# Patient Record
Sex: FEMALE | Race: ASIAN | NOT HISPANIC OR LATINO | Employment: FULL TIME | URBAN - METROPOLITAN AREA
[De-identification: names, ages, dates, MRNs, and addresses within clinical notes are randomized per-mention and may not be internally consistent; named-entity substitution may affect disease eponyms.]

---

## 2021-02-01 ENCOUNTER — AMBULATORY - HEALTHEAST (OUTPATIENT)
Dept: NURSING | Facility: CLINIC | Age: 46
End: 2021-02-01

## 2021-02-22 ENCOUNTER — AMBULATORY - HEALTHEAST (OUTPATIENT)
Dept: NURSING | Facility: CLINIC | Age: 46
End: 2021-02-22

## 2023-10-10 ENCOUNTER — OFFICE VISIT (OUTPATIENT)
Dept: FAMILY MEDICINE | Facility: CLINIC | Age: 48
End: 2023-10-10
Payer: COMMERCIAL

## 2023-10-10 ENCOUNTER — NURSE TRIAGE (OUTPATIENT)
Dept: NURSING | Facility: CLINIC | Age: 48
End: 2023-10-10
Payer: COMMERCIAL

## 2023-10-10 VITALS
RESPIRATION RATE: 22 BRPM | DIASTOLIC BLOOD PRESSURE: 85 MMHG | TEMPERATURE: 99.1 F | SYSTOLIC BLOOD PRESSURE: 154 MMHG | HEART RATE: 90 BPM | OXYGEN SATURATION: 93 %

## 2023-10-10 DIAGNOSIS — R60.0 BILATERAL LOWER EXTREMITY EDEMA: ICD-10-CM

## 2023-10-10 DIAGNOSIS — R06.09 DYSPNEA ON EXERTION: Primary | ICD-10-CM

## 2023-10-10 LAB
ERYTHROCYTE [DISTWIDTH] IN BLOOD BY AUTOMATED COUNT: 15 % (ref 10–15)
HCT VFR BLD AUTO: 42.7 % (ref 35–47)
HGB BLD-MCNC: 14 G/DL (ref 11.7–15.7)
MCH RBC QN AUTO: 29.8 PG (ref 26.5–33)
MCHC RBC AUTO-ENTMCNC: 32.8 G/DL (ref 31.5–36.5)
MCV RBC AUTO: 91 FL (ref 78–100)
PLATELET # BLD AUTO: 124 10E3/UL (ref 150–450)
RBC # BLD AUTO: 4.7 10E6/UL (ref 3.8–5.2)
WBC # BLD AUTO: 16.3 10E3/UL (ref 4–11)

## 2023-10-10 PROCEDURE — 80053 COMPREHEN METABOLIC PANEL: CPT | Performed by: STUDENT IN AN ORGANIZED HEALTH CARE EDUCATION/TRAINING PROGRAM

## 2023-10-10 PROCEDURE — 36415 COLL VENOUS BLD VENIPUNCTURE: CPT | Performed by: STUDENT IN AN ORGANIZED HEALTH CARE EDUCATION/TRAINING PROGRAM

## 2023-10-10 PROCEDURE — 83880 ASSAY OF NATRIURETIC PEPTIDE: CPT | Performed by: STUDENT IN AN ORGANIZED HEALTH CARE EDUCATION/TRAINING PROGRAM

## 2023-10-10 PROCEDURE — 85027 COMPLETE CBC AUTOMATED: CPT | Performed by: STUDENT IN AN ORGANIZED HEALTH CARE EDUCATION/TRAINING PROGRAM

## 2023-10-10 PROCEDURE — 84443 ASSAY THYROID STIM HORMONE: CPT | Performed by: STUDENT IN AN ORGANIZED HEALTH CARE EDUCATION/TRAINING PROGRAM

## 2023-10-10 PROCEDURE — 99204 OFFICE O/P NEW MOD 45 MIN: CPT | Performed by: STUDENT IN AN ORGANIZED HEALTH CARE EDUCATION/TRAINING PROGRAM

## 2023-10-10 NOTE — TELEPHONE ENCOUNTER
Pt calling with concerns about;    Has been experiencing following symptoms intermittently since sudden onset one day last winter of 2023.    States she was walking into a school to  her nephew on an extremely cold/windy day.  When she tried to walk back to her car in parking lot- it took her almost 30 minutes d/t being so out of breath.    Since that time she has had increasingly frequent episodes of unusual fatigue, difficulty doing some physical activities like stairs and taking longer periods of rest to gain any energy.    Sometimes has some wheezing  Pt reports she has not been seen by a provider for long length of time but does have upcoming appt for physical.    Pt is concerned today because of extreme fatigue, some wheezing.    Denies;  Chest pain/pressure  Fever  Severe cough  Signs/sx of cold/flu/recent illness    According to the protocol, patient should go to ED/UC (no PCP for 2LT).  Care advice given. Patient verbalizes understanding and agrees with plan of care. Transferred to scheduling.     Lorraine Rincon RN, Nurse Advisor 3:09 PM 10/10/2023  Reason for Disposition   Patient sounds very sick or weak to the triager    Additional Information   Negative: SEVERE difficulty breathing (e.g., struggling for each breath, speaks in single words, pulse > 120)   Negative: Breathing stopped and hasn't returned   Negative: Choking on something   Negative: Bluish (or gray) lips or face   Negative: Difficult to awaken or acting confused (e.g., disoriented, slurred speech)   Negative: Passed out (i.e., fainted, collapsed and was not responding)   Negative: Wheezing started suddenly after medicine, an allergic food, or bee sting   Negative: Stridor (harsh sound while breathing in)   Negative: Slow, shallow and weak breathing   Negative: Sounds like a life-threatening emergency to the triager   Negative: Chest pain   Negative: Wheezing (high pitched whistling sound) and previous asthma attacks or use of asthma  "medicines   Negative: Difficulty breathing and within 14 days of COVID-19 Exposure   Negative: Difficulty breathing and only present when coughing   Negative: Difficulty breathing and only from stuffy nose   Negative: Difficulty breathing and only from stuffy nose or runny nose from common cold   Negative: MODERATE difficulty breathing (e.g., speaks in phrases, SOB even at rest, pulse 100-120) of new-onset or worse than normal   Negative: Oxygen level (e.g., pulse oximetry) 90% or lower   Negative: Wheezing can be heard across the room   Negative: Drooling or spitting out saliva (because can't swallow)   Negative: Any history of prior \"blood clot\" in leg or lungs   Negative: Illness requiring prolonged bedrest in past month (e.g., immobilization, long hospital stay)   Negative: Hip or leg fracture (broken bone) in past month (or had cast on leg or ankle in past month)   Negative: Major surgery in the past month   Negative: Long-distance travel in past month (e.g., car, bus, train, plane; with trip lasting 6 or more hours)   Negative: Cancer treatment in past six months (or has cancer now)   Negative: Extra heartbeats, irregular heart beating, or heart is beating very fast (i.e., 'palpitations')   Negative: Fever > 103 F (39.4 C)   Negative: Fever > 101 F (38.3 C) and over 60 years of age   Negative: Fever > 100.0 F (37.8 C) and bedridden (e.g., nursing home patient, stroke, chronic illness, recovering from surgery)   Negative: Fever > 100.0 F (37.8 C) and diabetes mellitus or weak immune system (e.g., HIV positive, cancer chemo, splenectomy, organ transplant, chronic steroids)   Negative: Periods where breathing stops and then resumes normally and bedridden (e.g., nursing home patient, CVA)   Negative: Pregnant or postpartum (from 0 to 6 weeks after delivery)    Protocols used: Breathing Difficulty-A-[place]    "

## 2023-10-11 ENCOUNTER — HOSPITAL ENCOUNTER (INPATIENT)
Facility: HOSPITAL | Age: 48
LOS: 6 days | Discharge: HOME | End: 2023-10-17
Attending: EMERGENCY MEDICINE | Admitting: EMERGENCY MEDICINE
Payer: COMMERCIAL

## 2023-10-11 ENCOUNTER — APPOINTMENT (OUTPATIENT)
Dept: CT IMAGING | Facility: HOSPITAL | Age: 48
End: 2023-10-11
Attending: EMERGENCY MEDICINE
Payer: COMMERCIAL

## 2023-10-11 ENCOUNTER — APPOINTMENT (OUTPATIENT)
Dept: ULTRASOUND IMAGING | Facility: HOSPITAL | Age: 48
End: 2023-10-11
Attending: EMERGENCY MEDICINE
Payer: COMMERCIAL

## 2023-10-11 ENCOUNTER — APPOINTMENT (OUTPATIENT)
Dept: RADIOLOGY | Facility: HOSPITAL | Age: 48
End: 2023-10-11
Attending: EMERGENCY MEDICINE
Payer: COMMERCIAL

## 2023-10-11 DIAGNOSIS — R73.9 BLOOD GLUCOSE ELEVATED: ICD-10-CM

## 2023-10-11 DIAGNOSIS — N28.9 RENAL INSUFFICIENCY: ICD-10-CM

## 2023-10-11 DIAGNOSIS — J69.0 ASPIRATION PNEUMONIA OF LOWER LOBE, UNSPECIFIED ASPIRATION PNEUMONIA TYPE, UNSPECIFIED LATERALITY (H): ICD-10-CM

## 2023-10-11 DIAGNOSIS — I50.21 ACUTE SYSTOLIC CONGESTIVE HEART FAILURE (H): ICD-10-CM

## 2023-10-11 DIAGNOSIS — N28.89 RENAL MASS: ICD-10-CM

## 2023-10-11 DIAGNOSIS — J90 PLEURAL EFFUSION: ICD-10-CM

## 2023-10-11 DIAGNOSIS — R60.0 PERIPHERAL EDEMA: ICD-10-CM

## 2023-10-11 DIAGNOSIS — R78.81 BACTEREMIA: ICD-10-CM

## 2023-10-11 DIAGNOSIS — N10 PYELONEPHRITIS, ACUTE: Primary | ICD-10-CM

## 2023-10-11 DIAGNOSIS — I10 HYPERTENSION, UNSPECIFIED TYPE: ICD-10-CM

## 2023-10-11 LAB
ALBUMIN SERPL BCG-MCNC: 3.7 G/DL (ref 3.5–5.2)
ALBUMIN SERPL BCG-MCNC: 3.9 G/DL (ref 3.5–5.2)
ALBUMIN UR-MCNC: 100 MG/DL
ALP SERPL-CCNC: 60 U/L (ref 35–104)
ALP SERPL-CCNC: 77 U/L (ref 35–104)
ALT SERPL W P-5'-P-CCNC: 21 U/L (ref 0–50)
ALT SERPL W P-5'-P-CCNC: 23 U/L (ref 0–50)
ANION GAP SERPL CALCULATED.3IONS-SCNC: 10 MMOL/L (ref 7–15)
ANION GAP SERPL CALCULATED.3IONS-SCNC: 13 MMOL/L (ref 7–15)
APPEARANCE UR: ABNORMAL
AST SERPL W P-5'-P-CCNC: 23 U/L (ref 0–45)
AST SERPL W P-5'-P-CCNC: 25 U/L (ref 0–45)
BACTERIA #/AREA URNS HPF: ABNORMAL /HPF
BASO+EOS+MONOS # BLD AUTO: ABNORMAL 10*3/UL
BASO+EOS+MONOS NFR BLD AUTO: ABNORMAL %
BASOPHILS # BLD AUTO: 0 10E3/UL (ref 0–0.2)
BASOPHILS NFR BLD AUTO: 0 %
BILIRUB SERPL-MCNC: 1.8 MG/DL
BILIRUB SERPL-MCNC: 1.9 MG/DL
BILIRUB UR QL STRIP: NEGATIVE
BUN SERPL-MCNC: 16.8 MG/DL (ref 6–20)
BUN SERPL-MCNC: 20.6 MG/DL (ref 6–20)
CALCIUM SERPL-MCNC: 8.8 MG/DL (ref 8.6–10)
CALCIUM SERPL-MCNC: 9 MG/DL (ref 8.6–10)
CHLORIDE SERPL-SCNC: 104 MMOL/L (ref 98–107)
CHLORIDE SERPL-SCNC: 105 MMOL/L (ref 98–107)
CLUE CELLS: ABNORMAL
COLOR UR AUTO: YELLOW
CREAT SERPL-MCNC: 0.91 MG/DL (ref 0.51–0.95)
CREAT SERPL-MCNC: 0.98 MG/DL (ref 0.51–0.95)
DEPRECATED HCO3 PLAS-SCNC: 24 MMOL/L (ref 22–29)
DEPRECATED HCO3 PLAS-SCNC: 25 MMOL/L (ref 22–29)
EGFRCR SERPLBLD CKD-EPI 2021: 70 ML/MIN/1.73M2
EGFRCR SERPLBLD CKD-EPI 2021: 77 ML/MIN/1.73M2
EOSINOPHIL # BLD AUTO: 0 10E3/UL (ref 0–0.7)
EOSINOPHIL NFR BLD AUTO: 0 %
ERYTHROCYTE [DISTWIDTH] IN BLOOD BY AUTOMATED COUNT: 15.3 % (ref 10–15)
GLUCOSE SERPL-MCNC: 137 MG/DL (ref 70–99)
GLUCOSE SERPL-MCNC: 144 MG/DL (ref 70–99)
GLUCOSE UR STRIP-MCNC: NEGATIVE MG/DL
HBA1C MFR BLD: 5.8 %
HCT VFR BLD AUTO: 47.9 % (ref 35–47)
HGB BLD-MCNC: 15.3 G/DL (ref 11.7–15.7)
HGB UR QL STRIP: ABNORMAL
HOLD SPECIMEN: NORMAL
HYALINE CASTS: 4 /LPF
IMM GRANULOCYTES # BLD: 0.1 10E3/UL
IMM GRANULOCYTES NFR BLD: 1 %
KETONES UR STRIP-MCNC: ABNORMAL MG/DL
LACTATE SERPL-SCNC: 1.5 MMOL/L (ref 0.7–2)
LEUKOCYTE ESTERASE UR QL STRIP: ABNORMAL
LYMPHOCYTES # BLD AUTO: 0.6 10E3/UL (ref 0.8–5.3)
LYMPHOCYTES NFR BLD AUTO: 4 %
MCH RBC QN AUTO: 29.1 PG (ref 26.5–33)
MCHC RBC AUTO-ENTMCNC: 31.9 G/DL (ref 31.5–36.5)
MCV RBC AUTO: 91 FL (ref 78–100)
MONOCYTES # BLD AUTO: 0.9 10E3/UL (ref 0–1.3)
MONOCYTES NFR BLD AUTO: 6 %
MUCOUS THREADS #/AREA URNS LPF: PRESENT /LPF
NEUTROPHILS # BLD AUTO: 14.7 10E3/UL (ref 1.6–8.3)
NEUTROPHILS NFR BLD AUTO: 89 %
NITRATE UR QL: POSITIVE
NRBC # BLD AUTO: 0 10E3/UL
NRBC BLD AUTO-RTO: 0 /100
NT-PROBNP SERPL-MCNC: 5007 PG/ML (ref 0–450)
NT-PROBNP SERPL-MCNC: 5019 PG/ML (ref 0–450)
PH UR STRIP: 6 [PH] (ref 5–7)
PLATELET # BLD AUTO: 120 10E3/UL (ref 150–450)
POTASSIUM SERPL-SCNC: 3.4 MMOL/L (ref 3.4–5.3)
POTASSIUM SERPL-SCNC: 3.9 MMOL/L (ref 3.4–5.3)
PROT SERPL-MCNC: 7.5 G/DL (ref 6.4–8.3)
PROT SERPL-MCNC: 8.3 G/DL (ref 6.4–8.3)
RBC # BLD AUTO: 5.25 10E6/UL (ref 3.8–5.2)
RBC URINE: 14 /HPF
SODIUM SERPL-SCNC: 140 MMOL/L (ref 135–145)
SODIUM SERPL-SCNC: 141 MMOL/L (ref 135–145)
SP GR UR STRIP: 1.03 (ref 1–1.03)
SQUAMOUS EPITHELIAL: 2 /HPF
TRICHOMONAS, WET PREP: ABNORMAL
TROPONIN T SERPL HS-MCNC: 17 NG/L
TSH SERPL DL<=0.005 MIU/L-ACNC: 1.3 UIU/ML (ref 0.3–4.2)
UROBILINOGEN UR STRIP-MCNC: 4 MG/DL
WBC # BLD AUTO: 16.4 10E3/UL (ref 4–11)
WBC CLUMPS #/AREA URNS HPF: PRESENT /HPF
WBC URINE: >182 /HPF
WBC'S/HIGH POWER FIELD, WET PREP: ABNORMAL
YEAST, WET PREP: ABNORMAL

## 2023-10-11 PROCEDURE — 96366 THER/PROPH/DIAG IV INF ADDON: CPT

## 2023-10-11 PROCEDURE — 84484 ASSAY OF TROPONIN QUANT: CPT | Performed by: EMERGENCY MEDICINE

## 2023-10-11 PROCEDURE — 250N000011 HC RX IP 250 OP 636: Mod: JZ | Performed by: EMERGENCY MEDICINE

## 2023-10-11 PROCEDURE — 83880 ASSAY OF NATRIURETIC PEPTIDE: CPT | Performed by: EMERGENCY MEDICINE

## 2023-10-11 PROCEDURE — 74177 CT ABD & PELVIS W/CONTRAST: CPT

## 2023-10-11 PROCEDURE — 250N000013 HC RX MED GY IP 250 OP 250 PS 637: Performed by: EMERGENCY MEDICINE

## 2023-10-11 PROCEDURE — 36415 COLL VENOUS BLD VENIPUNCTURE: CPT | Performed by: EMERGENCY MEDICINE

## 2023-10-11 PROCEDURE — 83036 HEMOGLOBIN GLYCOSYLATED A1C: CPT | Performed by: EMERGENCY MEDICINE

## 2023-10-11 PROCEDURE — 93005 ELECTROCARDIOGRAM TRACING: CPT | Performed by: EMERGENCY MEDICINE

## 2023-10-11 PROCEDURE — 96375 TX/PRO/DX INJ NEW DRUG ADDON: CPT

## 2023-10-11 PROCEDURE — 120N000001 HC R&B MED SURG/OB

## 2023-10-11 PROCEDURE — 258N000003 HC RX IP 258 OP 636: Performed by: EMERGENCY MEDICINE

## 2023-10-11 PROCEDURE — 87210 SMEAR WET MOUNT SALINE/INK: CPT | Performed by: EMERGENCY MEDICINE

## 2023-10-11 PROCEDURE — 99285 EMERGENCY DEPT VISIT HI MDM: CPT | Mod: 25

## 2023-10-11 PROCEDURE — 93970 EXTREMITY STUDY: CPT

## 2023-10-11 PROCEDURE — 85025 COMPLETE CBC W/AUTO DIFF WBC: CPT | Performed by: EMERGENCY MEDICINE

## 2023-10-11 PROCEDURE — 96365 THER/PROPH/DIAG IV INF INIT: CPT | Mod: 59

## 2023-10-11 PROCEDURE — 81001 URINALYSIS AUTO W/SCOPE: CPT | Performed by: STUDENT IN AN ORGANIZED HEALTH CARE EDUCATION/TRAINING PROGRAM

## 2023-10-11 PROCEDURE — 87086 URINE CULTURE/COLONY COUNT: CPT | Performed by: STUDENT IN AN ORGANIZED HEALTH CARE EDUCATION/TRAINING PROGRAM

## 2023-10-11 PROCEDURE — 84145 PROCALCITONIN (PCT): CPT | Performed by: FAMILY MEDICINE

## 2023-10-11 PROCEDURE — 99223 1ST HOSP IP/OBS HIGH 75: CPT | Performed by: EMERGENCY MEDICINE

## 2023-10-11 PROCEDURE — 87077 CULTURE AEROBIC IDENTIFY: CPT | Performed by: EMERGENCY MEDICINE

## 2023-10-11 PROCEDURE — 71046 X-RAY EXAM CHEST 2 VIEWS: CPT

## 2023-10-11 PROCEDURE — 71275 CT ANGIOGRAPHY CHEST: CPT

## 2023-10-11 PROCEDURE — 87491 CHLMYD TRACH DNA AMP PROBE: CPT | Performed by: EMERGENCY MEDICINE

## 2023-10-11 PROCEDURE — 96367 TX/PROPH/DG ADDL SEQ IV INF: CPT

## 2023-10-11 PROCEDURE — 83605 ASSAY OF LACTIC ACID: CPT | Performed by: EMERGENCY MEDICINE

## 2023-10-11 PROCEDURE — 87149 DNA/RNA DIRECT PROBE: CPT | Performed by: EMERGENCY MEDICINE

## 2023-10-11 PROCEDURE — 80053 COMPREHEN METABOLIC PANEL: CPT | Performed by: EMERGENCY MEDICINE

## 2023-10-11 RX ORDER — CEFTRIAXONE 2 G/1
2 INJECTION, POWDER, FOR SOLUTION INTRAMUSCULAR; INTRAVENOUS EVERY 24 HOURS
Status: DISCONTINUED | OUTPATIENT
Start: 2023-10-12 | End: 2023-10-17 | Stop reason: HOSPADM

## 2023-10-11 RX ORDER — HYDRALAZINE HYDROCHLORIDE 20 MG/ML
5 INJECTION INTRAMUSCULAR; INTRAVENOUS EVERY 6 HOURS PRN
Status: DISCONTINUED | OUTPATIENT
Start: 2023-10-11 | End: 2023-10-13

## 2023-10-11 RX ORDER — AMOXICILLIN 250 MG
2 CAPSULE ORAL 2 TIMES DAILY PRN
Status: DISCONTINUED | OUTPATIENT
Start: 2023-10-11 | End: 2023-10-17 | Stop reason: HOSPADM

## 2023-10-11 RX ORDER — CEFTRIAXONE 2 G/1
2 INJECTION, POWDER, FOR SOLUTION INTRAMUSCULAR; INTRAVENOUS EVERY 24 HOURS
Status: DISCONTINUED | OUTPATIENT
Start: 2023-10-12 | End: 2023-10-11

## 2023-10-11 RX ORDER — ENOXAPARIN SODIUM 100 MG/ML
40 INJECTION SUBCUTANEOUS EVERY 12 HOURS
Status: DISCONTINUED | OUTPATIENT
Start: 2023-10-11 | End: 2023-10-17 | Stop reason: HOSPADM

## 2023-10-11 RX ORDER — CEFTRIAXONE 2 G/1
2 INJECTION, POWDER, FOR SOLUTION INTRAMUSCULAR; INTRAVENOUS ONCE
Status: COMPLETED | OUTPATIENT
Start: 2023-10-11 | End: 2023-10-11

## 2023-10-11 RX ORDER — DEXTROSE MONOHYDRATE 25 G/50ML
25-50 INJECTION, SOLUTION INTRAVENOUS
Status: DISCONTINUED | OUTPATIENT
Start: 2023-10-11 | End: 2023-10-17 | Stop reason: HOSPADM

## 2023-10-11 RX ORDER — LANOLIN ALCOHOL/MO/W.PET/CERES
1000 CREAM (GRAM) TOPICAL DAILY
COMMUNITY

## 2023-10-11 RX ORDER — ASPIRIN 81 MG/1
81 TABLET, CHEWABLE ORAL ONCE
Status: COMPLETED | OUTPATIENT
Start: 2023-10-11 | End: 2023-10-11

## 2023-10-11 RX ORDER — FUROSEMIDE 10 MG/ML
20 INJECTION INTRAMUSCULAR; INTRAVENOUS ONCE
Status: COMPLETED | OUTPATIENT
Start: 2023-10-11 | End: 2023-10-11

## 2023-10-11 RX ORDER — IOPAMIDOL 755 MG/ML
90 INJECTION, SOLUTION INTRAVASCULAR ONCE
Status: COMPLETED | OUTPATIENT
Start: 2023-10-11 | End: 2023-10-11

## 2023-10-11 RX ORDER — MULTIVITAMIN,THERAPEUTIC
1 TABLET ORAL DAILY
COMMUNITY

## 2023-10-11 RX ORDER — FERROUS SULFATE 324(65)MG
1 TABLET, DELAYED RELEASE (ENTERIC COATED) ORAL DAILY
COMMUNITY

## 2023-10-11 RX ORDER — LANOLIN ALCOHOL/MO/W.PET/CERES
1000 CREAM (GRAM) TOPICAL DAILY
Status: DISCONTINUED | OUTPATIENT
Start: 2023-10-12 | End: 2023-10-17 | Stop reason: HOSPADM

## 2023-10-11 RX ORDER — ONDANSETRON 2 MG/ML
4 INJECTION INTRAMUSCULAR; INTRAVENOUS EVERY 6 HOURS PRN
Status: DISCONTINUED | OUTPATIENT
Start: 2023-10-11 | End: 2023-10-17 | Stop reason: HOSPADM

## 2023-10-11 RX ORDER — ACETAMINOPHEN 325 MG/1
975 TABLET ORAL ONCE
Status: COMPLETED | OUTPATIENT
Start: 2023-10-11 | End: 2023-10-11

## 2023-10-11 RX ORDER — ONDANSETRON 2 MG/ML
4 INJECTION INTRAMUSCULAR; INTRAVENOUS ONCE
Status: COMPLETED | OUTPATIENT
Start: 2023-10-11 | End: 2023-10-11

## 2023-10-11 RX ORDER — MULTIVITAMIN,THERAPEUTIC
1 TABLET ORAL DAILY
Status: DISCONTINUED | OUTPATIENT
Start: 2023-10-12 | End: 2023-10-17 | Stop reason: HOSPADM

## 2023-10-11 RX ORDER — AMOXICILLIN 250 MG
1 CAPSULE ORAL 2 TIMES DAILY PRN
Status: DISCONTINUED | OUTPATIENT
Start: 2023-10-11 | End: 2023-10-17 | Stop reason: HOSPADM

## 2023-10-11 RX ORDER — ACETAMINOPHEN 650 MG/1
650 SUPPOSITORY RECTAL EVERY 6 HOURS PRN
Status: DISCONTINUED | OUTPATIENT
Start: 2023-10-11 | End: 2023-10-17 | Stop reason: HOSPADM

## 2023-10-11 RX ORDER — ACETAMINOPHEN 325 MG/1
650 TABLET ORAL EVERY 6 HOURS PRN
Status: DISCONTINUED | OUTPATIENT
Start: 2023-10-11 | End: 2023-10-17 | Stop reason: HOSPADM

## 2023-10-11 RX ORDER — FERROUS SULFATE 325(65) MG
324 TABLET ORAL DAILY
Status: DISCONTINUED | OUTPATIENT
Start: 2023-10-12 | End: 2023-10-12

## 2023-10-11 RX ORDER — NICOTINE POLACRILEX 4 MG
15-30 LOZENGE BUCCAL
Status: DISCONTINUED | OUTPATIENT
Start: 2023-10-11 | End: 2023-10-17 | Stop reason: HOSPADM

## 2023-10-11 RX ORDER — ONDANSETRON 4 MG/1
4 TABLET, ORALLY DISINTEGRATING ORAL EVERY 6 HOURS PRN
Status: DISCONTINUED | OUTPATIENT
Start: 2023-10-11 | End: 2023-10-17 | Stop reason: HOSPADM

## 2023-10-11 RX ADMIN — ASPIRIN 81 MG CHEWABLE TABLET 81 MG: 81 TABLET CHEWABLE at 15:41

## 2023-10-11 RX ADMIN — FUROSEMIDE 20 MG: 10 INJECTION, SOLUTION INTRAMUSCULAR; INTRAVENOUS at 16:12

## 2023-10-11 RX ADMIN — IOPAMIDOL 90 ML: 755 INJECTION, SOLUTION INTRAVENOUS at 13:12

## 2023-10-11 RX ADMIN — CEFTRIAXONE SODIUM 2 G: 2 INJECTION, POWDER, FOR SOLUTION INTRAMUSCULAR; INTRAVENOUS at 13:45

## 2023-10-11 RX ADMIN — ACETAMINOPHEN 975 MG: 325 TABLET ORAL at 16:22

## 2023-10-11 RX ADMIN — NITROGLYCERIN 15 MG: 20 OINTMENT TOPICAL at 15:40

## 2023-10-11 RX ADMIN — ENOXAPARIN SODIUM 40 MG: 40 INJECTION SUBCUTANEOUS at 21:43

## 2023-10-11 RX ADMIN — AZITHROMYCIN MONOHYDRATE 500 MG: 500 INJECTION, POWDER, LYOPHILIZED, FOR SOLUTION INTRAVENOUS at 16:08

## 2023-10-11 ASSESSMENT — ACTIVITIES OF DAILY LIVING (ADL)
ADLS_ACUITY_SCORE: 35

## 2023-10-11 ASSESSMENT — ENCOUNTER SYMPTOMS: SHORTNESS OF BREATH: 1

## 2023-10-11 NOTE — ED NOTES
EMERGENCY DEPARTMENT SIGN OUT NOTE        ED COURSE AND MEDICAL DECISION MAKING  Patient was signed out to me by Dr Isabel Dawson at 2:31 PM.    In brief, Betty Pan is a 48 year old female who initially presented Increasing LE edema with elevated BNP, hypertensive.  US and CTs performed, neg for DVT/PE.  CT abd imaging showing pyelo and CT chest showing effusions and/or pneumonia.  Treating with IV antibiotics.       At time of sign out, disposition was pending attempt to transfer if there is another bed available.     5:00 PM No beds in system. Will admit here.    5:27 PM I spoke with hospitalist, Dr. Zepeda, who accepts for inpatient tele.       FINAL IMPRESSION    1. Pyelonephritis, acute    2. Hypertension, unspecified type    3. Peripheral edema    4. Aspiration pneumonia of lower lobe, unspecified aspiration pneumonia type, unspecified laterality (H)    5. Pleural effusion    6. Renal insufficiency    7. Blood glucose elevated    8. Renal mass        ED MEDS  Medications   nitroGLYcerin (NITRO-BID) 2 % ointment 15 mg (15 mg Transdermal $Patch/Med Applied 10/11/23 1540)   cefTRIAXone (ROCEPHIN) 2 g vial to attach to  ml bag for ADULTS or NS 50 ml bag for PEDS (0 g Intravenous Stopped 10/11/23 1611)   iopamidol (ISOVUE-370) solution 90 mL (90 mLs Intravenous $Given 10/11/23 1312)   azithromycin (ZITHROMAX) 500 mg in sodium chloride 0.9 % 250 mL intermittent infusion (0 mg Intravenous Stopped 10/11/23 1742)   furosemide (LASIX) injection 20 mg (20 mg Intravenous $Given 10/11/23 1612)   aspirin (ASA) chewable tablet 81 mg (81 mg Oral $Given 10/11/23 1541)   acetaminophen (TYLENOL) tablet 975 mg (975 mg Oral $Given 10/11/23 1622)   ondansetron (ZOFRAN) injection 4 mg (4 mg Intravenous Not Given 10/11/23 1841)       LAB  Labs Ordered and Resulted from Time of ED Arrival to Time of ED Departure   COMPREHENSIVE METABOLIC PANEL - Abnormal       Result Value    Sodium 141      Potassium 3.4      Carbon  Dioxide (CO2) 24      Anion Gap 13      Urea Nitrogen 20.6 (*)     Creatinine 0.98 (*)     GFR Estimate 70      Calcium 9.0      Chloride 104      Glucose 144 (*)     Alkaline Phosphatase 77      AST 23      ALT 23      Protein Total 8.3      Albumin 3.9      Bilirubin Total 1.9 (*)    TROPONIN T, HIGH SENSITIVITY - Abnormal    Troponin T, High Sensitivity 17 (*)    NT PROBNP INPATIENT - Abnormal    N terminal Pro BNP Inpatient 5,007 (*)    CBC WITH PLATELETS AND DIFFERENTIAL - Abnormal    WBC Count 16.4 (*)     RBC Count 5.25 (*)     Hemoglobin 15.3      Hematocrit 47.9 (*)     MCV 91      MCH 29.1      MCHC 31.9      RDW 15.3 (*)     Platelet Count 120 (*)     % Neutrophils 89      % Lymphocytes 4      % Monocytes 6      Mids % (Monos, Eos, Basos)        % Eosinophils 0      % Basophils 0      % Immature Granulocytes 1      NRBCs per 100 WBC 0      Absolute Neutrophils 14.7 (*)     Absolute Lymphocytes 0.6 (*)     Absolute Monocytes 0.9      Mids Abs (Monos, Eos, Basos)        Absolute Eosinophils 0.0      Absolute Basophils 0.0      Absolute Immature Granulocytes 0.1      Absolute NRBCs 0.0     ROUTINE UA WITH MICROSCOPIC REFLEX TO CULTURE - Abnormal    Color Urine Yellow      Appearance Urine Cloudy (*)     Glucose Urine Negative      Bilirubin Urine Negative      Ketones Urine Trace (*)     Specific Gravity Urine 1.029      Blood Urine 0.5 mg/dL (*)     pH Urine 6.0      Protein Albumin Urine 100 (*)     Urobilinogen Urine 4.0 (*)     Nitrite Urine Positive (*)     Leukocyte Esterase Urine 500 Christian/uL (*)     Bacteria Urine Few (*)     WBC Clumps Urine Present (*)     Mucus Urine Present (*)     RBC Urine 14 (*)     WBC Urine >182 (*)     Squamous Epithelials Urine 2 (*)     Hyaline Casts Urine 4 (*)    HEMOGLOBIN A1C - Abnormal    Hemoglobin A1C 5.8 (*)    WET PREPARATION - Abnormal    Trichomonas Absent      Yeast Absent      Clue Cells Absent      WBCs/high power field 1+ (*)    LACTIC ACID WHOLE BLOOD -  Normal    Lactic Acid 1.5     URINE CULTURE   BLOOD CULTURE   BLOOD CULTURE   CHLAMYDIA TRACHOMATIS/NEISSERIA GONORRHOEAE BY PCR       RADIOLOGY    CT Abdomen Pelvis w Contrast   Final Result   IMPRESSION:    1.  No findings to explain lower extremity edema.      2.  Geographic hypoenhancing areas in the upper poles of both kidneys most suggestive of pyelonephritis. Consider follow-up renal CT or MRI in 4-6 weeks to ensure complete resolution.      CT Chest Pulmonary Embolism w Contrast   Final Result   IMPRESSION:   1.  No acute pulmonary emboli.      2.  Small right pleural effusion with associated middle lobe and right lower lobe consolidation, which could represent compressive atelectasis or pneumonia.      US Lower Extremity Venous Duplex Bilateral   Final Result   IMPRESSION:   1.  No deep venous thrombosis in the bilateral lower extremities.   2.  Bilateral popliteal fossa Baker's cysts, larger on the right.      XR Chest 2 Views   Final Result   IMPRESSION: There is cardiomegaly, pulmonary vascular congestion, perihilar edema and bilateral small effusions, right greater than left. Findings consistent with failure.      No signs of pneumonia.          DISCHARGE MEDS  New Prescriptions    No medications on file         I, Bria Agarwal, am serving as a scribe to document services personally performed by Dr. Beto Parry, based on my observations and the provider's statements to me. I, Beto Parry, DO attest that Bria Agarwal is acting in a scribe capacity, has observed my performance of the services and has documented them in accordance with my direction.    Beto Parry DO  [facility] EMERGENCY DEPARTMENT  [address] [place] [id number]  [phone]       Beto Parry MD  10/11/23 9504

## 2023-10-11 NOTE — ED TRIAGE NOTES
Patient states she was seen at urgent care yesterday and the MD called her back this AM to be seen in the ED. Patient reports 1 year hx of shortness of breath with exertion and fatigue.      Triage Assessment (Adult)       Row Name 10/11/23 1044          Triage Assessment    Airway WDL WDL        Respiratory WDL    Respiratory WDL X;rhythm/pattern     Rhythm/Pattern, Respiratory shortness of breath;other (see comments)  dyspnea on exertion        Skin Circulation/Temperature WDL    Skin Circulation/Temperature WDL WDL        Cardiac WDL    Cardiac WDL X;rhythm     Pulse Rate & Regularity tachycardic

## 2023-10-11 NOTE — ED NOTES
Expected Patient Referral to ED  9:34 AM    Referring Clinic/Provider:  Dr. Flower at [facility] walk in clinic (yesterday), but coming from home    Reason for referral/Clinical facts:  1 year worsening fatigue and SOB, BNP 5000, sleeping upright in chair. Sending in for eval and diuresis.    Recommendations provided:  Send to ED for further evaluation    Caller was informed that this institution does possess the capabilities and/or resources to provide for patient and should be transferred to our facility.    Discussed that if direct admit is sought and any hurdles are encountered, this ED would be happy to see the patient and evaluate.    Informed caller that recommendations provided are recommendations based only on the facts provided and that they responsible to accept or reject the advice, or to seek a formal in person consultation as needed and that this ED will see/treat patient should they arrive.      Richard Sharma MD  [facility] EMERGENCY DEPARTMENT  [address] [place] [id number]  [phone]       Richrad Sharma MD  10/11/23 0916

## 2023-10-11 NOTE — Clinical Note
dry, intact, no bleeding and no hematoma. Right femoral arterial sheath sutured in place and site covered with a tegaderm dressing.

## 2023-10-11 NOTE — Clinical Note
Unable to advance guidewire via right femoral artery. Decision made to get femoral venous access and right radial arterial access.

## 2023-10-11 NOTE — H&P
ADMISSION HISTORY & PHYSICAL      No Ref-Primary, Physician, None  ASSESSMENT AND PLAN:  48 year old female presenting with pyelonephritis, possible heart failure versus pneumonia.    Pyelonephritis: Abdomen/pelvic CT shows hypoenhancing areas in the upper poles of both kidneys most suggestive of pyelonephritis.  Recommend follow-up renal CT or MRI in 4 to 6 weeks to ensure complete resolution.  UA is consistent with infection with white count of 16.4 but lactate is normal and she is not hypotensive.  Temperature elevated on multiple occasions up to 100.3.  Started on IV Zosyn.  Right sided pneumonia with small right pleural effusion with associated middle lobe and right lower lobe consolidation.  Already on antibiotics as above.  BNP is elevated which could occur with pneumonia but she does have evidence of CHF clinically with chronically progressive dyspnea and lower extremity edema.  We will check echocardiogram.  Given 1 dose of IV Lasix in the ED, will see how she responds and we will hold off on further diuresis until further evaluation completed, especially given her fever and evidence of infection which would raise a concern for sepsis.  Acute hypoxic respiratory failure: On 2 L for O2 sats of 84% and now satting in the mid 90s.  Hyperglycemia with new diagnosis of prediabetes: Hemoglobin A1c 5.8, diabetic order set started.  Hypertension: We will monitor, would want avoid hypotension in the context of potential infection.  As needed hydralazine ordered for extremely high pressures.  History of cervical cancer: Would need follow-up imaging of her kidneys as described above to rule out mass.    Barriers to discharge: Pyelonephritis, IV antibiotics, dyspnea      CHIEF COMPLAINT:  Dyspnea    HISTORY OF PRESENTING ILLNESS:  Betty Pan is a 48 year old female who presented to urgent care yesterday for evaluation of progressive dyspnea on exertion and at rest over the past year.  She finally got insurance and  presented at her family's insistence.  She was found to have abnormal labs with BNP greater than 5000.  Interestingly, she denies any UTI symptoms despite her UA and CT findings.  Her symptoms sound more consistent with heart failure although pneumonia could certainly be an issue especially given her fever.  She denies any chest pain, no UTI symptoms including urinary frequency or urgency or dysuria or flank pain.  Does have a fever in the ED, denies severe coughing.  Does report lower extremity edema gradually progressive over the last year.    PMH/PSH:  Patient Active Problem List   Diagnosis    Pyelonephritis, acute    Peripheral edema    Pleural effusion    Renal mass    Blood glucose elevated    Renal insufficiency    Aspiration pneumonia of lower lobe, unspecified aspiration pneumonia type, unspecified laterality (H)    Hypertension, unspecified type       ALLERGIES:  No Known Allergies    MEDICATIONS:  Reviewed.  Current Facility-Administered Medications   Medication    nitroGLYcerin (NITRO-BID) 2 % ointment 15 mg    ondansetron (ZOFRAN) injection 4 mg     Current Outpatient Medications   Medication    cyanocobalamin (VITAMIN B-12) 1000 MCG tablet    Ferrous Sulfate 324 (65 Fe) MG TBEC    multivitamin, therapeutic (THERA-VIT) TABS tablet       SOCIAL HISTORY:  Social History     Socioeconomic History    Marital status: Single     Spouse name: Not on file    Number of children: Not on file    Years of education: Not on file    Highest education level: Not on file   Occupational History    Not on file   Tobacco Use    Smoking status: Never    Smokeless tobacco: Not on file   Substance and Sexual Activity    Alcohol use: Not on file    Drug use: Not on file    Sexual activity: Not on file   Other Topics Concern    Not on file   Social History Narrative    Not on file     Social Determinants of Health     Financial Resource Strain: Not on file   Food Insecurity: Not on file   Transportation Needs: Not on file  "  Physical Activity: Not on file   Stress: Not on file   Social Connections: Not on file   Interpersonal Safety: Not on file   Housing Stability: Not on file       FAMILY HISTORY:  History reviewed. No pertinent family history.    ROS:  12 point ROS was performed and found to be negative except for the pertinent positives mentioned in the HPI.      PHYSICAL EXAM:  BP (!) 141/77   Pulse 90   Temp (!) 102.2  F (39  C) (Oral)   Resp 23   Ht 1.499 m (4' 11\")   SpO2 93%   BMI 32.32 kg/m    No intake/output data recorded.  I/O this shift:  In: 250 [IV Piggyback:250]  Out: -   CONSTITUTIONAL: No apparent distress  HEENT:       Sclera- anicteric      Mucous membrane- moist and pink  LUNGS: Decreased breath sounds bilaterally  CARDIOVASCULAR: S1S2 regular. No murmurs, rubs or gallops, 1+ bilateral equal pedal edema  GI: Soft. Non-tender, non-distended. No organomegaly. No guarding or rigidity. Bowel sounds- active  NEURO: Cranial nerves II-XII grossly intact. No focal neurological deficit. No involuntary movements  INTGM: No jaundice, no cyanosis or clubbing  LYMPH:   MSK: no joint swelling or tenderness  PSYCH: alert and oriented , no agitation      DIAGNOSTIC DATA:  Recent Results (from the past 24 hour(s))   Comprehensive metabolic panel (BMP + Alb, Alk Phos, ALT, AST, Total. Bili, TP)    Collection Time: 10/10/23  8:12 PM   Result Value Ref Range    Sodium 140 135 - 145 mmol/L    Potassium 3.9 3.4 - 5.3 mmol/L    Carbon Dioxide (CO2) 25 22 - 29 mmol/L    Anion Gap 10 7 - 15 mmol/L    Urea Nitrogen 16.8 6.0 - 20.0 mg/dL    Creatinine 0.91 0.51 - 0.95 mg/dL    GFR Estimate 77 >60 mL/min/1.73m2    Calcium 8.8 8.6 - 10.0 mg/dL    Chloride 105 98 - 107 mmol/L    Glucose 137 (H) 70 - 99 mg/dL    Alkaline Phosphatase 60 35 - 104 U/L    AST 25 0 - 45 U/L    ALT 21 0 - 50 U/L    Protein Total 7.5 6.4 - 8.3 g/dL    Albumin 3.7 3.5 - 5.2 g/dL    Bilirubin Total 1.8 (H) <=1.2 mg/dL   CBC with platelets    Collection Time: " 10/10/23  8:12 PM   Result Value Ref Range    WBC Count 16.3 (H) 4.0 - 11.0 10e3/uL    RBC Count 4.70 3.80 - 5.20 10e6/uL    Hemoglobin 14.0 11.7 - 15.7 g/dL    Hematocrit 42.7 35.0 - 47.0 %    MCV 91 78 - 100 fL    MCH 29.8 26.5 - 33.0 pg    MCHC 32.8 31.5 - 36.5 g/dL    RDW 15.0 10.0 - 15.0 %    Platelet Count 124 (L) 150 - 450 10e3/uL   TSH with free T4 reflex    Collection Time: 10/10/23  8:12 PM   Result Value Ref Range    TSH 1.30 0.30 - 4.20 uIU/mL   BNP-N terminal pro    Collection Time: 10/10/23  8:12 PM   Result Value Ref Range    N Terminal Pro BNP Outpatient 5,019 (H) 0 - 450 pg/mL   Extra Blue Top Tube    Collection Time: 10/11/23 10:57 AM   Result Value Ref Range    Hold Specimen JIC    Extra Red Top Tube    Collection Time: 10/11/23 10:57 AM   Result Value Ref Range    Hold Specimen JIC    Extra Green Top (Lithium Heparin) Tube    Collection Time: 10/11/23 10:57 AM   Result Value Ref Range    Hold Specimen JIC    Extra Purple Top Tube    Collection Time: 10/11/23 10:57 AM   Result Value Ref Range    Hold Specimen JIC    Comprehensive metabolic panel    Collection Time: 10/11/23 10:57 AM   Result Value Ref Range    Sodium 141 135 - 145 mmol/L    Potassium 3.4 3.4 - 5.3 mmol/L    Carbon Dioxide (CO2) 24 22 - 29 mmol/L    Anion Gap 13 7 - 15 mmol/L    Urea Nitrogen 20.6 (H) 6.0 - 20.0 mg/dL    Creatinine 0.98 (H) 0.51 - 0.95 mg/dL    GFR Estimate 70 >60 mL/min/1.73m2    Calcium 9.0 8.6 - 10.0 mg/dL    Chloride 104 98 - 107 mmol/L    Glucose 144 (H) 70 - 99 mg/dL    Alkaline Phosphatase 77 35 - 104 U/L    AST 23 0 - 45 U/L    ALT 23 0 - 50 U/L    Protein Total 8.3 6.4 - 8.3 g/dL    Albumin 3.9 3.5 - 5.2 g/dL    Bilirubin Total 1.9 (H) <=1.2 mg/dL   Troponin T, High Sensitivity    Collection Time: 10/11/23 10:57 AM   Result Value Ref Range    Troponin T, High Sensitivity 17 (H) <=14 ng/L   Nt probnp inpatient (BNP)    Collection Time: 10/11/23 10:57 AM   Result Value Ref Range    N terminal Pro BNP  Inpatient 5,007 (H) 0 - 450 pg/mL   CBC with platelets and differential    Collection Time: 10/11/23 10:57 AM   Result Value Ref Range    WBC Count 16.4 (H) 4.0 - 11.0 10e3/uL    RBC Count 5.25 (H) 3.80 - 5.20 10e6/uL    Hemoglobin 15.3 11.7 - 15.7 g/dL    Hematocrit 47.9 (H) 35.0 - 47.0 %    MCV 91 78 - 100 fL    MCH 29.1 26.5 - 33.0 pg    MCHC 31.9 31.5 - 36.5 g/dL    RDW 15.3 (H) 10.0 - 15.0 %    Platelet Count 120 (L) 150 - 450 10e3/uL    % Neutrophils 89 %    % Lymphocytes 4 %    % Monocytes 6 %    Mids % (Monos, Eos, Basos)      % Eosinophils 0 %    % Basophils 0 %    % Immature Granulocytes 1 %    NRBCs per 100 WBC 0 <1 /100    Absolute Neutrophils 14.7 (H) 1.6 - 8.3 10e3/uL    Absolute Lymphocytes 0.6 (L) 0.8 - 5.3 10e3/uL    Absolute Monocytes 0.9 0.0 - 1.3 10e3/uL    Mids Abs (Monos, Eos, Basos)      Absolute Eosinophils 0.0 0.0 - 0.7 10e3/uL    Absolute Basophils 0.0 0.0 - 0.2 10e3/uL    Absolute Immature Granulocytes 0.1 <=0.4 10e3/uL    Absolute NRBCs 0.0 10e3/uL   Hemoglobin A1c    Collection Time: 10/11/23 10:57 AM   Result Value Ref Range    Hemoglobin A1C 5.8 (H) <5.7 %   UA with Microscopic reflex to Culture    Collection Time: 10/11/23 11:53 AM    Specimen: Urine, Midstream   Result Value Ref Range    Color Urine Yellow Colorless, Straw, Light Yellow, Yellow    Appearance Urine Cloudy (A) Clear    Glucose Urine Negative Negative mg/dL    Bilirubin Urine Negative Negative    Ketones Urine Trace (A) Negative mg/dL    Specific Gravity Urine 1.029 1.001 - 1.030    Blood Urine 0.5 mg/dL (A) Negative    pH Urine 6.0 5.0 - 7.0    Protein Albumin Urine 100 (A) Negative mg/dL    Urobilinogen Urine 4.0 (A) <2.0 mg/dL    Nitrite Urine Positive (A) Negative    Leukocyte Esterase Urine 500 Christian/uL (A) Negative    Bacteria Urine Few (A) None Seen /HPF    WBC Clumps Urine Present (A) None Seen /HPF    Mucus Urine Present (A) None Seen /LPF    RBC Urine 14 (H) <=2 /HPF    WBC Urine >182 (H) <=5 /HPF    Squamous  Epithelials Urine 2 (H) <=1 /HPF    Hyaline Casts Urine 4 (H) <=2 /LPF   Lactic acid whole blood    Collection Time: 10/11/23  1:37 PM   Result Value Ref Range    Lactic Acid 1.5 0.7 - 2.0 mmol/L   Wet prep    Collection Time: 10/11/23  4:06 PM    Specimen: Vagina; Swab   Result Value Ref Range    Trichomonas Absent Absent    Yeast Absent Absent    Clue Cells Absent Absent    WBCs/high power field 1+ (A) None     All lab studies reviewed personally    CT Abdomen Pelvis w Contrast    Result Date: 10/11/2023  EXAM: CT ABDOMEN PELVIS W CONTRAST LOCATION: [facility] DATE: 10/11/2023 INDICATION: hx o cervical cancer s p tahbso but now with asymmetric lower extremity edema and progressive dyspnea COMPARISON: 5/13/2017 TECHNIQUE: CT scan of the abdomen and pelvis was performed following injection of IV contrast. Multiplanar reformats were obtained. Dose reduction techniques were used. CONTRAST: IsoVue 370 90mL FINDINGS: LOWER CHEST: See separate chest CT report for full details. HEPATOBILIARY: Layering sludge and stones in the gallbladder, but no acute inflammation or biliary dilation. No hepatic lesions. PANCREAS: Normal. SPLEEN: Normal. ADRENAL GLANDS: Normal. KIDNEYS/BLADDER: 3.2 x 2.9 x 2.6 cm geographic hypoenhancing area in the upper pole right kidney (3/83, 4/68) with preserved overlying cortex. Another smaller hypoenhancing area also noted in the upper pole left kidney (3/83). Tiny simple cyst in the right lower pole. No collecting system dilation or obstructing urinary tract calculi. No urothelial thickening or periureteral fat stranding. The bladder is unremarkable. BOWEL: No obstruction or inflammatory change. Postsurgical changes in the stomach. LYMPH NODES: No lymphadenopathy. VASCULATURE: IVC and pelvic veins are patent. No abdominal aortic aneurysm. PELVIC ORGANS: Surgically absent. No suspicious soft tissue in the pelvis. Trace free fluid. MUSCULOSKELETAL: No aggressive or  destructive osseous lesions. Degenerative changes in the spine.     IMPRESSION: 1.  No findings to explain lower extremity edema. 2.  Geographic hypoenhancing areas in the upper poles of both kidneys most suggestive of pyelonephritis. Consider follow-up renal CT or MRI in 4-6 weeks to ensure complete resolution.    CT Chest Pulmonary Embolism w Contrast    Result Date: 10/11/2023  EXAM: CT CHEST PULMONARY EMBOLISM W CONTRAST LOCATION: [facility] DATE: 10/11/2023 INDICATION: dyspnea progressive.  extremity edema COMPARISON: None. TECHNIQUE: CT chest pulmonary angiogram during arterial phase injection of IV contrast. Multiplanar reformats and MIP reconstructions were performed. Dose reduction techniques were used. CONTRAST: IsoVue 370 90mL FINDINGS: ANGIOGRAM CHEST: Pulmonary arteries are normal caliber and negative for pulmonary emboli. Thoracic aorta is not well opacified and is  indeterminate for dissection. No CT evidence of right heart strain. LUNGS AND PLEURA: Small right pleural effusion with associated right lower lobe and middle lobe consolidation. MEDIASTINUM/AXILLAE: Cardiomegaly. No enlarged thoracic lymph nodes. CORONARY ARTERY CALCIFICATION: Mild. UPPER ABDOMEN: Post surgical changes in the stomach. MUSCULOSKELETAL: Degenerative changes in the spine.     IMPRESSION: 1.  No acute pulmonary emboli. 2.  Small right pleural effusion with associated middle lobe and right lower lobe consolidation, which could represent compressive atelectasis or pneumonia.    US Lower Extremity Venous Duplex Bilateral    Result Date: 10/11/2023  EXAM: US LOWER EXTREMITY VENOUS DUPLEX BILATERAL LOCATION: [facility] DATE: 10/11/2023 INDICATION: Bilateral lower extremity pain and swelling. Swelling long term worse on left than r.  no previous evaluation. COMPARISON: None. TECHNIQUE: Venous Duplex ultrasound of bilateral lower extremities with and without compression, augmentation  and duplex. Color flow and spectral Doppler with waveform analysis performed. FINDINGS: Exam includes the common femoral, femoral, popliteal veins as well as segmentally visualized deep calf veins and greater saphenous vein. RIGHT: No deep vein thrombosis. No superficial thrombophlebitis. Within the right popliteal fossa is an anechoic avascular cyst measuring 4.8 x 3.1 x 1.5 cm. LEFT: No deep vein thrombosis. No superficial thrombophlebitis. Within the left popliteal fossa is an anechoic avascular cyst measuring 2.2 x 1.4 x 0.4 cm.     IMPRESSION: 1.  No deep venous thrombosis in the bilateral lower extremities. 2.  Bilateral popliteal fossa Baker's cysts, larger on the right.    XR Chest 2 Views    Result Date: 10/11/2023  EXAM: XR CHEST 2 VIEWS LOCATION: [facility] DATE: 10/11/2023 INDICATION: dyspnea  COMPARISON: 03/17/2014     IMPRESSION: There is cardiomegaly, pulmonary vascular congestion, perihilar edema and bilateral small effusions, right greater than left. Findings consistent with failure. No signs of pneumonia.    Radiology report reviewed.    Medical Decision Making       80 MINUTES SPENT BY ME on the date of service doing chart review, history, exam, documentation & further activities per the note.      Jarrod Zepeda MD  [facility] Medicine Service

## 2023-10-11 NOTE — Clinical Note
Potential access sites were evaluated for patency using ultrasound.   The right femoral artery and right femoral vein was selected. Access was obtained under with Sonosite and Fluoroscopic guidance using a micropuncture 21 gauge needle with direct visualization of needle entry.

## 2023-10-11 NOTE — ED NOTES
Patient provided swabs to self swab per order. Would like to rest following transfer to room prior to collection. Patient informed to call with any questions or assistance. Family at bedside.

## 2023-10-11 NOTE — Clinical Note
dry, intact, no bleeding and no hematoma. Right femoral site. Venous sheath removed and manual pressure held until hemostasis achieved.

## 2023-10-11 NOTE — PHARMACY-ADMISSION MEDICATION HISTORY
Pharmacist Admission Medication History    Admission medication history is complete. The information provided in this note is only as accurate as the sources available at the time of the update.    Information Source(s): Patient via in-person    Changes made to PTA medication list:  Added: all  Deleted: None  Changed: None    Medication Affordability:  Not including over the counter (OTC) medications, was there a time in the past 3 months when you did not take your medications as prescribed because of cost?: No    Allergies reviewed with patient and updates made in EHR: yes    Medication History Completed By: Shi Bryant [facility] 10/11/2023 5:50 PM    PTA Med List   Medication Sig Last Dose    cyanocobalamin (VITAMIN B-12) 1000 MCG tablet Take 1,000 mcg by mouth daily 10/11/2023 at am    Ferrous Sulfate 324 (65 Fe) MG TBEC Take 1 tablet by mouth daily 10/11/2023 at am    multivitamin, therapeutic (THERA-VIT) TABS tablet Take 1 tablet by mouth daily 10/11/2023 at am

## 2023-10-11 NOTE — PATIENT INSTRUCTIONS
Your fatigue which has been gradually worsening over the last year and your shortness of breath when you exert yourself are worth further working up with some additional labs.  We got comprehensive metabolic panel to check your liver and kidney.  We will also get a complete blood count to look for a low blood level.  We are checking your thyroid.  We will also obtain an echocardiogram to look at your heart.    Because your symptoms have been gradually worsening over the last year, we do have some time to look this up.  Is very important that you follow-up with your primary care doctor later this month to make sure that we can come up with a good plan for anything that we detect on this work-up.

## 2023-10-11 NOTE — PROGRESS NOTES
Addendum:     Patient's BNP returned at greater than 5000.  In conjunction with her dyspnea on exertion and dyspnea with lying down horizontally and 2+ lower extremity edema this is concerning for heart failure exacerbation.      Spoke with the patient this morning via phone and discussed options with her.  While her symptoms have been gradually progressing over the last year, given the current severity of symptoms, exam findings and laboratory measurements, it would be reasonable for her to present to the emergency department for an echo and IV diuresis. She is amenable to this plan.     I signed out her care to one of the [facility] emergency apartment physicians.  Betty plans to present to the ER in the next hour or two.    Clinical Decision Making: The patient presents with 1 year of gradually worsening fatigue and dyspnea on exertion.  Given her bilateral lower extremity edema and her inability to lie down horizontally in bed, concern for possible heart failure.  We will obtain brain natruretic peptide and transthoracic echo to evaluate this.  As her symptoms have been gradually worsening over time without any acute changes, she does not need to be urgently admitted to the hospital.  However, discussed with the patient and her brother that if symptoms acutely change, this work-up could be expedited in the hospital.    Renal or hepatic cause of her increased lower extremity edema and fatigue is possible which we will evaluate with a comprehensive metabolic panel.  Anemia could also cause her fatigue which we will evaluate with a complete blood count.  Hypothyroidism could also explain her fatigue which we will evaluate with labs.  Low suspicion for an acute process such as an infection given the gradual worsening of her symptoms and the absence of a fever.    Discussed with patient that this work-up was fairly extensive and it would be of prime importance that she follows up with her primary care physician  later this month to address results from this work-up.  She states that she has a visit to establish care with a primary care physician later in October.  Discussed with her that at that time these results should be back and would inform a plan for management e.g. diuresis if there is heart failure versus Synthroid if she has hypothyroidism versus iron if she has iron deficiency anemia etc.        ICD-10-CM    1. Dyspnea on exertion  R06.09 Echocardiogram Complete     BNP-N terminal pro      2. Bilateral lower extremity edema  R60.0 Comprehensive metabolic panel (BMP + Alb, Alk Phos, ALT, AST, Total. Bili, TP)     CBC with platelets     TSH with free T4 reflex          Patient Instructions   Your fatigue which has been gradually worsening over the last year and your shortness of breath when you exert yourself are worth further working up with some additional labs.  We got comprehensive metabolic panel to check your liver and kidney.  We will also get a complete blood count to look for a low blood level.  We are checking your thyroid.  We will also obtain an echocardiogram to look at your heart.    Because your symptoms have been gradually worsening over the last year, we do have some time to look this up.  Is very important that you follow-up with your primary care doctor later this month to make sure that we can come up with a good plan for anything that we detect on this work-up.    HPI:  Betty Pan is a 48 year old female who presents today complaining of 1 year of gradually worsening chronic fatigue.  She notes an episode last winter when she was helping her nephew and had acute dyspnea and fatigue.  Since then symptoms have been fluctuating.  She notes that she can walk less than 1 block at the present and cannot walk up or down stairs.    She has to sit in a chair at night when sleeping because she feels dyspneic with lying down flat.  This has been going on for several months.  She is not sure if her  weight has increased or decreased in the last few months.  Review of symptoms otherwise unremarkable.    She does not take any regular medications but does take supplemental iron and vitamin B12.  Surgical history notable for D&C, hysterectomy, back surgery and gastric bypass.  No known allergies.  Family history notable for diabetes in her father and brother and sister.  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  It has been difficult for her to move around in the café given her dyspnea and fatigue.        History obtained from the patient and her brother.    Problem List:  There are no relevant problems documented for this patient.      No past medical history on file.    Social History     Tobacco Use    Smoking status: Never    Smokeless tobacco: Not on file   Substance Use Topics    Alcohol use: Not on file       Vitals:    10/10/23 1923   BP: (!) 154/85   Pulse: 90   Resp: 22   Temp: 99.1  F (37.3  C)   TempSrc: Oral   SpO2: 93%       Physical Exam  Constitutional:       Appearance: She is obese.      Comments: fatigued   HENT:      Head: Normocephalic.      Right Ear: External ear normal.      Left Ear: External ear normal.      Mouth/Throat:      Mouth: Mucous membranes are moist.   Eyes:      Extraocular Movements: Extraocular movements intact.   Cardiovascular:      Rate and Rhythm: Normal rate and regular rhythm.   Pulmonary:      Breath sounds: Normal breath sounds.   Musculoskeletal:         General: Normal range of motion.      Right lower leg: Edema present.      Left lower leg: Edema present.   Neurological:      General: No focal deficit present.      Mental Status: She is alert and oriented to person, place, and time. Mental status is at baseline.         Results:  Results for orders placed or performed in visit on 10/10/23   CBC with platelets     Status: Abnormal   Result Value Ref Range    WBC Count 16.3 (H) 4.0 - 11.0 10e3/uL    RBC Count 4.70 3.80 - 5.20 10e6/uL    Hemoglobin 14.0 11.7 -  15.7 g/dL    Hematocrit 42.7 35.0 - 47.0 %    MCV 91 78 - 100 fL    MCH 29.8 26.5 - 33.0 pg    MCHC 32.8 31.5 - 36.5 g/dL    RDW 15.0 10.0 - 15.0 %    Platelet Count 124 (L) 150 - 450 10e3/uL         At the end of the encounter, I discussed results, diagnosis, medications. Discussed red flags for immediate return to clinic/ER, as well as indications for follow up if no improvement. Patient understood and agreed to plan. Patient was stable for discharge.

## 2023-10-11 NOTE — Clinical Note
Unless otherwise noted, the J wire was used to insert, remove, exchange, and reposition all catheters.

## 2023-10-11 NOTE — ED NOTES
Patient ambulated to bathroom on room air with pulse ox. Patient O2 dropped to 84% and HR increased to 110 BPM. While resting on toilet, patient recovered somewhat to 90% on room air but dropped again when returning to room. Patient placed back on 2LPM via nasal canula and recovered to 95%. Provider notified.

## 2023-10-11 NOTE — ED PROVIDER NOTES
Emergency Department Encounter     Evaluation Date & Time:   No admission date for patient encounter.    CHIEF COMPLAINT:  Shortness of Breath and Fatigue      Triage Note:Patient states she was seen at urgent care yesterday and the MD called her back this AM to be seen in the ED. Patient reports 1 year hx of shortness of breath with exertion and fatigue.      Triage Assessment (Adult)       Row Name 10/11/23 1044          Triage Assessment    Airway WDL WDL        Respiratory WDL    Respiratory WDL X;rhythm/pattern     Rhythm/Pattern, Respiratory shortness of breath;other (see comments)  dyspnea on exertion        Skin Circulation/Temperature WDL    Skin Circulation/Temperature WDL WDL        Cardiac WDL    Cardiac WDL X;rhythm     Pulse Rate & Regularity tachycardic                         FINAL IMPRESSION:    ICD-10-CM    1. Hypertension, unspecified type  I10       2. Peripheral edema  R60.9       3. Pyelonephritis, acute  N10       4. Aspiration pneumonia of lower lobe, unspecified aspiration pneumonia type, unspecified laterality (H)  J69.0       5. Pleural effusion  J90       6. Renal insufficiency  N28.9       7. Blood glucose elevated  R73.9       8. Renal mass  N28.89           Impression and Plan     ED COURSE & MEDICAL DECISION MAKIN:31 AM I met with the patient to gather history and to perform my initial exam. I discussed the plan for care while in the Emergency Department. Appropriate PPE was worn during patient encounters.    ED Course as of 10/11/23 1429   Wed Oct 11, 2023   1146 She really comes in sent in by her clinic for evaluation of progressive dyspnea on exertion and at rest over the course of the past year at least.  She finally got insurance and presented for evaluation at her family's urging for continued progression of shortness of breath with minimal exertion and at rest.  She is obese and does have findings of peripheral edema.  Her blood pressure is severely elevated but she  has no current chest pain and there is no acute decompensation noted.  However, she is quite short of breath even just with talking to me while sitting and so is rather decompensated on an every day basis.  She would certainly benefit from more urgent evaluation and stabilization of this.  I did review her blood work from the clinic and her BNP is severely elevated but thankfully her GFR is not severely decreased.  She has asymmetric symmetry which may be simply due to position, or changes in venous drainage but also has a history of cervical cancer so asymmetric swelling in her extremities may be secondary to pelvic mass.  We will do CT scan to rule out PE and ultrasound of her lower extremities to rule out DVT with this asymmetry, but I think also needs pelvic/abdominal CT due to this history.  Otherwise, will likely need admission for her breathlessness to start stabilization and treatment and do cardiac evaluation depending on findings.  Unlikely to be infectious given the very slow progression of disease.   1151 I did review her chart from yesterday including her blood work.  Her EKG does suggest some biatrial enlargement though she does not quite meet criteria for right atrial enlargement on her EKG the P wave does occasionally have a double bump suggesting it.  No signs of right heart strain.  There is poor R wave progression suggesting anterior infarct pattern cited on or before February 2014.   1220 Given the presence of nitrite this is likely an E. coli UTI despite the fact that there are few squamous cells present.  As she likely has associated heart failure with this whether from PE or cardiac cause, will opt to treat with antibiotics.  She is not running a fever and so although tachycardic does not technically meet criteria for sepsis but will get blood cultures as endocarditis would still be in the differential given the indolent nature of her progression of shortness of breath.   1224 Troponin is at a  nonspecific level.  This is long-term without acute changes so no need to repeat troponin at this time.  May also be due to underlying renal disease.  Glycosylated hemoglobin pending for elevated bs.   1424 Patient's imaging is just back and shows pyelonephritis, right-sided pneumonia versus atelectasis but with associated effusion.  It certainly would be concerning for underlying mass given her history of cervical cancer should have follow-up CT to rule show resolution but with progressive shortness of breath does need admission for stabilization.  I am adding on azithromycin to her ceftriaxone to treat the possible pneumonia on top of the pyelonephritis.  She is signed out to Dr. Parry as we have been requested to attempt to transfer if there is another bed available.   1427 Now that we have confirmed there is no PE and I am starting her on nitroglycerin paste, a small dose of diuretic given her underlying renal insufficiency and aspirin.       At the conclusion of the encounter I discussed the results of all the tests and the disposition. The questions were answered. The patient or family acknowledged understanding and was agreeable with the care plan.          0 minutes of critical care time        MEDICATIONS GIVEN IN THE EMERGENCY DEPARTMENT:  Medications   azithromycin (ZITHROMAX) 500 mg in sodium chloride 0.9 % 250 mL intermittent infusion (has no administration in time range)   nitroGLYcerin (NITRO-BID) 2 % ointment 15 mg (has no administration in time range)   furosemide (LASIX) injection 20 mg (has no administration in time range)   aspirin (ASA) chewable tablet 81 mg (has no administration in time range)   cefTRIAXone (ROCEPHIN) 2 g vial to attach to  ml bag for ADULTS or NS 50 ml bag for PEDS (2 g Intravenous $New Bag 10/11/23 1343)   iopamidol (ISOVUE-370) solution 90 mL (90 mLs Intravenous $Given 10/11/23 1312)       NEW PRESCRIPTIONS STARTED AT TODAY'S ED VISIT:  New Prescriptions    No  "medications on file       HPI     HPI     Betty Pan is a 48 year old female with a pertinent history of gastric bypass and hysterectomy who presents to this ED via private vehicle with sister for evaluation of shortness of breath.    Per chart review, patient was seen on 10/10 (~1 day ago) at [facility] for shortness of breath. N terminal pro BNP 5,019. ECHO completed. Patient discharged in stable condition. Patient contacted the next day with lab results and advised to seek ED evaluation.    Patient states that she has not been seen by a doctor in a long time and has been having progressively worsening shortness of breath for ~1 year. She was seen ~1 day ago for the shortness of breath and contacted prior to arrival for a concerning abdominal pain value. Shortness of breath is worse with activity. She has to elevate her head while sleeping. Endorses bilateral leg swelling, left leg more than the right.    REVIEW OF SYSTEMS:  Review of Systems   Respiratory:  Positive for shortness of breath.    Cardiovascular:  Positive for leg swelling (left greater than the right).     remainder of systems are all otherwise negative.        Medical History     History reviewed. No pertinent past medical history.    History reviewed. No pertinent surgical history.    History reviewed. No pertinent family history.    Social History     Tobacco Use    Smoking status: Never       No current outpatient medications on file.      Physical Exam     First Vitals:  Patient Vitals for the past 24 hrs:   BP Temp Temp src Pulse Resp SpO2 Height   10/11/23 1418 (!) 196/100 -- -- 102 -- 91 % --   10/11/23 1415 (!) 193/106 -- -- 101 -- 91 % --   10/11/23 1043 (!) 214/117 97.6  F (36.4  C) Temporal 105 20 95 % 1.499 m (4' 11\")       PHYSICAL EXAM:   Constitutional:   Sitting in wheelchair, easily conversive   HENT:  Normocephalic, posterior pharynx wnl, wearing mask   Eyes:  PERRL, EOMI, Conjunctiva normal, No discharge, no scleral " icterus.  Respiratory:  Breathless with talking, able to speak in full sentences, clear to auscultation  Cardiovascular:  Regular rate and rhythm, nl s1s2 0 murmurs, rubs, or gallops.  Peripheral pulses dp, pt, and radial are wnl. 1-2+ pitting edema in lower extremities, left calf few cm larger than the right  GI:  Bowel sounds normal, Soft, No tenderness, No flank tenderness, nondistended.  :No CVA tenderness.   Musculoskeletal:  Moves all extremities.  No erythematous or swollen major joints,   Integument:  Normal color  Lymphatic:  No cervical lymphadenopathy  Neurologic:  Alert & oriented x 3, Normal motor function, Normal sensory function, No focal deficits noted. Normal speech.  Psychiatric:  Affect normal, Judgment normal, Mood normal.     Results     LAB AND RADIOLOGY:  All pertinent labs reviewed and interpreted  Results for orders placed or performed during the hospital encounter of 10/11/23   XR Chest 2 Views     Status: None    Narrative    EXAM: XR CHEST 2 VIEWS  LOCATION: [facility]  DATE: 10/11/2023    INDICATION: dyspnea    COMPARISON: 03/17/2014      Impression    IMPRESSION: There is cardiomegaly, pulmonary vascular congestion, perihilar edema and bilateral small effusions, right greater than left. Findings consistent with failure.    No signs of pneumonia.   US Lower Extremity Venous Duplex Bilateral     Status: None    Narrative    EXAM: US LOWER EXTREMITY VENOUS DUPLEX BILATERAL  LOCATION: [facility]  DATE: 10/11/2023    INDICATION: Bilateral lower extremity pain and swelling. Swelling long term worse on left than r.  no previous evaluation.  COMPARISON: None.  TECHNIQUE: Venous Duplex ultrasound of bilateral lower extremities with and without compression, augmentation and duplex. Color flow and spectral Doppler with waveform analysis performed.    FINDINGS: Exam includes the common femoral, femoral, popliteal veins as well as segmentally  visualized deep calf veins and greater saphenous vein.     RIGHT: No deep vein thrombosis. No superficial thrombophlebitis. Within the right popliteal fossa is an anechoic avascular cyst measuring 4.8 x 3.1 x 1.5 cm.    LEFT: No deep vein thrombosis. No superficial thrombophlebitis. Within the left popliteal fossa is an anechoic avascular cyst measuring 2.2 x 1.4 x 0.4 cm.      Impression    IMPRESSION:  1.  No deep venous thrombosis in the bilateral lower extremities.  2.  Bilateral popliteal fossa Baker's cysts, larger on the right.   CT Chest Pulmonary Embolism w Contrast     Status: None    Narrative    EXAM: CT CHEST PULMONARY EMBOLISM W CONTRAST  LOCATION: [facility]  DATE: 10/11/2023    INDICATION: dyspnea progressive.  extremity edema  COMPARISON: None.  TECHNIQUE: CT chest pulmonary angiogram during arterial phase injection of IV contrast. Multiplanar reformats and MIP reconstructions were performed. Dose reduction techniques were used.   CONTRAST: IsoVue 370 90mL    FINDINGS:  ANGIOGRAM CHEST: Pulmonary arteries are normal caliber and negative for pulmonary emboli. Thoracic aorta is not well opacified and is  indeterminate for dissection. No CT evidence of right heart strain.    LUNGS AND PLEURA: Small right pleural effusion with associated right lower lobe and middle lobe consolidation.    MEDIASTINUM/AXILLAE: Cardiomegaly. No enlarged thoracic lymph nodes.    CORONARY ARTERY CALCIFICATION: Mild.    UPPER ABDOMEN: Post surgical changes in the stomach.    MUSCULOSKELETAL: Degenerative changes in the spine.      Impression    IMPRESSION:  1.  No acute pulmonary emboli.    2.  Small right pleural effusion with associated middle lobe and right lower lobe consolidation, which could represent compressive atelectasis or pneumonia.   CT Abdomen Pelvis w Contrast     Status: None    Narrative    EXAM: CT ABDOMEN PELVIS W CONTRAST  LOCATION: [facility]  DATE:  10/11/2023    INDICATION: hx o cervical cancer s p tahbso but now with asymmetric lower extremity edema and progressive dyspnea  COMPARISON: 5/13/2017  TECHNIQUE: CT scan of the abdomen and pelvis was performed following injection of IV contrast. Multiplanar reformats were obtained. Dose reduction techniques were used.  CONTRAST: IsoVue 370 90mL    FINDINGS:   LOWER CHEST: See separate chest CT report for full details.    HEPATOBILIARY: Layering sludge and stones in the gallbladder, but no acute inflammation or biliary dilation. No hepatic lesions.    PANCREAS: Normal.    SPLEEN: Normal.    ADRENAL GLANDS: Normal.    KIDNEYS/BLADDER: 3.2 x 2.9 x 2.6 cm geographic hypoenhancing area in the upper pole right kidney (3/83, 4/68) with preserved overlying cortex. Another smaller hypoenhancing area also noted in the upper pole left kidney (3/83). Tiny simple cyst in the   right lower pole. No collecting system dilation or obstructing urinary tract calculi. No urothelial thickening or periureteral fat stranding. The bladder is unremarkable.    BOWEL: No obstruction or inflammatory change. Postsurgical changes in the stomach.    LYMPH NODES: No lymphadenopathy.    VASCULATURE: IVC and pelvic veins are patent. No abdominal aortic aneurysm.    PELVIC ORGANS: Surgically absent. No suspicious soft tissue in the pelvis. Trace free fluid.    MUSCULOSKELETAL: No aggressive or destructive osseous lesions. Degenerative changes in the spine.      Impression    IMPRESSION:   1.  No findings to explain lower extremity edema.    2.  Geographic hypoenhancing areas in the upper poles of both kidneys most suggestive of pyelonephritis. Consider follow-up renal CT or MRI in 4-6 weeks to ensure complete resolution.   [place] Draw     Status: None    Narrative    The following orders were created for panel order [place] Draw.  Procedure                               Abnormality         Status                     ---------                                -----------         ------                     Extra Blue Top Tube[[id number]]                              Final result               Extra Red Top Tube[[id number]]                               Final result               Extra Green Top (Lithium...[[id number]]                      Final result               Extra Purple Top Tube[[id number]]                            Final result                 Please view results for these tests on the individual orders.   Extra Blue Top Tube     Status: None   Result Value Ref Range    Hold Specimen JIC    Extra Red Top Tube     Status: None   Result Value Ref Range    Hold Specimen JIC    Extra Green Top (Lithium Heparin) Tube     Status: None   Result Value Ref Range    Hold Specimen JIC    Extra Purple Top Tube     Status: None   Result Value Ref Range    Hold Specimen JIC    Comprehensive metabolic panel     Status: Abnormal   Result Value Ref Range    Sodium 141 135 - 145 mmol/L    Potassium 3.4 3.4 - 5.3 mmol/L    Carbon Dioxide (CO2) 24 22 - 29 mmol/L    Anion Gap 13 7 - 15 mmol/L    Urea Nitrogen 20.6 (H) 6.0 - 20.0 mg/dL    Creatinine 0.98 (H) 0.51 - 0.95 mg/dL    GFR Estimate 70 >60 mL/min/1.73m2    Calcium 9.0 8.6 - 10.0 mg/dL    Chloride 104 98 - 107 mmol/L    Glucose 144 (H) 70 - 99 mg/dL    Alkaline Phosphatase 77 35 - 104 U/L    AST 23 0 - 45 U/L    ALT 23 0 - 50 U/L    Protein Total 8.3 6.4 - 8.3 g/dL    Albumin 3.9 3.5 - 5.2 g/dL    Bilirubin Total 1.9 (H) <=1.2 mg/dL   Troponin T, High Sensitivity     Status: Abnormal   Result Value Ref Range    Troponin T, High Sensitivity 17 (H) <=14 ng/L   Nt probnp inpatient (BNP)     Status: Abnormal   Result Value Ref Range    N terminal Pro BNP Inpatient 5,007 (H) 0 - 450 pg/mL   CBC with platelets and differential     Status: Abnormal   Result Value Ref Range    WBC Count 16.4 (H) 4.0 - 11.0 10e3/uL    RBC Count 5.25 (H) 3.80 - 5.20 10e6/uL    Hemoglobin 15.3 11.7 - 15.7 g/dL    Hematocrit 47.9 (H) 35.0 - 47.0 %     MCV 91 78 - 100 fL    MCH 29.1 26.5 - 33.0 pg    MCHC 31.9 31.5 - 36.5 g/dL    RDW 15.3 (H) 10.0 - 15.0 %    Platelet Count 120 (L) 150 - 450 10e3/uL    % Neutrophils 89 %    % Lymphocytes 4 %    % Monocytes 6 %    Mids % (Monos, Eos, Basos)      % Eosinophils 0 %    % Basophils 0 %    % Immature Granulocytes 1 %    NRBCs per 100 WBC 0 <1 /100    Absolute Neutrophils 14.7 (H) 1.6 - 8.3 10e3/uL    Absolute Lymphocytes 0.6 (L) 0.8 - 5.3 10e3/uL    Absolute Monocytes 0.9 0.0 - 1.3 10e3/uL    Mids Abs (Monos, Eos, Basos)      Absolute Eosinophils 0.0 0.0 - 0.7 10e3/uL    Absolute Basophils 0.0 0.0 - 0.2 10e3/uL    Absolute Immature Granulocytes 0.1 <=0.4 10e3/uL    Absolute NRBCs 0.0 10e3/uL   UA with Microscopic reflex to Culture     Status: Abnormal    Specimen: Urine, Midstream   Result Value Ref Range    Color Urine Yellow Colorless, Straw, Light Yellow, Yellow    Appearance Urine Cloudy (A) Clear    Glucose Urine Negative Negative mg/dL    Bilirubin Urine Negative Negative    Ketones Urine Trace (A) Negative mg/dL    Specific Gravity Urine 1.029 1.001 - 1.030    Blood Urine 0.5 mg/dL (A) Negative    pH Urine 6.0 5.0 - 7.0    Protein Albumin Urine 100 (A) Negative mg/dL    Urobilinogen Urine 4.0 (A) <2.0 mg/dL    Nitrite Urine Positive (A) Negative    Leukocyte Esterase Urine 500 Christian/uL (A) Negative    Bacteria Urine Few (A) None Seen /HPF    WBC Clumps Urine Present (A) None Seen /HPF    Mucus Urine Present (A) None Seen /LPF    RBC Urine 14 (H) <=2 /HPF    WBC Urine >182 (H) <=5 /HPF    Squamous Epithelials Urine 2 (H) <=1 /HPF    Hyaline Casts Urine 4 (H) <=2 /LPF    Narrative    Urine Culture ordered based on laboratory criteria   Lactic acid whole blood     Status: Normal   Result Value Ref Range    Lactic Acid 1.5 0.7 - 2.0 mmol/L   Hemoglobin A1c     Status: Abnormal   Result Value Ref Range    Hemoglobin A1C 5.8 (H) <5.7 %   CBC with platelets differential     Status: Abnormal    Narrative    The following  orders were created for panel order CBC with platelets differential.  Procedure                               Abnormality         Status                     ---------                               -----------         ------                     CBC with platelets and d...[[id number]]  Abnormal            Final result                 Please view results for these tests on the individual orders.         ECG:    Performed at: 1048    Impression: nst with borderline first degree av block, lae, possible yousif, anterior infarct pattern cited onor before 2/27/14 tachycardiac rate 108, pr 204ms, qrs 86ms, qtc 450ms, prt axes 50 73 42. Compared to 2/27/2014 ventricular rate has increased by 44bpm , lae and possible yousif are now present, pr interval has lengthened, and qtc has lengthtned. nonspecific T wave abnormality also now evident in lateral leads.    I have independently reviewed and interpreted the EKS(s) documented above    PROCEDURES:  Procedures:      [facility] Documentation     Medical Decision Making    History:  Supplemental history from: Documented in chart, if applicable  External Record(s) reviewed: Outpatient Record: 10/10/2023 (See HPI)    Work Up:  Chart documentation includes differential considered and any EKGs or imaging independently interpreted by provider, where specified.  In additional to work up documented, I considered the following work up: Documented in chart, if applicable.    External consultation:  Discussion of management with another provider: Documented in chart, if applicable    Complicating factors:  Care impacted by chronic illness: N/A  Care affected by social determinants of health: Other: previous issues with insurance availability    Disposition considerations: Admit.signed out to Dr. Parry to attempt transfer             The creation of this record is based on the scribe s observations of the work being performed by Brionna Malhotra and the provider s statements to them. This  document has been checked and approved by MD Isabel Sprague MD  Emergency Medicine  [facility] EMERGENCY DEPARTMENT         Isabel Dawson MD  10/11/23 1119

## 2023-10-12 ENCOUNTER — APPOINTMENT (OUTPATIENT)
Dept: OCCUPATIONAL THERAPY | Facility: HOSPITAL | Age: 48
End: 2023-10-12
Attending: FAMILY MEDICINE
Payer: COMMERCIAL

## 2023-10-12 ENCOUNTER — APPOINTMENT (OUTPATIENT)
Dept: CARDIOLOGY | Facility: HOSPITAL | Age: 48
End: 2023-10-12
Attending: EMERGENCY MEDICINE
Payer: COMMERCIAL

## 2023-10-12 LAB
ACINETOBACTER SPECIES: NOT DETECTED
BACTERIA UR CULT: ABNORMAL
C TRACH DNA SPEC QL PROBE+SIG AMP: NEGATIVE
CITROBACTER SPECIES: NOT DETECTED
CTX-M: NOT DETECTED
ENTEROBACTER SPECIES: NOT DETECTED
ESCHERICHIA COLI: DETECTED
GLUCOSE BLDC GLUCOMTR-MCNC: 105 MG/DL (ref 70–99)
GLUCOSE BLDC GLUCOMTR-MCNC: 121 MG/DL (ref 70–99)
GLUCOSE BLDC GLUCOMTR-MCNC: 128 MG/DL (ref 70–99)
GLUCOSE BLDC GLUCOMTR-MCNC: 150 MG/DL (ref 70–99)
IMP: NOT DETECTED
KLEBSIELLA OXYTOCA: NOT DETECTED
KLEBSIELLA PNEUMONIAE: NOT DETECTED
KPC: NOT DETECTED
N GONORRHOEA DNA SPEC QL NAA+PROBE: NEGATIVE
NDM: NOT DETECTED
OXA (DETECTED/NOT DETECTED): NOT DETECTED
POTASSIUM SERPL-SCNC: 3.9 MMOL/L (ref 3.4–5.3)
PROCALCITONIN SERPL IA-MCNC: 0.55 NG/ML
PROTEUS SPECIES: NOT DETECTED
PSEUDOMONAS AERUGINOSA: NOT DETECTED
VIM: NOT DETECTED

## 2023-10-12 PROCEDURE — 255N000002 HC RX 255 OP 636: Performed by: INTERNAL MEDICINE

## 2023-10-12 PROCEDURE — 250N000011 HC RX IP 250 OP 636: Mod: JZ | Performed by: FAMILY MEDICINE

## 2023-10-12 PROCEDURE — 250N000013 HC RX MED GY IP 250 OP 250 PS 637: Performed by: EMERGENCY MEDICINE

## 2023-10-12 PROCEDURE — 250N000013 HC RX MED GY IP 250 OP 250 PS 637: Performed by: INTERNAL MEDICINE

## 2023-10-12 PROCEDURE — 250N000011 HC RX IP 250 OP 636: Mod: JZ | Performed by: EMERGENCY MEDICINE

## 2023-10-12 PROCEDURE — 97165 OT EVAL LOW COMPLEX 30 MIN: CPT | Mod: GO

## 2023-10-12 PROCEDURE — 250N000011 HC RX IP 250 OP 636: Mod: JZ | Performed by: INTERNAL MEDICINE

## 2023-10-12 PROCEDURE — 84132 ASSAY OF SERUM POTASSIUM: CPT | Performed by: FAMILY MEDICINE

## 2023-10-12 PROCEDURE — 120N000001 HC R&B MED SURG/OB

## 2023-10-12 PROCEDURE — 99233 SBSQ HOSP IP/OBS HIGH 50: CPT | Performed by: FAMILY MEDICINE

## 2023-10-12 PROCEDURE — 36415 COLL VENOUS BLD VENIPUNCTURE: CPT | Performed by: FAMILY MEDICINE

## 2023-10-12 PROCEDURE — 258N000003 HC RX IP 258 OP 636: Performed by: EMERGENCY MEDICINE

## 2023-10-12 PROCEDURE — 99255 IP/OBS CONSLTJ NEW/EST HI 80: CPT | Performed by: INTERNAL MEDICINE

## 2023-10-12 PROCEDURE — 97535 SELF CARE MNGMENT TRAINING: CPT | Mod: GO

## 2023-10-12 PROCEDURE — 999N000248 HC STATISTIC IV INSERT WITH US BY RN

## 2023-10-12 PROCEDURE — 93306 TTE W/DOPPLER COMPLETE: CPT | Mod: 26 | Performed by: INTERNAL MEDICINE

## 2023-10-12 RX ORDER — FERROUS SULFATE 325(65) MG
325 TABLET ORAL DAILY
Status: DISCONTINUED | OUTPATIENT
Start: 2023-10-12 | End: 2023-10-17 | Stop reason: HOSPADM

## 2023-10-12 RX ORDER — FUROSEMIDE 10 MG/ML
40 INJECTION INTRAMUSCULAR; INTRAVENOUS EVERY 12 HOURS
Status: DISCONTINUED | OUTPATIENT
Start: 2023-10-12 | End: 2023-10-12

## 2023-10-12 RX ORDER — DIAZEPAM 5 MG
10 TABLET ORAL
Status: DISCONTINUED | OUTPATIENT
Start: 2023-10-12 | End: 2023-10-13

## 2023-10-12 RX ORDER — FUROSEMIDE 10 MG/ML
40 INJECTION INTRAMUSCULAR; INTRAVENOUS ONCE
Status: COMPLETED | OUTPATIENT
Start: 2023-10-12 | End: 2023-10-12

## 2023-10-12 RX ORDER — FUROSEMIDE 10 MG/ML
40 INJECTION INTRAMUSCULAR; INTRAVENOUS EVERY 8 HOURS
Status: DISCONTINUED | OUTPATIENT
Start: 2023-10-12 | End: 2023-10-13

## 2023-10-12 RX ADMIN — FUROSEMIDE 40 MG: 10 INJECTION, SOLUTION INTRAMUSCULAR; INTRAVENOUS at 21:08

## 2023-10-12 RX ADMIN — FUROSEMIDE 40 MG: 10 INJECTION, SOLUTION INTRAMUSCULAR; INTRAVENOUS at 13:09

## 2023-10-12 RX ADMIN — AZITHROMYCIN MONOHYDRATE 250 MG: 500 INJECTION, POWDER, LYOPHILIZED, FOR SOLUTION INTRAVENOUS at 17:11

## 2023-10-12 RX ADMIN — FERROUS SULFATE TAB 325 MG (65 MG ELEMENTAL FE) 325 MG: 325 (65 FE) TAB at 08:44

## 2023-10-12 RX ADMIN — ACETAMINOPHEN 650 MG: 325 TABLET ORAL at 14:32

## 2023-10-12 RX ADMIN — PERFLUTREN 2 ML: 6.52 INJECTION, SUSPENSION INTRAVENOUS at 10:22

## 2023-10-12 RX ADMIN — ENOXAPARIN SODIUM 40 MG: 40 INJECTION SUBCUTANEOUS at 08:42

## 2023-10-12 RX ADMIN — CEFTRIAXONE SODIUM 2 G: 2 INJECTION, POWDER, FOR SOLUTION INTRAMUSCULAR; INTRAVENOUS at 13:24

## 2023-10-12 RX ADMIN — THERA TABS 1 TABLET: TAB at 08:42

## 2023-10-12 RX ADMIN — SACUBITRIL AND VALSARTAN 1 TABLET: 24; 26 TABLET, FILM COATED ORAL at 21:07

## 2023-10-12 RX ADMIN — ACETAMINOPHEN 650 MG: 325 TABLET ORAL at 03:08

## 2023-10-12 RX ADMIN — CYANOCOBALAMIN TAB 1000 MCG 1000 MCG: 1000 TAB at 08:42

## 2023-10-12 RX ADMIN — ENOXAPARIN SODIUM 40 MG: 40 INJECTION SUBCUTANEOUS at 21:09

## 2023-10-12 ASSESSMENT — ACTIVITIES OF DAILY LIVING (ADL)
ADLS_ACUITY_SCORE: 35
DEPENDENT_IADLS:: INDEPENDENT
ADLS_ACUITY_SCORE: 35
ADLS_ACUITY_SCORE: 35
PREVIOUS_RESPONSIBILITIES: WORK
ADLS_ACUITY_SCORE: 35

## 2023-10-12 NOTE — CONSULTS
Thank you, Dr. Luke ref. provider found, for asking the [facility] team to see Betty Pan to evaluate heart failure.      Assessment/Recommendations   Assessment:    Acute heart failure with fluid overload  Cardiomyopathy with moderately depressed LV systolic function and apical akinesia, unclear etiology-echo suggestive of either coronary artery disease or stress cardiomyopathy as a culprit  Coronary calcification consistent with coronary atherosclerosis  Pyelonephritis, resolved  Sleep apnea, untreated  Morbid obesity  Gastric bypass surgery  Cervical cancer status post surgery with no chemo      Plan:  IV furosemide until euvolemic  Start Entresto  Add carvedilol when euvolemic    Check lipids    Coronary angiogram tomorrow if able to lay flat  Risks and benefits were discussed with the patient and she agrees to proceed           History of Present Illness/Subjective    Ms. Betty Pan is a 48 year old female who came to emergency room with complaints of progressive shortness of breath and overwhelming fatigue.  She did not have any chest pains or heart palpitations.  She was treated for pyelonephritis.  Echocardiogram was performed and showed decreased LV systolic function.  Cardiology consultation was requested.  She has no history of known heart disease.  She has never had any cardiac testing.  She reports that starting in winter of last year she started to experience worsening shortness of breath.  The last several weeks and months she is not able to do any physical activities without getting profoundly dyspneic.  She is not sure if she gained any weight.  She does have some swelling of lower extremities.  She been orthopneic.  She denies any recent emotionally traumatic events in her life.  She does have a history of sleep apnea but she does not use CPAP mask  She also has a history of gastric bypass surgery but she regained all the weight that she lost after initial surgery.        ECG:  "Personally reviewed.  Normal sinus rhythm.  Poor R wave progression questionable Q waves in anterolateral leads    ECHO (personnaly Reviewed):   Technically very challenging study. Definity contrast utilized.  Left ventricle measures mildly enlarged. Mild left ventricular hypertrophy  suggested. Left ventricular systolic function is moderate to severely globally  reduced with an ejection fraction of 30 to 35% with akinesis of the left  ventricular apex.Diastolic Doppler findings (E/E' ratio and/or other  parameters) suggest left ventricular filling pressures are increased.  The right ventricle is suboptimally visualized. On limited imaging right  ventricular systolic function appears potentially reduced.  Mild enlargement of the left atrium.  Valve structures are suboptimally visualized on this technically challenging  study. No obvious hemodynamically significant valve abnormality noted.  CT chest:  1.  No acute pulmonary emboli.     2.  Small right pleural effusion with associated middle lobe and right lower lobe consolidation, which could represent compressive atelectasis or pneumonia.  3.  Mild coronary calcification     Physical Examination Review of Systems   /80   Pulse 70   Temp 98.1  F (36.7  C) (Oral)   Resp 18   Ht 1.499 m (4' 11\")   Wt 111.6 kg (246 lb)   SpO2 100%   BMI 49.69 kg/m    Body mass index is 49.69 kg/m .  Wt Readings from Last 3 Encounters:   10/11/23 111.6 kg (246 lb)       Intake/Output Summary (Last 24 hours) at 10/12/2023 1355  Last data filed at 10/11/2023 1742  Gross per 24 hour   Intake 250 ml   Output --   Net 250 ml     General Appearance:   Alert, cooperative, mildly dyspneic   Head/ENT: Normocephalic, without obvious abnormality. Membranes moist.      EYES:  No scleral icterus   Neck: Supple, symmetrical, trachea midline, no adenopathy, thyroid: not enlarged, symmetric, no carotid bruit JVD is not visible   Chest/Lungs:   Distant breath sound   Cardiovascular:   " "Regular rhythm, distant S1, S2 normal, no murmur, rub or gallop.   Abdomen:  Soft, non-tender, bowel sounds active all four quadrants,  no masses, no organomegaly   Extremities: no cyanosis or clubbing.  2+ edema   Skin: Skin color, texture, turgor normal, no rashes or lesions.    Neurologic: Alert and oriented x 3, moving all four extremities.    Psychiatric: Normal affect.      10 system review of systems completed see history of present illness and inpatient H&P (reviewed) for further details.          Lab Results    Chemistry/lipid CBC Cardiac Enzymes/BNP/TSH/INR   No results for input(s): \"CHOL\", \"HDL\", \"LDL\", \"TRIG\", \"CHOLHDLRATIO\" in the last 60477 hours.  No results for input(s): \"LDL\" in the last 95338 hours.  Recent Labs   Lab Test 10/12/23  1141 10/12/23  0832 10/11/23  1057   NA  --   --  141   POTASSIUM  --   --  3.4   CHLORIDE  --   --  104   CO2  --   --  24   *   < > 144*   BUN  --   --  20.6*   CR  --   --  0.98*   GFRESTIMATED  --   --  70   KHADIJAH  --   --  9.0    < > = values in this interval not displayed.     Recent Labs   Lab Test 10/11/23  1057 10/10/23  2012   CR 0.98* 0.91     Recent Labs   Lab Test 10/11/23  1057   A1C 5.8*          Recent Labs   Lab Test 10/11/23  1057   WBC 16.4*   HGB 15.3   HCT 47.9*   MCV 91   *     Recent Labs   Lab Test 10/11/23  1057 10/10/23  2012   HGB 15.3 14.0    No results for input(s): \"TROPONINI\" in the last 45417 hours.  Recent Labs   Lab Test 10/11/23  1057 10/10/23  2012   NTBNPI 5,007*  --    NTBNP  --  5,019*     Recent Labs   Lab Test 10/10/23  2012   TSH 1.30     No results for input(s): \"INR\" in the last 06073 hours.     Medical History  Surgical History Family History Social History   Sleep apnea Gastric bypass  Cervical cancer surgery.  Father had some form of heart surgery in his 60s   Social History     Socioeconomic History    Marital status: Single     Spouse name: Not on file    Number of children: Not on file    Years of education: " Not on file    Highest education level: Not on file   Occupational History    Not on file   Tobacco Use    Smoking status: Never    Smokeless tobacco: Not on file   Substance and Sexual Activity    Alcohol use: Not on file    Drug use: Not on file    Sexual activity: Not on file   Other Topics Concern    Not on file   Social History Narrative    Not on file     Social Determinants of Health     Financial Resource Strain: Not on file   Food Insecurity: Not on file   Transportation Needs: Not on file   Physical Activity: Not on file   Stress: Not on file   Social Connections: Not on file   Interpersonal Safety: Not on file   Housing Stability: Not on file         Medications  Allergies   Current Facility-Administered Medications Ordered in Epic   Medication Dose Route Frequency Provider Last Rate Last Admin    acetaminophen (TYLENOL) tablet 650 mg  650 mg Oral Q6H PRN Lazarus Zepeda MD   650 mg at 10/12/23 0308    Or    acetaminophen (TYLENOL) Suppository 650 mg  650 mg Rectal Q6H PRN Lazarus Zepeda MD        azithromycin (ZITHROMAX) 250 mg in sodium chloride 0.9 % 250 mL intermittent infusion  250 mg Intravenous Q24H Lazarus Zepeda MD        cefTRIAXone (ROCEPHIN) 2 g vial to attach to  ml bag for ADULTS or NS 50 ml bag for PEDS  2 g Intravenous Q24H Lazarus Zepeda  mL/hr at 10/12/23 1324 2 g at 10/12/23 1324    cyanocobalamin (VITAMIN B-12) tablet 1,000 mcg  1,000 mcg Oral Daily Lazarus Zepeda MD   1,000 mcg at 10/12/23 0842    glucose gel 15-30 g  15-30 g Oral Q15 Min PRN Lazarus Zepeda MD        Or    dextrose 50 % injection 25-50 mL  25-50 mL Intravenous Q15 Min PRN Lazarus Zepeda MD        Or    glucagon injection 1 mg  1 mg Subcutaneous Q15 Min PRLazarus Mcconnell MD        enoxaparin ANTICOAGULANT (LOVENOX) injection 40 mg  40 mg Subcutaneous Q12H Lazarus Zepeda MD   40 mg at 10/12/23 0842    ferrous sulfate (FEROSUL) tablet 325  mg  325 mg Oral Daily Lazarus Zepeda MD   325 mg at 10/12/23 0844    furosemide (LASIX) injection 40 mg  40 mg Intravenous Q8H Gus Nur MD        hydrALAZINE (APRESOLINE) injection 5 mg  5 mg Intravenous Q6H PRN Lazarus Zepeda MD        melatonin tablet 1 mg  1 mg Oral At Bedtime PRN Lazarus Zepeda MD        multivitamin, therapeutic (THERA-VIT) tablet 1 tablet  1 tablet Oral Daily Lazarus Zepeda MD   1 tablet at 10/12/23 0842    ondansetron (ZOFRAN ODT) ODT tab 4 mg  4 mg Oral Q6H PRN Lazarus Zepeda MD        Or    ondansetron (ZOFRAN) injection 4 mg  4 mg Intravenous Q6H PRN Lazarus Zepeda MD        sacubitril-valsartan (ENTRESTO) 24-26 MG per tablet 1 tablet  1 tablet Oral BID Gus Nur MD        senna-docusate (SENOKOT-S/PERICOLACE) 8.6-50 MG per tablet 1 tablet  1 tablet Oral BID PRN Lazarus Zepeda MD        Or    senna-docusate (SENOKOT-S/PERICOLACE) 8.6-50 MG per tablet 2 tablet  2 tablet Oral BID PRN Lazarus Zepeda MD         No current Epic-ordered outpatient medications on file.     No Known Allergies

## 2023-10-12 NOTE — PLAN OF CARE
Goal Outcome Evaluation:      Problem: Adult Inpatient Plan of Care  Goal: Absence of Hospital-Acquired Illness or Injury  Outcome: Progressing  Intervention: Identify and Manage Fall Risk  Recent Flowsheet Documentation  Taken 10/12/2023 1340 by Jaylyn Chapin RN  Safety Promotion/Fall Prevention:   activity supervised   assistive device/personal items within reach   clutter free environment maintained   nonskid shoes/slippers when out of bed   patient and family education   safety round/check completed  Taken 10/12/2023 0845 by Jaylyn Chapin RN  Safety Promotion/Fall Prevention:   activity supervised   assistive device/personal items within reach   clutter free environment maintained   nonskid shoes/slippers when out of bed   patient and family education   safety round/check completed     Problem: Pain Acute  Goal: Optimal Pain Control and Function  Outcome: Progressing  Intervention: Develop Pain Management Plan  Recent Flowsheet Documentation  Taken 10/12/2023 1432 by Jaylyn Chapin, RN  Pain Management Interventions: medication (see MAR)  Intervention: Prevent or Manage Pain  Recent Flowsheet Documentation  Taken 10/12/2023 1340 by Jaylyn Chpain, RN  Medication Review/Management: medications reviewed  Taken 10/12/2023 0845 by Jaylyn Chapin, RN  Medication Review/Management: medications reviewed  PRN Tylenol for generalized pain.    Pt with SOB and dyspnea on exertion.  O2 sat 93-94% on RA; and % on 2 L n/c.      IV Lasix given. Pt voiding frequently.    VSS.  Telemetry: NSR.  Ate 50% of breakfast.  Still eating lunch.

## 2023-10-12 NOTE — PROGRESS NOTES
"Occupational Therapy     10/12/23 9860   Appointment Info   Signing Clinician's Name / Credentials (OT) LORENA Doherty   Student Supervision Direct supervision provided   Living Environment   People in Home alone   Current Living Arrangements house  (duplex-lives on top floor)   Home Accessibility stairs to enter home;stairs within home   Number of Stairs, Main Entrance 3   Number of Stairs, Within Home, Primary greater than 10 stairs   Living Environment Comments Tub/shower w/ GB, has SC but to small per pt report   Self-Care   Usual Activity Tolerance moderate   Current Activity Tolerance poor   Equipment Currently Used at Home none   Activity/Exercise/Self-Care Comment Baseline indp in ADLs   Instrumental Activities of Daily Living (IADL)   Previous Responsibilities work   IADL Comments Baseline indp in IADLs   General Information   Onset of Illness/Injury or Date of Surgery 10/11/23   Referring Physician Candelaria Whatley MD   Additional Occupational Profile Info/Pertinent History of Current Problem \" 48 year old female who presented to urgent care yesterday for evaluation of progressive dyspnea on exertion and at rest over the past year.  She finally got insurance and presented at her family's insistence.  She was found to have abnormal labs with BNP greater than 5000.  Interestingly, she denies any UTI symptoms despite her UA and CT findings.  Her symptoms sound more consistent with heart failure although pneumonia could certainly be an issue especially given her fever.  She denies any chest pain, no UTI symptoms including urinary frequency or urgency or dysuria or flank pain.  Does have a fever in the ED, denies severe coughing.  Does report lower extremity edema gradually progressive over the last year.\"   Existing Precautions/Restrictions oxygen therapy device and L/min   Cognitive Status Examination   Orientation Status orientation to person, place and time   Follows Commands WNL   Posture   Posture " not impaired   Range of Motion Comprehensive   General Range of Motion bilateral upper extremity ROM WFL   Strength Comprehensive (MMT)   General Manual Muscle Testing (MMT) Assessment no strength deficits identified   Bed Mobility   Bed Mobility supine-sit   Supine-Sit [place] (Bed Mobility) supervision   Assistive Device (Bed Mobility) bed rails   Comment (Bed Mobility) increased effort/extended time   Transfers   Transfers sit-stand transfer;toilet transfer   Sit-Stand Transfer   Sit-Stand [place] (Transfers) supervision   Sit/Stand Transfer Comments STS from EOB no AD   Toilet Transfer   Type (Toilet Transfer) sit-stand   [place] Level (Toilet Transfer) supervision   Activities of Daily Living   Comment, BADL Assessment/Training Not assessed during eval but pt experiences decreased endurance/activity tolerance requiring increased effort impacting ADL performance   Additional Documentation Comment, BADL Assessment/Training (Row)   Clinical Impression   Criteria for Skilled Therapeutic Interventions Met (OT) Yes, treatment indicated   OT Diagnosis Decreased ADL/activity tolerance   OT Problem List-Impairments impacting ADL problems related to;mobility   Assessment of Occupational Performance 1-3 Performance Deficits   Identified Performance Deficits ADLs, activity tolerance/endurance   Planned Therapy Interventions (OT) ADL retraining;bed mobility training;progressive activity/exercise;transfer training   Clinical Decision Making Complexity (OT) problem focused assessment/low complexity   Risk & Benefits of therapy have been explained evaluation/treatment results reviewed;care plan/treatment goals reviewed;current/potential barriers reviewed;participants voiced agreement with care plan;patient   OT Total Evaluation Time   OT Eval, Low Complexity Minutes (60440) 10   OT Goals   Therapy Frequency (OT) Daily   OT Predicted Duration/Target Date for Goal Attainment 10/19/23   OT Goals Cardiac Phase 1    OT: Understanding of cardiac education to maximize quality of life, condition management, and health outcomes Patient;Verbalize;Demonstrate   OT: Perform aerobic activity with stable cardiovascular response intermittent;10 minutes;ambulation   OT: Functional/aerobic ambulation tolerance with stable cardiovascular response in order to return to home and community environment Modified independent;200 feet   OT: Navigation of stairs simulating home set up with stable cardiovascular response in order to return to home and community environment Supervision/SBA;10 stairs   Self-Care/Home Management   Self-Care/Home Mgmt/ADL, Compensatory, Meal Prep Minutes (95510) 15   Symptoms Noted During/After Treatment (Meal Preparation/Planning Training) fatigue;shortness of breath   Treatment Detail/Skilled Intervention Following sup<EOB pt /92 HR 85, pt took seated rest break at EOB till stable. Pt amb to/from BR w/ SBA/supervision no AD, in BR pt able to complete toilet routine w/ supervision. Seated at EOB post activity BP 130s/60s SpO2 90s. Pt educ/reviewed CHF (monitor weight, low sodium diet, diagnosis), pt verbalized understanding. Pt EOB<Sup w/ supervision, pt able to scoot to HOB but required increased effort/SOB. Throughout session pt reported increased fatigue/SOB and required increased effort for bed mobility/trfrs/amb.   OT Discharge Planning   OT Plan CHF review (packet in room), functional mobility, stairs, monitor BP, LB dressing?   OT Discharge Recommendation (DC Rec) home   OT Rationale for DC Rec At this time pt demo increased fatigue/SOB impacting ADL performance, pt lives alone at home w/ greater than 10 stairs and works fulltime. Pt safe to return home when medically stable and follow CHF rec.   OT Brief overview of current status CHF-monitor BP, Supervision amb/trfrs no AD, increased effort   OT Equipment Needed at Discharge   (Pt interested in SC options)   Total Session Time   Timed Code Treatment  Minutes 15   Total Session Time (sum of timed and untimed services) 25

## 2023-10-12 NOTE — ED NOTES
"[facility] ED Handoff Report    ED Chief Complaint: SOB, fatigue    ED Diagnosis:  (N10) Pyelonephritis, acute  (primary encounter diagnosis)  Comment: Urinary symptoms of frequency, urgency, and malodorous.  Plan: Abx started    (I10) Hypertension, unspecified type  Comment: Improved following nitroglycerin paste administration  Plan: admit    (R60.9) Peripheral edema  Plan: admit    (J69.0) Aspiration pneumonia of lower lobe, unspecified aspiration pneumonia type, unspecified laterality (H)  Comment: denies cough today. Infrequent cough yesterday.  Plan: admit    (J90) Pleural effusion  Comment: SOB with exertion  Plan: admit, O2    (N28.9) Renal insufficiency    (R73.9) Blood glucose elevated    (N28.89) Renal mass      PMH:  History reviewed. No pertinent past medical history.     Code Status:  No Order     Falls Risk: Yes Band: Applied    Current Living Situation/Residence: lives with a significant other     Elimination Status: Continent: Yes     Activity Level: SBA    Patients Preferred Language:  English     Needed: No    Vital Signs:  BP (!) 145/92   Pulse 74   Temp 99.6  F (37.6  C) (Oral)   Resp (!) 36   Ht 1.499 m (4' 11\")   SpO2 97%   BMI 32.32 kg/m       Cardiac Rhythm: SR, ST with activity    Pain Score: 0/10    Is the Patient Confused:  No    Last Food or Drink: 10/11/23 at dinner    Focused Assessment:  Lung sounds clear, diminished. Denies cough. SOB with exertion. Dropped to 84% on room air when ambulated to bathroom along with increased HR to 110 BPM. On 2 LPM via nasal canula, 95%.  Urinary frequency, urgency, malodorous.     Tests Performed: Done: Labs and Imaging    Treatments Provided:  see MAR    Family Dynamics/Concerns: No    Family Updated On Visitor Policy: Yes    Plan of Care Communicated to Family: Yes    Who Was Updated about Plan of Care:  at bedside    Belongings Checklist Done and Signed by Patient: No    Medications sent with patient: " none    Additional Information: call with questions    RN: Vanesa Zepeda RN   10/11/2023 7:26 PM

## 2023-10-12 NOTE — PLAN OF CARE
Problem: Adult Inpatient Plan of Care  Goal: Optimal Comfort and Wellbeing  Outcome: Progressing     Problem: Pain Acute  Goal: Optimal Pain Control and Function  Outcome: Progressing  Intervention: Prevent or Manage Pain  Recent Flowsheet Documentation  Taken 10/12/2023 0100 by Immanuel Randall RN  Medication Review/Management: medications reviewed   Goal Outcome Evaluation:       Pt is A&O x4. Vital signs stable. Pt reported generalized headache with pain at a 7. PRN PO tylenol given. Nitroglycerin patch removed. Pt reported improvement with shortness of breath. Blood cultures came back from 10/11 positive for gram negative bacilli. Rapid ID came back positive for E-coli. Provider notified of critical results. Pt able to make needs known.   Immanuel Randall RN

## 2023-10-12 NOTE — SIGNIFICANT EVENT
Significant Event Note    Time of event: 3:23 AM October 12, 2023    Description of event:  RN reported positive blood culture 1 of 2 bottles from 10/11/2023 at 1:42 PM growing gram-negative bacilli    Currently on IV ceftriaxone for pyelonephritis    Plan:  Follow-up Verigene    Discussed with: bedside nurse    Abdulaziz Hernandez MD

## 2023-10-12 NOTE — CONSULTS
Care Management Initial Consult    General Information  Assessment completed with: Patient,    Type of CM/SW Visit: Initial Assessment    Primary Care Provider verified and updated as needed: No   Readmission within the last 30 days:        Reason for Consult: discharge planning  Advance Care Planning: Advance Care Planning Reviewed: no concerns identified          Communication Assessment  Patient's communication style: spoken language (English or Bilingual)             Cognitive  Cognitive/Neuro/Behavioral: WDL                      Living Environment:   People in home: alone     Current living Arrangements: apartment      Able to return to prior arrangements: yes       Family/Social Support:  Care provided by: self  Provides care for: no one  Marital Status: Single  Sibling(s)          Description of Support System:           Current Resources:   Patient receiving home care services: No     Community Resources: None  Equipment currently used at home:    Supplies currently used at home: None    Employment/Financial:  Employment Status: self-employed        Financial Concerns: none           Does the patient's insurance plan have a 3 day qualifying hospital stay waiver?  No    Lifestyle & Psychosocial Needs:  Social Determinants of Health     Food Insecurity: Not on file   Depression: Not on file   Housing Stability: Not on file   Tobacco Use: Unknown (10/11/2023)    Patient History     Smoking Tobacco Use: Never     Smokeless Tobacco Use: Unknown     Passive Exposure: Not on file   Financial Resource Strain: Not on file   Alcohol Use: Not on file   Transportation Needs: Not on file   Physical Activity: Not on file   Interpersonal Safety: Not on file   Stress: Not on file   Social Connections: Not on file       Functional Status:  Prior to admission patient needed assistance:   Dependent ADLs:: Independent  Dependent IADLs:: Independent       Mental Health Status:          Chemical Dependency Status:                 Values/Beliefs:  Spiritual, Cultural Beliefs, L[employer]-day Practices, Values that affect care:                 Additional Information:  Assessed. Pt lives in an apartment alone. She is independent with ADLs and IADLs and self employed. No CM needs anticipated.    RNCM to follow for medical progression, recommendations, and final discharge plan.      Rupali Cantor RN

## 2023-10-12 NOTE — PROGRESS NOTES
[facility]    Medicine Progress Note - Hospitalist Service    Date of Admission:  10/11/2023    Assessment & Plan      48 year old female presenting with pyelonephritis, possible heart failure versus pneumonia.     Acute respiratory failure  Dyspnea on exertion  Acute HFoEF  ---new dx  --- Subacute symptoms; mild hypoxia noted in the emergency room department  --- With increasing edema, weight gain over the past year and PND, orthopnea suspect underlying CHF  --- Echo today shows EF 30 to 35% with akinesis of the left ventricular apex  --- Add I's and O's and daily weights  --- Add CHF order set; add IV Lasix; he was given 1 dose in the emergency room department  --- Cardiology consult  --- On Rocephin and Zithromax for possible pneumonia, see below.  Suspect mainly cardiac etiology along with pyelonephritis as main infectious etiology  --- Add telemetry  --- Wean oxygen as able    Sepsis due to Pyelonephritis,   resolved: Diagnosis based on Temp > 38 C and WBC > 12 due to infection.    ---BC positive with BNB  ---Abdomen/pelvic CT shows hypoenhancing areas in the upper poles of both kidneys most suggestive of pyelonephritis.  Recommend follow-up renal CT or MRI in 4 to 6 weeks to ensure complete resolution.    ---UA is consistent with infection with white count of 16.4   --- Symptomatic at home with fever dysuria, strong smelling urine  ---lactate is normal and she is not hypotensive.   --- Temperature elevated on multiple occasions up to 100.3.  ---  Started on IV ceftriaxone, will continue  --- Follow-up blood culture and urine culture    Right sided pneumonia  --- CT with small right pleural effusion with associated middle lobe and right lower lobe consolidation.    --- I wonder if this is all more related to effusion from CHF  --- Check procalcitonin; if low could consider discontinuing azithromycin and using ceftriaxone alone for pyelonephritis   --- Would recommend outpatient  "follow-up chest CT in 4 to 6 weeks to ensure resolution   --- Treatment CHF per above.    Hyperglycemia   --- new diagnosis of prediabetes:   ---Hemoglobin A1c 5.8  --- Accu-Cheks have been low.  No clear indication for insulin so we will hold off on insulin sliding scale    Hypertension:   --- Improved with 1 dose diuretic  ----As needed hydralazine ordered for extremely high pressures.  ---was on galdino patch - removed  --- Monitor with further work-up per above    History of cervical cancer:   ---consider follow-up imaging of her kidneys as described above to rule out mass.    Obstructive sleep apnea--does not use CPAP per her report  --- Unclear if contributing to above  --- Await cardiology recommendations    Previous gastric bypass surgery        Diet: Combination Diet Regular Diet Adult; Moderate Consistent Carb (60 g CHO per Meal) Diet; Low Saturated Fat Na <2400mg Diet    DVT Prophylaxis: Enoxaparin (Lovenox) SQ  Bond Catheter: Not present  Lines: None     Cardiac Monitoring: None  Code Status: Full Code      Clinically Significant Risk Factors Present on Admission                # Thrombocytopenia: Lowest platelets = 120 in last 2 days, will monitor for bleeding   # Hypertension: Noted on problem list   # Acute Respiratory Failure: Documented O2 saturation < 91%.  Continue supplemental oxygen as needed     # Severe Obesity: Estimated body mass index is 49.69 kg/m  as calculated from the following:    Height as of this encounter: 1.499 m (4' 11\").    Weight as of this encounter: 111.6 kg (246 lb).              Disposition Plan      Expected Discharge Date: 10/13/2023                    Candelaria Whatley MD  Hospitalist Service  [facility]  Securely message with MMIS (more info)  Text page via hopscout Paging/Directory   ______________________________________________________________________    Interval History   --- Patient seen and chart reviewed.  --- Seen in the presence of her " family.  --- Breathing slightly better with diuretics overnight.  Urinating a lot.  Leg swelling possibly some better  --- She confirms that main issue of coming in was dyspnea on exertion, PND and orthopnea.  --- Also admits to subacute increasing fatigue.  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  Admits to poor self-care due to being busy with this  --- Most recently she thinks she had a couple days of fevers along with malodorous urine and increased urinary frequency at home which was a separate issue and not what brought her into urgent care.    Physical Exam   Vital Signs: Temp: 98.1  F (36.7  C) Temp src: Oral BP: 125/80 Pulse: 70   Resp: 18 SpO2: 94 % O2 Device: None (Room air) Oxygen Delivery: 2 LPM  Weight: 246 lbs 0 oz    General Appearance: Pleasant female  Respiratory: Decreased breath sounds throughout.  Mild crackles  Cardiovascular: Regular rate and rhythm  GI: Obese soft and nontender  Skin: 1+ lower extremity edema, left somewhat greater than right  Other: Neurologically grossly intact without focal deficits appreciated    Medical Decision Making             Data     I have personally reviewed the following data over the past 24 hrs:    TSH: N/A T4: N/A A1C: N/A     Procal: N/A CRP: N/A Lactic Acid: 1.5         Imaging results reviewed over the past 24 hrs:   Recent Results (from the past 24 hour(s))   XR Chest 2 Views    Narrative    EXAM: XR CHEST 2 VIEWS  LOCATION: [facility]  DATE: 10/11/2023    INDICATION: dyspnea    COMPARISON: 03/17/2014      Impression    IMPRESSION: There is cardiomegaly, pulmonary vascular congestion, perihilar edema and bilateral small effusions, right greater than left. Findings consistent with failure.    No signs of pneumonia.   US Lower Extremity Venous Duplex Bilateral    Narrative    EXAM: US LOWER EXTREMITY VENOUS DUPLEX BILATERAL  LOCATION:   [facility]  DATE: 10/11/2023    INDICATION: Bilateral lower extremity pain and swelling. Swelling long term worse on left than r.  no previous evaluation.  COMPARISON: None.  TECHNIQUE: Venous Duplex ultrasound of bilateral lower extremities with and without compression, augmentation and duplex. Color flow and spectral Doppler with waveform analysis performed.    FINDINGS: Exam includes the common femoral, femoral, popliteal veins as well as segmentally visualized deep calf veins and greater saphenous vein.     RIGHT: No deep vein thrombosis. No superficial thrombophlebitis. Within the right popliteal fossa is an anechoic avascular cyst measuring 4.8 x 3.1 x 1.5 cm.    LEFT: No deep vein thrombosis. No superficial thrombophlebitis. Within the left popliteal fossa is an anechoic avascular cyst measuring 2.2 x 1.4 x 0.4 cm.      Impression    IMPRESSION:  1.  No deep venous thrombosis in the bilateral lower extremities.  2.  Bilateral popliteal fossa Baker's cysts, larger on the right.   CT Chest Pulmonary Embolism w Contrast    Narrative    EXAM: CT CHEST PULMONARY EMBOLISM W CONTRAST  LOCATION: [facility]  DATE: 10/11/2023    INDICATION: dyspnea progressive.  extremity edema  COMPARISON: None.  TECHNIQUE: CT chest pulmonary angiogram during arterial phase injection of IV contrast. Multiplanar reformats and MIP reconstructions were performed. Dose reduction techniques were used.   CONTRAST: IsoVue 370 90mL    FINDINGS:  ANGIOGRAM CHEST: Pulmonary arteries are normal caliber and negative for pulmonary emboli. Thoracic aorta is not well opacified and is  indeterminate for dissection. No CT evidence of right heart strain.    LUNGS AND PLEURA: Small right pleural effusion with associated right lower lobe and middle lobe consolidation.    MEDIASTINUM/AXILLAE: Cardiomegaly. No enlarged thoracic lymph nodes.    CORONARY ARTERY CALCIFICATION: Mild.    UPPER ABDOMEN: Post  surgical changes in the stomach.    MUSCULOSKELETAL: Degenerative changes in the spine.      Impression    IMPRESSION:  1.  No acute pulmonary emboli.    2.  Small right pleural effusion with associated middle lobe and right lower lobe consolidation, which could represent compressive atelectasis or pneumonia.   CT Abdomen Pelvis w Contrast    Narrative    EXAM: CT ABDOMEN PELVIS W CONTRAST  LOCATION: [facility]  DATE: 10/11/2023    INDICATION: hx o cervical cancer s p tahbso but now with asymmetric lower extremity edema and progressive dyspnea  COMPARISON: 5/13/2017  TECHNIQUE: CT scan of the abdomen and pelvis was performed following injection of IV contrast. Multiplanar reformats were obtained. Dose reduction techniques were used.  CONTRAST: IsoVue 370 90mL    FINDINGS:   LOWER CHEST: See separate chest CT report for full details.    HEPATOBILIARY: Layering sludge and stones in the gallbladder, but no acute inflammation or biliary dilation. No hepatic lesions.    PANCREAS: Normal.    SPLEEN: Normal.    ADRENAL GLANDS: Normal.    KIDNEYS/BLADDER: 3.2 x 2.9 x 2.6 cm geographic hypoenhancing area in the upper pole right kidney (3/83, 4/68) with preserved overlying cortex. Another smaller hypoenhancing area also noted in the upper pole left kidney (3/83). Tiny simple cyst in the   right lower pole. No collecting system dilation or obstructing urinary tract calculi. No urothelial thickening or periureteral fat stranding. The bladder is unremarkable.    BOWEL: No obstruction or inflammatory change. Postsurgical changes in the stomach.    LYMPH NODES: No lymphadenopathy.    VASCULATURE: IVC and pelvic veins are patent. No abdominal aortic aneurysm.    PELVIC ORGANS: Surgically absent. No suspicious soft tissue in the pelvis. Trace free fluid.    MUSCULOSKELETAL: No aggressive or destructive osseous lesions. Degenerative changes in the spine.      Impression    IMPRESSION:   1.  No findings to  explain lower extremity edema.    2.  Geographic hypoenhancing areas in the upper poles of both kidneys most suggestive of pyelonephritis. Consider follow-up renal CT or MRI in 4-6 weeks to ensure complete resolution.   Echocardiogram Complete    Narrative    [id number]  [id number]  [id number]  [id number]^TADEO^ROSE^[place] [address]     Name: JOHANNA JIMÉNEZ  MRN: [id number]  : 1975  Study Date: 10/12/2023 09:52 AM  Age: 48 yrs  Gender: Female  Patient [facility]  Reason For Study: CHF  Ordering Physician: ROSE PIEDRA  Performed By:      BSA: 2.0 m2  Height: 59 in  Weight: 246 lb  HR: 85  ______________________________________________________________________________  Procedure  Complete Portable Echo Adult. Definity (NDC #12485-351) given intravenously.  ______________________________________________________________________________  Interpretation Summary     Technically very challenging study. Definity contrast utilized.  Left ventricle measures mildly enlarged. Mild left ventricular hypertrophy  suggested. Left ventricular systolic function is moderate to severely globally  reduced with an ejection fraction of 30 to 35% with akinesis of the left  ventricular apex.Diastolic Doppler findings (E/E' ratio and/or other  parameters) suggest left ventricular filling pressures are increased.  The right ventricle is suboptimally visualized. On limited imaging right  ventricular systolic function appears potentially reduced.  Mild enlargement of the left atrium.  Valve structures are suboptimally visualized on this technically challenging  study. No obvious hemodynamically significant valve abnormality noted.  Consider cardiac MRI to further define cardiac structures.  ______________________________________________________________________________  Left Ventricle  The left ventricle is mildly dilated. There is mild concentric left  ventricular  hypertrophy. Diastolic Doppler findings (E/E' ratio and/or other  parameters) suggest left ventricular filling pressures are increased. Left-  ventricular systolic function is moderate to severely globally reduced with an  ejection fraction estimate of 30 to 35% with akinesis of the left ventricular  apex. There is apical akinesis. There is mod-severe global hypokinesia of the  left ventricle.     Right Ventricle  The right ventricle is not well visualized. The right ventricle is  suboptimally visualized. On limited imaging right ventricular systolic  function appears reduced.     Atria  The left atrium is not well visualized. The left atrium is mildly dilated.  Right atrium not well visualized.     Mitral Valve  The mitral valve leaflets appear thickened, but open well. There is trace  mitral regurgitation.     Tricuspid Valve  The tricuspid valve is not well visualized, but is grossly normal. There is  trace tricuspid regurgitation. Right ventricular systolic pressure could not  be approximated due to inadequate tricuspid regurgitation.     Aortic Valve  The aortic valve is not well visualized. The aortic valve is trileaflet with  aortic valve sclerosis. No hemodynamically significant valvular aortic  stenosis.     Pulmonic Valve  The pulmonic valve is not well seen, but is grossly normal. There is trace  pulmonic valvular regurgitation.     Vessels  The aorta root is normal. Normal size ascending aorta. IVC diameter >2.1 cm  collapsing <50% with sniff suggests a high RA pressure estimated at 15 mmHg or  greater.     Pericardium  Pericardial fat suggested.     ______________________________________________________________________________  MMode/2D Measurements & Calculations  IVSd: 1.2 cm  LVIDd: 5.6 cm  LVIDs: 4.4 cm  LVPWd: 1.7 cm     FS: 20.7 %  LV mass(C)d: 367.2 grams  LV mass(C)dI: 182.4 grams/m2  Ao root diam: 2.7 cm  LA dimension: 3.9 cm  asc Aorta Diam: 3.3 cm  LA/Ao: 1.4  LVOT diam: 1.9 cm  LVOT area:  2.8 cm2  Ao root diam index Ht(cm/m): 1.8  Ao root diam index BSA (cm/m2): 1.3  Asc Ao diam index BSA (cm/m2): 1.6  Asc Ao diam index Ht(cm/m): 2.2  LA Volume (BP): 58.3 ml     LA Volume Index (BP): 29.0 ml/m2  LA Volume Indexed (AL/bp): 30.9 ml/m2  RWT: 0.60     Doppler Measurements & Calculations  MV E max herminio: 130.0 cm/sec  MV A max herminio: 88.6 cm/sec  MV E/A: 1.5  MV max P.4 mmHg  MV mean P.0 mmHg  MV V2 VTI: 28.9 cm  MVA(VTI): 1.8 cm2  MV P1/2t max herminio: 157.0 cm/sec  MV P1/2t: 52.3 msec  MVA(P1/2t): 4.2 cm2  MV dec slope: 880.0 cm/sec2  MV dec time: 0.15 sec  Ao V2 max: 171.5 cm/sec  Ao max P.0 mmHg  Ao V2 mean: 131.0 cm/sec  Ao mean P.0 mmHg  Ao V2 VTI: 32.6 cm  ENA(I,D): 1.6 cm2  ENA(V,D): 1.9 cm2     LV V1 max P.5 mmHg  LV V1 max: 114.9 cm/sec  LV V1 VTI: 18.5 cm  SV(LVOT): 52.5 ml  SI(LVOT): 26.0 ml/m2  PA V2 max: 83.2 cm/sec  PA max P.8 mmHg  PA acc time: 0.08 sec  PI end-d herminio: 112.0 cm/sec  AV Herminio Ratio (DI): 0.67  ENA Index (cm2/m2): 0.80  E/E' av.7  Lateral E/e': 15.9  Medial E/e': 31.5  RV S Herminio: 6.1 cm/sec     ______________________________________________________________________________  Report approved by: Joyce Butler 10/12/2023 11:30 AM

## 2023-10-13 ENCOUNTER — TELEPHONE (OUTPATIENT)
Dept: CARDIOLOGY | Facility: CLINIC | Age: 48
End: 2023-10-13
Payer: COMMERCIAL

## 2023-10-13 DIAGNOSIS — I50.9 ACUTE DECOMPENSATED HEART FAILURE (H): Primary | ICD-10-CM

## 2023-10-13 PROBLEM — I50.21 ACUTE SYSTOLIC CONGESTIVE HEART FAILURE (H): Status: ACTIVE | Noted: 2023-10-13

## 2023-10-13 LAB
ANION GAP SERPL CALCULATED.3IONS-SCNC: 11 MMOL/L (ref 7–15)
ATRIAL RATE - MUSE: 108 BPM
BASE EXCESS BLDA CALC-SCNC: 1.9 MMOL/L
BASE EXCESS BLDV CALC-SCNC: 3.5 MMOL/L
BUN SERPL-MCNC: 21.2 MG/DL (ref 6–20)
CALCIUM SERPL-MCNC: 9 MG/DL (ref 8.6–10)
CHLORIDE SERPL-SCNC: 102 MMOL/L (ref 98–107)
COHGB MFR BLD: 95.5 % (ref 96–97)
CREAT SERPL-MCNC: 0.86 MG/DL (ref 0.51–0.95)
DEPRECATED HCO3 PLAS-SCNC: 25 MMOL/L (ref 22–29)
DIASTOLIC BLOOD PRESSURE - MUSE: NORMAL MMHG
EGFRCR SERPLBLD CKD-EPI 2021: 82 ML/MIN/1.73M2
ERYTHROCYTE [DISTWIDTH] IN BLOOD BY AUTOMATED COUNT: 14.6 % (ref 10–15)
GLUCOSE BLDC GLUCOMTR-MCNC: 108 MG/DL (ref 70–99)
GLUCOSE BLDC GLUCOMTR-MCNC: 134 MG/DL (ref 70–99)
GLUCOSE SERPL-MCNC: 105 MG/DL (ref 70–99)
HCO3 BLDA-SCNC: 26 MMOL/L (ref 23–29)
HCO3 BLDV-SCNC: 26 MMOL/L (ref 24–30)
HCT VFR BLD AUTO: 40.4 % (ref 35–47)
HGB BLD-MCNC: 12.5 G/DL (ref 11.7–15.7)
INTERPRETATION ECG - MUSE: NORMAL
LACTATE SERPL-SCNC: 1.2 MMOL/L (ref 0.7–2)
MCH RBC QN AUTO: 28.8 PG (ref 26.5–33)
MCHC RBC AUTO-ENTMCNC: 30.9 G/DL (ref 31.5–36.5)
MCV RBC AUTO: 93 FL (ref 78–100)
P AXIS - MUSE: 50 DEGREES
PCO2 BLDA: 41 MM HG (ref 35–45)
PCO2 BLDV: 48 MM HG (ref 35–50)
PH BLDA: 7.42 [PH] (ref 7.37–7.44)
PH BLDV: 7.38 [PH] (ref 7.35–7.45)
PLATELET # BLD AUTO: 138 10E3/UL (ref 150–450)
PO2 BLDA: 82 MM HG (ref 80–90)
PO2 BLDV: 34 MM HG (ref 25–47)
POTASSIUM SERPL-SCNC: 3.5 MMOL/L (ref 3.4–5.3)
PR INTERVAL - MUSE: 204 MS
QRS DURATION - MUSE: 86 MS
QT - MUSE: 336 MS
QTC - MUSE: 450 MS
R AXIS - MUSE: 73 DEGREES
RBC # BLD AUTO: 4.34 10E6/UL (ref 3.8–5.2)
SATV LHE POCT: 66 % (ref 70–75)
SODIUM SERPL-SCNC: 138 MMOL/L (ref 135–145)
SYSTOLIC BLOOD PRESSURE - MUSE: NORMAL MMHG
T AXIS - MUSE: 42 DEGREES
VENTRICULAR RATE- MUSE: 108 BPM
WBC # BLD AUTO: 11.5 10E3/UL (ref 4–11)

## 2023-10-13 PROCEDURE — 83605 ASSAY OF LACTIC ACID: CPT | Performed by: INTERNAL MEDICINE

## 2023-10-13 PROCEDURE — 4A023N6 MEASUREMENT OF CARDIAC SAMPLING AND PRESSURE, RIGHT HEART, PERCUTANEOUS APPROACH: ICD-10-PCS | Performed by: INTERNAL MEDICINE

## 2023-10-13 PROCEDURE — C1887 CATHETER, GUIDING: HCPCS | Performed by: INTERNAL MEDICINE

## 2023-10-13 PROCEDURE — 93456 R HRT CORONARY ARTERY ANGIO: CPT | Mod: 26 | Performed by: INTERNAL MEDICINE

## 2023-10-13 PROCEDURE — 255N000002 HC RX 255 OP 636: Performed by: INTERNAL MEDICINE

## 2023-10-13 PROCEDURE — 99153 MOD SED SAME PHYS/QHP EA: CPT | Performed by: INTERNAL MEDICINE

## 2023-10-13 PROCEDURE — 258N000003 HC RX IP 258 OP 636: Performed by: NURSE PRACTITIONER

## 2023-10-13 PROCEDURE — 250N000013 HC RX MED GY IP 250 OP 250 PS 637: Performed by: INTERNAL MEDICINE

## 2023-10-13 PROCEDURE — B211YZZ FLUOROSCOPY OF MULTIPLE CORONARY ARTERIES USING OTHER CONTRAST: ICD-10-PCS | Performed by: INTERNAL MEDICINE

## 2023-10-13 PROCEDURE — 250N000013 HC RX MED GY IP 250 OP 250 PS 637: Performed by: NURSE PRACTITIONER

## 2023-10-13 PROCEDURE — 36415 COLL VENOUS BLD VENIPUNCTURE: CPT | Performed by: INTERNAL MEDICINE

## 2023-10-13 PROCEDURE — 36415 COLL VENOUS BLD VENIPUNCTURE: CPT | Performed by: FAMILY MEDICINE

## 2023-10-13 PROCEDURE — 85014 HEMATOCRIT: CPT | Performed by: FAMILY MEDICINE

## 2023-10-13 PROCEDURE — 250N000013 HC RX MED GY IP 250 OP 250 PS 637: Performed by: EMERGENCY MEDICINE

## 2023-10-13 PROCEDURE — 99152 MOD SED SAME PHYS/QHP 5/>YRS: CPT | Performed by: INTERNAL MEDICINE

## 2023-10-13 PROCEDURE — C1751 CATH, INF, PER/CENT/MIDLINE: HCPCS | Performed by: INTERNAL MEDICINE

## 2023-10-13 PROCEDURE — 250N000011 HC RX IP 250 OP 636: Mod: JZ | Performed by: INTERNAL MEDICINE

## 2023-10-13 PROCEDURE — 80048 BASIC METABOLIC PNL TOTAL CA: CPT | Performed by: FAMILY MEDICINE

## 2023-10-13 PROCEDURE — 210N000001 HC R&B IMCU HEART CARE

## 2023-10-13 PROCEDURE — 250N000009 HC RX 250: Performed by: INTERNAL MEDICINE

## 2023-10-13 PROCEDURE — 93456 R HRT CORONARY ARTERY ANGIO: CPT | Performed by: INTERNAL MEDICINE

## 2023-10-13 PROCEDURE — P9047 ALBUMIN (HUMAN), 25%, 50ML: HCPCS | Performed by: NURSE PRACTITIONER

## 2023-10-13 PROCEDURE — C1894 INTRO/SHEATH, NON-LASER: HCPCS | Performed by: INTERNAL MEDICINE

## 2023-10-13 PROCEDURE — 272N000001 HC OR GENERAL SUPPLY STERILE: Performed by: INTERNAL MEDICINE

## 2023-10-13 PROCEDURE — 250N000011 HC RX IP 250 OP 636: Performed by: NURSE PRACTITIONER

## 2023-10-13 PROCEDURE — 250N000011 HC RX IP 250 OP 636: Performed by: INTERNAL MEDICINE

## 2023-10-13 PROCEDURE — 99223 1ST HOSP IP/OBS HIGH 75: CPT | Mod: GC | Performed by: THORACIC SURGERY (CARDIOTHORACIC VASCULAR SURGERY)

## 2023-10-13 PROCEDURE — 99233 SBSQ HOSP IP/OBS HIGH 50: CPT | Performed by: INTERNAL MEDICINE

## 2023-10-13 PROCEDURE — 82805 BLOOD GASES W/O2 SATURATION: CPT

## 2023-10-13 RX ORDER — IODIXANOL 320 MG/ML
INJECTION, SOLUTION INTRAVASCULAR
Status: DISCONTINUED | OUTPATIENT
Start: 2023-10-13 | End: 2023-10-13 | Stop reason: HOSPADM

## 2023-10-13 RX ORDER — SPIRONOLACTONE 25 MG/1
25 TABLET ORAL DAILY
Status: DISCONTINUED | OUTPATIENT
Start: 2023-10-13 | End: 2023-10-13

## 2023-10-13 RX ORDER — NALOXONE HYDROCHLORIDE 0.4 MG/ML
0.4 INJECTION, SOLUTION INTRAMUSCULAR; INTRAVENOUS; SUBCUTANEOUS
Status: ACTIVE | OUTPATIENT
Start: 2023-10-13 | End: 2023-10-13

## 2023-10-13 RX ORDER — NALOXONE HYDROCHLORIDE 0.4 MG/ML
0.2 INJECTION, SOLUTION INTRAMUSCULAR; INTRAVENOUS; SUBCUTANEOUS
Status: ACTIVE | OUTPATIENT
Start: 2023-10-13 | End: 2023-10-13

## 2023-10-13 RX ORDER — HYDRALAZINE HYDROCHLORIDE 20 MG/ML
INJECTION INTRAMUSCULAR; INTRAVENOUS
Status: DISCONTINUED | OUTPATIENT
Start: 2023-10-13 | End: 2023-10-13 | Stop reason: HOSPADM

## 2023-10-13 RX ORDER — OXYCODONE HYDROCHLORIDE 5 MG/1
10 TABLET ORAL EVERY 4 HOURS PRN
Status: DISCONTINUED | OUTPATIENT
Start: 2023-10-13 | End: 2023-10-17 | Stop reason: HOSPADM

## 2023-10-13 RX ORDER — FUROSEMIDE 10 MG/ML
60 INJECTION INTRAMUSCULAR; INTRAVENOUS EVERY 8 HOURS
Status: DISCONTINUED | OUTPATIENT
Start: 2023-10-13 | End: 2023-10-15

## 2023-10-13 RX ORDER — OXYCODONE HYDROCHLORIDE 5 MG/1
5 TABLET ORAL EVERY 4 HOURS PRN
Status: DISCONTINUED | OUTPATIENT
Start: 2023-10-13 | End: 2023-10-17 | Stop reason: HOSPADM

## 2023-10-13 RX ORDER — NITROGLYCERIN 0.4 MG/1
0.4 TABLET SUBLINGUAL EVERY 5 MIN PRN
Status: DISCONTINUED | OUTPATIENT
Start: 2023-10-13 | End: 2023-10-17 | Stop reason: HOSPADM

## 2023-10-13 RX ORDER — HYDRALAZINE HYDROCHLORIDE 20 MG/ML
10 INJECTION INTRAMUSCULAR; INTRAVENOUS
Status: DISCONTINUED | OUTPATIENT
Start: 2023-10-13 | End: 2023-10-13

## 2023-10-13 RX ORDER — ALBUMIN (HUMAN) 12.5 G/50ML
50 SOLUTION INTRAVENOUS ONCE
Status: COMPLETED | OUTPATIENT
Start: 2023-10-13 | End: 2023-10-13

## 2023-10-13 RX ORDER — FENTANYL CITRATE 50 UG/ML
25 INJECTION, SOLUTION INTRAMUSCULAR; INTRAVENOUS
Status: DISCONTINUED | OUTPATIENT
Start: 2023-10-13 | End: 2023-10-17 | Stop reason: HOSPADM

## 2023-10-13 RX ORDER — ASPIRIN 81 MG/1
243 TABLET, CHEWABLE ORAL ONCE
Status: DISCONTINUED | OUTPATIENT
Start: 2023-10-13 | End: 2023-10-13

## 2023-10-13 RX ORDER — FENTANYL CITRATE 50 UG/ML
25 INJECTION, SOLUTION INTRAMUSCULAR; INTRAVENOUS
Status: DISCONTINUED | OUTPATIENT
Start: 2023-10-13 | End: 2023-10-13

## 2023-10-13 RX ORDER — ASPIRIN 325 MG
325 TABLET ORAL ONCE
Status: DISCONTINUED | OUTPATIENT
Start: 2023-10-13 | End: 2023-10-13

## 2023-10-13 RX ORDER — ATROPINE SULFATE 0.1 MG/ML
0.5 INJECTION INTRAVENOUS
Status: ACTIVE | OUTPATIENT
Start: 2023-10-13 | End: 2023-10-13

## 2023-10-13 RX ORDER — LIDOCAINE 40 MG/G
CREAM TOPICAL
Status: DISCONTINUED | OUTPATIENT
Start: 2023-10-13 | End: 2023-10-13

## 2023-10-13 RX ORDER — HYDRALAZINE HYDROCHLORIDE 20 MG/ML
10 INJECTION INTRAMUSCULAR; INTRAVENOUS EVERY 4 HOURS PRN
Status: DISCONTINUED | OUTPATIENT
Start: 2023-10-13 | End: 2023-10-17 | Stop reason: HOSPADM

## 2023-10-13 RX ORDER — FLUMAZENIL 0.1 MG/ML
0.2 INJECTION, SOLUTION INTRAVENOUS
Status: ACTIVE | OUTPATIENT
Start: 2023-10-13 | End: 2023-10-13

## 2023-10-13 RX ORDER — FENTANYL CITRATE 50 UG/ML
INJECTION, SOLUTION INTRAMUSCULAR; INTRAVENOUS
Status: DISCONTINUED | OUTPATIENT
Start: 2023-10-13 | End: 2023-10-13 | Stop reason: HOSPADM

## 2023-10-13 RX ORDER — ACETAMINOPHEN 325 MG/1
650 TABLET ORAL EVERY 4 HOURS PRN
Status: DISCONTINUED | OUTPATIENT
Start: 2023-10-13 | End: 2023-10-17 | Stop reason: HOSPADM

## 2023-10-13 RX ADMIN — AZITHROMYCIN MONOHYDRATE 250 MG: 500 INJECTION, POWDER, LYOPHILIZED, FOR SOLUTION INTRAVENOUS at 19:53

## 2023-10-13 RX ADMIN — THERA TABS 1 TABLET: TAB at 08:40

## 2023-10-13 RX ADMIN — FUROSEMIDE 60 MG: 10 INJECTION, SOLUTION INTRAMUSCULAR; INTRAVENOUS at 16:30

## 2023-10-13 RX ADMIN — SACUBITRIL AND VALSARTAN 1 TABLET: 24; 26 TABLET, FILM COATED ORAL at 21:10

## 2023-10-13 RX ADMIN — CEFTRIAXONE SODIUM 2 G: 2 INJECTION, POWDER, FOR SOLUTION INTRAMUSCULAR; INTRAVENOUS at 18:15

## 2023-10-13 RX ADMIN — FUROSEMIDE 60 MG: 10 INJECTION, SOLUTION INTRAMUSCULAR; INTRAVENOUS at 21:35

## 2023-10-13 RX ADMIN — HYDRALAZINE HYDROCHLORIDE 10 MG: 20 INJECTION, SOLUTION INTRAMUSCULAR; INTRAVENOUS at 15:30

## 2023-10-13 RX ADMIN — CYANOCOBALAMIN TAB 1000 MCG 1000 MCG: 1000 TAB at 08:40

## 2023-10-13 RX ADMIN — SACUBITRIL AND VALSARTAN 1 TABLET: 24; 26 TABLET, FILM COATED ORAL at 08:40

## 2023-10-13 RX ADMIN — ENOXAPARIN SODIUM 40 MG: 40 INJECTION SUBCUTANEOUS at 21:11

## 2023-10-13 RX ADMIN — ACETAMINOPHEN 650 MG: 325 TABLET ORAL at 16:39

## 2023-10-13 RX ADMIN — FUROSEMIDE 40 MG: 10 INJECTION, SOLUTION INTRAMUSCULAR; INTRAVENOUS at 07:14

## 2023-10-13 RX ADMIN — FERROUS SULFATE TAB 325 MG (65 MG ELEMENTAL FE) 325 MG: 325 (65 FE) TAB at 08:40

## 2023-10-13 RX ADMIN — ACETAMINOPHEN 650 MG: 325 TABLET ORAL at 01:23

## 2023-10-13 RX ADMIN — ALBUMIN HUMAN 50 G: 0.25 SOLUTION INTRAVENOUS at 11:57

## 2023-10-13 ASSESSMENT — ACTIVITIES OF DAILY LIVING (ADL)
ADLS_ACUITY_SCORE: 35
ADLS_ACUITY_SCORE: 35
ADLS_ACUITY_SCORE: 20
ADLS_ACUITY_SCORE: 20
ADLS_ACUITY_SCORE: 35
ADLS_ACUITY_SCORE: 20
ADLS_ACUITY_SCORE: 35

## 2023-10-13 NOTE — PRE-PROCEDURE
GENERAL PRE-PROCEDURE:   Procedure:  Coronary angiogram with possible PCI, right heart cathterization  Date/Time:  10/13/2023 10:38 AM    Written consent obtained?: Yes    Risks and benefits: Risks, benefits and alternatives were discussed    Consent given by:  Patient  Patient states understanding of procedure being performed: Yes    Patient's understanding of procedure matches consent: Yes    Procedure consent matches procedure scheduled: Yes    Expected level of sedation:  Moderate  Appropriately NPO:  Yes  ASA Class:  4 (HFrEF, untreated MANINDER, Class III obesity; BMI 49.69kg/m2)  Mallampati  :  Grade 4- soft palate obscured by base of tongue  Lungs:  Lungs clear with good breath sounds bilaterally  Heart:  Normal heart sounds and rate  History & Physical reviewed:  History and physical reviewed and updates made (see comment)  H&P Comments:  Clinically Significant Risk Factors Present on Admission    Cardiovascular: Systolic acute    Fluid & Electrolyte Disorders : Volume overload, unspecified; undergoing diuresis    Gastroenterology : Not present on admission    Hematology/Oncology : Not present on admission    Nephrology : Not present on admission    Neurology : Not present on admission    Pulmonology : Not present on admission    Systemic: Chronic Fatigue and Other Debilities: Other reduced mobility; Class III obesity BMI 49.65kg/m2    Statement of review:  I have reviewed the lab findings, diagnostic data, medications, and the plan for sedation  I agree  Roxana Melgoza MD on 10/13/2023 at 1:08 PM

## 2023-10-13 NOTE — CONSULTS
Cardiac surgery consult  Note     Betty Pan  Code Status: Full Code  Primary Care Physician: No Ref-Primary, Physician   : 1975   Age: 48 year old     Date of Service: 10/13/2023     Subjective     Chief Complaint: Shortness of breath    History of present illness:     48-year-old female with a history of morbid obesity and cervical cancer s/p hysterectomy.  She has been having increased shortness of breath and fatigue.  She presented to the hospital for evaluation and was noted to have leukocytosis with hypoenhancing lesions in the kidney.  Suspected pyelonephritis and admitted for IV antibiotics.  Echo noted significantly reduced EF of 30 to 35%.  Coronary angiogram today demonstrates severe multivessel CAD.  She is currently resting in bed and denies chest pain.  She does not have pain with mobility.  She does have significant shortness of breath with ambulation and has to stop to catch her breath frequently when going on stairs or walking more than a short distance.  She denies history of hypertension or diabetes.  She does not take any blood thinners.  No prior history of surgery to the heart or lungs  She has a history of gastric bypass     Subjective   Review of Systems:   12 point ROS negative except for noted above     Patient Active Problem List   Diagnosis    Pyelonephritis, acute    Peripheral edema    Pleural effusion    Renal mass    Blood glucose elevated    Renal insufficiency    Aspiration pneumonia of lower lobe, unspecified aspiration pneumonia type, unspecified laterality (H)    Hypertension, unspecified type     History reviewed. No pertinent past medical history.  History reviewed. No pertinent surgical history.  Social History     Socioeconomic History    Marital status: Single     Spouse name: Not on file    Number of children: Not on file    Years of education: Not on file    Highest education level: Not on file   Occupational History    Not on file   Tobacco Use    Smoking  "status: Never    Smokeless tobacco: Not on file   Substance and Sexual Activity    Alcohol use: Not on file    Drug use: Not on file    Sexual activity: Not on file   Other Topics Concern    Not on file   Social History Narrative    Not on file     Social Determinants of Health     Financial Resource Strain: Not on file   Food Insecurity: Not on file   Transportation Needs: Not on file   Physical Activity: Not on file   Stress: Not on file   Social Connections: Not on file   Interpersonal Safety: Not on file   Housing Stability: Not on file     History reviewed. No pertinent family history.  Medications Prior to Admission   Medication Sig Dispense Refill Last Dose    cyanocobalamin (VITAMIN B-12) 1000 MCG tablet Take 1,000 mcg by mouth daily   10/11/2023 at am    Ferrous Sulfate 324 (65 Fe) MG TBEC Take 1 tablet by mouth daily   10/11/2023 at am    multivitamin, therapeutic (THERA-VIT) TABS tablet Take 1 tablet by mouth daily   10/11/2023 at am     No Known Allergies       Objective   Objective   /72 (BP Location: Left arm)   Pulse 68   Temp 98.4  F (36.9  C) (Oral)   Resp 20   Ht 1.499 m (4' 11\")   Wt 111.6 kg (246 lb)   SpO2 98%   BMI 49.69 kg/m      Temp  Min: 98.4  F (36.9  C)  Max: 100.4  F (38  C)  BP  Min: 120/72  Max: 141/87     Intake/Output Summary (Last 24 hours) at 10/13/2023 1507  Last data filed at 10/13/2023 1230  Gross per 24 hour   Intake --   Output 3350 ml   Net -3350 ml     I/O last 3 completed shifts:  In: -   Out: 3350 [Urine:3350]     Physical Exam:   Gen- alert and oriented x3, NAD  HEENT- NC/AT, EOMI  Cardiac- RRR  Pulm- normal respiratory effort  Abd- soft, NT/ND, no rebound or guarding  - deferred  Extremities- no peripheral edema  Neuro- gross motor intact  Skin- no obvious rashes, bruises, or cuts     Pertinent Labs: Reviewed.     Arterial Blood Gases   Recent Labs   Lab 10/13/23  1331   PH 7.42   PCO2 41   PO2 82   HCO3 26       Complete Blood Count   Recent Labs   Lab " 10/13/23  0607 10/11/23  1057 10/10/23  2012   WBC 11.5* 16.4* 16.3*   HGB 12.5 15.3 14.0   * 120* 124*       Basic Metabolic Panel  Recent Labs   Lab 10/13/23  0607 10/12/23  1527 10/11/23  1057 10/10/23  2012     --  141 140   POTASSIUM 3.5 3.9 3.4 3.9   CHLORIDE 102  --  104 105   CO2 25  --  24 25   BUN 21.2*  --  20.6* 16.8   CR 0.86  --  0.98* 0.91       Liver Function Tests  Recent Labs   Lab 10/11/23  1057 10/10/23  2012   AST 23 25   ALT 23 21   ALKPHOS 77 60   BILITOTAL 1.9* 1.8*   ALBUMIN 3.9 3.7       Coagulation Profile  No lab results found in last 7 days.       Pertinent Imaging (Last 24 hours):  Recent Results (from the past 24 hour(s))   Cardiac Catheterization    Narrative      1st Mrg lesion is 70% stenosed.    Prox LAD to Mid LAD lesion is 90% stenosed.    1st Diag lesion is 90% stenosed.    2nd Diag lesion is 70% stenosed.    Mid LAD lesion is 100% stenosed.    RPDA lesion is 90% stenosed.    Prox RCA lesion is 30% stenosed.    3rd RPL lesion is 80% stenosed.    Right sided filling pressures are normal.    Left sided filling pressures are mildly elevated.    Mild elevated pulmonary hypertension.     Severe multivessel CAD without significant left main stenosis.  The proximal to midLAD has severe calcific stensosis involving the ostia   of D1 and D2 and the distal LAD is occluded and fills by collaterals.  The LCx OM1 branch has severe ostial stenosis.  The RPDA has subtotal stensis and there is severe stenosis of a large RPLA    branch.  Right side filling pressures are high-normal and left side filling   pressures are mildly elevated.  RA mean 8mmHg  PA 43/25, mean 31mmHg  PCWP mean 19mmHg.   Borderline reduced cardiac output/index: CO 4.2L/min, CI 2.1L/min/m2.         Last Echo:  Echo result w/o MOPS: Interpretation Summary Technically very challenging study. Definity contrast utilized.Left ventricle measures mildly enlarged. Mild left ventricular hypertrophysuggested. Left  ventricular systolic function is moderate to severely globallyreduced with an ejection fraction of 30 to 35% with akinesis of the leftventricular apex.Diastolic Doppler findings (E/E' ratio and/or otherparameters) suggest left ventricular filling pressures are increased.The right ventricle is suboptimally visualized. On limited imaging rightventricular systolic function appears potentially reduced.Mild enlargement of the left atrium.Valve structures are suboptimally visualized on this technically challengingstudy. No obvious hemodynamically significant valve abnormality noted.Consider cardiac MRI to further define cardiac structures.               Current Medications     S  C  H  E  D  azithromycin  250 mg Intravenous Q24H    cefTRIAXone  2 g Intravenous Q24H    cyanocobalamin  1,000 mcg Oral Daily    enoxaparin ANTICOAGULANT  40 mg Subcutaneous Q12H    ferrous sulfate  325 mg Oral Daily    furosemide  60 mg Intravenous Q8H    multivitamin, therapeutic  1 tablet Oral Daily    sacubitril-valsartan  1 tablet Oral BID      G  T  T    P  R  N acetaminophen **OR** acetaminophen, acetaminophen, atropine, glucose **OR** dextrose **OR** glucagon, fentaNYL, flumazenil, HOLD MEDICATION, hydrALAZINE, hydrALAZINE, melatonin, midazolam, naloxone **OR** naloxone **OR** naloxone **OR** naloxone, ondansetron **OR** ondansetron, oxyCODONE **OR** oxyCODONE, senna-docusate **OR** senna-docusate, sodium chloride 0.9%         Assessment     48 year old female with morbid obesity and newly diagnosed HFrEF and severe multivessel CAD     Plan     Patient with multivessel CAD.  100% of the mid LAD, reduced EF on echo.  Difficult to visualize valves but no obvious valvular abnormalities. Akinesis of the LV [place]   Her biggest symptom is shortness of breath and fatigue.  Likely a combination of her morbid obesity and cardiac disease.  Also noted to have pyelonephritis (?)  On admission and on antibiotics.  WBC improving.  History of cervical  cancer and hypoenhancing lesions on the kidneys.  Likely pyelonephritis but follow-up imaging recommended in 4 to 6 weeks. May be beneficial to rule out other pathology prior to surgery.    Continue medical optimization including diuresis and treatment of pyelonephritis.  Discussed with patient.  She is high risk due to her size and limited mobility. She wished to discuss with family. Will follow through the weekend and re discuss options on Monday.   Please obtain Carotid US if able.     Discussed with Dr. Rizvi.         Signed:    Alfa Mcintyre MD 10/13/2023 at 3:07 PM  Cardiothoracic Surgery Fellow

## 2023-10-13 NOTE — PROGRESS NOTES
HEART CARE NOTE          Assessment/Recommendations     1. Severe HFrEF c/b severe ADHF  Assessment / Plan  Hypervolemic on physical exam; diuresing well on current regimen; continue to monitor renal function/repeat BMP, UOP and hemodynamics closely   New HFrEF -  coronary angiogram today to r/o obstructive CAD   GDMT as detailed below    Current Pharmacotherapy AHA Guideline-Directed Medical Therapy   Sacubitril-valsartan 24-26 mg twice daily Lisinopril 20 mg twice daily   Metoprolol succinate - on hold until near euvolemia as patient is Bblocker naive Carvedilol 25 mg twice daily   Spironolactone  - not started Spironolactone 25 mg once daily   Hydralazine NA Hydralazine 100 mg three times daily   Isosorbide dinitrate NA Isosorbide dinitrate 40 mg three times daily   SGLT2 inhibitor:Dapagliflozin/Empagliflozin - not started Dapagliflozin or Empagliflozin 10 mg daily     2. Acute hypoxic respiratory failure  Assessment / Plan  Diuresis as above    3. HTN  Assessment / Plan  Adequately controlled on current regimen - no changes to regimen at this time      Plan of care discussed on October 13, 2023 with patient at bedside, and primary team overseeing patient's care    60 minutes spent reviewing prior records (including documentation, laboratory studies, cardiac testing/imaging), history and physical exam, planning, and subsequent documentation.    History of Present Illness/Subjective    Ms. Betty Pan is a 48 year old female who presents in acute hypoxic respiratory failure concerning for ADHF    Today, Mrs. Pan denies any acute cardiac events or complaints; Management plan as detailed above     ECG: personally reviewed; sinus tachycardia.    ECHO (personnaly Reviewed on 10/1323):   Technically very challenging study. Definity contrast utilized.  Left ventricle measures mildly enlarged. Mild left ventricular hypertrophy  suggested. Left ventricular systolic function is moderate to severely globally  reduced  "with an ejection fraction of 30 to 35% with akinesis of the left  ventricular apex.Diastolic Doppler findings (E/E' ratio and/or other  parameters) suggest left ventricular filling pressures are increased.  The right ventricle is suboptimally visualized. On limited imaging right  ventricular systolic function appears potentially reduced.  Mild enlargement of the left atrium.  Valve structures are suboptimally visualized on this technically challenging  study. No obvious hemodynamically significant valve abnormality noted.  Consider cardiac MRI to further define cardiac structures.    Telemetry: personally reviewed October 13, 2023; notable for sinus rhythm     Medical history and pertinent documents reviewed in Care Everywhere please where applicable see details above        Physical Examination Review of Systems   /80 (BP Location: Left arm, Patient Position: Semi-Brown's)   Pulse 73   Temp 98.4  F (36.9  C) (Oral)   Resp 20   Ht 1.499 m (4' 11\")   Wt 111.6 kg (246 lb)   SpO2 93%   BMI 49.69 kg/m    Body mass index is 49.69 kg/m .  Wt Readings from Last 3 Encounters:   10/11/23 111.6 kg (246 lb)     General Appearance:   no distress, normal body habitus   ENT/Mouth: membranes moist, no oral lesions or bleeding gums.      EYES:  no scleral icterus, normal conjunctivae   Neck: no carotid bruits or thyromegaly   Chest/Lungs:   lungs are clear to auscultation, no rales or wheezing, equal chest wall expansion    Cardiovascular:   Regular. Normal first and second heart sounds with no murmurs, rubs, or gallops; the carotid, radial and posterior tibial pulses are intact, + JVD and LE edema bilaterally    Abdomen:  no organomegaly, masses, bruits, or tenderness; bowel sounds are present   Extremities: no cyanosis or clubbing   Skin: no xanthelasma, warm.    Neurologic: NAD     Psychiatric: alert and oriented x3, calm     A complete 10 systems ROS was reviewed  And is negative except what is listed in the HPI. " "         Medical History  Surgical History Family History Social History   History reviewed. No pertinent past medical history. History reviewed. No pertinent surgical history. no family history of premature coronary artery disease Social History     Socioeconomic History    Marital status: Single     Spouse name: Not on file    Number of children: Not on file    Years of education: Not on file    Highest education level: Not on file   Occupational History    Not on file   Tobacco Use    Smoking status: Never    Smokeless tobacco: Not on file   Substance and Sexual Activity    Alcohol use: Not on file    Drug use: Not on file    Sexual activity: Not on file   Other Topics Concern    Not on file   Social History Narrative    Not on file     Social Determinants of Health     Financial Resource Strain: Not on file   Food Insecurity: Not on file   Transportation Needs: Not on file   Physical Activity: Not on file   Stress: Not on file   Social Connections: Not on file   Interpersonal Safety: Not on file   Housing Stability: Not on file           Lab Results    Chemistry/lipid CBC Cardiac Enzymes/BNP/TSH/INR   Lab Results   Component Value Date    BUN 21.2 (H) 10/13/2023     10/13/2023    CO2 25 10/13/2023    Lab Results   Component Value Date    WBC 11.5 (H) 10/13/2023    HGB 12.5 10/13/2023    HCT 40.4 10/13/2023    MCV 93 10/13/2023     (L) 10/13/2023    Lab Results   Component Value Date    TSH 1.30 10/10/2023     No results found for: \"CKTOTAL\", \"CKMB\", \"TROPONINI\"       Weight:    Wt Readings from Last 3 Encounters:   10/11/23 111.6 kg (246 lb)       Allergies  No Known Allergies      Surgical History  History reviewed. No pertinent surgical history.    Social History  Tobacco:   History   Smoking Status    Never   Smokeless Tobacco    Not on file    Alcohol:   Social History    Substance and Sexual Activity      Alcohol use: Not on file   Illicit Drugs:   History   Drug Use Not on file       Family " History  History reviewed. No pertinent family history.       Yenny Crespo MD on 10/13/2023      cc: No Ref-Primary, Physician

## 2023-10-13 NOTE — PROGRESS NOTES
[facility]    Medicine Progress Note - Hospitalist Service    Date of Admission:  10/11/2023    Assessment & Plan   48 year old female presenting with pyelonephritis, new heart failure and concern forpneumonia.     Acute respiratory failure: resolving. Likely secondary to new acute systolic CHF and possible CAP  TTE showed EF of 30-35%  CXR with possible right sided pneumonia  -- continue Rocephin and Zithromax for possible pneumonia  -- continue diuresis per cardiology     Sepsis due to Pyelonephritis and likely CAP:  Resolved  Abdomen/pelvic CT shows hypoenhancing areas in the upper poles of both kidneys most suggestive of pyelonephritis.      UC grew pansensitive E. Coli, blood cultures also with E. coli  --  Started on IV ceftriaxone and azithromycin, will continue   -- plan follow-up renal CT or MRI in 4 to 6 weeks to ensure complete resolution      Acute systolic CHF: Found to have ischemic cardiomyopathy   TTE on admission shows EF of 30-35%  Coronary angiogram 10/13 shows severe multivessel CAD  -- CTS consulted  -- plan carotid US as part of pre-op eval   -- continue lasix and entrestro per cards       Right sided pneumonia  CT with small right pleural effusion with associated middle lobe and right lower lobe consolidation.    Question if more related to effusion from CHF although procalcitonin >0.5 so will continue CTX and azithro as above  -- plan outpatient follow-up chest CT in 4 to 6 weeks to ensure resolution   ---Treatment for CHF as above         Hyperglycemia: new diagnosis of prediabetes:   Hemoglobin A1c 5.8  --Accu-Cheks have been low.  No clear indication for insulin so we will hold off on insulin sliding scale  -- continue diabetic diet after discharge       Hypertension:   -- continue entresto and lasix       History of cervical cancer:   -- will need follow-up renal imaging as described above to rule out mass.         Obstructive sleep apnea--does not use CPAP per  "her report       Previous gastric bypass surgery       Diet: Moderate Consistent Carb (60 g CHO per Meal) Diet  DVT Prophylaxis: Enoxaparin (Lovenox) SQ  Bond Catheter: Not present  Lines: PRESENT             Cardiac Monitoring: ACTIVE order. Indication: Post- PCI/Angiogram (24 hours)  Code Status: Full Code      Clinically Significant Risk Factors                  # Hypertension: Noted on problem list        # Severe Obesity: Estimated body mass index is 49.69 kg/m  as calculated from the following:    Height as of this encounter: 1.499 m (4' 11\").    Weight as of this encounter: 111.6 kg (246 lb)., PRESENT ON ADMISSION     # Financial/Environmental Concerns: none         Disposition Plan      Expected Discharge Date: 10/14/2023      Destination: home            Shyam Ibrahim DO  Hospitalist Service  [facility]  Securely message with [employer] (more info)  Text page via [employer] Paging/Directory   ______________________________________________________________________    Interval History   NAD. States breathing is much better since admission. Feels she can lay flat for angio    Physical Exam   Vital Signs: Temp: 98.4  F (36.9  C) Temp src: Oral BP: 120/72 Pulse: 68   Resp: 20 SpO2: 98 % O2 Device: Nasal cannula Oxygen Delivery: 2 LPM  Weight: 246 lbs 0 oz  General: NAD  RESPIRATORY: Breathing nonlabored  CARDIOVASCULAR: 1+ le edema bilat.   NEUROLOGIC: Motor and sensory intact, speech clear         Medical Decision Making       >50 MINUTES SPENT BY ME on the date of service doing chart review, history, exam, documentation & further activities per the note.      Data       "

## 2023-10-13 NOTE — PLAN OF CARE
"  Problem: Adult Inpatient Plan of Care  Goal: Plan of Care Review  Description: The Plan of Care Review/Shift note should be completed every shift.  The Outcome Evaluation is a brief statement about your assessment that the patient is improving, declining, or no change.  This information will be displayed automatically on your shift  note.  Outcome: Progressing  Goal: Patient-Specific Goal (Individualized)  Description: You can add care plan individualizations to a care plan. Examples of Individualization might be:  \"Parent requests to be called daily at 9am for status\", \"I have a hard time hearing out of my right ear\", or \"Do not touch me to wake me up as it startles  me\".  Outcome: Progressing  Goal: Absence of Hospital-Acquired Illness or Injury  Outcome: Progressing  Intervention: Prevent Skin Injury  Recent Flowsheet Documentation  Taken 10/13/2023 0818 by Chasity Ramos RN  Body Position: position changed independently  Goal: Optimal Comfort and Wellbeing  Outcome: Progressing  Goal: Readiness for Transition of Care  Outcome: Progressing   Goal Outcome Evaluation:       Patient denies pain.  O2 sats wnl on room air.  Patient tolerating lying flat.  Showered independently this morning. NPO for cardiac cath.                 "

## 2023-10-13 NOTE — PLAN OF CARE
"  Problem: Adult Inpatient Plan of Care  Goal: Patient-Specific Goal (Individualized)  Description: You can add care plan individualizations to a care plan. Examples of Individualization might be:  \"Parent requests to be called daily at 9am for status\", \"I have a hard time hearing out of my right ear\", or \"Do not touch me to wake me up as it startles  me\".  Outcome: Progressing     Problem: Adult Inpatient Plan of Care  Goal: Optimal Comfort and Wellbeing  Outcome: Progressing   Goal Outcome Evaluation:    SOB with activities. Up to bathroom only.  Remains on oxygen at 2L.  O2 sats in upper 90s but Patient does not want to have oxygen removed.  Patient states SOB is \"a little better today.\"    Antibiotic infused/I.V. Lasix admin. Started on new Heart Failure med (Entresto)  Patient denied pain/discomfort.  Coronary Angiogram scheduled for tomorrow.           "

## 2023-10-13 NOTE — PLAN OF CARE
Problem: Adult Inpatient Plan of Care  Goal: Optimal Comfort and Wellbeing  Intervention: Monitor Pain and Promote Comfort  Recent Flowsheet Documentation  Taken 10/13/2023 0123 by Tati Cotto, RN  Pain Management Interventions:   medication (see MAR)   repositioned    PRN tylenol given for generalized body ache, effective per pt report.  OOB independently.  Lung sounds diminished.  +1 edema generalized throughout.  Sleeping with HOB elevated and O2 via nasal cannula for comfort.  NSR on telemetry.      Tati Cotto, RN

## 2023-10-13 NOTE — CONSULTS
NUTRITION EDUCATION      REASON FOR ASSESSMENT:  Consulted to educate pt on 2 gram Na diet    NUTRITION HISTORY:  Information obtained from pt    Pt does not follow any special diet at home.  This low sodium diet is new to her    CURRENT DIET:  NPO    NUTRITION DIAGNOSIS:  Food- and nutrition-related knowledge deficit R/t acute heart failure as evidenced by consulted to educate on low sodium diet    INTERVENTIONS:    Nutrition Prescription:  2 gram Na    Implementation:      *  Nutrition Education (Content):   A)  Provided handout Low sodium nutrition therapy, sodium free seasonings sheet, low sodium shopping guidelines   B)  Discussed foods high and low in sodium, label reading, appropriate sodium free seasonings      *  Nutrition Education (Application):   A)  Discussed current eating habits and recommended alternative food choices      *  Anticipate good compliance      *  Diet Education - refer to Education Flowsheet    Goals:      *  Patient will verbalize understanding of diet met      *  All of the above goals met during the education session    Follow Up/Monitoring:      *  Provided RD contact information for future questions      *  Recommended Out-Patient Nutrition Referral, if further diet instructions are needed

## 2023-10-13 NOTE — PROGRESS NOTES
Care Management Follow Up    Length of Stay (days): 2    Expected Discharge Date: 10/14/2023     Concerns to be Addressed: angio  Patient plan of care discussed at interdisciplinary rounds: Yes    Anticipated Discharge Disposition:home       Anticipated Discharge Services:    Anticipated Discharge DME:      Patient/family educated on Medicare website which has current facility and service quality ratings:    Education Provided on the Discharge Plan:    Patient/Family in Agreement with the Plan:      Referrals Placed by CM/SW:  none at this time  Private pay costs discussed: Not applicable    Additional Information:  Social History:  Pt lives in an apartment alone. She is independent with ADLs and IADLs and self employed. Brother to transport at discharge.    Per provider plan is for angio today and discharge 1-2 days home     Therapy recommendation is home.    RNCM to follow for medical progression, recommendations, and final discharge plan.      Rupali Cantor RN

## 2023-10-13 NOTE — PLAN OF CARE
Problem: Adult Inpatient Plan of Care  Goal: Optimal Comfort and Wellbeing  Outcome: Progressing   Goal Outcome Evaluation:    Patient arrived from [facility] after right femoral angiogram. She had a fever of 101F while in [facility], per nurse to nurse report. PRN tylenol was given. Once she arrived on P3 her tempeture had subsided to 98.7F, she states she did have the chills but no longer. IV antibiotics are running now. Septic protocol fired and was ran, lactic acid results 1.2.

## 2023-10-14 ENCOUNTER — APPOINTMENT (OUTPATIENT)
Dept: OCCUPATIONAL THERAPY | Facility: HOSPITAL | Age: 48
End: 2023-10-14
Payer: COMMERCIAL

## 2023-10-14 ENCOUNTER — APPOINTMENT (OUTPATIENT)
Dept: ULTRASOUND IMAGING | Facility: HOSPITAL | Age: 48
End: 2023-10-14
Attending: INTERNAL MEDICINE
Payer: COMMERCIAL

## 2023-10-14 LAB
ANION GAP SERPL CALCULATED.3IONS-SCNC: 13 MMOL/L (ref 7–15)
BACTERIA BLD CULT: ABNORMAL
BUN SERPL-MCNC: 19.9 MG/DL (ref 6–20)
CALCIUM SERPL-MCNC: 8.7 MG/DL (ref 8.6–10)
CHLORIDE SERPL-SCNC: 100 MMOL/L (ref 98–107)
CREAT SERPL-MCNC: 0.81 MG/DL (ref 0.51–0.95)
DEPRECATED HCO3 PLAS-SCNC: 27 MMOL/L (ref 22–29)
EGFRCR SERPLBLD CKD-EPI 2021: 88 ML/MIN/1.73M2
GLUCOSE BLDC GLUCOMTR-MCNC: 121 MG/DL (ref 70–99)
GLUCOSE BLDC GLUCOMTR-MCNC: 121 MG/DL (ref 70–99)
GLUCOSE BLDC GLUCOMTR-MCNC: 122 MG/DL (ref 70–99)
GLUCOSE BLDC GLUCOMTR-MCNC: 128 MG/DL (ref 70–99)
GLUCOSE SERPL-MCNC: 94 MG/DL (ref 70–99)
MAGNESIUM SERPL-MCNC: 2.1 MG/DL (ref 1.7–2.3)
PLATELET # BLD AUTO: 119 10E3/UL (ref 150–450)
POTASSIUM SERPL-SCNC: 3.5 MMOL/L (ref 3.4–5.3)
SODIUM SERPL-SCNC: 140 MMOL/L (ref 135–145)
WBC # BLD AUTO: 10.6 10E3/UL (ref 4–11)

## 2023-10-14 PROCEDURE — 99232 SBSQ HOSP IP/OBS MODERATE 35: CPT | Performed by: INTERNAL MEDICINE

## 2023-10-14 PROCEDURE — 250N000011 HC RX IP 250 OP 636: Mod: JZ | Performed by: NURSE PRACTITIONER

## 2023-10-14 PROCEDURE — 250N000013 HC RX MED GY IP 250 OP 250 PS 637: Performed by: INTERNAL MEDICINE

## 2023-10-14 PROCEDURE — 97535 SELF CARE MNGMENT TRAINING: CPT | Mod: GO

## 2023-10-14 PROCEDURE — 93880 EXTRACRANIAL BILAT STUDY: CPT

## 2023-10-14 PROCEDURE — 36415 COLL VENOUS BLD VENIPUNCTURE: CPT | Performed by: NURSE PRACTITIONER

## 2023-10-14 PROCEDURE — 85049 AUTOMATED PLATELET COUNT: CPT | Performed by: NURSE PRACTITIONER

## 2023-10-14 PROCEDURE — 210N000001 HC R&B IMCU HEART CARE

## 2023-10-14 PROCEDURE — 250N000013 HC RX MED GY IP 250 OP 250 PS 637: Performed by: NURSE PRACTITIONER

## 2023-10-14 PROCEDURE — 85048 AUTOMATED LEUKOCYTE COUNT: CPT | Performed by: INTERNAL MEDICINE

## 2023-10-14 PROCEDURE — 258N000003 HC RX IP 258 OP 636: Performed by: NURSE PRACTITIONER

## 2023-10-14 PROCEDURE — 83735 ASSAY OF MAGNESIUM: CPT | Performed by: INTERNAL MEDICINE

## 2023-10-14 PROCEDURE — 99233 SBSQ HOSP IP/OBS HIGH 50: CPT | Performed by: INTERNAL MEDICINE

## 2023-10-14 PROCEDURE — 97110 THERAPEUTIC EXERCISES: CPT | Mod: GO

## 2023-10-14 PROCEDURE — 80048 BASIC METABOLIC PNL TOTAL CA: CPT | Performed by: INTERNAL MEDICINE

## 2023-10-14 RX ORDER — NALOXONE HYDROCHLORIDE 0.4 MG/ML
0.4 INJECTION, SOLUTION INTRAMUSCULAR; INTRAVENOUS; SUBCUTANEOUS
Status: DISCONTINUED | OUTPATIENT
Start: 2023-10-14 | End: 2023-10-17 | Stop reason: HOSPADM

## 2023-10-14 RX ORDER — CARVEDILOL 3.12 MG/1
3.12 TABLET ORAL 2 TIMES DAILY WITH MEALS
Status: DISCONTINUED | OUTPATIENT
Start: 2023-10-14 | End: 2023-10-17 | Stop reason: HOSPADM

## 2023-10-14 RX ORDER — NALOXONE HYDROCHLORIDE 0.4 MG/ML
0.2 INJECTION, SOLUTION INTRAMUSCULAR; INTRAVENOUS; SUBCUTANEOUS
Status: DISCONTINUED | OUTPATIENT
Start: 2023-10-14 | End: 2023-10-17 | Stop reason: HOSPADM

## 2023-10-14 RX ORDER — GUAIFENESIN 600 MG/1
1200 TABLET, EXTENDED RELEASE ORAL 2 TIMES DAILY
Status: DISCONTINUED | OUTPATIENT
Start: 2023-10-14 | End: 2023-10-17 | Stop reason: HOSPADM

## 2023-10-14 RX ORDER — SPIRONOLACTONE 25 MG/1
25 TABLET ORAL DAILY
Status: DISCONTINUED | OUTPATIENT
Start: 2023-10-14 | End: 2023-10-17 | Stop reason: HOSPADM

## 2023-10-14 RX ADMIN — SACUBITRIL AND VALSARTAN 1 TABLET: 24; 26 TABLET, FILM COATED ORAL at 08:36

## 2023-10-14 RX ADMIN — ENOXAPARIN SODIUM 40 MG: 40 INJECTION SUBCUTANEOUS at 08:37

## 2023-10-14 RX ADMIN — AZITHROMYCIN MONOHYDRATE 250 MG: 500 INJECTION, POWDER, LYOPHILIZED, FOR SOLUTION INTRAVENOUS at 20:21

## 2023-10-14 RX ADMIN — CEFTRIAXONE SODIUM 2 G: 2 INJECTION, POWDER, FOR SOLUTION INTRAMUSCULAR; INTRAVENOUS at 13:35

## 2023-10-14 RX ADMIN — THERA TABS 1 TABLET: TAB at 08:36

## 2023-10-14 RX ADMIN — GUAIFENESIN 1200 MG: 600 TABLET ORAL at 23:07

## 2023-10-14 RX ADMIN — FUROSEMIDE 60 MG: 10 INJECTION, SOLUTION INTRAMUSCULAR; INTRAVENOUS at 13:33

## 2023-10-14 RX ADMIN — FUROSEMIDE 60 MG: 10 INJECTION, SOLUTION INTRAMUSCULAR; INTRAVENOUS at 21:34

## 2023-10-14 RX ADMIN — SACUBITRIL AND VALSARTAN 1 TABLET: 24; 26 TABLET, FILM COATED ORAL at 20:28

## 2023-10-14 RX ADMIN — FUROSEMIDE 60 MG: 10 INJECTION, SOLUTION INTRAMUSCULAR; INTRAVENOUS at 06:50

## 2023-10-14 RX ADMIN — ACETAMINOPHEN 650 MG: 325 TABLET ORAL at 06:10

## 2023-10-14 RX ADMIN — CARVEDILOL 3.12 MG: 3.12 TABLET, FILM COATED ORAL at 11:14

## 2023-10-14 RX ADMIN — CARVEDILOL 3.12 MG: 3.12 TABLET, FILM COATED ORAL at 17:10

## 2023-10-14 RX ADMIN — CYANOCOBALAMIN TAB 1000 MCG 1000 MCG: 1000 TAB at 08:36

## 2023-10-14 RX ADMIN — ENOXAPARIN SODIUM 40 MG: 40 INJECTION SUBCUTANEOUS at 20:28

## 2023-10-14 RX ADMIN — FERROUS SULFATE TAB 325 MG (65 MG ELEMENTAL FE) 325 MG: 325 (65 FE) TAB at 08:36

## 2023-10-14 RX ADMIN — SPIRONOLACTONE 25 MG: 25 TABLET, FILM COATED ORAL at 11:13

## 2023-10-14 ASSESSMENT — ACTIVITIES OF DAILY LIVING (ADL)
ADLS_ACUITY_SCORE: 20

## 2023-10-14 NOTE — PROGRESS NOTES
[facility]    Medicine Progress Note - Hospitalist Service    Date of Admission:  10/11/2023    Assessment & Plan   48 year old female presenting with pyelonephritis, new heart failure and concern for pneumonia.     Acute respiratory failure: resolved. Likely secondary to new acute systolic CHF and possible CAP  TTE showed EF of 30-35%  CXR with possible right sided pneumonia  -- continue Rocephin and Zithromax for possible pneumonia  -- continue diuresis per cardiology     Sepsis due to Pyelonephritis and likely CAP:  Resolved  Abdomen/pelvic CT shows hypoenhancing areas in the upper poles of both kidneys most suggestive of pyelonephritis.      UC grew pansensitive E. Coli, blood cultures also with E. coli  --  Started on IV ceftriaxone and azithromycin, will continue   -- plan follow-up renal CT or MRI in 4 to 6 weeks to ensure complete resolution      Acute systolic CHF: Found to have ischemic cardiomyopathy   TTE on admission shows EF of 30-35%  Coronary angiogram 10/13 shows severe multivessel CAD  -- CTS consulted  -- plan carotid US as part of pre-op eval   -- continue coreg, spironolactone, lasix and entrestro       Right sided pneumonia  CT with small right pleural effusion with associated middle lobe and right lower lobe consolidation.    Question if more related to effusion from CHF although procalcitonin >0.5 so will continue CTX and azithro as above  -- plan outpatient follow-up chest CT in 4 to 6 weeks to ensure resolution   ---Treatment for CHF as above         Hyperglycemia: new diagnosis of prediabetes:   Hemoglobin A1c 5.8  --Accu-Cheks have been low.  No clear indication for insulin so we will hold off on insulin sliding scale  -- continue diabetic diet after discharge       Hypertension:   -- continue entresto, coreg, spirono and lasix       History of cervical cancer:   -- will need follow-up renal imaging as described above to rule out mass.         Obstructive sleep  "apnea--does not use CPAP per her report       Previous gastric bypass surgery       Diet: Moderate Consistent Carb (60 g CHO per Meal) Diet  DVT Prophylaxis: Enoxaparin (Lovenox) SQ  Bond Catheter: Not present  Lines: None       Cardiac Monitoring: ACTIVE order. Indication: Post- PCI/Angiogram (24 hours)  Code Status: Full Code      Clinically Significant Risk Factors                # Thrombocytopenia: Lowest platelets = 119 in last 2 days, will monitor for bleeding   # Hypertension: Noted on problem list        # Severe Obesity: Estimated body mass index is 48.17 kg/m  as calculated from the following:    Height as of this encounter: 1.499 m (4' 11\").    Weight as of this encounter: 108.2 kg (238 lb 8 oz)., PRESENT ON ADMISSION       # Financial/Environmental Concerns: none         Disposition Plan      Expected Discharge Date: 10/16/2023      Destination: home            Shyam Ibrahim DO  Hospitalist Service  [facility]  Securely message with [employer] (more info)  Text page via [employer] Paging/Directory   ______________________________________________________________________    Interval History   NAD. Denies complaints    Physical Exam   Vital Signs: Temp: 98.2  F (36.8  C) Temp src: Oral BP: 125/62 Pulse: 62   Resp: 20 SpO2: 100 % O2 Device: Nasal cannula Oxygen Delivery: 2 LPM  Weight: 238 lbs 8 oz  General: NAD  RESPIRATORY: Breathing nonlabored  CARDIOVASCULAR: 1+ le edema bilat.   NEUROLOGIC: Motor and sensory intact, speech clear         Medical Decision Making       >45 MINUTES SPENT BY ME on the date of service doing chart review, history, exam, documentation & further activities per the note.      Data       "

## 2023-10-14 NOTE — PLAN OF CARE
"Goal Outcome Evaluation:    Problem: Comorbidity Management  Goal: Blood Glucose Levels Within Targeted Range  Outcome: Progressing  Intervention: Monitor and Manage Glycemia    Goal: Maintenance of Heart Failure Symptom Control  Outcome: Progressing  Intervention: Maintain Heart Failure Management    Goal: Blood Pressure in Desired Range  Outcome: Progressing  Intervention: Maintain Blood Pressure Management       Problem: Infection  Goal: Absence of Infection Signs and Symptoms  Outcome: Progressing          A&Ox4. HS . Did not order dinner tray, but ate a little bit of food brought in by family. Has been up to bathroom independently throughout the night. Denies pain. Denies nausea. Afebrile this shift. BP elevated, but within acceptable range per vital signs orders.   Right femoral access site WDL. No bleeding or using. No pain. CMS intact BLE. Pedal pulses weak, equal bilaterally.     Tele: NSR this shift         BP (!) 143/74 (BP Location: Left arm)   Pulse 72   Temp 98.6  F (37  C) (Oral)   Resp 20   Ht 1.499 m (4' 11\")   Wt 108.2 kg (238 lb 8 oz)   SpO2 99%   BMI 48.17 kg/m          "

## 2023-10-14 NOTE — PROGRESS NOTES
Pt temp is 100. + N.P. Miriam WOLFE called and updated, 650 tylenol given. This RN instructed OK to removed art sheath at this time.

## 2023-10-14 NOTE — CARE PLAN
Heart Failure Care Map  GOALS TO BE MET BEFORE DISCHARGE:    1. Decrease congestion and/or edema with diuretic therapy to achieve near optimal volume status.     Dyspnea improved: Yes, satisfactory for discharge.   Edema improved: Yes, satisfactory for discharge.        Last 24 hour I/O:   Intake/Output Summary (Last 24 hours) at 10/14/2023 1408  Last data filed at 10/14/2023 0600  Gross per 24 hour   Intake 1450 ml   Output 2725 ml   Net -1275 ml           Net I/O and Weights since admission:   09/14 1500 - 10/14 1459  In: 1940 [P.O.:1440; I.V.:250]  Out: 6075 [Urine:6075]  Net: -4135     Vitals:    10/11/23 1953 10/14/23 0416   Weight: 111.6 kg (246 lb) 108.2 kg (238 lb 8 oz)       2.  O2 sats > 90% on room air, or at prior home O2 therapy level.      Able to wean O2 this shift to keep sats above 90%?: Yes, satisfactory for discharge.   Does patient use Home O2? No          Current oxygenation status:   SpO2: 100 %     O2 Device: Nasal cannula, Oxygen Delivery: 2 LPM    3.  Tolerates ambulation and mobility near baseline.     Ambulation: Yes, satisfactory for discharge.   Times patient ambulated with staff this shift: 3    Please review the Heart Failure Care Map for additional HF goal outcomes.    JASE ACOSTA RN  10/14/2023

## 2023-10-14 NOTE — PROGRESS NOTES
"    Cardiology Progress Note    Assessment:  Coronary artery disease, multivessel,  of mid LAD, no angina  Acute heart failure, improved volume status/good urine output/stable creatinine  Ischemic cardiomyopathy with moderately depressed LV systolic function  History of gastric bypass surgery  Morbid obesity      Plan:  Continue furosemide  Start spironolactone  Start low-dose carvedilol  Continue Entresto    Further decisions about revascularization whether surgical or percutaneous early next week.    Subjective:   Feels less short of breath, denies chest pains    Objective:   /62 (BP Location: Left arm, Cuff Size: Adult Regular)   Pulse 62   Temp 98.2  F (36.8  C) (Oral)   Resp 20   Ht 1.499 m (4' 11\")   Wt 108.2 kg (238 lb 8 oz)   SpO2 100%   BMI 48.17 kg/m      Intake/Output Summary (Last 24 hours) at 10/14/2023 0949  Last data filed at 10/14/2023 0600  Gross per 24 hour   Intake 1450 ml   Output 3375 ml   Net -1925 ml         Physical Exam:  GENERAL: no distress  NECK: No JVD  LUNGS: Clear to auscultation.  CARDIAC: regular  rhythm, S1 & S2 normal.  No heaves, thrills, gallops or murmurs.  ABDOMEN: flat, negative hepatosplenomegaly, soft and non-tender.  EXTREMITIES: No evidence of cyanosis, clubbing or edema.    Current Facility-Administered Medications Ordered in Epic   Medication Dose Route Frequency Provider Last Rate Last Admin    acetaminophen (TYLENOL) tablet 650 mg  650 mg Oral Q6H PRN Kalamitsiotis, Stephanie C, CNP   650 mg at 10/14/23 0610    Or    acetaminophen (TYLENOL) Suppository 650 mg  650 mg Rectal Q6H PRN Kalamitsiotis, Stephanie C, CNP        acetaminophen (TYLENOL) tablet 650 mg  650 mg Oral Q4H PRN Kalamitsiotis, Stephanie C, CNP   650 mg at 10/13/23 1639    azithromycin (ZITHROMAX) 250 mg in sodium chloride 0.9 % 250 mL intermittent infusion  250 mg Intravenous Q24H Kalamitsiotis, Stephanie C, CNP   250 mg at 10/13/23 1953    cefTRIAXone (ROCEPHIN) 2 g vial to attach to  ml bag " for ADULTS or NS 50 ml bag for PEDS  2 g Intravenous Q24H Stephanie Bird  mL/hr at 10/12/23 1324 2 g at 10/13/23 1815    cyanocobalamin (VITAMIN B-12) tablet 1,000 mcg  1,000 mcg Oral Daily RicoamitsioChinmay venegasle C, CNP   1,000 mcg at 10/14/23 0836    glucose gel 15-30 g  15-30 g Oral Q15 Min PRN Tyleriotheo Stephanie C, CNP        Or    dextrose 50 % injection 25-50 mL  25-50 mL Intravenous Q15 Min PRN Kalamitsiotheo Stephanie C, CNP        Or    glucagon injection 1 mg  1 mg Subcutaneous Q15 Min PRN Tyleriotheo Stephanie C, CNP        enoxaparin ANTICOAGULANT (LOVENOX) injection 40 mg  40 mg Subcutaneous Q12H Stephanie Bird C, CNP   40 mg at 10/14/23 0837    fentaNYL (PF) (SUBLIMAZE) injection 25 mcg  25 mcg Intravenous Q15 Min PRN Stephanie Bird CNP        ferrous sulfate (FEROSUL) tablet 325 mg  325 mg Oral Daily Brunotsiotheo Stephanie C, CNP   325 mg at 10/14/23 0836    furosemide (LASIX) injection 60 mg  60 mg Intravenous Q8H Stephanie Bird CNP   60 mg at 10/14/23 0650    HOLD:  Metformin and metformin containing medications if patient received IV contrast with acute kidney injury or severe chronic kidney disease (stage IV or stage V; i.e., eGFR less than 30)   Does not apply HOLD Stephanie Bird CNP        hydrALAZINE (APRESOLINE) injection 10 mg  10 mg Intravenous Q4H PRN Shyam Ibrahim, DO   10 mg at 10/13/23 1530    melatonin tablet 1 mg  1 mg Oral At Bedtime PRN Stephanie Bird CNP        midazolam (VERSED) injection 0.5 mg  0.5 mg Intravenous Q5 Min PRN Brunotsiotheo, Stephanie C, CNP        multivitamin, therapeutic (THERA-VIT) tablet 1 tablet  1 tablet Oral Daily Chinmay Birdle C, CNP   1 tablet at 10/14/23 0836    nitroGLYcerin (NITROSTAT) sublingual tablet 0.4 mg  0.4 mg Sublingual Q5 Min PRN Stephanie Bird, WILD        ondansetron (ZOFRAN ODT) ODT tab 4 mg  4 mg Oral Q6H PRN Stephanie Bird CNP        Or    ondansetron  (ZOFRAN) injection 4 mg  4 mg Intravenous Q6H PRN Stephanie Bird CNP        oxyCODONE (ROXICODONE) tablet 5 mg  5 mg Oral Q4H PRN Stephanie Bird CNP        Or    oxyCODONE (ROXICODONE) tablet 10 mg  10 mg Oral Q4H PRN Stephanie Bird CNP        sacubitril-valsartan (ENTRESTO) 24-26 MG per tablet 1 tablet  1 tablet Oral BID Stephanie Bird CNP   1 tablet at 10/14/23 0836    senna-docusate (SENOKOT-S/PERICOLACE) 8.6-50 MG per tablet 1 tablet  1 tablet Oral BID PRN Stephanie Bird CNP        Or    senna-docusate (SENOKOT-S/PERICOLACE) 8.6-50 MG per tablet 2 tablet  2 tablet Oral BID PRN Stephanie Bird CNP         No current Epic-ordered outpatient medications on file.       Cardiographics:    Telemetry: Normal sinus rhythm  ECG: Normal sinus rhythm Q waves anterior leads  Echocardiogram:   Left ventricle measures mildly enlarged. Mild left ventricular hypertrophy  suggested. Left ventricular systolic function is moderate to severely globally  reduced with an ejection fraction of 30 to 35% with akinesis of the left  ventricular apex.Diastolic Doppler findings (E/E' ratio and/or other  parameters) suggest left ventricular filling pressures are increased.  The right ventricle is suboptimally visualized. On limited imaging right  ventricular systolic function appears potentially reduced.  Mild enlargement of the left atrium  Coronary angio:   Severe multivessel CAD without significant left main stenosis.  The proximal to midLAD has severe calcific stensosis involving the ostia of D1 and D2 and the distal LAD is occluded and fills by collaterals.  The LCx OM1 branch has severe ostial stenosis.  The RPDA has subtotal stensis and there is severe stenosis of a large RPLA  branch.  Right side filling pressures are high-normal and left side filling pressures are mildly elevated.  RA mean 8mmHg  PA 43/25, mean 31mmHg  PCWP mean 19mmHg.   Borderline reduced cardiac  "output/index: CO 4.2L/min, CI 2.1L/min/m2.   Lab Results    Chemistry/lipid CBC Cardiac Enzymes/BNP/TSH/INR   No results for input(s): \"CHOL\", \"HDL\", \"LDL\", \"TRIG\", \"CHOLHDLRATIO\" in the last 00450 hours.  No results for input(s): \"LDL\" in the last 08114 hours.  Recent Labs   Lab Test 10/14/23  0731 10/14/23  0417   NA  --  140   POTASSIUM  --  3.5   CHLORIDE  --  100   CO2  --  27   * 94   BUN  --  19.9   CR  --  0.81   GFRESTIMATED  --  88   KHADIJAH  --  8.7     Recent Labs   Lab Test 10/14/23  0417 10/13/23  0607 10/11/23  1057   CR 0.81 0.86 0.98*     Recent Labs   Lab Test 10/11/23  1057   A1C 5.8*          Recent Labs   Lab Test 10/14/23  0417 10/13/23  0607   WBC 10.6 11.5*   HGB  --  12.5   HCT  --  40.4   MCV  --  93   * 138*     Recent Labs   Lab Test 10/13/23  0607 10/11/23  1057 10/10/23  2012   HGB 12.5 15.3 14.0    No results for input(s): \"TROPONINI\" in the last 82981 hours.  Recent Labs   Lab Test 10/11/23  1057 10/10/23  2012   NTBNPI 5,007*  --    NTBNP  --  5,019*     Recent Labs   Lab Test 10/10/23  2012   TSH 1.30     No results for input(s): \"INR\" in the last 56309 hours.                "

## 2023-10-15 ENCOUNTER — APPOINTMENT (OUTPATIENT)
Dept: OCCUPATIONAL THERAPY | Facility: HOSPITAL | Age: 48
End: 2023-10-15
Payer: COMMERCIAL

## 2023-10-15 LAB
ALBUMIN UR-MCNC: NEGATIVE MG/DL
ANION GAP SERPL CALCULATED.3IONS-SCNC: 13 MMOL/L (ref 7–15)
APPEARANCE UR: CLEAR
BILIRUB UR QL STRIP: NEGATIVE
BUN SERPL-MCNC: 20.8 MG/DL (ref 6–20)
CALCIUM SERPL-MCNC: 9.3 MG/DL (ref 8.6–10)
CHLORIDE SERPL-SCNC: 99 MMOL/L (ref 98–107)
COLOR UR AUTO: YELLOW
CREAT SERPL-MCNC: 0.77 MG/DL (ref 0.51–0.95)
DEPRECATED HCO3 PLAS-SCNC: 29 MMOL/L (ref 22–29)
EGFRCR SERPLBLD CKD-EPI 2021: >90 ML/MIN/1.73M2
GLUCOSE BLDC GLUCOMTR-MCNC: 106 MG/DL (ref 70–99)
GLUCOSE BLDC GLUCOMTR-MCNC: 118 MG/DL (ref 70–99)
GLUCOSE BLDC GLUCOMTR-MCNC: 147 MG/DL (ref 70–99)
GLUCOSE BLDC GLUCOMTR-MCNC: 149 MG/DL (ref 70–99)
GLUCOSE SERPL-MCNC: 116 MG/DL (ref 70–99)
GLUCOSE UR STRIP-MCNC: NEGATIVE MG/DL
HGB UR QL STRIP: ABNORMAL
HOLD SPECIMEN: NORMAL
HYALINE CASTS: 5 /LPF
KETONES UR STRIP-MCNC: NEGATIVE MG/DL
LEUKOCYTE ESTERASE UR QL STRIP: ABNORMAL
MUCOUS THREADS #/AREA URNS LPF: PRESENT /LPF
NITRATE UR QL: NEGATIVE
PH UR STRIP: 6 [PH] (ref 5–7)
POTASSIUM SERPL-SCNC: 3.1 MMOL/L (ref 3.4–5.3)
POTASSIUM SERPL-SCNC: 3.5 MMOL/L (ref 3.4–5.3)
RBC URINE: 2 /HPF
SODIUM SERPL-SCNC: 141 MMOL/L (ref 135–145)
SP GR UR STRIP: 1.02 (ref 1–1.03)
SQUAMOUS EPITHELIAL: <1 /HPF
UROBILINOGEN UR STRIP-MCNC: <2 MG/DL
WBC URINE: 11 /HPF

## 2023-10-15 PROCEDURE — 250N000011 HC RX IP 250 OP 636: Performed by: INTERNAL MEDICINE

## 2023-10-15 PROCEDURE — 84132 ASSAY OF SERUM POTASSIUM: CPT | Performed by: INTERNAL MEDICINE

## 2023-10-15 PROCEDURE — 250N000013 HC RX MED GY IP 250 OP 250 PS 637: Performed by: NURSE PRACTITIONER

## 2023-10-15 PROCEDURE — 250N000011 HC RX IP 250 OP 636: Mod: JZ | Performed by: NURSE PRACTITIONER

## 2023-10-15 PROCEDURE — 250N000013 HC RX MED GY IP 250 OP 250 PS 637: Performed by: INTERNAL MEDICINE

## 2023-10-15 PROCEDURE — 99232 SBSQ HOSP IP/OBS MODERATE 35: CPT | Performed by: INTERNAL MEDICINE

## 2023-10-15 PROCEDURE — 87040 BLOOD CULTURE FOR BACTERIA: CPT | Performed by: INTERNAL MEDICINE

## 2023-10-15 PROCEDURE — 210N000001 HC R&B IMCU HEART CARE

## 2023-10-15 PROCEDURE — 81003 URINALYSIS AUTO W/O SCOPE: CPT | Performed by: INTERNAL MEDICINE

## 2023-10-15 PROCEDURE — 87086 URINE CULTURE/COLONY COUNT: CPT | Performed by: INTERNAL MEDICINE

## 2023-10-15 PROCEDURE — 97110 THERAPEUTIC EXERCISES: CPT | Mod: GO

## 2023-10-15 PROCEDURE — 36415 COLL VENOUS BLD VENIPUNCTURE: CPT | Performed by: INTERNAL MEDICINE

## 2023-10-15 PROCEDURE — 80048 BASIC METABOLIC PNL TOTAL CA: CPT | Performed by: INTERNAL MEDICINE

## 2023-10-15 PROCEDURE — 258N000003 HC RX IP 258 OP 636: Performed by: INTERNAL MEDICINE

## 2023-10-15 RX ORDER — FUROSEMIDE 20 MG
40 TABLET ORAL
Status: DISCONTINUED | OUTPATIENT
Start: 2023-10-15 | End: 2023-10-17 | Stop reason: HOSPADM

## 2023-10-15 RX ORDER — POTASSIUM CHLORIDE 1500 MG/1
40 TABLET, EXTENDED RELEASE ORAL ONCE
Status: COMPLETED | OUTPATIENT
Start: 2023-10-15 | End: 2023-10-15

## 2023-10-15 RX ADMIN — SACUBITRIL AND VALSARTAN 1 TABLET: 24; 26 TABLET, FILM COATED ORAL at 20:44

## 2023-10-15 RX ADMIN — FUROSEMIDE 40 MG: 20 TABLET ORAL at 12:11

## 2023-10-15 RX ADMIN — ENOXAPARIN SODIUM 40 MG: 40 INJECTION SUBCUTANEOUS at 20:44

## 2023-10-15 RX ADMIN — AZITHROMYCIN MONOHYDRATE 250 MG: 500 INJECTION, POWDER, LYOPHILIZED, FOR SOLUTION INTRAVENOUS at 20:35

## 2023-10-15 RX ADMIN — ENOXAPARIN SODIUM 40 MG: 40 INJECTION SUBCUTANEOUS at 08:04

## 2023-10-15 RX ADMIN — CARVEDILOL 3.12 MG: 3.12 TABLET, FILM COATED ORAL at 18:48

## 2023-10-15 RX ADMIN — SPIRONOLACTONE 25 MG: 25 TABLET, FILM COATED ORAL at 08:02

## 2023-10-15 RX ADMIN — CYANOCOBALAMIN TAB 1000 MCG 1000 MCG: 1000 TAB at 08:02

## 2023-10-15 RX ADMIN — GUAIFENESIN 1200 MG: 600 TABLET ORAL at 08:02

## 2023-10-15 RX ADMIN — FUROSEMIDE 40 MG: 20 TABLET ORAL at 18:48

## 2023-10-15 RX ADMIN — FERROUS SULFATE TAB 325 MG (65 MG ELEMENTAL FE) 325 MG: 325 (65 FE) TAB at 08:03

## 2023-10-15 RX ADMIN — SACUBITRIL AND VALSARTAN 1 TABLET: 24; 26 TABLET, FILM COATED ORAL at 08:04

## 2023-10-15 RX ADMIN — FUROSEMIDE 60 MG: 10 INJECTION, SOLUTION INTRAMUSCULAR; INTRAVENOUS at 06:09

## 2023-10-15 RX ADMIN — CEFTRIAXONE SODIUM 2 G: 2 INJECTION, POWDER, FOR SOLUTION INTRAMUSCULAR; INTRAVENOUS at 13:56

## 2023-10-15 RX ADMIN — THERA TABS 1 TABLET: TAB at 08:02

## 2023-10-15 RX ADMIN — GUAIFENESIN 1200 MG: 600 TABLET ORAL at 20:44

## 2023-10-15 RX ADMIN — POTASSIUM CHLORIDE 40 MEQ: 1500 TABLET, EXTENDED RELEASE ORAL at 07:54

## 2023-10-15 RX ADMIN — CARVEDILOL 3.12 MG: 3.12 TABLET, FILM COATED ORAL at 07:54

## 2023-10-15 ASSESSMENT — ACTIVITIES OF DAILY LIVING (ADL)
ADLS_ACUITY_SCORE: 20

## 2023-10-15 NOTE — PROGRESS NOTES
Heart Failure Care Map  GOALS TO BE MET BEFORE DISCHARGE:    1. Decrease congestion and/or edema with diuretic therapy to achieve near optimal volume status.     Dyspnea improved: No, further care required to meet this goal. Please explain STOKES   Edema improved: Yes, satisfactory for discharge.         Last 24 hour I/O:   Intake/Output Summary (Last 24 hours) at 10/15/2023 1419  Last data filed at 10/15/2023 1356  Gross per 24 hour   Intake 830 ml   Output 2900 ml   Net -2070 ml           Net I/O and Weights since admission:   09/15 1500 - 10/15 1459  In: 3491 [P.O.:2640; I.V.:601]  Out: 8975 [Urine:8975]  Net: -5484     Vitals:    10/11/23 1953 10/14/23 0416 10/15/23 0448   Weight: 111.6 kg (246 lb) 108.2 kg (238 lb 8 oz) 106.5 kg (234 lb 11.2 oz)       2.  O2 sats > 90% on room air, or at prior home O2 therapy level.      Able to wean O2 this shift to keep sats above 90%?: Yes, satisfactory for discharge.   Does patient use Home O2? No          Current oxygenation status:   SpO2: 99 %     O2 Device: None (Room air),      3.  Tolerates ambulation and mobility near baseline.     Ambulation: Yes, satisfactory for discharge.   Times patient ambulated with staff this shift: 0    Please review the Heart Failure Care Map for additional HF goal outcomes.    Joan Wynne RN  10/15/2023

## 2023-10-15 NOTE — PROGRESS NOTES
[facility]    Medicine Progress Note - Hospitalist Service    Date of Admission:  10/11/2023    Assessment & Plan   48 year old female presenting with pyelonephritis, new heart failure and concern for pneumonia.     Acute respiratory failure: resolved. Likely secondary to new acute systolic CHF and possible CAP  TTE showed EF of 30-35%  CXR with possible right sided pneumonia  -- continue Rocephin and Zithromax for possible pneumonia. Will complete Azithro 10/15  -- continue diuresis per cardiology     Sepsis due to Pyelonephritis and likely CAP:  Resolved  Abdomen/pelvic CT shows hypoenhancing areas in the upper poles of both kidneys most suggestive of pyelonephritis.      UC grew pansensitive E. Coli, blood cultures also with E. coli  --  Started on IV ceftriaxone and azithromycin, will continue   -- plan follow-up renal CT or MRI in 4 to 6 weeks to ensure complete resolution      Acute systolic CHF: Found to have ischemic cardiomyopathy   TTE on admission shows EF of 30-35%  Coronary angiogram 10/13 shows severe multivessel CAD  -- CTS consulted, discussion ongoing regarding CAB  -- carotid US as part of pre-op eval shows less than 50% stenosis in the bilateral internal carotid arteries   -- continue coreg, spironolactone, lasix and entrestro  -- recheck blood and urine cultures 10/15 to ensure resolution of UTI and bactermia prior to CAB         Right sided pneumonia  CT with small right pleural effusion with associated middle lobe and right lower lobe consolidation.    Question if more related to effusion from CHF although procalcitonin >0.5 so will continue CTX and azithro as above  -- plan outpatient follow-up chest CT in 4 to 6 weeks to ensure resolution   ---Treatment for CHF as above         Hyperglycemia: new diagnosis of prediabetes:   Hemoglobin A1c 5.8  --Accu-Cheks have been low.  No clear indication for insulin so we will hold off on insulin sliding scale  -- continue  "diabetic diet after discharge       Hypertension:   -- continue entresto, coreg, spirono and lasix       History of cervical cancer:   -- will need follow-up renal imaging as described above to rule out mass.         Obstructive sleep apnea--does not use CPAP per her report         Previous gastric bypass surgery        Hypokalemia: replace and recheck         Diet: Moderate Consistent Carb (60 g CHO per Meal) Diet  DVT Prophylaxis: Enoxaparin (Lovenox) SQ  Bond Catheter: Not present  Lines: None       Cardiac Monitoring: ACTIVE order. Indication: Post- PCI/Angiogram (24 hours)  Code Status: Full Code      Clinically Significant Risk Factors        # Hypokalemia: Lowest K = 3.1 mmol/L in last 2 days, will replace as needed         # Thrombocytopenia: Lowest platelets = 119 in last 2 days, will monitor for bleeding   # Hypertension: Noted on problem list        # Severe Obesity: Estimated body mass index is 47.4 kg/m  as calculated from the following:    Height as of this encounter: 1.499 m (4' 11\").    Weight as of this encounter: 106.5 kg (234 lb 11.2 oz)., PRESENT ON ADMISSION       # Financial/Environmental Concerns: none         Disposition Plan     Expected Discharge Date: 10/16/2023      Destination: home            Shyam Ibrahim DO  Hospitalist Service  [facility]  Securely message with [employer] (more info)  Text page via [employer] Paging/Directory   ______________________________________________________________________    Interval History   NAD. Denies complaints    Physical Exam   Vital Signs: Temp: 98.1  F (36.7  C) Temp src: Oral BP: 107/75 Pulse: 66   Resp: 18 SpO2: 94 % O2 Device: None (Room air)    Weight: 234 lbs 11.2 oz  General: NAD  RESPIRATORY: Breathing nonlabored  CARDIOVASCULAR: 1+ le edema bilat.   NEUROLOGIC: Motor and sensory intact, speech clear         Medical Decision Making       >45 MINUTES SPENT BY ME on the date of service doing chart review, history, exam, " documentation & further activities per the note.      Data

## 2023-10-15 NOTE — PLAN OF CARE
Problem: Pain Acute  Goal: Optimal Pain Control and Function  Outcome: Progressing       Problem: Cardiac Catheterization (Diagnostic/Interventional)  Goal: Absence of Bleeding  Outcome: Progressing     Problem: Infection  Goal: Absence of Infection Signs and Symptoms  10/15/2023 1324 by Joan Wynne RN  Outcome: Progressing     Pt A/O x4. Denied pain. Continues to report mild STOKES. Up independently in room. Transitioned from IV lasix to po this shift per MD orders. K protocol, recheck in the AM. UA completed this shift.

## 2023-10-15 NOTE — PROGRESS NOTES
CVTS contacted by team regarding patient's decision to undergo surgical revascularization. We plan to see patient Monday and discuss timing of procedure for this coming week. Prior to surgery she needs to have 48 hours of negative blood cultures and a clean UA.       STS Risk Score Clinical Summary  Planned Surgery: Isolated CABG, Urgent, First cardiovascular surgery  Demographics: 48 year old, female, 106kg, 149cm, BMI: 47.8 kg/m   Lab Values: Creatinine: 0.77 mg/dL, Hematocrit: 40%, WBC Count: 10.6 10 /?L, Platelet Count: 242972 cells/?L  Risk Factors / Comorbidities: Hypertension  Pulmonary RF: Recent Pneumonia, Sleep Apnea  Cardiac Status: Acute heart failure, NYHA Class III, Ejection Fraction = 30%    Short-Term Risk Calculator  Adult Cardiac Surgery  Procedure Type: Isolated CABG  Perioperative Outcome Estimate %  Operative Mortality 3.36%  Morbidity & Mortality 21.1%  Stroke 1.01%  Renal Failure 2.23%  Reoperation 4.6%  Prolonged Ventilation 16.4%  Deep Sternal Wound Infection 0.672%  Long Hospital Stay (>14 days) 11.6%  Short Hospital Stay (<6 days) 21.6%      Bharti French PA-C   Cardiothoracic Surgery   Pager #[phone]  October 15, 2023 at 9:57 AM

## 2023-10-15 NOTE — PLAN OF CARE
Heart Failure Care Map  GOALS TO BE MET BEFORE DISCHARGE:    1. Decrease congestion and/or edema with diuretic therapy to achieve near optimal volume status.     Dyspnea improved: Yes, satisfactory for discharge.   Edema improved: Yes, satisfactory for discharge.        Last 24 hour I/O:   Intake/Output Summary (Last 24 hours) at 10/15/2023 0501  Last data filed at 10/15/2023 0451  Gross per 24 hour   Intake 1551 ml   Output 2100 ml   Net -549 ml           Net I/O and Weights since admission:   09/15 0700 - 10/15 0659  In: 3251 [P.O.:2400; I.V.:601]  Out: 8175 [Urine:8175]  Net: -4924     Vitals:    10/11/23 1953 10/14/23 0416 10/15/23 0448   Weight: 111.6 kg (246 lb) 108.2 kg (238 lb 8 oz) 106.5 kg (234 lb 11.2 oz)       2.  O2 sats > 90% on room air, or at prior home O2 therapy level.      Able to wean O2 this shift to keep sats above 90%?: Been on room air.   Does patient use Home O2? No          Current oxygenation status:   SpO2: 97 %     O2 Device: None (Room air),     3.  Tolerates ambulation and mobility near baseline.     Ambulation: Not applicable this shift.   Times patient ambulated with staff this shift: 0         Lungs clear to diminished. Getting IV lasix. Trace lower extremity edema. Weight trending down.    Please review the Heart Failure Care Map for additional HF goal outcomes.    Angelica Painter RN  10/15/2023

## 2023-10-15 NOTE — PROGRESS NOTES
Care Management Follow Up    Length of Stay (days): 4    Expected Discharge Date: 10/18/2023     Concerns to be Addressed: all concerns addressed in this encounter     Patient plan of care discussed at interdisciplinary rounds: Yes    Anticipated Discharge Disposition:  home      Anticipated Discharge Services:    Anticipated Discharge DME:      Patient/family educated on Medicare website which has current facility and service quality ratings:    Education Provided on the Discharge Plan:    Patient/Family in Agreement with the Plan:      Referrals Placed by CM/SW:    Private pay costs discussed: Not applicable    Additional Information:  Chart reviewed. CM following for needs at discharge     Social History:  Pt lives in an apartment alone. She is independent with ADLs and IADLs and self employed    Daisy Ortiz RN

## 2023-10-15 NOTE — PROGRESS NOTES
"    Cardiology Progress Note    Assessment:  Coronary artery disease, multivessel,  of mid LAD, no angina  Acute heart failure, improved volume status/good urine output/stable creatinine  Ischemic cardiomyopathy with moderately depressed LV systolic function  History of gastric bypass surgery  Morbid obesity  Pyelonephritis per hospitalist      Plan:  We can stop IV furosemide and start p.o.    Further decisions about revascularization whether surgical or percutaneous early next week.    Subjective:   Feels less short of breath, denies chest pains    Objective:   BP 98/62 (BP Location: Right arm)   Pulse 63   Temp 98.4  F (36.9  C) (Oral)   Resp 18   Ht 1.499 m (4' 11\")   Wt 106.5 kg (234 lb 11.2 oz)   SpO2 99%   BMI 47.40 kg/m      Intake/Output Summary (Last 24 hours) at 10/14/2023 0949  Last data filed at 10/14/2023 0600  Gross per 24 hour   Intake 1450 ml   Output 3375 ml   Net -1925 ml         Physical Exam:  GENERAL: no distress  NECK: No JVD  LUNGS: Clear to auscultation.  CARDIAC: regular  rhythm, S1 & S2 normal.  No heaves, thrills, gallops or murmurs.  ABDOMEN: flat, negative hepatosplenomegaly, soft and non-tender.  EXTREMITIES: No evidence of cyanosis, clubbing or edema.    Current Facility-Administered Medications Ordered in Epic   Medication Dose Route Frequency Provider Last Rate Last Admin    acetaminophen (TYLENOL) tablet 650 mg  650 mg Oral Q6H PRN Kalamitsiotis, Stephanie C, CNP   650 mg at 10/14/23 0610    Or    acetaminophen (TYLENOL) Suppository 650 mg  650 mg Rectal Q6H PRN Kalamitsiotis, Stephanie C, CNP        acetaminophen (TYLENOL) tablet 650 mg  650 mg Oral Q4H PRN Kalamitsiotis, Stephanie C, CNP   650 mg at 10/13/23 1639    azithromycin (ZITHROMAX) 250 mg in sodium chloride 0.9 % 250 mL intermittent infusion  250 mg Intravenous Q24H Shyam Ibrahim DO        carvedilol (COREG) tablet 3.125 mg  3.125 mg Oral BID w/meals Gus Nur MD   3.125 mg at 10/15/23 0751    cefTRIAXone " (ROCEPHIN) 2 g vial to attach to  ml bag for ADULTS or NS 50 ml bag for PEDS  2 g Intravenous Q24H Stephanie Bird  mL/hr at 10/12/23 1324 2 g at 10/14/23 1335    cyanocobalamin (VITAMIN B-12) tablet 1,000 mcg  1,000 mcg Oral Daily BrunotsStephanie gardiner CNP   1,000 mcg at 10/15/23 0802    glucose gel 15-30 g  15-30 g Oral Q15 Min PRN Stephanie Bird CNP        Or    dextrose 50 % injection 25-50 mL  25-50 mL Intravenous Q15 Min PRN Stephanie Bird CNP        Or    glucagon injection 1 mg  1 mg Subcutaneous Q15 Min PRN Stephanie Bird CNP        enoxaparin ANTICOAGULANT (LOVENOX) injection 40 mg  40 mg Subcutaneous Q12H Stephanie Bird CNP   40 mg at 10/15/23 0804    fentaNYL (PF) (SUBLIMAZE) injection 25 mcg  25 mcg Intravenous Q15 Min PRN Stephanie Bird CNP        ferrous sulfate (FEROSUL) tablet 325 mg  325 mg Oral Daily Stephanie Bird CNP   325 mg at 10/15/23 0803    furosemide (LASIX) tablet 40 mg  40 mg Oral BID Gus Nur MD        guaiFENesin (MUCINEX) 12 hr tablet 1,200 mg  1,200 mg Oral BID Kemi Osorio MD   1,200 mg at 10/15/23 0802    HOLD:  Metformin and metformin containing medications if patient received IV contrast with acute kidney injury or severe chronic kidney disease (stage IV or stage V; i.e., eGFR less than 30)   Does not apply HOLD Stephanie Bird CNP        hydrALAZINE (APRESOLINE) injection 10 mg  10 mg Intravenous Q4H PRN Shyam Ibrahim DO   10 mg at 10/13/23 1530    melatonin tablet 1 mg  1 mg Oral At Bedtime PRN Stephanie Bird CNP        midazolam (VERSED) injection 0.5 mg  0.5 mg Intravenous Q5 Min PRN Stephanie Bird CNP        multivitamin, therapeutic (THERA-VIT) tablet 1 tablet  1 tablet Oral Daily Stephanie Bird CNP   1 tablet at 10/15/23 0802    naloxone (NARCAN) injection 0.2 mg  0.2 mg Intravenous Q2 Min PRN Shyam Ibrahim, DO        Or     naloxone (NARCAN) injection 0.4 mg  0.4 mg Intravenous Q2 Min PRN Rossini, Shyam A, DO        Or    naloxone (NARCAN) injection 0.2 mg  0.2 mg Intramuscular Q2 Min PRN Rossini, Shyam A, DO        Or    naloxone (NARCAN) injection 0.4 mg  0.4 mg Intramuscular Q2 Min PRN Rossini, Shyam A, DO        nitroGLYcerin (NITROSTAT) sublingual tablet 0.4 mg  0.4 mg Sublingual Q5 Min PRN Stephanie Bird CNP        ondansetron (ZOFRAN ODT) ODT tab 4 mg  4 mg Oral Q6H PRN Stephanie Bird CNP        Or    ondansetron (ZOFRAN) injection 4 mg  4 mg Intravenous Q6H PRN Stephanie Bird CNP        oxyCODONE (ROXICODONE) tablet 5 mg  5 mg Oral Q4H PRN Stephanie Bird CNP        Or    oxyCODONE (ROXICODONE) tablet 10 mg  10 mg Oral Q4H PRN Stephanie Bird CNP        sacubitril-valsartan (ENTRESTO) 24-26 MG per tablet 1 tablet  1 tablet Oral BID Stephanie Bird CNP   1 tablet at 10/15/23 0804    senna-docusate (SENOKOT-S/PERICOLACE) 8.6-50 MG per tablet 1 tablet  1 tablet Oral BID PRN Stephanie Bird CNP        Or    senna-docusate (SENOKOT-S/PERICOLACE) 8.6-50 MG per tablet 2 tablet  2 tablet Oral BID PRN Stephanie Bird CNP        spironolactone (ALDACTONE) tablet 25 mg  25 mg Oral Daily Gus Nur MD   25 mg at 10/15/23 0802     No current Epic-ordered outpatient medications on file.       Cardiographics:    Telemetry: Normal sinus rhythm  ECG: Normal sinus rhythm Q waves anterior leads  Echocardiogram:   Left ventricle measures mildly enlarged. Mild left ventricular hypertrophy  suggested. Left ventricular systolic function is moderate to severely globally  reduced with an ejection fraction of 30 to 35% with akinesis of the left  ventricular apex.Diastolic Doppler findings (E/E' ratio and/or other  parameters) suggest left ventricular filling pressures are increased.  The right ventricle is suboptimally visualized. On limited imaging right  ventricular  "systolic function appears potentially reduced.  Mild enlargement of the left atrium  Coronary angio:   Severe multivessel CAD without significant left main stenosis.  The proximal to midLAD has severe calcific stensosis involving the ostia of D1 and D2 and the distal LAD is occluded and fills by collaterals.  The LCx OM1 branch has severe ostial stenosis.  The RPDA has subtotal stensis and there is severe stenosis of a large RPLA  branch.  Right side filling pressures are high-normal and left side filling pressures are mildly elevated.  RA mean 8mmHg  PA 43/25, mean 31mmHg  PCWP mean 19mmHg.   Borderline reduced cardiac output/index: CO 4.2L/min, CI 2.1L/min/m2.   Lab Results    Chemistry/lipid CBC Cardiac Enzymes/BNP/TSH/INR   No results for input(s): \"CHOL\", \"HDL\", \"LDL\", \"TRIG\", \"CHOLHDLRATIO\" in the last 71106 hours.  No results for input(s): \"LDL\" in the last 50896 hours.  Recent Labs   Lab Test 10/14/23  0731 10/14/23  0417   NA  --  140   POTASSIUM  --  3.5   CHLORIDE  --  100   CO2  --  27   * 94   BUN  --  19.9   CR  --  0.81   GFRESTIMATED  --  88   KHADIJAH  --  8.7     Recent Labs   Lab Test 10/14/23  0417 10/13/23  0607 10/11/23  1057   CR 0.81 0.86 0.98*     Recent Labs   Lab Test 10/11/23  1057   A1C 5.8*          Recent Labs   Lab Test 10/14/23  0417 10/13/23  0607   WBC 10.6 11.5*   HGB  --  12.5   HCT  --  40.4   MCV  --  93   * 138*     Recent Labs   Lab Test 10/13/23  0607 10/11/23  1057 10/10/23  2012   HGB 12.5 15.3 14.0    No results for input(s): \"TROPONINI\" in the last 68123 hours.  Recent Labs   Lab Test 10/11/23  1057 10/10/23  2012   NTBNPI 5,007*  --    NTBNP  --  5,019*     Recent Labs   Lab Test 10/10/23  2012   TSH 1.30     No results for input(s): \"INR\" in the last 86575 hours.                "

## 2023-10-16 LAB
ANION GAP SERPL CALCULATED.3IONS-SCNC: 12 MMOL/L (ref 7–15)
BUN SERPL-MCNC: 20.8 MG/DL (ref 6–20)
CALCIUM SERPL-MCNC: 9.4 MG/DL (ref 8.6–10)
CHLORIDE SERPL-SCNC: 99 MMOL/L (ref 98–107)
CREAT SERPL-MCNC: 0.82 MG/DL (ref 0.51–0.95)
DEPRECATED HCO3 PLAS-SCNC: 28 MMOL/L (ref 22–29)
EGFRCR SERPLBLD CKD-EPI 2021: 87 ML/MIN/1.73M2
GLUCOSE BLDC GLUCOMTR-MCNC: 122 MG/DL (ref 70–99)
GLUCOSE BLDC GLUCOMTR-MCNC: 132 MG/DL (ref 70–99)
GLUCOSE SERPL-MCNC: 116 MG/DL (ref 70–99)
POTASSIUM SERPL-SCNC: 3.5 MMOL/L (ref 3.4–5.3)
SODIUM SERPL-SCNC: 139 MMOL/L (ref 135–145)

## 2023-10-16 PROCEDURE — 80048 BASIC METABOLIC PNL TOTAL CA: CPT | Performed by: INTERNAL MEDICINE

## 2023-10-16 PROCEDURE — 36415 COLL VENOUS BLD VENIPUNCTURE: CPT | Performed by: INTERNAL MEDICINE

## 2023-10-16 PROCEDURE — 250N000013 HC RX MED GY IP 250 OP 250 PS 637: Performed by: INTERNAL MEDICINE

## 2023-10-16 PROCEDURE — 99232 SBSQ HOSP IP/OBS MODERATE 35: CPT | Performed by: INTERNAL MEDICINE

## 2023-10-16 PROCEDURE — 210N000001 HC R&B IMCU HEART CARE

## 2023-10-16 PROCEDURE — 250N000011 HC RX IP 250 OP 636: Mod: JZ | Performed by: NURSE PRACTITIONER

## 2023-10-16 PROCEDURE — 99233 SBSQ HOSP IP/OBS HIGH 50: CPT | Performed by: INTERNAL MEDICINE

## 2023-10-16 PROCEDURE — 250N000013 HC RX MED GY IP 250 OP 250 PS 637: Performed by: NURSE PRACTITIONER

## 2023-10-16 RX ADMIN — CARVEDILOL 3.12 MG: 3.12 TABLET, FILM COATED ORAL at 08:12

## 2023-10-16 RX ADMIN — ENOXAPARIN SODIUM 40 MG: 40 INJECTION SUBCUTANEOUS at 20:12

## 2023-10-16 RX ADMIN — ENOXAPARIN SODIUM 40 MG: 40 INJECTION SUBCUTANEOUS at 08:10

## 2023-10-16 RX ADMIN — CEFTRIAXONE SODIUM 2 G: 2 INJECTION, POWDER, FOR SOLUTION INTRAMUSCULAR; INTRAVENOUS at 14:19

## 2023-10-16 RX ADMIN — FERROUS SULFATE TAB 325 MG (65 MG ELEMENTAL FE) 325 MG: 325 (65 FE) TAB at 08:11

## 2023-10-16 RX ADMIN — GUAIFENESIN 1200 MG: 600 TABLET ORAL at 08:11

## 2023-10-16 RX ADMIN — SACUBITRIL AND VALSARTAN 1 TABLET: 24; 26 TABLET, FILM COATED ORAL at 20:11

## 2023-10-16 RX ADMIN — SACUBITRIL AND VALSARTAN 1 TABLET: 24; 26 TABLET, FILM COATED ORAL at 08:12

## 2023-10-16 RX ADMIN — FUROSEMIDE 40 MG: 20 TABLET ORAL at 08:10

## 2023-10-16 RX ADMIN — FUROSEMIDE 40 MG: 20 TABLET ORAL at 17:29

## 2023-10-16 RX ADMIN — CYANOCOBALAMIN TAB 1000 MCG 1000 MCG: 1000 TAB at 08:12

## 2023-10-16 RX ADMIN — THERA TABS 1 TABLET: TAB at 08:11

## 2023-10-16 RX ADMIN — SPIRONOLACTONE 25 MG: 25 TABLET, FILM COATED ORAL at 08:12

## 2023-10-16 RX ADMIN — CARVEDILOL 3.12 MG: 3.12 TABLET, FILM COATED ORAL at 17:29

## 2023-10-16 RX ADMIN — GUAIFENESIN 1200 MG: 600 TABLET ORAL at 20:12

## 2023-10-16 ASSESSMENT — ACTIVITIES OF DAILY LIVING (ADL)
ADLS_ACUITY_SCORE: 20

## 2023-10-16 NOTE — PROGRESS NOTES
[facility]    Medicine Progress Note - Hospitalist Service    Date of Admission:  10/11/2023    Assessment & Plan   48 year old female presenting with pyelonephritis, new heart failure and concern for pneumonia.     Acute respiratory failure: resolved. Likely secondary to new acute systolic CHF and possible CAP  TTE showed EF of 30-35%  CXR with possible right sided pneumonia  -- continue Rocephin   -- completed Zithromax for possible pneumonia 10/15  -- continue diuresis per cardiology       Sepsis due to Pyelonephritis and likely CAP:  Resolved  Found to have e. Coli bacteremia and E. Coli UTI  Abdomen/pelvic CT shows hypoenhancing areas in the upper poles of both kidneys most suggestive of pyelonephritis.      UC grew pansensitive E. Coli, blood cultures also with E. coli  --  will continue Ceftriaxone started on admission for treatment of E. Coli UTI and bacteremia   -- plan follow-up renal CT or MRI in 4 to 6 weeks to ensure complete resolution  -- repeat blood and urine cultures from 10/15 remain NGTD      Acute systolic CHF: Found to have ischemic cardiomyopathy   TTE on admission shows EF of 30-35%  Coronary angiogram 10/13 shows severe multivessel CAD  -- CTS consulted   -- carotid US as part of pre-op eval shows less than 50% stenosis in the bilateral internal carotid arteries   -- continue coreg, spironolactone, lasix and entrestro  -- rechecked blood and urine cultures 10/15 to ensure resolution of UTI and bactermia prior to CAB.   -- Repeat blood and urine cultures from 10/15 remain NGTD         Right sided pneumonia  CT with small right pleural effusion with associated middle lobe and right lower lobe consolidation.    -- completed azithro and keeping on ceftriaxone for pyelo and gram negative bacteremia as above  -- plan outpatient follow-up chest CT in 4 to 6 weeks to ensure resolution          Hyperglycemia: new diagnosis of prediabetes:   Hemoglobin A1c 5.8  --Accu-Cheks  "have been low.  Not meeting criteria for insulin so we will hold off on insulin sliding scale and stop glucose checks  -- continue diabetic diet after discharge       Hypertension:   -- continue entresto, coreg, spirono and lasix       History of cervical cancer:   -- will need follow-up renal imaging as described above to rule out mass.         Obstructive sleep apnea--does not use CPAP per her report         Previous gastric bypass surgery        Hypokalemia: replace and recheck         Diet: Moderate Consistent Carb (60 g CHO per Meal) Diet  DVT Prophylaxis: Enoxaparin (Lovenox) SQ  Bond Catheter: Not present  Lines: None       Cardiac Monitoring: ACTIVE order. Indication: Acute decompensated heart failure (48 hours)  Code Status: Full Code      Clinically Significant Risk Factors        # Hypokalemia: Lowest K = 3.1 mmol/L in last 2 days, will replace as needed           # Hypertension: Noted on problem list        # Severe Obesity: Estimated body mass index is 47.46 kg/m  as calculated from the following:    Height as of this encounter: 1.499 m (4' 11\").    Weight as of this encounter: 106.6 kg (235 lb).        # Financial/Environmental Concerns: none         Disposition Plan     Expected Discharge Date: 10/20/2023      Destination: home            Shyam Ibrahim DO  Hospitalist Service  [facility]  Securely message with [employer] (more info)  Text page via there[employer] Paging/Directory   ______________________________________________________________________    Interval History   NAD. Denies complaints    Physical Exam   Vital Signs: Temp: 98.6  F (37  C) Temp src: Oral BP: 103/58 Pulse: 73   Resp: 20 SpO2: 95 % O2 Device: None (Room air)    Weight: 235 lbs 0 oz  General: NAD  RESPIRATORY: Breathing nonlabored  CARDIOVASCULAR: 1+ le edema bilat.   NEUROLOGIC: Motor and sensory intact, speech clear         Medical Decision Making       >45 MINUTES SPENT BY ME on the date of service doing " chart review, history, exam, documentation & further activities per the note.      Data

## 2023-10-16 NOTE — PLAN OF CARE
Problem: Infection  Goal: Absence of Infection Signs and Symptoms  Outcome: Progressing     Problem: Comorbidity Management  Goal: Blood Glucose Levels Within Targeted Range  Outcome: Progressing  Intervention: Monitor and Manage Glycemia  Recent Flowsheet Documentation  Taken 10/15/2023 2001 by Soy Payan, RN  Medication Review/Management: medications reviewed    Goal Outcome Evaluation:  A/Ox 4. BG acceptable. Denied chest pain, c/o of fatigue and SOB with activity, diminished breath sounds. IV Abx, dark humza UOP. Reported BM today. Independently ambulating in room. Telemetry- NSR.

## 2023-10-16 NOTE — PROGRESS NOTES
Care Management Follow Up    Length of Stay (days): 5    Expected Discharge Date: 10/20/2023     Concerns to be Addressed: all concerns addressed in this encounter     Patient plan of care discussed at interdisciplinary rounds: Yes    Anticipated Discharge Disposition:  TBD     Anticipated Discharge Services:    Anticipated Discharge DME:      Patient/family educated on Medicare website which has current facility and service quality ratings:    Education Provided on the Discharge Plan:    Patient/Family in Agreement with the Plan:      Referrals Placed by CM/SW:    Private pay costs discussed: Not applicable    Additional Information:  Chart reviewed. CM following for needs at discharge      Social History:  Pt lives in an apartment alone. She is independent with ADLs and IADLs and self employed    Daisy Ortiz RN

## 2023-10-16 NOTE — PLAN OF CARE
Heart Failure Care Map  GOALS TO BE MET BEFORE DISCHARGE:    1. Decrease congestion and/or edema with diuretic therapy to achieve near optimal volume status.     Dyspnea improved: Yes, satisfactory for discharge.   Edema improved: Yes, satisfactory for discharge.        Last 24 hour I/O:   Intake/Output Summary (Last 24 hours) at 10/16/2023 0532  Last data filed at 10/16/2023 0400  Gross per 24 hour   Intake 1232 ml   Output 1400 ml   Net -168 ml           Net I/O and Weights since admission:   09/16 0700 - 10/16 0659  In: 4483 [P.O.:3240; I.V.:993]  Out: 9575 [Urine:9575]  Net: -5092     Vitals:    10/11/23 1953 10/14/23 0416 10/15/23 0448 10/16/23 0432   Weight: 111.6 kg (246 lb) 108.2 kg (238 lb 8 oz) 106.5 kg (234 lb 11.2 oz) 106.6 kg (235 lb)       2.  O2 sats > 90% on room air, or at prior home O2 therapy level.      Able to wean O2 this shift to keep sats above 90%?: Yes, satisfactory for discharge.   Does patient use Home O2? No          Current oxygenation status:   SpO2: 96 %     O2 Device: None (Room air),      3.  Tolerates ambulation and mobility near baseline.     Ambulation: Not applicable.   Times patient ambulated with staff this shift: 0         Lungs clear to diminished. On oral lasix.     Please review the Heart Failure Care Map for additional HF goal outcomes.    Angelica Painter RN  10/16/2023

## 2023-10-16 NOTE — PROGRESS NOTES
HEART CARE NOTE          Assessment/Recommendations     1. Severe HFrEF c/b severe ADHF  Assessment / Plan  Tolerating oral diuretic regimen - no changes at this time; continue to monitor renal function/repeat BMP, UOP and hemodynamics closely   New HFrEF; coronary angiogram significant for severe multi-vessel CAD; CTS consulted - awaiting recs regarding timing  GDMT as detailed below     Current Pharmacotherapy AHA Guideline-Directed Medical Therapy   Sacubitril-valsartan 24-26 mg twice daily Lisinopril 20 mg twice daily   Carvedilol 3.125 mg BID Carvedilol 25 mg twice daily   Spironolactone 25 mg daily Spironolactone 25 mg once daily   Hydralazine NA Hydralazine 100 mg three times daily   Isosorbide dinitrate NA Isosorbide dinitrate 40 mg three times daily   SGLT2 inhibitor:Dapagliflozin/Empagliflozin - not started Dapagliflozin or Empagliflozin 10 mg daily      2. Acute hypoxic respiratory failure  Assessment / Plan  Resolved     3. HTN  Assessment / Plan  Adequately controlled on current regimen - no changes to regimen at this time     4. CAD  Assessment / Plan  Severe multi-vessel CAD - CTS consulted; tentative plans for CABG this admission  Denies chest pain or anginal equivalents    Plan of care discussed on October 16, 2023 with patient at bedside, and primary team overseeing patient's care     60 minutes spent reviewing prior records (including documentation, laboratory studies, cardiac testing/imaging), history and physical exam, planning, and subsequent documentation.    History of Present Illness/Subjective    Ms. Betty Pan is a 48 year old female who presents in acute hypoxic respiratory failure concerning for ADHF     Today, Mrs. Pan denies any acute cardiac events or complaints; Management plan as detailed above      ECG: personally reviewed; sinus tachycardia.     ECHO (personnaly Reviewed on 10/1323):   Technically very challenging study. Definity contrast utilized.  Left ventricle measures  "mildly enlarged. Mild left ventricular hypertrophy  suggested. Left ventricular systolic function is moderate to severely globally  reduced with an ejection fraction of 30 to 35% with akinesis of the left  ventricular apex.Diastolic Doppler findings (E/E' ratio and/or other  parameters) suggest left ventricular filling pressures are increased.  The right ventricle is suboptimally visualized. On limited imaging right  ventricular systolic function appears potentially reduced.  Mild enlargement of the left atrium.  Valve structures are suboptimally visualized on this technically challenging  study. No obvious hemodynamically significant valve abnormality noted.  Consider cardiac MRI to further define cardiac structures.    Telemetry: personally reviewed October 16, 2023; notable for sinus rhythm     Medical history and pertinent documents reviewed in Care Everywhere please where applicable see details above        Physical Examination Review of Systems   /71   Pulse 68   Temp 99  F (37.2  C) (Oral)   Resp 18   Ht 1.499 m (4' 11\")   Wt 106.6 kg (235 lb)   SpO2 96%   BMI 47.46 kg/m    Body mass index is 47.46 kg/m .  Wt Readings from Last 3 Encounters:   10/16/23 106.6 kg (235 lb)     General Appearance:   no distress, normal body habitus   ENT/Mouth: membranes moist, no oral lesions or bleeding gums.      EYES:  no scleral icterus, normal conjunctivae   Neck: no carotid bruits or thyromegaly   Chest/Lungs:   lungs are clear to auscultation, no rales or wheezing, equal chest wall expansion    Cardiovascular:   Regular. Normal first and second heart sounds with no murmurs, rubs, or gallops; the carotid, radial and posterior tibial pulses are intact, no JVD or LE edema bilaterally    Abdomen:  no organomegaly, masses, bruits, or tenderness; bowel sounds are present   Extremities: no cyanosis or clubbing   Skin: no xanthelasma, warm.    Neurologic: normal gait, normal  bilateral, no tremors   " "  Psychiatric: alert and oriented x3, calm     A complete 10 systems ROS was reviewed  And is negative except what is listed in the HPI.          Medical History  Surgical History Family History Social History   History reviewed. No pertinent past medical history. History reviewed. No pertinent surgical history. no family history of premature coronary artery disease Social History     Socioeconomic History    Marital status: Single     Spouse name: Not on file    Number of children: Not on file    Years of education: Not on file    Highest education level: Not on file   Occupational History    Not on file   Tobacco Use    Smoking status: Never    Smokeless tobacco: Not on file   Substance and Sexual Activity    Alcohol use: Not on file    Drug use: Not on file    Sexual activity: Not on file   Other Topics Concern    Not on file   Social History Narrative    Not on file     Social Determinants of Health     Financial Resource Strain: Not on file   Food Insecurity: Not on file   Transportation Needs: Not on file   Physical Activity: Not on file   Stress: Not on file   Social Connections: Not on file   Interpersonal Safety: Not on file   Housing Stability: Not on file           Lab Results    Chemistry/lipid CBC Cardiac Enzymes/BNP/TSH/INR   Lab Results   Component Value Date    BUN 20.8 (H) 10/16/2023     10/16/2023    CO2 28 10/16/2023    Lab Results   Component Value Date    WBC 10.6 10/14/2023    HGB 12.5 10/13/2023    HCT 40.4 10/13/2023    MCV 93 10/13/2023     (L) 10/14/2023    Lab Results   Component Value Date    TSH 1.30 10/10/2023     No results found for: \"CKTOTAL\", \"CKMB\", \"TROPONINI\"       Weight:    Wt Readings from Last 3 Encounters:   10/16/23 106.6 kg (235 lb)       Allergies  No Known Allergies      Surgical History  History reviewed. No pertinent surgical history.    Social History  Tobacco:   History   Smoking Status    Never   Smokeless Tobacco    Not on file    Alcohol:   Social " History    Substance and Sexual Activity      Alcohol use: Not on file   Illicit Drugs:   History   Drug Use Not on file       Family History  History reviewed. No pertinent family history.       Yenny Crespo MD on 10/16/2023      cc: No Ref-Primary, Physician

## 2023-10-17 VITALS
RESPIRATION RATE: 20 BRPM | OXYGEN SATURATION: 95 % | TEMPERATURE: 97.7 F | DIASTOLIC BLOOD PRESSURE: 51 MMHG | SYSTOLIC BLOOD PRESSURE: 99 MMHG | BODY MASS INDEX: 47.16 KG/M2 | HEART RATE: 63 BPM | HEIGHT: 59 IN | WEIGHT: 233.9 LBS

## 2023-10-17 LAB
ANION GAP SERPL CALCULATED.3IONS-SCNC: 11 MMOL/L (ref 7–15)
BACTERIA UR CULT: NO GROWTH
BUN SERPL-MCNC: 20.8 MG/DL (ref 6–20)
CALCIUM SERPL-MCNC: 9.5 MG/DL (ref 8.6–10)
CHLORIDE SERPL-SCNC: 101 MMOL/L (ref 98–107)
CREAT SERPL-MCNC: 0.78 MG/DL (ref 0.51–0.95)
DEPRECATED HCO3 PLAS-SCNC: 28 MMOL/L (ref 22–29)
EGFRCR SERPLBLD CKD-EPI 2021: >90 ML/MIN/1.73M2
GLUCOSE SERPL-MCNC: 112 MG/DL (ref 70–99)
PLATELET # BLD AUTO: 191 10E3/UL (ref 150–450)
POTASSIUM SERPL-SCNC: 3.5 MMOL/L (ref 3.4–5.3)
SODIUM SERPL-SCNC: 140 MMOL/L (ref 135–145)

## 2023-10-17 PROCEDURE — 85049 AUTOMATED PLATELET COUNT: CPT | Performed by: NURSE PRACTITIONER

## 2023-10-17 PROCEDURE — 99233 SBSQ HOSP IP/OBS HIGH 50: CPT | Performed by: INTERNAL MEDICINE

## 2023-10-17 PROCEDURE — 250N000011 HC RX IP 250 OP 636: Mod: JZ | Performed by: NURSE PRACTITIONER

## 2023-10-17 PROCEDURE — 99239 HOSP IP/OBS DSCHRG MGMT >30: CPT | Performed by: HOSPITALIST

## 2023-10-17 PROCEDURE — 250N000013 HC RX MED GY IP 250 OP 250 PS 637: Performed by: NURSE PRACTITIONER

## 2023-10-17 PROCEDURE — 250N000013 HC RX MED GY IP 250 OP 250 PS 637: Performed by: INTERNAL MEDICINE

## 2023-10-17 PROCEDURE — 36415 COLL VENOUS BLD VENIPUNCTURE: CPT | Performed by: NURSE PRACTITIONER

## 2023-10-17 PROCEDURE — 80048 BASIC METABOLIC PNL TOTAL CA: CPT | Performed by: INTERNAL MEDICINE

## 2023-10-17 RX ORDER — FUROSEMIDE 40 MG
40 TABLET ORAL
Qty: 60 TABLET | Refills: 3 | Status: SHIPPED | OUTPATIENT
Start: 2023-10-17 | End: 2023-11-06

## 2023-10-17 RX ORDER — CEFDINIR 300 MG/1
300 CAPSULE ORAL 2 TIMES DAILY
Qty: 6 CAPSULE | Refills: 0 | Status: SHIPPED | OUTPATIENT
Start: 2023-10-17 | End: 2023-11-06

## 2023-10-17 RX ORDER — NITROGLYCERIN 0.4 MG/1
TABLET SUBLINGUAL
Qty: 20 TABLET | Refills: 3 | Status: ON HOLD | OUTPATIENT
Start: 2023-10-17 | End: 2023-12-06

## 2023-10-17 RX ORDER — SPIRONOLACTONE 25 MG/1
25 TABLET ORAL DAILY
Qty: 30 TABLET | Refills: 3 | Status: SHIPPED | OUTPATIENT
Start: 2023-10-18 | End: 2023-12-27

## 2023-10-17 RX ORDER — CARVEDILOL 3.12 MG/1
3.12 TABLET ORAL 2 TIMES DAILY WITH MEALS
Qty: 60 TABLET | Refills: 3 | Status: ON HOLD | OUTPATIENT
Start: 2023-10-17 | End: 2023-12-06

## 2023-10-17 RX ADMIN — FUROSEMIDE 40 MG: 20 TABLET ORAL at 08:19

## 2023-10-17 RX ADMIN — CARVEDILOL 3.12 MG: 3.12 TABLET, FILM COATED ORAL at 08:19

## 2023-10-17 RX ADMIN — SACUBITRIL AND VALSARTAN 1 TABLET: 24; 26 TABLET, FILM COATED ORAL at 08:19

## 2023-10-17 RX ADMIN — CYANOCOBALAMIN TAB 1000 MCG 1000 MCG: 1000 TAB at 08:19

## 2023-10-17 RX ADMIN — SPIRONOLACTONE 25 MG: 25 TABLET, FILM COATED ORAL at 08:21

## 2023-10-17 RX ADMIN — FERROUS SULFATE TAB 325 MG (65 MG ELEMENTAL FE) 325 MG: 325 (65 FE) TAB at 08:19

## 2023-10-17 RX ADMIN — GUAIFENESIN 1200 MG: 600 TABLET ORAL at 08:19

## 2023-10-17 RX ADMIN — CEFTRIAXONE SODIUM 2 G: 2 INJECTION, POWDER, FOR SOLUTION INTRAMUSCULAR; INTRAVENOUS at 14:01

## 2023-10-17 RX ADMIN — ENOXAPARIN SODIUM 40 MG: 40 INJECTION SUBCUTANEOUS at 08:19

## 2023-10-17 RX ADMIN — THERA TABS 1 TABLET: TAB at 08:19

## 2023-10-17 ASSESSMENT — ACTIVITIES OF DAILY LIVING (ADL)
ADLS_ACUITY_SCORE: 20

## 2023-10-17 NOTE — PLAN OF CARE
Heart Failure Care Map  GOALS TO BE MET BEFORE DISCHARGE:    1. Decrease congestion and/or edema with diuretic therapy to achieve near optimal volume status.     Dyspnea improved: Yes, satisfactory for discharge.   Edema improved: Yes, satisfactory for discharge.        Last 24 hour I/O:   Intake/Output Summary (Last 24 hours) at 10/17/2023 0522  Last data filed at 10/17/2023 0448  Gross per 24 hour   Intake 800 ml   Output 1200 ml   Net -400 ml           Net I/O and Weights since admission:   09/17 0700 - 10/17 0659  In: 5283 [P.O.:4040; I.V.:993]  Out: 36230 [Urine:88284]  Net: -5492     Vitals:    10/11/23 1953 10/14/23 0416 10/15/23 0448 10/16/23 0432   Weight: 111.6 kg (246 lb) 108.2 kg (238 lb 8 oz) 106.5 kg (234 lb 11.2 oz) 106.6 kg (235 lb)    10/17/23 0440   Weight: 106.1 kg (233 lb 14.4 oz)       2.  O2 sats > 90% on room air, or at prior home O2 therapy level.      Able to wean O2 this shift to keep sats above 90%?: Been on room air.   Does patient use Home O2? No          Current oxygenation status:   SpO2: 96 %     O2 Device: None (Room air),      3.  Tolerates ambulation and mobility near baseline.     Ambulation: Not applicable this shift.   Times patient ambulated with staff this shift: 0         Lungs clear to diminished. Getting oral lasix and spironolactone. Also on entresto. Awaiting CV to talk to the patient what the next step is for her.    Please review the Heart Failure Care Map for additional HF goal outcomes.    Angelica Painter RN  10/17/2023

## 2023-10-17 NOTE — PROGRESS NOTES
Heart Failure Care Map  GOALS TO BE MET BEFORE DISCHARGE:    1. Decrease congestion and/or edema with diuretic therapy to achieve near optimal volume status.     Dyspnea improved: Yes, satisfactory for discharge.   Edema improved: Yes, satisfactory for discharge.        Last 24 hour I/O:   Intake/Output Summary (Last 24 hours) at 10/16/2023 1929  Last data filed at 10/16/2023 1800  Gross per 24 hour   Intake 1059 ml   Output 1300 ml   Net -241 ml           Net I/O and Weights since admission:   09/16 2300 - 10/16 2259  In: 5283 [P.O.:4040; I.V.:993]  Out: 92737 [Urine:55546]  Net: -4992     Vitals:    10/11/23 1953 10/14/23 0416 10/15/23 0448 10/16/23 0432   Weight: 111.6 kg (246 lb) 108.2 kg (238 lb 8 oz) 106.5 kg (234 lb 11.2 oz) 106.6 kg (235 lb)       2.  O2 sats > 90% on room air, or at prior home O2 therapy level.      Able to wean O2 this shift to keep sats above 90%?: Yes, satisfactory for discharge.   Does patient use Home O2? No          Current oxygenation status:   SpO2: 95 %     O2 Device: None (Room air),      3.  Tolerates ambulation and mobility near baseline.     Ambulation: Yes, satisfactory for discharge.   Times patient ambulated with staff this shift: 0   VS table she denies any SOB and chest pain.   Please review the Heart Failure Care Map for additional HF goal outcomes.    Kylee Waggoner RN  10/16/2023

## 2023-10-17 NOTE — PROGRESS NOTES
HEART CARE NOTE          Assessment/Recommendations   1. Severe HFrEF c/b severe ADHF  Assessment / Plan  Tolerating oral diuretic regimen; near euvolemia - no changes to regimen at this time  New HFrEF; coronary angiogram significant for severe multi-vessel CAD; CTS consulted - awaiting recs regarding timing  GDMT as detailed below     Current Pharmacotherapy AHA Guideline-Directed Medical Therapy   Sacubitril-valsartan 24-26 mg twice daily Lisinopril 20 mg twice daily   Carvedilol 3.125 mg BID Carvedilol 25 mg twice daily   Spironolactone 25 mg daily Spironolactone 25 mg once daily   Hydralazine NA Hydralazine 100 mg three times daily   Isosorbide dinitrate NA Isosorbide dinitrate 40 mg three times daily   SGLT2 inhibitor:Dapagliflozin/Empagliflozin - not started Dapagliflozin or Empagliflozin 10 mg daily      2. Acute hypoxic respiratory failure  Assessment / Plan  Resolved     3. HTN  Assessment / Plan  Adequately controlled on current regimen - no changes to regimen at this time     4. CAD  Assessment / Plan  Severe multi-vessel CAD - CTS consulted; tentative plans for CABG this admission  Denies chest pain or anginal equivalents     Plan of care discussed on October 17, 2023 with patient at bedside, and primary team overseeing patient's care    Patient is optimized for OHS from a cardiology standpoint; Cardiology team will sign-off for now. Please do not hesitate to consult us again if new questions or concerns arise.    Please contact CTS team directly with any questions or concerns regarding timing of OHS.      History of Present Illness/Subjective    Ms. Betty Pan is a 48 year old female who presents in acute hypoxic respiratory failure concerning for ADHF     Today, Mrs. Pan denies any acute cardiac events or complaints; Management plan as detailed above      ECG: personally reviewed; sinus tachycardia.     ECHO (personnaly Reviewed on 10/1323):   Technically very challenging study. Definity  "contrast utilized.  Left ventricle measures mildly enlarged. Mild left ventricular hypertrophy  suggested. Left ventricular systolic function is moderate to severely globally  reduced with an ejection fraction of 30 to 35% with akinesis of the left  ventricular apex.Diastolic Doppler findings (E/E' ratio and/or other  parameters) suggest left ventricular filling pressures are increased.  The right ventricle is suboptimally visualized. On limited imaging right  ventricular systolic function appears potentially reduced.  Mild enlargement of the left atrium.  Valve structures are suboptimally visualized on this technically challenging  study. No obvious hemodynamically significant valve abnormality noted.  Consider cardiac MRI to further define cardiac structures.    Telemetry: personally reviewed October 17, 2023; notable for NSR     Medical history and pertinent documents reviewed in Care Everywhere please where applicable see details above        Physical Examination Review of Systems   BP 91/51 (BP Location: Right arm)   Pulse 56   Temp 98  F (36.7  C) (Oral)   Resp 16   Ht 1.499 m (4' 11\")   Wt 106.1 kg (233 lb 14.4 oz)   SpO2 96%   BMI 47.24 kg/m    Body mass index is 47.24 kg/m .  Wt Readings from Last 3 Encounters:   10/17/23 106.1 kg (233 lb 14.4 oz)     General Appearance:   no distress, normal body habitus   ENT/Mouth: membranes moist, no oral lesions or bleeding gums.      EYES:  no scleral icterus, normal conjunctivae   Neck: no carotid bruits or thyromegaly   Chest/Lungs:   lungs are clear to auscultation, no rales or wheezing, equal chest wall expansion    Cardiovascular:   Regular. Normal first and second heart sounds with no murmurs, rubs, or gallops; the carotid, radial and posterior tibial pulses are intact, no JVD or LE edema bilaterally    Abdomen:  no organomegaly, masses, bruits, or tenderness; bowel sounds are present   Extremities: no cyanosis or clubbing   Skin: no xanthelasma, warm.  " "  Neurologic: NAD     Psychiatric: alert and oriented x3, calm     A complete 10 systems ROS was reviewed  And is negative except what is listed in the HPI.          Medical History  Surgical History Family History Social History   History reviewed. No pertinent past medical history. Past Surgical History:   Procedure Laterality Date    CV CORONARY ANGIOGRAM N/A 10/13/2023    Procedure: Coronary Angiogram;  Surgeon: Roxana Melgoza MD;  Location: [facility] CATH LAB CV    CV RIGHT HEART CATH MEASUREMENTS RECORDED N/A 10/13/2023    Procedure: Right Heart Catheterization;  Surgeon: Roxana Melgoza MD;  Location: [facility] CATH LAB CV    no family history of premature coronary artery disease Social History     Socioeconomic History    Marital status: Single     Spouse name: Not on file    Number of children: Not on file    Years of education: Not on file    Highest education level: Not on file   Occupational History    Not on file   Tobacco Use    Smoking status: Never    Smokeless tobacco: Not on file   Substance and Sexual Activity    Alcohol use: Not on file    Drug use: Not on file    Sexual activity: Not on file   Other Topics Concern    Not on file   Social History Narrative    Not on file     Social Determinants of Health     Financial Resource Strain: Not on file   Food Insecurity: Not on file   Transportation Needs: Not on file   Physical Activity: Not on file   Stress: Not on file   Social Connections: Not on file   Interpersonal Safety: Not on file   Housing Stability: Not on file           Lab Results    Chemistry/lipid CBC Cardiac Enzymes/BNP/TSH/INR   Lab Results   Component Value Date    BUN 20.8 (H) 10/17/2023     10/17/2023    CO2 28 10/17/2023    Lab Results   Component Value Date    WBC 10.6 10/14/2023    HGB 12.5 10/13/2023    HCT 40.4 10/13/2023    MCV 93 10/13/2023     10/17/2023    Lab Results   Component Value Date    TSH 1.30 10/10/2023     No results found for: \"CKTOTAL\", \"CKMB\", " "\"TROPONINI\"       Weight:    Wt Readings from Last 3 Encounters:   10/17/23 106.1 kg (233 lb 14.4 oz)       Allergies  No Known Allergies      Surgical History  Past Surgical History:   Procedure Laterality Date    CV CORONARY ANGIOGRAM N/A 10/13/2023    Procedure: Coronary Angiogram;  Surgeon: Roxana Melgoza MD;  Location: [facility] CATH LAB CV    CV RIGHT HEART CATH MEASUREMENTS RECORDED N/A 10/13/2023    Procedure: Right Heart Catheterization;  Surgeon: Roxana Melgoza MD;  Location: [facility] CATH LAB CV       Social History  Tobacco:   History   Smoking Status    Never   Smokeless Tobacco    Not on file    Alcohol:   Social History    Substance and Sexual Activity      Alcohol use: Not on file   Illicit Drugs:   History   Drug Use Not on file       Family History  History reviewed. No pertinent family history.       Yenny Crespo MD on 10/17/2023      cc: No Ref-Primary, Physician    "

## 2023-10-17 NOTE — PROGRESS NOTES
Heart Failure Care Map  GOALS TO BE MET BEFORE DISCHARGE:    1. Decrease congestion and/or edema with diuretic therapy to achieve near optimal volume status.     Dyspnea improved: Yes, satisfactory for discharge.   Edema improved: Yes, satisfactory for discharge.        Last 24 hour I/O:   Intake/Output Summary (Last 24 hours) at 10/17/2023 0932  Last data filed at 10/17/2023 0448  Gross per 24 hour   Intake 560 ml   Output 1200 ml   Net -640 ml           Net I/O and Weights since admission:   09/17 1500 - 10/17 1459  In: 5283 [P.O.:4040; I.V.:993]  Out: 51299 [Urine:27390]  Net: -5492     Vitals:    10/11/23 1953 10/14/23 0416 10/15/23 0448 10/16/23 0432   Weight: 111.6 kg (246 lb) 108.2 kg (238 lb 8 oz) 106.5 kg (234 lb 11.2 oz) 106.6 kg (235 lb)    10/17/23 0440   Weight: 106.1 kg (233 lb 14.4 oz)       2.  O2 sats > 90% on room air, or at prior home O2 therapy level.      Able to wean O2 this shift to keep sats above 90%?: Yes, satisfactory for discharge.   Does patient use Home O2? No          Current oxygenation status:   SpO2: 96 %     O2 Device: None (Room air),      3.  Tolerates ambulation and mobility near baseline.     Ambulation: Yes, satisfactory for discharge.   Times patient ambulated with staff this shift: 1    0800 - Pt. A&O x4 and on room air. Denies pain, numbness, tingling or tenderness.     1100 - VSS no change from AM. Denies pain, numbness, tingling or tenderness. Pt. Was curious about the plan and when someone from the CV surgery team will see her.     1145 - Contacted CV surgery team regarding the plan. Per Naomy, CV will see pt. Tomorrow to discuss the plan. Updated pt. About the plan.     1429 - Updated hospitalist that Dr. Rizvi will see pt. Outpatient in clinic next Wednesday at 1:30PM to talk about Open heart surgery since pt. Still has pneumonia. Pt. Aware of situation.     1530 - VSS no change from AM. Denies pain, numbness, tingling or tenderness.     1645 - Discharge paperwork  explained and given to pt. All belongings were given back. All questions were answered.     Please review the Heart Failure Care Map for additional HF goal outcomes.    Deven Ceballos RN  10/17/2023

## 2023-10-17 NOTE — DISCHARGE INSTRUCTIONS
Contact your care team If symptoms get worse, If increased shortness of breath, If waking up at night with difficulty breathing, If unable to lie down for sleep due to symptoms, If weight gain of 2 pounds a day for 2 days in a row OR 5 pounds in 1 week., Increased swelling in your ankles or legs, and Dizziness or lightheadedness

## 2023-10-17 NOTE — PROGRESS NOTES
[facility]: Heart Failure Care Coordination   Heart Failure Education    Situation/Background:      RN JUANA provided heart failure education to Pt during admission.    Assessment:      Living situation: home, independent    Barriers to Heart Failure follow-up: none     Medication management: independent    Intervention/Plan:      CM/HF education topics reviewed:  Low sodium: 2000 mg or less daily, meal choices and label reading   Fluid Restriction: 50-60oz daily   Daily weight monitoring and logging   Medication review and importance of compliance   Overview of C.O.R.E. clinic   Importance of exercise   Symptoms of HF to be reported to Core Team      Education materials provided:  Low sodium food and drink handout  Low sodium food product examples  Already prepared low salt meal delivery services handout  HF stoplight tool  Guide to HF booklet  Cardiac medication handout  C.O.R.E. information brochure     HF resources reviewed:  [facility]  HRS      Patient to follow up as scheduled.     Patient expressed understanding of above education/instructions and denied further questions at this time.    Pt shares she is a . She is not interested in [facility]. She worries that being on a low sodium diet will change her taste buds and she won't be able to taste food anymore. Informed her it would more likely do the opposite and make her more sensitive to saltier foods. We discussed her low EF and the importance of the low sodium diet and fluid restriction. She cooks her own meals and prefers kosher salt. We discussed the need to eliminate salt and to use salt substitutes. She does not have a scale but can obtain one. Reviewed significance of daily wt monitoring and when to call with worsening wt gain and/or symptoms.     Justen CARTER RN  BSN

## 2023-10-17 NOTE — DISCHARGE SUMMARY
"[facility]  Hospitalist Discharge Summary      Date of Admission:  10/11/2023  Date of Discharge:  10/17/2023  Discharging Provider: Nick Sepulveda DO  Discharge Service: Hospitalist Service    Discharge Diagnoses   Acute respiratory failure with hypoxia  New acute heart failure with reduced EF  Ischemic cardiomyopathy  Pneumonia  E. coli bacteremia  Pyelonephritis  Sepsis  Prediabetes  Hypertension  Obstructive sleep apnea  Hypokalemia  Severe obesity    Clinically Significant Risk Factors     # Severe Obesity: Estimated body mass index is 47.24 kg/m  as calculated from the following:    Height as of this encounter: 1.499 m (4' 11\").    Weight as of this encounter: 106.1 kg (233 lb 14.4 oz).       Follow-ups Needed After Discharge   Follow-up Appointments     Follow-up and recommended labs and tests       Follow up with Dr. Rizvi, within 7 days for hospital follow- up.        Renal CT in 4-6 weeks  CXR in 4-6 weeks    Unresulted Labs Ordered in the Past 30 Days of this Admission       Date and Time Order Name Status Description    10/15/2023 11:24 AM Blood Culture Arm, Left Preliminary     10/15/2023 11:24 AM Blood Culture Arm, Right Preliminary         These results will be followed up by [facility]    Discharge Disposition   Discharged to home  Condition at discharge: Stable    Hospital Course   48 year old female admitted with pyelonephritis, pneumonia and new heart failure.     #Acute respiratory failure, resolved  Likely due to new acute systolic CHF and pneumonia  Weaned off O2    #Pneumonia, right sided  s/p 5 days Azithromycin and 7 days Rocephin  CXR in 4-6 weeks to confirm resolution    #Sepsis, resolved  #Bacteremia  due to pyelonephritis and pneumonia  Abdomen/pelvic CT shows hypoenhancing areas in the upper poles of both kidneys most suggestive of pyelonephritis.      UC grew pansensitive E. Coli, blood cultures also with E. coli  s/p 7 days Rocephin. Repeat BCx from 10/15 negative. " Continue with 3 more days Omnicef (total of 10 days course)    #Pyelonephritis  Need renal CT or MRI in 4-6 weeks to confirm resolution    #New onset Acute HFrEF 30-35%  #Ischemic cardiomyopathy   #Hypertension  Coronary angiogram 10/13 shows severe multivessel CAD  CTS consulted: Want to hold off on CABG until infectious course completely resolved  Continue coreg, spironolactone, lasix and entrestro     #Prediabetes  A1c 5.8         Diet: Moderate Consistent Carb (60 g CHO per Meal) Diet  DVT Prophylaxis: Enoxaparin (Lovenox) SQ  Bond Catheter: Not present  Lines: None       Cardiac Monitoring: ACTIVE order. Indication: Acute decompensated heart failure (48 hours)  Code Status: Full Code      Consultations This Hospital Stay   CARE MANAGEMENT / SOCIAL WORK IP CONSULT  CORE CLINIC EVALUATION IP CONSULT  OCCUPATIONAL THERAPY ADULT IP CONSULT  NUTRITION SERVICES ADULT IP CONSULT  CARE MANAGEMENT / SOCIAL WORK IP CONSULT  CARDIOLOGY IP CONSULT  PHARMACY IP CONSULT  CARDIOTHORACIC SURGERY IP CONSULT    Code Status   Full Code    Time Spent on this Encounter   INick DO, personally saw the patient today and spent greater than 30 minutes discharging this patient.       Nick Sepulveda DO  [facility]  [address] [place], [place] [id number]  Phone: [phone]  Fax: [phone]  ______________________________________________________________________    Physical Exam   Vital Signs: Temp: 97.7  F (36.5  C) Temp src: Oral BP: 99/51 Pulse: 63   Resp: 20 SpO2: 95 % O2 Device: None (Room air)    Weight: 233 lbs 14.4 oz  General Appearance:  No acute distress  Respiratory: Clear to auscultation bilaterally  Cardiovascular: Regular rate and rhythm  Extremities: No peripheral edema or cyanosis       Primary Care Physician   Physician No Ref-Primary    Discharge Orders      Follow-Up with Cardiology      Reason for your hospital stay    Bacteremia, heart failure     Follow-up and  recommended labs and tests     Follow up with Dr. Rizvi, within 7 days for hospital follow- up.     Activity    Your activity upon discharge: activity as tolerated     Diet    Follow this diet upon discharge: Orders Placed This Encounter      Low Saturated Fat Na <2400 mg       Significant Results and Procedures   Most Recent 3 CBC's:  Recent Labs   Lab Test 10/17/23  0439 10/14/23  0417 10/13/23  0607 10/11/23  1057 10/10/23  2012   WBC  --  10.6 11.5* 16.4* 16.3*   HGB  --   --  12.5 15.3 14.0   MCV  --   --  93 91 91    119* 138* 120* 124*     Most Recent 3 BMP's:  Recent Labs   Lab Test 10/17/23  0439 10/16/23  1210 10/16/23  0659 10/16/23  0431 10/15/23  1613 10/15/23  1223 10/15/23  0748 10/15/23  0429     --   --  139  --   --   --  141   POTASSIUM 3.5  --   --  3.5  --  3.5  --  3.1*   CHLORIDE 101  --   --  99  --   --   --  99   CO2 28  --   --  28  --   --   --  29   BUN 20.8*  --   --  20.8*  --   --   --  20.8*   CR 0.78  --   --  0.82  --   --   --  0.77   ANIONGAP 11  --   --  12  --   --   --  13   KHADIJAH 9.5  --   --  9.4  --   --   --  9.3   * 122* 132* 116*   < >  --    < > 116*    < > = values in this interval not displayed.     Most Recent 3 Troponin's:No lab results found.  Most Recent 3 BNP's:  Recent Labs   Lab Test 10/11/23  1057 10/10/23  2012   NTBNPI 5,007*  --    NTBNP  --  5,019*     Most Recent Cholesterol Panel:No lab results found.  Most Recent Hemoglobin A1c:  Recent Labs   Lab Test 10/11/23  1057   A1C 5.8*   ,   Results for orders placed or performed during the hospital encounter of 10/11/23   XR Chest 2 Views    Narrative    EXAM: XR CHEST 2 VIEWS  LOCATION: [facility]  DATE: 10/11/2023    INDICATION: dyspnea    COMPARISON: 03/17/2014      Impression    IMPRESSION: There is cardiomegaly, pulmonary vascular congestion, perihilar edema and bilateral small effusions, right greater than left. Findings consistent with failure.    No signs of  pneumonia.   US Lower Extremity Venous Duplex Bilateral    Narrative    EXAM: US LOWER EXTREMITY VENOUS DUPLEX BILATERAL  LOCATION: [facility]  DATE: 10/11/2023    INDICATION: Bilateral lower extremity pain and swelling. Swelling long term worse on left than r.  no previous evaluation.  COMPARISON: None.  TECHNIQUE: Venous Duplex ultrasound of bilateral lower extremities with and without compression, augmentation and duplex. Color flow and spectral Doppler with waveform analysis performed.    FINDINGS: Exam includes the common femoral, femoral, popliteal veins as well as segmentally visualized deep calf veins and greater saphenous vein.     RIGHT: No deep vein thrombosis. No superficial thrombophlebitis. Within the right popliteal fossa is an anechoic avascular cyst measuring 4.8 x 3.1 x 1.5 cm.    LEFT: No deep vein thrombosis. No superficial thrombophlebitis. Within the left popliteal fossa is an anechoic avascular cyst measuring 2.2 x 1.4 x 0.4 cm.      Impression    IMPRESSION:  1.  No deep venous thrombosis in the bilateral lower extremities.  2.  Bilateral popliteal fossa Baker's cysts, larger on the right.   CT Chest Pulmonary Embolism w Contrast    Narrative    EXAM: CT CHEST PULMONARY EMBOLISM W CONTRAST  LOCATION: [facility]  DATE: 10/11/2023    INDICATION: dyspnea progressive.  extremity edema  COMPARISON: None.  TECHNIQUE: CT chest pulmonary angiogram during arterial phase injection of IV contrast. Multiplanar reformats and MIP reconstructions were performed. Dose reduction techniques were used.   CONTRAST: IsoVue 370 90mL    FINDINGS:  ANGIOGRAM CHEST: Pulmonary arteries are normal caliber and negative for pulmonary emboli. Thoracic aorta is not well opacified and is  indeterminate for dissection. No CT evidence of right heart strain.    LUNGS AND PLEURA: Small right pleural effusion with associated right lower lobe and middle lobe  consolidation.    MEDIASTINUM/AXILLAE: Cardiomegaly. No enlarged thoracic lymph nodes.    CORONARY ARTERY CALCIFICATION: Mild.    UPPER ABDOMEN: Post surgical changes in the stomach.    MUSCULOSKELETAL: Degenerative changes in the spine.      Impression    IMPRESSION:  1.  No acute pulmonary emboli.    2.  Small right pleural effusion with associated middle lobe and right lower lobe consolidation, which could represent compressive atelectasis or pneumonia.   CT Abdomen Pelvis w Contrast    Narrative    EXAM: CT ABDOMEN PELVIS W CONTRAST  LOCATION: [facility]  DATE: 10/11/2023    INDICATION: hx o cervical cancer s p tahbso but now with asymmetric lower extremity edema and progressive dyspnea  COMPARISON: 5/13/2017  TECHNIQUE: CT scan of the abdomen and pelvis was performed following injection of IV contrast. Multiplanar reformats were obtained. Dose reduction techniques were used.  CONTRAST: IsoVue 370 90mL    FINDINGS:   LOWER CHEST: See separate chest CT report for full details.    HEPATOBILIARY: Layering sludge and stones in the gallbladder, but no acute inflammation or biliary dilation. No hepatic lesions.    PANCREAS: Normal.    SPLEEN: Normal.    ADRENAL GLANDS: Normal.    KIDNEYS/BLADDER: 3.2 x 2.9 x 2.6 cm geographic hypoenhancing area in the upper pole right kidney (3/83, 4/68) with preserved overlying cortex. Another smaller hypoenhancing area also noted in the upper pole left kidney (3/83). Tiny simple cyst in the   right lower pole. No collecting system dilation or obstructing urinary tract calculi. No urothelial thickening or periureteral fat stranding. The bladder is unremarkable.    BOWEL: No obstruction or inflammatory change. Postsurgical changes in the stomach.    LYMPH NODES: No lymphadenopathy.    VASCULATURE: IVC and pelvic veins are patent. No abdominal aortic aneurysm.    PELVIC ORGANS: Surgically absent. No suspicious soft tissue in the pelvis. Trace free  fluid.    MUSCULOSKELETAL: No aggressive or destructive osseous lesions. Degenerative changes in the spine.      Impression    IMPRESSION:   1.  No findings to explain lower extremity edema.    2.  Geographic hypoenhancing areas in the upper poles of both kidneys most suggestive of pyelonephritis. Consider follow-up renal CT or MRI in 4-6 weeks to ensure complete resolution.   US Carotid Bilateral    Narrative    EXAM: US CAROTID BILATERAL  LOCATION: [facility]  DATE: 10/14/2023    INDICATION: Multivessel coronary artery disease, preoperative evaluation prior to CABG  COMPARISON: None.  TECHNIQUE: Duplex exam performed utilizing 2D gray-scale imaging, Doppler interrogation with color-flow and spectral waveform analysis. The percent diameter stenosis is determined using NASCET criteria and Society of Radiologists in Ultrasound Consensus   Criteria.    FINDINGS:    RIGHT: Mild plaque at the bifurcation. The peak systolic velocity in the ICA is less than 125 cm/sec, consistent with less than 50% stenosis. Normal velocities in the ECA. Antegrade flow within the vertebral artery.     LEFT: Mild plaque at the bifurcation. The peak systolic velocity in the ICA is less than 125 cm/sec, consistent with less than 50% stenosis. Normal velocities in the ECA. Antegrade flow within the vertebral artery.    VELOCITY CHART:  CCA   Right: 62/21 cm/s   Left: 72/28 cm/s  ICA   Right: 86/37 cm/s   Left: 93/43 cm/s  ECA   Right: 91/18 cm/s   Left: 83/17 cm/s  ICA/CCA PSV Ratio   Right: 1.4   Left: 1.3      Impression    IMPRESSION:  1.  Mild plaque formation, velocities consistent with less than 50% stenosis in the right internal carotid artery.  2.  Mild plaque formation, velocities consistent with less than 50% stenosis in the left internal carotid artery.  3.  Flow within the vertebral arteries is antegrade.   Echocardiogram Complete    Narrative    [id number]  [id number]  [id number]  [id number]^TADEO^ROSE^[place] [address]     Name: JOHANNA JIMÉNEZ  MRN: [id number]  : 1975  Study Date: 10/12/2023 09:52 AM  Age: 48 yrs  Gender: Female  Patient Location: [facility]  Reason For Study: CHF  Ordering Physician: ROSE PIEDRA  Performed By: RENÉE     BSA: 2.0 m2  Height: 59 in  Weight: 246 lb  HR: 85  ______________________________________________________________________________  Procedure  Complete Portable Echo Adult. Definity (NDC #28534-942) given intravenously.  ______________________________________________________________________________  Interpretation Summary     Technically very challenging study. Definity contrast utilized.  Left ventricle measures mildly enlarged. Mild left ventricular hypertrophy  suggested. Left ventricular systolic function is moderate to severely globally  reduced with an ejection fraction of 30 to 35% with akinesis of the left  ventricular apex.Diastolic Doppler findings (E/E' ratio and/or other  parameters) suggest left ventricular filling pressures are increased.  The right ventricle is suboptimally visualized. On limited imaging right  ventricular systolic function appears potentially reduced.  Mild enlargement of the left atrium.  Valve structures are suboptimally visualized on this technically challenging  study. No obvious hemodynamically significant valve abnormality noted.  Consider cardiac MRI to further define cardiac structures.  ______________________________________________________________________________  Left Ventricle  The left ventricle is mildly dilated. There is mild concentric left  ventricular hypertrophy. Diastolic Doppler findings (E/E' ratio and/or other  parameters) suggest left ventricular filling pressures are increased. Left-  ventricular systolic function is moderate to severely globally reduced with an  ejection fraction estimate of 30 to 35% with akinesis of the left ventricular  apex. There is apical  akinesis. There is mod-severe global hypokinesia of the  left ventricle.     Right Ventricle  The right ventricle is not well visualized. The right ventricle is  suboptimally visualized. On limited imaging right ventricular systolic  function appears reduced.     Atria  The left atrium is not well visualized. The left atrium is mildly dilated.  Right atrium not well visualized.     Mitral Valve  The mitral valve leaflets appear thickened, but open well. There is trace  mitral regurgitation.     Tricuspid Valve  The tricuspid valve is not well visualized, but is grossly normal. There is  trace tricuspid regurgitation. Right ventricular systolic pressure could not  be approximated due to inadequate tricuspid regurgitation.     Aortic Valve  The aortic valve is not well visualized. The aortic valve is trileaflet with  aortic valve sclerosis. No hemodynamically significant valvular aortic  stenosis.     Pulmonic Valve  The pulmonic valve is not well seen, but is grossly normal. There is trace  pulmonic valvular regurgitation.     Vessels  The aorta root is normal. Normal size ascending aorta. IVC diameter >2.1 cm  collapsing <50% with sniff suggests a high RA pressure estimated at 15 mmHg or  greater.     Pericardium  Pericardial fat suggested.     ______________________________________________________________________________  MMode/2D Measurements & Calculations  IVSd: 1.2 cm  LVIDd: 5.6 cm  LVIDs: 4.4 cm  LVPWd: 1.7 cm     FS: 20.7 %  LV mass(C)d: 367.2 grams  LV mass(C)dI: 182.4 grams/m2  Ao root diam: 2.7 cm  LA dimension: 3.9 cm  asc Aorta Diam: 3.3 cm  LA/Ao: 1.4  LVOT diam: 1.9 cm  LVOT area: 2.8 cm2  Ao root diam index Ht(cm/m): 1.8  Ao root diam index BSA (cm/m2): 1.3  Asc Ao diam index BSA (cm/m2): 1.6  Asc Ao diam index Ht(cm/m): 2.2  LA Volume (BP): 58.3 ml     LA Volume Index (BP): 29.0 ml/m2  LA Volume Indexed (AL/bp): 30.9 ml/m2  RWT: 0.60     Doppler Measurements & Calculations  MV E max cipriano: 130.0  cm/sec  MV A max herminio: 88.6 cm/sec  MV E/A: 1.5  MV max P.4 mmHg  MV mean P.0 mmHg  MV V2 VTI: 28.9 cm  MVA(VTI): 1.8 cm2  MV P1/2t max herminio: 157.0 cm/sec  MV P1/2t: 52.3 msec  MVA(P1/2t): 4.2 cm2  MV dec slope: 880.0 cm/sec2  MV dec time: 0.15 sec  Ao V2 max: 171.5 cm/sec  Ao max P.0 mmHg  Ao V2 mean: 131.0 cm/sec  Ao mean P.0 mmHg  Ao V2 VTI: 32.6 cm  ENA(I,D): 1.6 cm2  ENA(V,D): 1.9 cm2     LV V1 max P.5 mmHg  LV V1 max: 114.9 cm/sec  LV V1 VTI: 18.5 cm  SV(LVOT): 52.5 ml  SI(LVOT): 26.0 ml/m2  PA V2 max: 83.2 cm/sec  PA max P.8 mmHg  PA acc time: 0.08 sec  PI end-d herminio: 112.0 cm/sec  AV Herminio Ratio (DI): 0.67  ENA Index (cm2/m2): 0.80  E/E' av.7  Lateral E/e': 15.9  Medial E/e': 31.5  RV S Herminio: 6.1 cm/sec     ______________________________________________________________________________  Report approved by: Joyce Butler 10/12/2023 11:30 AM         Cardiac Catheterization    Narrative      1st Mrg lesion is 70% stenosed.    Prox LAD to Mid LAD lesion is 90% stenosed.    1st Diag lesion is 90% stenosed.    2nd Diag lesion is 70% stenosed.    Mid LAD lesion is 100% stenosed.    RPDA lesion is 90% stenosed.    Prox RCA lesion is 30% stenosed.    3rd RPL lesion is 80% stenosed.    Right sided filling pressures are normal.    Left sided filling pressures are mildly elevated.    Mild elevated pulmonary hypertension.     Severe multivessel CAD without significant left main stenosis.  The proximal to midLAD has severe calcific stensosis involving the ostia   of D1 and D2 and the distal LAD is occluded and fills by collaterals.  The LCx OM1 branch has severe ostial stenosis.  The RPDA has subtotal stensis and there is severe stenosis of a large RPLA    branch.  Right side filling pressures are high-normal and left side filling   pressures are mildly elevated.  RA mean 8mmHg  PA 43/25, mean 31mmHg  PCWP mean 19mmHg.   Borderline reduced cardiac output/index: CO 4.2L/min, CI 2.1L/min/m2.          Discharge Medications   Current Discharge Medication List        START taking these medications    Details   carvedilol (COREG) 3.125 MG tablet Take 1 tablet (3.125 mg) by mouth 2 times daily (with meals)  Qty: 60 tablet, Refills: 3    Associated Diagnoses: Acute systolic congestive heart failure (H)      cefdinir (OMNICEF) 300 MG capsule Take 1 capsule (300 mg) by mouth 2 times daily  Qty: 6 capsule, Refills: 0    Associated Diagnoses: Bacteremia      furosemide (LASIX) 40 MG tablet Take 1 tablet (40 mg) by mouth 2 times daily  Qty: 60 tablet, Refills: 3    Associated Diagnoses: Acute systolic congestive heart failure (H)      nitroGLYcerin (NITROSTAT) 0.4 MG sublingual tablet For chest pain place 1 tablet under the tongue every 5 minutes for 3 doses. If symptoms persist 5 minutes after 1st dose call 911.  Qty: 20 tablet, Refills: 3    Associated Diagnoses: Acute systolic congestive heart failure (H)      sacubitril-valsartan (ENTRESTO) 24-26 MG per tablet Take 1 tablet by mouth 2 times daily  Qty: 60 tablet, Refills: 3    Associated Diagnoses: Acute systolic congestive heart failure (H)      spironolactone (ALDACTONE) 25 MG tablet Take 1 tablet (25 mg) by mouth daily  Qty: 30 tablet, Refills: 3    Associated Diagnoses: Acute systolic congestive heart failure (H)           CONTINUE these medications which have NOT CHANGED    Details   cyanocobalamin (VITAMIN B-12) 1000 MCG tablet Take 1,000 mcg by mouth daily      Ferrous Sulfate 324 (65 Fe) MG TBEC Take 1 tablet by mouth daily      multivitamin, therapeutic (THERA-VIT) TABS tablet Take 1 tablet by mouth daily           Allergies   No Known Allergies

## 2023-10-18 ENCOUNTER — PATIENT OUTREACH (OUTPATIENT)
Dept: CARE COORDINATION | Facility: CLINIC | Age: 48
End: 2023-10-18
Payer: COMMERCIAL

## 2023-10-18 NOTE — PLAN OF CARE
Occupational Therapy Discharge Summary    Reason for therapy discharge:    Discharged to home with home therapy.    Progress towards therapy goal(s). See goals on Care Plan in [facility] electronic health record for goal details.  Goals met    Therapy recommendation(s):    No further therapy is recommended.

## 2023-10-18 NOTE — PROGRESS NOTES
"Clinic Care Coordination Contact  [facility]: Post-Discharge Note  SITUATION                                                      Admission:    Admission Date: 10/11/23   Reason for Admission: Acute systolic congestive heart failure  Discharge:   Discharge Date: 10/17/23  Discharge Diagnosis: Acute systolic congestive heart failure    BACKGROUND                                                      Per hospital discharge summary and inpatient provider notes:  Betty Pan is a 48 year old female who presented to urgent care yesterday for evaluation of progressive dyspnea on exertion and at rest over the past year.  She finally got insurance and presented at her family's insistence.  She was found to have abnormal labs with BNP greater than 5000.  Interestingly, she denies any UTI symptoms despite her UA and CT findings.  Her symptoms sound more consistent with heart failure although pneumonia could certainly be an issue especially given her fever.  She denies any chest pain, no UTI symptoms including urinary frequency or urgency or dysuria or flank pain.  Does have a fever in the ED, denies severe coughing.  Does report lower extremity edema gradually progressive over the last year.     ASSESSMENT           Discharge Assessment  How are you doing now that you are home?: \" Everything is good\"  How are your symptoms? (Red Flag symptoms escalate to triage hotline per guidelines): Improved  Do you feel your condition is stable enough to be safe at home until your provider visit?: Yes  Does the patient have their discharge instructions? : Yes  Does the patient have questions regarding their discharge instructions? : No  Were you started on any new medications or were there changes to any of your previous medications? : Yes  Does the patient have all of their medications?: Yes  Do you have questions regarding any of your medications? : No  Do you have all of your needed medical supplies or equipment (DME)?  (i.e. " oxygen tank, CPAP, cane, etc.): Yes  Discharge follow-up appointment scheduled within 14 calendar days? : Yes  Discharge Follow Up Appointment Date: 10/27/23  Discharge Follow Up Appointment Scheduled with?: Specialty Care Provider    Post-op ([facility] Only)  If the patient had a surgery or procedure, do they have any questions for a nurse?: No             PLAN                                                      Outpatient Plan:    Follow up with Dr. Rizvi, within 7 days for hospital follow- up.          Activity     Your activity upon discharge: activity as tolerated          Diet     Follow this diet upon discharge: Orders Placed This Encounter      Low Saturated Fat Na <2400 mg       Future Appointments   Date Time Provider Department Center   10/27/2023  7:50 AM Sabine Mohr, APRN CNP [facility]   10/27/2023  8:30 AM [facility] FAILURE RN [facility]   11/1/2023  1:30 PM Angélica Rizvi MD [facility]         For any urgent concerns, please contact our 24 hour nurse triage line: [phone] ([phone])         Rupali Godfrey MA

## 2023-10-20 LAB
BACTERIA BLD CULT: NO GROWTH
BACTERIA BLD CULT: NO GROWTH

## 2023-10-21 ENCOUNTER — HEALTH MAINTENANCE LETTER (OUTPATIENT)
Age: 48
End: 2023-10-21

## 2023-10-27 ENCOUNTER — APPOINTMENT (OUTPATIENT)
Dept: CARDIOLOGY | Facility: CLINIC | Age: 48
End: 2023-10-27
Attending: INTERNAL MEDICINE
Payer: COMMERCIAL

## 2023-10-27 VITALS
DIASTOLIC BLOOD PRESSURE: 62 MMHG | HEART RATE: 56 BPM | BODY MASS INDEX: 47.26 KG/M2 | SYSTOLIC BLOOD PRESSURE: 100 MMHG | RESPIRATION RATE: 14 BRPM | WEIGHT: 234 LBS

## 2023-10-27 DIAGNOSIS — G47.33 OSA (OBSTRUCTIVE SLEEP APNEA): ICD-10-CM

## 2023-10-27 DIAGNOSIS — I25.5 ISCHEMIC CARDIOMYOPATHY: ICD-10-CM

## 2023-10-27 DIAGNOSIS — E66.813 CLASS 3 SEVERE OBESITY DUE TO EXCESS CALORIES WITH BODY MASS INDEX (BMI) OF 45.0 TO 49.9 IN ADULT, UNSPECIFIED WHETHER SERIOUS COMORBIDITY PRESENT (H): ICD-10-CM

## 2023-10-27 DIAGNOSIS — I10 HYPERTENSION, UNSPECIFIED TYPE: ICD-10-CM

## 2023-10-27 DIAGNOSIS — E66.01 CLASS 3 SEVERE OBESITY DUE TO EXCESS CALORIES WITH BODY MASS INDEX (BMI) OF 45.0 TO 49.9 IN ADULT, UNSPECIFIED WHETHER SERIOUS COMORBIDITY PRESENT (H): ICD-10-CM

## 2023-10-27 DIAGNOSIS — I25.10 CORONARY ARTERY DISEASE INVOLVING NATIVE CORONARY ARTERY OF NATIVE HEART WITHOUT ANGINA PECTORIS: ICD-10-CM

## 2023-10-27 DIAGNOSIS — I50.20 HEART FAILURE WITH REDUCED EJECTION FRACTION (H): Primary | ICD-10-CM

## 2023-10-27 PROBLEM — E66.09 OBESITY DUE TO EXCESS CALORIES: Status: ACTIVE | Noted: 2023-10-27

## 2023-10-27 PROCEDURE — 99215 OFFICE O/P EST HI 40 MIN: CPT | Performed by: NURSE PRACTITIONER

## 2023-10-27 NOTE — PROGRESS NOTES
Assessment/Recommendations   Assessment:    1. Ischemic cardiomyopathy, heart failure with reduced ejection fraction, ejection fraction 30-35%, NYHA class II: Compensated.  She states her symptoms including dyspnea on exertion, abdominal distention and lower extremity edema have improved.  Her weight has decreased since discharge approximately 6 pounds.  She is trying to follow a low-sodium diet which is difficult for her due to being a .  She met with the nurse clinician during hospitalization.  2.  Hypertension: Controlled.  Blood pressure 100/62  3.  MANINDER: She states she had a sleep study approximately 10 years ago and has not worn her CPAP for that long.  Discussed the correlation between untreated sleep apnea and heart failure.  Sleep medicine referral placed  4.  CAD: States she has feelings of heartburn.  Not sure if it is typical chest pain or heartburn.  Recommended she take nitroglycerin to see if this resolves.  Recent coronary angiogram showed severe multivessel coronary artery disease.  She meets with Dr. Rizvi next week to discuss possible CABG.   5.  Obesity: BMI 47.26    Plan:  1.   Heart failure medications:  - Beta blocker therapy with carvedilol 3.125 mg twice a day  - ARNI therapy with Entresto 24-26 mg twice a day  - Aldosterone antagonist with spironolactone 25 mg daily  - Diuretic therapy with furosemide 40 mg daily  2.  Continue current medications.  Unable to titrate due to hypotension and occasional lightheadedness  3.  Continue monitoring daily weights and stressed importance of low-sodium diet  4.  Referral to sleep medicine to obtain new CPAP    Betty Pan will follow up with Dr. Nur in 3 months, Dr. Haley my November 1 and in the heart failure clinic in 3 weeks.     History of Present Illness/Subjective    Ms. Betty Pan is a 48 year old female seen at [facility] heart failure clinic today for continued follow-up.  She follows up for heart failure with  reduced ejection fraction, ischemic cardiomyopathy.  She was hospitalized October 11 to October 17 with acute respiratory failure.  She was found to have acute heart failure and pneumonia.  She was started on antibiotics and diuretics.  She was also found to have pyelonephritis.  She had an echocardiogram which showed ejection fraction of 30 to 35%.  She had coronary angiogram and right heart catheterization which showed severe multivessel coronary artery disease.  CABG will be held off until infectious course has completely resolved.  She meets with Dr. Rizvi next week.  She has a past medical history significant for hypertension, prediabetes, morbid obesity, MANINDER not on CPAP, CAD, hyperlipidemia.    Today, she states her dyspnea on exertion, abdominal distention and lower extremity edema have improved.  She denies shortness of breath, orthopnea, PND, palpitations, abdominal fullness/bloating, and lower extremity edema.      She is monitoring home weights which have decreased from 234 pounds to 228 pounds since discharge.  She is trying to follow a low-sodium diet.  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      ECHOCARDIOGRAM: 10/12/2023  Interpretation Summary     Technically very challenging study. Definity contrast utilized.  Left ventricle measures mildly enlarged. Mild left ventricular hypertrophy  suggested. Left ventricular systolic function is moderate to severely globally  reduced with an ejection fraction of 30 to 35% with akinesis of the left  ventricular apex.Diastolic Doppler findings (E/E' ratio and/or other  parameters) suggest left ventricular filling pressures are increased.  The right ventricle is suboptimally visualized. On limited imaging right  ventricular systolic function appears potentially reduced.  Mild enlargement of the left atrium.  Valve structures are suboptimally visualized on this technically challenging  study. No obvious hemodynamically significant valve abnormality noted.  Consider  cardiac MRI to further define cardiac structures.      Coronary angiogram, right heart catheterization: 10/13/2023  Indications    Acute systolic congestive heart failure (H) [I50.21 (ICD-10-CM)]     Comments/Patient Narrative    49 yo F admitted with heart failure found to have reduced ejection fraction 30 to 35% with mild left ventricular dilation and akinesis of the LV apex was referred for coronary angiogram with possible percutaneous coronary intervention and right heart catheterization.         Conclusion      1st Mrg lesion is 70% stenosed.    Prox LAD to Mid LAD lesion is 90% stenosed.    1st Diag lesion is 90% stenosed.    2nd Diag lesion is 70% stenosed.    Mid LAD lesion is 100% stenosed.    RPDA lesion is 90% stenosed.    Prox RCA lesion is 30% stenosed.    3rd RPL lesion is 80% stenosed.    Right sided filling pressures are normal.    Left sided filling pressures are mildly elevated.    Mild elevated pulmonary hypertension.      Severe multivessel CAD without significant left main stenosis.  The proximal to midLAD has severe calcific stensosis involving the ostia of D1 and D2 and the distal LAD is occluded and fills by collaterals.  The LCx OM1 branch has severe ostial stenosis.  The RPDA has subtotal stensis and there is severe stenosis of a large RPLA  branch.  Right side filling pressures are high-normal and left side filling pressures are mildly elevated.  RA mean 8mmHg  PA 43/25, mean 31mmHg  PCWP mean 19mmHg.   Borderline reduced cardiac output/index: CO 4.2L/min, CI 2.1L/min/m2.         Plan     Follow bedrest per protocol   Continued medical management and lifestyle modifications for cardiovascular risk factor optimizations.   Return to the primary inpatient team for further evaluation and managmenet    CV surgery consultation for consideration of CABG. Potential targets for bypass are distal LAD, D2, OM1, RPDA, RPLA.        Physical Examination Review of Systems   /62 (BP Location: Left  arm, Patient Position: Sitting, Cuff Size: Adult Large)   Pulse 56   Resp 14   Wt 106.1 kg (234 lb)   BMI 47.26 kg/m    Body mass index is 47.26 kg/m .  Wt Readings from Last 3 Encounters:   10/27/23 106.1 kg (234 lb)   10/17/23 106.1 kg (233 lb 14.4 oz)       General Appearance:   no acute distress   ENT/Mouth: No abnormalities   EYES:  no scleral icterus, normal conjunctivae   Neck: no thyromegaly   Chest/Lungs:   lungs are clear to auscultation, no rales or wheezing, equal chest wall expansion    Cardiovascular:   Regular. Normal first and second heart sounds with no murmurs, rubs, or gallops, no edema bilaterally    Abdomen:  Obese, bowel sounds are present   Extremities: no cyanosis or clubbing   Skin: warm   Neurologic: no tremors     Psychiatric: alert and oriented x3    Enc Vitals  BP: 100/62  Pulse: 56  Resp: 14  Weight: 106.1 kg (234 lb)                                         Medical History  Surgical History Family History Social History   Past Medical History:   Diagnosis Date    Congestive heart failure (H)     Coronary artery disease     Heart failure with reduced ejection fraction (H) 10/27/2023    Hyperlipidemia     Hypertension, unspecified type 10/11/2023    Ischemic cardiomyopathy 10/27/2023    Obese     Sleep apnea     Past Surgical History:   Procedure Laterality Date    CORONARY ANGIOGRAPHY ADULT ORDER      CV CORONARY ANGIOGRAM N/A 10/13/2023    Procedure: Coronary Angiogram;  Surgeon: Roxana Melgoza MD;  Location: [facility] CATH LAB CV    CV RIGHT HEART CATH MEASUREMENTS RECORDED N/A 10/13/2023    Procedure: Right Heart Catheterization;  Surgeon: Roxana Melgoza MD;  Location: [facility] CATH LAB CV    No family history on file. Social History     Socioeconomic History    Marital status: Single     Spouse name: Not on file    Number of children: Not on file    Years of education: Not on file    Highest education level: Not on file   Occupational History    Not on file   Tobacco Use     Smoking status: Never    Smokeless tobacco: Not on file   Substance and Sexual Activity    Alcohol use: Not on file    Drug use: Not on file    Sexual activity: Not on file   Other Topics Concern    Not on file   Social History Narrative    Not on file     Social Determinants of Health     Financial Resource Strain: Not on file   Food Insecurity: Not on file   Transportation Needs: Not on file   Physical Activity: Not on file   Stress: Not on file   Social Connections: Not on file   Interpersonal Safety: Not on file   Housing Stability: Not on file          Medications  Allergies   Current Outpatient Medications   Medication Sig Dispense Refill    carvedilol (COREG) 3.125 MG tablet Take 1 tablet (3.125 mg) by mouth 2 times daily (with meals) 60 tablet 3    cefdinir (OMNICEF) 300 MG capsule Take 1 capsule (300 mg) by mouth 2 times daily 6 capsule 0    cyanocobalamin (VITAMIN B-12) 1000 MCG tablet Take 1,000 mcg by mouth daily      Ferrous Sulfate 324 (65 Fe) MG TBEC Take 1 tablet by mouth daily      furosemide (LASIX) 40 MG tablet Take 1 tablet (40 mg) by mouth 2 times daily 60 tablet 3    multivitamin, therapeutic (THERA-VIT) TABS tablet Take 1 tablet by mouth daily      nitroGLYcerin (NITROSTAT) 0.4 MG sublingual tablet For chest pain place 1 tablet under the tongue every 5 minutes for 3 doses. If symptoms persist 5 minutes after 1st dose call 911. 20 tablet 3    sacubitril-valsartan (ENTRESTO) 24-26 MG per tablet Take 1 tablet by mouth 2 times daily 60 tablet 3    spironolactone (ALDACTONE) 25 MG tablet Take 1 tablet (25 mg) by mouth daily 30 tablet 3    No Known Allergies      Lab Results    Chemistry/lipid CBC Cardiac Enzymes/BNP/TSH/INR   Lab Results   Component Value Date    BUN 20.8 (H) 10/17/2023     10/17/2023    CO2 28 10/17/2023    Lab Results   Component Value Date    WBC 10.6 10/14/2023    HGB 12.5 10/13/2023    HCT 40.4 10/13/2023    MCV 93 10/13/2023     10/17/2023    Lab Results    Component Value Date    TSH 1.30 10/10/2023             This note has been dictated using voice recognition software. Any grammatical, typographical, or context distortions are unintentional and inherent to the software    40 minutes spent on the date of encounter doing chart review, review of outside records, review of test results, interpretation with above tests, patient visit, and documentation.

## 2023-10-27 NOTE — PATIENT INSTRUCTIONS
Betty Pan,    It was a pleasure to see you today at [facility] HEART [facility].     My recommendations after this visit include:  - Please follow up with Dr Nur in 3 months   - Please follow up with Dr Rizvi November 1  - Please follow up with Sabine Mohr in 3 weeks  - Continue current medications    Sabine Mohr, CNP

## 2023-10-27 NOTE — LETTER
10/27/2023    Physician No Ref-Primary  No address on file    RE: Betty MARTINEZ Pan       Dear Colleague,     I had the pleasure of seeing Betty Pan in the [facility].        Assessment/Recommendations   Assessment:    1. Ischemic cardiomyopathy, heart failure with reduced ejection fraction, ejection fraction 30-35%, NYHA class II: Compensated.  She states her symptoms including dyspnea on exertion, abdominal distention and lower extremity edema have improved.  Her weight has decreased since discharge approximately 6 pounds.  She is trying to follow a low-sodium diet which is difficult for her due to being a .  She met with the nurse clinician during hospitalization.  2.  Hypertension: Controlled.  Blood pressure 100/62  3.  MANINDER: She states she had a sleep study approximately 10 years ago and has not worn her CPAP for that long.  Discussed the correlation between untreated sleep apnea and heart failure.  Sleep medicine referral placed  4.  CAD: States she has feelings of heartburn.  Not sure if it is typical chest pain or heartburn.  Recommended she take nitroglycerin to see if this resolves.  Recent coronary angiogram showed severe multivessel coronary artery disease.  She meets with Dr. Rizvi next week to discuss possible CABG.   5.  Obesity: BMI 47.26    Plan:  1.   Heart failure medications:  - Beta blocker therapy with carvedilol 3.125 mg twice a day  - ARNI therapy with Entresto 24-26 mg twice a day  - Aldosterone antagonist with spironolactone 25 mg daily  - Diuretic therapy with furosemide 40 mg daily  2.  Continue current medications.  Unable to titrate due to hypotension and occasional lightheadedness  3.  Continue monitoring daily weights and stressed importance of low-sodium diet  4.  Referral to sleep medicine to obtain new CPAP    Betty Pan will follow up with Dr. Nur in 3 months, Dr. Haley my November 1 and in the heart failure clinic in 3 weeks.     History of  Present Illness/Subjective    Ms. Betty Pan is a 48 year old female seen at [facility] heart failure clinic today for continued follow-up.  She follows up for heart failure with reduced ejection fraction, ischemic cardiomyopathy.  She was hospitalized October 11 to October 17 with acute respiratory failure.  She was found to have acute heart failure and pneumonia.  She was started on antibiotics and diuretics.  She was also found to have pyelonephritis.  She had an echocardiogram which showed ejection fraction of 30 to 35%.  She had coronary angiogram and right heart catheterization which showed severe multivessel coronary artery disease.  CABG will be held off until infectious course has completely resolved.  She meets with Dr. Rizvi next week.  She has a past medical history significant for hypertension, prediabetes, morbid obesity, MANINDER not on CPAP, CAD, hyperlipidemia.    Today, she states her dyspnea on exertion, abdominal distention and lower extremity edema have improved.  She denies shortness of breath, orthopnea, PND, palpitations, abdominal fullness/bloating, and lower extremity edema.      She is monitoring home weights which have decreased from 234 pounds to 228 pounds since discharge.  She is trying to follow a low-sodium diet.  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      ECHOCARDIOGRAM: 10/12/2023  Interpretation Summary     Technically very challenging study. Definity contrast utilized.  Left ventricle measures mildly enlarged. Mild left ventricular hypertrophy  suggested. Left ventricular systolic function is moderate to severely globally  reduced with an ejection fraction of 30 to 35% with akinesis of the left  ventricular apex.Diastolic Doppler findings (E/E' ratio and/or other  parameters) suggest left ventricular filling pressures are increased.  The right ventricle is suboptimally visualized. On limited imaging right  ventricular systolic function appears potentially reduced.  Mild  enlargement of the left atrium.  Valve structures are suboptimally visualized on this technically challenging  study. No obvious hemodynamically significant valve abnormality noted.  Consider cardiac MRI to further define cardiac structures.      Coronary angiogram, right heart catheterization: 10/13/2023  Indications    Acute systolic congestive heart failure (H) [I50.21 (ICD-10-CM)]     Comments/Patient Narrative    49 yo F admitted with heart failure found to have reduced ejection fraction 30 to 35% with mild left ventricular dilation and akinesis of the LV apex was referred for coronary angiogram with possible percutaneous coronary intervention and right heart catheterization.         Conclusion      1st Mrg lesion is 70% stenosed.    Prox LAD to Mid LAD lesion is 90% stenosed.    1st Diag lesion is 90% stenosed.    2nd Diag lesion is 70% stenosed.    Mid LAD lesion is 100% stenosed.    RPDA lesion is 90% stenosed.    Prox RCA lesion is 30% stenosed.    3rd RPL lesion is 80% stenosed.    Right sided filling pressures are normal.    Left sided filling pressures are mildly elevated.    Mild elevated pulmonary hypertension.      Severe multivessel CAD without significant left main stenosis.  The proximal to midLAD has severe calcific stensosis involving the ostia of D1 and D2 and the distal LAD is occluded and fills by collaterals.  The LCx OM1 branch has severe ostial stenosis.  The RPDA has subtotal stensis and there is severe stenosis of a large RPLA  branch.  Right side filling pressures are high-normal and left side filling pressures are mildly elevated.  RA mean 8mmHg  PA 43/25, mean 31mmHg  PCWP mean 19mmHg.   Borderline reduced cardiac output/index: CO 4.2L/min, CI 2.1L/min/m2.         Plan     Follow bedrest per protocol   Continued medical management and lifestyle modifications for cardiovascular risk factor optimizations.   Return to the primary inpatient team for further evaluation and managmenet    CV  surgery consultation for consideration of CABG. Potential targets for bypass are distal LAD, D2, OM1, RPDA, RPLA.        Physical Examination Review of Systems   /62 (BP Location: Left arm, Patient Position: Sitting, Cuff Size: Adult Large)   Pulse 56   Resp 14   Wt 106.1 kg (234 lb)   BMI 47.26 kg/m    Body mass index is 47.26 kg/m .  Wt Readings from Last 3 Encounters:   10/27/23 106.1 kg (234 lb)   10/17/23 106.1 kg (233 lb 14.4 oz)       General Appearance:   no acute distress   ENT/Mouth: No abnormalities   EYES:  no scleral icterus, normal conjunctivae   Neck: no thyromegaly   Chest/Lungs:   lungs are clear to auscultation, no rales or wheezing, equal chest wall expansion    Cardiovascular:   Regular. Normal first and second heart sounds with no murmurs, rubs, or gallops, no edema bilaterally    Abdomen:  Obese, bowel sounds are present   Extremities: no cyanosis or clubbing   Skin: warm   Neurologic: no tremors     Psychiatric: alert and oriented x3    Enc Vitals  BP: 100/62  Pulse: 56  Resp: 14  Weight: 106.1 kg (234 lb)                                         Medical History  Surgical History Family History Social History   Past Medical History:   Diagnosis Date    Congestive heart failure (H)     Coronary artery disease     Heart failure with reduced ejection fraction (H) 10/27/2023    Hyperlipidemia     Hypertension, unspecified type 10/11/2023    Ischemic cardiomyopathy 10/27/2023    Obese     Sleep apnea     Past Surgical History:   Procedure Laterality Date    CORONARY ANGIOGRAPHY ADULT ORDER      CV CORONARY ANGIOGRAM N/A 10/13/2023    Procedure: Coronary Angiogram;  Surgeon: Roxana Melgoza MD;  Location: [facility] CATH LAB CV    CV RIGHT HEART CATH MEASUREMENTS RECORDED N/A 10/13/2023    Procedure: Right Heart Catheterization;  Surgeon: Roxana Melgoza MD;  Location: [facility] CATH LAB CV    No family history on file. Social History     Socioeconomic History    Marital status: Single      Spouse name: Not on file    Number of children: Not on file    Years of education: Not on file    Highest education level: Not on file   Occupational History    Not on file   Tobacco Use    Smoking status: Never    Smokeless tobacco: Not on file   Substance and Sexual Activity    Alcohol use: Not on file    Drug use: Not on file    Sexual activity: Not on file   Other Topics Concern    Not on file   Social History Narrative    Not on file     Social Determinants of Health     Financial Resource Strain: Not on file   Food Insecurity: Not on file   Transportation Needs: Not on file   Physical Activity: Not on file   Stress: Not on file   Social Connections: Not on file   Interpersonal Safety: Not on file   Housing Stability: Not on file          Medications  Allergies   Current Outpatient Medications   Medication Sig Dispense Refill    carvedilol (COREG) 3.125 MG tablet Take 1 tablet (3.125 mg) by mouth 2 times daily (with meals) 60 tablet 3    cefdinir (OMNICEF) 300 MG capsule Take 1 capsule (300 mg) by mouth 2 times daily 6 capsule 0    cyanocobalamin (VITAMIN B-12) 1000 MCG tablet Take 1,000 mcg by mouth daily      Ferrous Sulfate 324 (65 Fe) MG TBEC Take 1 tablet by mouth daily      furosemide (LASIX) 40 MG tablet Take 1 tablet (40 mg) by mouth 2 times daily 60 tablet 3    multivitamin, therapeutic (THERA-VIT) TABS tablet Take 1 tablet by mouth daily      nitroGLYcerin (NITROSTAT) 0.4 MG sublingual tablet For chest pain place 1 tablet under the tongue every 5 minutes for 3 doses. If symptoms persist 5 minutes after 1st dose call 911. 20 tablet 3    sacubitril-valsartan (ENTRESTO) 24-26 MG per tablet Take 1 tablet by mouth 2 times daily 60 tablet 3    spironolactone (ALDACTONE) 25 MG tablet Take 1 tablet (25 mg) by mouth daily 30 tablet 3    No Known Allergies      Lab Results    Chemistry/lipid CBC Cardiac Enzymes/BNP/TSH/INR   Lab Results   Component Value Date    BUN 20.8 (H) 10/17/2023     10/17/2023     CO2 28 10/17/2023    Lab Results   Component Value Date    WBC 10.6 10/14/2023    HGB 12.5 10/13/2023    HCT 40.4 10/13/2023    MCV 93 10/13/2023     10/17/2023    Lab Results   Component Value Date    TSH 1.30 10/10/2023             This note has been dictated using voice recognition software. Any grammatical, typographical, or context distortions are unintentional and inherent to the software    40 minutes spent on the date of encounter doing chart review, review of outside records, review of test results, interpretation with above tests, patient visit, and documentation.                Thank you for allowing me to participate in the care of your patient.      Sincerely,     ANDREW Florez [facility]  cc:   Yenny Crespo MD  [address]  [place] [address]

## 2023-10-31 ENCOUNTER — TELEPHONE (OUTPATIENT)
Dept: CARDIOLOGY | Facility: CLINIC | Age: 48
End: 2023-10-31
Payer: COMMERCIAL

## 2023-10-31 NOTE — TELEPHONE ENCOUNTER
Called back [facility] [place] to determine if a message can be forwarded on to Dr. Nur for review or if physician communication is requested. Of note, patient has not seen Dr. Nur since hospitalization. In addition, number left was main clinic number, not a physician direct line.         Left message with coordinator at [facility] requesting a call back from care team to address concerns and get a message to Dr. Nur. Direct line provided for call back. -[facility]

## 2023-10-31 NOTE — TELEPHONE ENCOUNTER
----- Message from Mar Lazo sent at 10/30/2023  3:38 PM CDT -----  Regarding: DCB PATIENT  General phone call:    Caller: DR. STAUFFER FROM [facility]    Primary cardiologist: MARGARET    Detailed reason for call: WOULD LIKE TO DISCUSS CHANGING MEDICATIONS, PATIENT HAS MIGUEL, MOST LIKELY FROM CARDIAC MEDS, PER DR. STAUFFER    Best phone number: [phone]    Best time to contact: ANY    Ok to leave a detailedmessage? YES    Device? NO    Additional Info:

## 2023-11-01 ENCOUNTER — OFFICE VISIT (OUTPATIENT)
Dept: CARDIOLOGY | Facility: CLINIC | Age: 48
End: 2023-11-01
Payer: COMMERCIAL

## 2023-11-01 ENCOUNTER — PREP FOR PROCEDURE (OUTPATIENT)
Dept: ADMINISTRATIVE | Facility: CLINIC | Age: 48
End: 2023-11-01

## 2023-11-01 VITALS
BODY MASS INDEX: 47.78 KG/M2 | HEIGHT: 59 IN | SYSTOLIC BLOOD PRESSURE: 138 MMHG | DIASTOLIC BLOOD PRESSURE: 70 MMHG | RESPIRATION RATE: 16 BRPM | WEIGHT: 237 LBS | OXYGEN SATURATION: 96 % | HEART RATE: 66 BPM

## 2023-11-01 DIAGNOSIS — I25.10 CAD (CORONARY ARTERY DISEASE): Primary | ICD-10-CM

## 2023-11-01 DIAGNOSIS — I25.10 CORONARY ARTERY DISEASE INVOLVING NATIVE CORONARY ARTERY OF NATIVE HEART WITHOUT ANGINA PECTORIS: Primary | ICD-10-CM

## 2023-11-01 DIAGNOSIS — I50.20 HEART FAILURE WITH REDUCED EJECTION FRACTION (H): ICD-10-CM

## 2023-11-01 PROCEDURE — 99214 OFFICE O/P EST MOD 30 MIN: CPT | Performed by: THORACIC SURGERY (CARDIOTHORACIC VASCULAR SURGERY)

## 2023-11-01 RX ORDER — ATORVASTATIN CALCIUM 40 MG/1
40 TABLET, FILM COATED ORAL DAILY
Status: ON HOLD | COMMUNITY
Start: 2023-10-30 | End: 2023-12-06

## 2023-11-01 NOTE — PATIENT INSTRUCTIONS
You were seen today in the [facility] Cardiovascular Surgery Clinic    Surgery will be scheduled Wednesday, November 8. Pre-admission testing (PAT) will be scheduled Monday, November 6 at 8:00 AM.    Schedule CT chest without contrast for November 6 after 10 AM.    Please call BERNA Tsang surgery coordinator with any questions.  Thank you.    Trinh Hernandez RN  Cardiovascular Surgery  Phone [phone]  Fax [phone]

## 2023-11-01 NOTE — LETTER
11/1/2023    Cristina Alexis MD  [address] [facility] [address]    RE: Betty MARTINEZ Maxim       Dear Colleague,     I had the pleasure of seeing Betty Pan in the [facility].  Patient was seen and examined during her recent hospitalization.  She requires a multi-vessel coronary artery bypass grafting procedure.  She also had a pneumonia so we will get a CT scan of the chest to make sure it has resolved.  She understands that the risk for a multi-vessel coronary artery bypass grafting procedure include: bleeding, infection, stroke, sternal dehiscence, myocardial infarction, arrhythmias, the need for a pacemaker, prolonged ventilation, pneumonia, liver/renal failure, aortic dissection, and an operative mortality of 2 to 4 percent.  She accepts these risks and is agreeable with the plan of proceeding with surgery next week.    Thank you for allowing me to participate in the care of your patient.      Sincerely,     Angélica Rizvi MD     [facility]  cc:   No referring provider defined for this encounter.

## 2023-11-02 DIAGNOSIS — I25.10 CORONARY ARTERY DISEASE INVOLVING NATIVE CORONARY ARTERY OF NATIVE HEART WITHOUT ANGINA PECTORIS: Primary | ICD-10-CM

## 2023-11-02 RX ORDER — CEFAZOLIN SODIUM/WATER 2 G/20 ML
2 SYRINGE (ML) INTRAVENOUS
Status: CANCELLED | OUTPATIENT
Start: 2023-11-08

## 2023-11-02 RX ORDER — PHENYLEPHRINE HCL IN 0.9% NACL 50MG/250ML
.1-6 PLASTIC BAG, INJECTION (ML) INTRAVENOUS CONTINUOUS
Status: CANCELLED | OUTPATIENT
Start: 2023-11-08

## 2023-11-02 RX ORDER — CEFAZOLIN SODIUM/WATER 2 G/20 ML
2 SYRINGE (ML) INTRAVENOUS SEE ADMIN INSTRUCTIONS
Status: CANCELLED | OUTPATIENT
Start: 2023-11-08

## 2023-11-02 RX ORDER — CHLORHEXIDINE GLUCONATE ORAL RINSE 1.2 MG/ML
10 SOLUTION DENTAL ONCE
Status: CANCELLED | OUTPATIENT
Start: 2023-11-08 | End: 2023-11-02

## 2023-11-02 RX ORDER — ASPIRIN 81 MG/1
162 TABLET, CHEWABLE ORAL
Status: CANCELLED | OUTPATIENT
Start: 2023-11-08

## 2023-11-02 RX ORDER — ASPIRIN 81 MG/1
81 TABLET, CHEWABLE ORAL
Status: CANCELLED | OUTPATIENT
Start: 2023-11-08

## 2023-11-02 RX ORDER — DEXMEDETOMIDINE HYDROCHLORIDE 4 UG/ML
.2-1.2 INJECTION, SOLUTION INTRAVENOUS CONTINUOUS
Status: CANCELLED | OUTPATIENT
Start: 2023-11-08

## 2023-11-02 RX ORDER — SODIUM CHLORIDE, SODIUM GLUCONATE, SODIUM ACETATE, POTASSIUM CHLORIDE AND MAGNESIUM CHLORIDE 526; 502; 368; 37; 30 MG/100ML; MG/100ML; MG/100ML; MG/100ML; MG/100ML
1000 INJECTION, SOLUTION INTRAVENOUS
Status: CANCELLED | OUTPATIENT
Start: 2023-11-08 | End: 2023-11-08

## 2023-11-02 RX ORDER — LIDOCAINE 40 MG/G
CREAM TOPICAL
Status: CANCELLED | OUTPATIENT
Start: 2023-11-02

## 2023-11-02 NOTE — PROGRESS NOTES
Patient was seen and examined during her recent hospitalization.  She requires a multi-vessel coronary artery bypass grafting procedure.  She also had a pneumonia so we will get a CT scan of the chest to make sure it has resolved.  She understands that the risk for a multi-vessel coronary artery bypass grafting procedure include: bleeding, infection, stroke, sternal dehiscence, myocardial infarction, arrhythmias, the need for a pacemaker, prolonged ventilation, pneumonia, liver/renal failure, aortic dissection, and an operative mortality of 2 to 4 percent.  She accepts these risks and is agreeable with the plan of proceeding with surgery next week.

## 2023-11-06 ENCOUNTER — ANESTHESIA EVENT (OUTPATIENT)
Dept: SURGERY | Facility: HOSPITAL | Age: 48
End: 2023-11-06

## 2023-11-06 ENCOUNTER — HOSPITAL ENCOUNTER (OUTPATIENT)
Dept: SURGERY | Facility: HOSPITAL | Age: 48
Discharge: HOME | End: 2023-11-06
Attending: THORACIC SURGERY (CARDIOTHORACIC VASCULAR SURGERY)
Payer: COMMERCIAL

## 2023-11-06 ENCOUNTER — HOSPITAL ENCOUNTER (OUTPATIENT)
Dept: CT IMAGING | Facility: HOSPITAL | Age: 48
Discharge: HOME | End: 2023-11-06
Attending: THORACIC SURGERY (CARDIOTHORACIC VASCULAR SURGERY)
Payer: COMMERCIAL

## 2023-11-06 VITALS
OXYGEN SATURATION: 98 % | BODY MASS INDEX: 48.21 KG/M2 | SYSTOLIC BLOOD PRESSURE: 131 MMHG | WEIGHT: 238.7 LBS | TEMPERATURE: 97.6 F | DIASTOLIC BLOOD PRESSURE: 83 MMHG

## 2023-11-06 DIAGNOSIS — I25.10 CORONARY ARTERY DISEASE INVOLVING NATIVE CORONARY ARTERY OF NATIVE HEART WITHOUT ANGINA PECTORIS: ICD-10-CM

## 2023-11-06 DIAGNOSIS — I50.20 HEART FAILURE WITH REDUCED EJECTION FRACTION (H): ICD-10-CM

## 2023-11-06 LAB
ABO/RH(D): NORMAL
ALBUMIN UR-MCNC: NEGATIVE MG/DL
ANTIBODY SCREEN: NEGATIVE
APPEARANCE UR: ABNORMAL
APTT PPP: 32 SECONDS (ref 22–38)
BACTERIA #/AREA URNS HPF: ABNORMAL /HPF
BILIRUB UR QL STRIP: NEGATIVE
COLOR UR AUTO: YELLOW
ERYTHROCYTE [DISTWIDTH] IN BLOOD BY AUTOMATED COUNT: 13.9 % (ref 10–15)
GLUCOSE UR STRIP-MCNC: NEGATIVE MG/DL
HCT VFR BLD AUTO: 44.2 % (ref 35–47)
HGB BLD-MCNC: 14.2 G/DL (ref 11.7–15.7)
HGB UR QL STRIP: NEGATIVE
INR PPP: 1.05 (ref 0.85–1.15)
KETONES UR STRIP-MCNC: NEGATIVE MG/DL
LEUKOCYTE ESTERASE UR QL STRIP: ABNORMAL
MAGNESIUM SERPL-MCNC: 2.2 MG/DL (ref 1.7–2.3)
MCH RBC QN AUTO: 29.1 PG (ref 26.5–33)
MCHC RBC AUTO-ENTMCNC: 32.1 G/DL (ref 31.5–36.5)
MCV RBC AUTO: 91 FL (ref 78–100)
NITRATE UR QL: NEGATIVE
PH UR STRIP: 5 [PH] (ref 5–7)
PLATELET # BLD AUTO: 122 10E3/UL (ref 150–450)
RBC # BLD AUTO: 4.88 10E6/UL (ref 3.8–5.2)
RBC URINE: ABNORMAL
SP GR UR STRIP: 1.02 (ref 1–1.03)
SPECIMEN EXPIRATION DATE: NORMAL
SQUAMOUS EPITHELIAL: ABNORMAL
UROBILINOGEN UR STRIP-MCNC: NORMAL MG/DL
WBC # BLD AUTO: 7.4 10E3/UL (ref 4–11)
WBC URINE: ABNORMAL

## 2023-11-06 PROCEDURE — 71250 CT THORAX DX C-: CPT

## 2023-11-06 PROCEDURE — 83735 ASSAY OF MAGNESIUM: CPT | Performed by: THORACIC SURGERY (CARDIOTHORACIC VASCULAR SURGERY)

## 2023-11-06 PROCEDURE — 85730 THROMBOPLASTIN TIME PARTIAL: CPT | Performed by: THORACIC SURGERY (CARDIOTHORACIC VASCULAR SURGERY)

## 2023-11-06 PROCEDURE — 85610 PROTHROMBIN TIME: CPT | Performed by: THORACIC SURGERY (CARDIOTHORACIC VASCULAR SURGERY)

## 2023-11-06 PROCEDURE — 36415 COLL VENOUS BLD VENIPUNCTURE: CPT | Performed by: THORACIC SURGERY (CARDIOTHORACIC VASCULAR SURGERY)

## 2023-11-06 PROCEDURE — 86901 BLOOD TYPING SEROLOGIC RH(D): CPT | Performed by: THORACIC SURGERY (CARDIOTHORACIC VASCULAR SURGERY)

## 2023-11-06 PROCEDURE — 85014 HEMATOCRIT: CPT | Performed by: THORACIC SURGERY (CARDIOTHORACIC VASCULAR SURGERY)

## 2023-11-06 PROCEDURE — 84134 ASSAY OF PREALBUMIN: CPT | Performed by: THORACIC SURGERY (CARDIOTHORACIC VASCULAR SURGERY)

## 2023-11-06 PROCEDURE — 81001 URINALYSIS AUTO W/SCOPE: CPT | Performed by: THORACIC SURGERY (CARDIOTHORACIC VASCULAR SURGERY)

## 2023-11-06 RX ORDER — FUROSEMIDE 40 MG
40 TABLET ORAL DAILY
Status: ON HOLD | COMMUNITY
End: 2023-12-06

## 2023-11-06 NOTE — PHARMACY-ADMISSION MEDICATION HISTORY
Pharmacist Admission Medication History    Admission medication history is complete. The information provided in this note is only as accurate as the sources available at the time of the update.    Information Source(s): Patient and CareEverywhere/SureScripts via in-person    Pertinent Information: Patient finished Cefdinir x 3 days prescribed 10/17/23. Lasix was prescribed as 40mg twice daily but she states this was decreased to 40mg once daily due to concern on kidneys.     Changes made to PTA medication list:  Added: None  Deleted: Cefdinir  Changed: Lasix 40mg BID to 40mg daily    Medication Affordability:  Not including over the counter (OTC) medications, was there a time in the past 3 months when you did not take your medications as prescribed because of cost?: No    Allergies reviewed with patient and updates made in EHR: yes    Medication History Completed By: Trinh Clancy, PharmD 11/6/2023 8:36 AM    PTA Med List   Medication Sig Last Dose    atorvastatin (LIPITOR) 40 MG tablet Take 40 mg by mouth daily  at PM    carvedilol (COREG) 3.125 MG tablet Take 1 tablet (3.125 mg) by mouth 2 times daily (with meals)     cyanocobalamin (VITAMIN B-12) 1000 MCG tablet Take 1,000 mcg by mouth daily     Ferrous Sulfate 324 (65 Fe) MG TBEC Take 1 tablet by mouth daily  at lunch    furosemide (LASIX) 40 MG tablet Take 40 mg by mouth daily  at AM    multivitamin, therapeutic (THERA-VIT) TABS tablet Take 1 tablet by mouth daily  at lunch    nitroGLYcerin (NITROSTAT) 0.4 MG sublingual tablet For chest pain place 1 tablet under the tongue every 5 minutes for 3 doses. If symptoms persist 5 minutes after 1st dose call 911. Unknown at prn    sacubitril-valsartan (ENTRESTO) 24-26 MG per tablet Take 1 tablet by mouth 2 times daily     spironolactone (ALDACTONE) 25 MG tablet Take 1 tablet (25 mg) by mouth daily  at AM

## 2023-11-06 NOTE — PROGRESS NOTES
SPIRITUAL HEALTH SERVICES NOTE  [facility]; MICHAEL/Pre-OP    Reason for Visit: Pre-surgical  Summary: Expresses normal, pre-surgical anxiety  Plan of Care: Betty welcomes Spiritual Care follow-up after surgery    SPIRITUAL CARE NOTE  Pre-surgical visit. Met briefly with Betty. Betty is currently scheduled to have a CABG with Dr. Rizvi on Wednesday, November 8th. Betty says that she is doing okay, but feels a bit overwhelmed this morning. I normalized this feeling. She states that she has had several surgeries in the past and that her dad went through heart surgery so she has some familiarity with the process. When asked about hopes and goals down the road, she states that she hopes to do more travelling and that she would like to visit [facility]. I encouraged her to be patient with herself as she recovers. Betty comes from a [employer] background and welcomes support after surgery.     Time Spent with Patient/Family: 5 minutes    Trinh Ulloa M.Div.      Office: [phone] (for non-urgent requests)  Please Vocera or page through [facility] for time-sensitive requests

## 2023-11-06 NOTE — ANESTHESIA PREPROCEDURE EVALUATION
Anesthesia Pre-Procedure Evaluation    Patient: Betty Pan   MRN: [id number] : 1975        Procedure : Procedure(s):  CORONARY ARTERY BYPASS GRAFT  , INTERNAL MAMMARY ARTERY HARVEST, ENDOSCOPIC VESSEL PROCUREMENT, ANESTHESIA TRANSESOPHAGEAL ECHOCARDIOGRAM          Past Medical History:   Diagnosis Date    Congestive heart failure (H)     Coronary artery disease     Heart failure with reduced ejection fraction (H) 10/27/2023    Hyperlipidemia     Hypertension, unspecified type 10/11/2023    Ischemic cardiomyopathy 10/27/2023    Obese     Obesity due to excess calories 10/27/2023    MANINDER (obstructive sleep apnea) 10/27/2023    Sleep apnea       Past Surgical History:   Procedure Laterality Date    CORONARY ANGIOGRAPHY ADULT ORDER      CV CORONARY ANGIOGRAM N/A 10/13/2023    Procedure: Coronary Angiogram;  Surgeon: Roxana Melgoza MD;  Location: [facility] CATH LAB CV    CV RIGHT HEART CATH MEASUREMENTS RECORDED N/A 10/13/2023    Procedure: Right Heart Catheterization;  Surgeon: Roxana Melgoza MD;  Location: [facility] CATH LAB CV      No Known Allergies   Social History     Tobacco Use    Smoking status: Never    Smokeless tobacco: Never   Substance Use Topics    Alcohol use: Never      Wt Readings from Last 1 Encounters:   23 108.3 kg (238 lb 11.2 oz)        Anesthesia Evaluation   Pt has had prior anesthetic.     No history of anesthetic complications       ROS/MED HX  ENT/Pulmonary:     (+) sleep apnea, doesn't use CPAP,                                     Neurologic:  - neg neurologic ROS     Cardiovascular:     (+)  hypertension- -  CAD - past MI - -      CHF  Last EF: 30-35                              METS/Exercise Tolerance: >4 METS    Hematologic:  - neg hematologic  ROS     Musculoskeletal:  - neg musculoskeletal ROS     GI/Hepatic:  - neg GI/hepatic ROS     Renal/Genitourinary:     (+) renal disease, type: CRI,            Endo:     (+)               Obesity (morbid),      "  Psychiatric/Substance Use:  - neg psychiatric ROS     Infectious Disease:  - neg infectious disease ROS     Malignancy:  - neg malignancy ROS     Other:  - neg other ROS          Physical Exam    Airway  airway exam normal      Mallampati: III   TM distance: > 3 FB   Neck ROM: full   Mouth opening: > 3 cm    Respiratory Devices and Support         Dental  no notable dental history         Cardiovascular   cardiovascular exam normal          Pulmonary   pulmonary exam normal                OUTSIDE LABS:  CBC:   Lab Results   Component Value Date    WBC 7.4 11/06/2023    WBC 10.6 10/14/2023    HGB 14.2 11/06/2023    HGB 12.5 10/13/2023    HCT 44.2 11/06/2023    HCT 40.4 10/13/2023     (L) 11/06/2023     10/17/2023     BMP:   Lab Results   Component Value Date     10/17/2023     10/16/2023    POTASSIUM 3.5 10/17/2023    POTASSIUM 3.5 10/16/2023    CHLORIDE 101 10/17/2023    CHLORIDE 99 10/16/2023    CO2 28 10/17/2023    CO2 28 10/16/2023    BUN 20.8 (H) 10/17/2023    BUN 20.8 (H) 10/16/2023    CR 0.78 10/17/2023    CR 0.82 10/16/2023     (H) 10/17/2023     (H) 10/16/2023     COAGS:   Lab Results   Component Value Date    PTT 32 11/06/2023    INR 1.05 11/06/2023     POC: No results found for: \"BGM\", \"HCG\", \"HCGS\"  HEPATIC:   Lab Results   Component Value Date    ALBUMIN 3.9 10/11/2023    PROTTOTAL 8.3 10/11/2023    ALT 23 10/11/2023    AST 23 10/11/2023    ALKPHOS 77 10/11/2023    BILITOTAL 1.9 (H) 10/11/2023     OTHER:   Lab Results   Component Value Date    PH 7.42 10/13/2023    LACT 1.2 10/13/2023    A1C 5.8 (H) 10/11/2023    KHADIJAH 9.5 10/17/2023    MAG 2.2 11/06/2023    TSH 1.30 10/10/2023       Anesthesia Plan    ASA Status:  4    NPO Status:  NPO Appropriate    Anesthesia Type: General.     - Airway: ETT   Induction: Intravenous.   Maintenance: Balanced.   Techniques and Equipment:     - Airway: Video-Laryngoscope     - Lines/Monitors: EL, Arterial Line, Central Line, PAC, " CVP, BIS, NIRS            EL Absolute Contra-indication: NONE            EL Relative Contra-indication: NONE     - Blood: Blood in Room, Cell Saver, PRBC     - Drips/Meds: Dexmed. infusion, Ketamine, Fentanyl, Phenylephrine, Norepi, Vasopressin, Epinephrine, Nicardipine     Consents    Anesthesia Plan(s) and associated risks, benefits, and realistic alternatives discussed. Questions answered and patient/representative(s) expressed understanding.     - Discussed:     - Discussed with:  Patient      - Extended Intubation/Ventilatory Support Discussed: Yes.      - Patient is DNR/DNI Status: No     Use of blood products discussed: Yes.     - Discussed with: Patient.     - Consented: consented to blood products            Reason for refusal: other.     Postoperative Care    Pain management: IV analgesics.     - Plan for long acting post-op opioid use   PONV prophylaxis: Ondansetron (or other 5HT-3)     Comments:    Other Comments: 50 mg ketamine IV on induction.  4 grams magnesium IV.  20 mg methadone IV on induction.            Tra Galvan MD

## 2023-11-07 ENCOUNTER — TELEPHONE (OUTPATIENT)
Dept: CARDIOLOGY | Facility: CLINIC | Age: 48
End: 2023-11-07
Payer: COMMERCIAL

## 2023-11-07 DIAGNOSIS — N30.00 ACUTE CYSTITIS WITHOUT HEMATURIA: Primary | ICD-10-CM

## 2023-11-07 LAB — PREALB SERPL IA-MCNC: 17 MG/DL (ref 15–45)

## 2023-11-07 RX ORDER — DEXMEDETOMIDINE HYDROCHLORIDE 4 UG/ML
.2-.7 INJECTION, SOLUTION INTRAVENOUS CONTINUOUS
Status: CANCELLED | OUTPATIENT
Start: 2023-11-07

## 2023-11-07 RX ORDER — GRANULES FOR ORAL 3 G/1
3 POWDER ORAL ONCE
Qty: 1 PACKET | Refills: 0 | Status: ON HOLD | OUTPATIENT
Start: 2023-11-07 | End: 2023-11-08

## 2023-11-07 NOTE — TELEPHONE ENCOUNTER
For positive UA, CV KELLEE prescribed one dose of fosfomycin for pt to take today. Psx sent to [facility] in [facility] on [address].    Patient called and confirmed receipt of email sent giving her instructions, and plans to  the medication today. Surgery planned for tomorrow 11/8.

## 2023-11-08 ENCOUNTER — HOSPITAL ENCOUNTER (OUTPATIENT)
Facility: HOSPITAL | Age: 48
Discharge: HOME | End: 2023-11-08
Attending: THORACIC SURGERY (CARDIOTHORACIC VASCULAR SURGERY) | Admitting: THORACIC SURGERY (CARDIOTHORACIC VASCULAR SURGERY)
Payer: COMMERCIAL

## 2023-11-08 ENCOUNTER — MEDICAL CORRESPONDENCE (OUTPATIENT)
Dept: HEALTH INFORMATION MANAGEMENT | Facility: CLINIC | Age: 48
End: 2023-11-08

## 2023-11-08 ENCOUNTER — ANESTHESIA (OUTPATIENT)
Dept: SURGERY | Facility: HOSPITAL | Age: 48
End: 2023-11-08

## 2023-11-08 VITALS
OXYGEN SATURATION: 94 % | WEIGHT: 236.7 LBS | RESPIRATION RATE: 18 BRPM | DIASTOLIC BLOOD PRESSURE: 56 MMHG | HEART RATE: 68 BPM | TEMPERATURE: 97.6 F | BODY MASS INDEX: 47.72 KG/M2 | SYSTOLIC BLOOD PRESSURE: 106 MMHG | HEIGHT: 59 IN

## 2023-11-08 DIAGNOSIS — I25.10 CORONARY ARTERY DISEASE INVOLVING NATIVE CORONARY ARTERY OF NATIVE HEART WITHOUT ANGINA PECTORIS: ICD-10-CM

## 2023-11-08 LAB
ALBUMIN UR-MCNC: NEGATIVE MG/DL
APPEARANCE UR: CLEAR
BACTERIA #/AREA URNS HPF: ABNORMAL /HPF
BILIRUB UR QL STRIP: NEGATIVE
BLD PROD TYP BPU: NORMAL
BLD PROD TYP BPU: NORMAL
BLOOD COMPONENT TYPE: NORMAL
BLOOD COMPONENT TYPE: NORMAL
CODING SYSTEM: NORMAL
CODING SYSTEM: NORMAL
COLOR UR AUTO: YELLOW
CROSSMATCH: NORMAL
CROSSMATCH: NORMAL
GLUCOSE BLDC GLUCOMTR-MCNC: 135 MG/DL (ref 70–99)
GLUCOSE UR STRIP-MCNC: NEGATIVE MG/DL
HGB UR QL STRIP: NEGATIVE
KETONES UR STRIP-MCNC: NEGATIVE MG/DL
LEUKOCYTE ESTERASE UR QL STRIP: ABNORMAL
MUCOUS THREADS #/AREA URNS LPF: PRESENT /LPF
NITRATE UR QL: NEGATIVE
PH UR STRIP: 6 [PH] (ref 5–7)
RBC URINE: <1 /HPF
SP GR UR STRIP: 1.02 (ref 1–1.03)
SQUAMOUS EPITHELIAL: 2 /HPF
UNIT ABO/RH: NORMAL
UNIT ABO/RH: NORMAL
UNIT NUMBER: NORMAL
UNIT NUMBER: NORMAL
UNIT STATUS: NORMAL
UNIT STATUS: NORMAL
UNIT TYPE ISBT: 7300
UNIT TYPE ISBT: 7300
UROBILINOGEN UR STRIP-MCNC: 2 MG/DL
WBC URINE: 15 /HPF

## 2023-11-08 PROCEDURE — 250N000009 HC RX 250: Performed by: THORACIC SURGERY (CARDIOTHORACIC VASCULAR SURGERY)

## 2023-11-08 PROCEDURE — 99221 1ST HOSP IP/OBS SF/LOW 40: CPT | Performed by: INTERNAL MEDICINE

## 2023-11-08 PROCEDURE — 87086 URINE CULTURE/COLONY COUNT: CPT | Performed by: INTERNAL MEDICINE

## 2023-11-08 PROCEDURE — 258N000003 HC RX IP 258 OP 636: Performed by: ANESTHESIOLOGY

## 2023-11-08 PROCEDURE — 999N000141 HC STATISTIC PRE-PROCEDURE NURSING ASSESSMENT: Performed by: THORACIC SURGERY (CARDIOTHORACIC VASCULAR SURGERY)

## 2023-11-08 PROCEDURE — 81001 URINALYSIS AUTO W/SCOPE: CPT | Performed by: INTERNAL MEDICINE

## 2023-11-08 PROCEDURE — 86923 COMPATIBILITY TEST ELECTRIC: CPT | Performed by: THORACIC SURGERY (CARDIOTHORACIC VASCULAR SURGERY)

## 2023-11-08 RX ORDER — SODIUM CHLORIDE, SODIUM LACTATE, POTASSIUM CHLORIDE, CALCIUM CHLORIDE 600; 310; 30; 20 MG/100ML; MG/100ML; MG/100ML; MG/100ML
INJECTION, SOLUTION INTRAVENOUS CONTINUOUS
Status: DISCONTINUED | OUTPATIENT
Start: 2023-11-08 | End: 2023-11-08 | Stop reason: HOSPADM

## 2023-11-08 RX ORDER — DEXMEDETOMIDINE HYDROCHLORIDE 4 UG/ML
.2-1.2 INJECTION, SOLUTION INTRAVENOUS CONTINUOUS
Status: DISCONTINUED | OUTPATIENT
Start: 2023-11-08 | End: 2023-11-08 | Stop reason: HOSPADM

## 2023-11-08 RX ORDER — METHADONE HYDROCHLORIDE 10 MG/ML
20 INJECTION, SOLUTION INTRAMUSCULAR; INTRAVENOUS; SUBCUTANEOUS ONCE
Status: DISCONTINUED | OUTPATIENT
Start: 2023-11-08 | End: 2023-11-08 | Stop reason: HOSPADM

## 2023-11-08 RX ORDER — SODIUM CHLORIDE, SODIUM GLUCONATE, SODIUM ACETATE, POTASSIUM CHLORIDE AND MAGNESIUM CHLORIDE 526; 502; 368; 37; 30 MG/100ML; MG/100ML; MG/100ML; MG/100ML; MG/100ML
1000 INJECTION, SOLUTION INTRAVENOUS
Status: ACTIVE | OUTPATIENT
Start: 2023-11-08 | End: 2023-11-08

## 2023-11-08 RX ORDER — MAGNESIUM SULFATE 4 G/50ML
4 INJECTION INTRAVENOUS ONCE
Status: DISCONTINUED | OUTPATIENT
Start: 2023-11-08 | End: 2023-11-08 | Stop reason: HOSPADM

## 2023-11-08 RX ORDER — ASPIRIN 81 MG/1
81 TABLET, CHEWABLE ORAL
Status: DISCONTINUED | OUTPATIENT
Start: 2023-11-08 | End: 2023-11-08 | Stop reason: HOSPADM

## 2023-11-08 RX ORDER — CEFAZOLIN SODIUM/WATER 2 G/20 ML
2 SYRINGE (ML) INTRAVENOUS SEE ADMIN INSTRUCTIONS
Status: DISCONTINUED | OUTPATIENT
Start: 2023-11-08 | End: 2023-11-08 | Stop reason: HOSPADM

## 2023-11-08 RX ORDER — CHLORHEXIDINE GLUCONATE ORAL RINSE 1.2 MG/ML
10 SOLUTION DENTAL ONCE
Status: DISCONTINUED | OUTPATIENT
Start: 2023-11-08 | End: 2023-11-08 | Stop reason: HOSPADM

## 2023-11-08 RX ORDER — LIDOCAINE 40 MG/G
CREAM TOPICAL
Status: DISCONTINUED | OUTPATIENT
Start: 2023-11-08 | End: 2023-11-08 | Stop reason: HOSPADM

## 2023-11-08 RX ORDER — PHENYLEPHRINE HCL IN 0.9% NACL 50MG/250ML
.1-6 PLASTIC BAG, INJECTION (ML) INTRAVENOUS CONTINUOUS
Status: DISCONTINUED | OUTPATIENT
Start: 2023-11-08 | End: 2023-11-08 | Stop reason: HOSPADM

## 2023-11-08 RX ORDER — ASPIRIN 81 MG/1
162 TABLET, CHEWABLE ORAL
Status: DISCONTINUED | OUTPATIENT
Start: 2023-11-08 | End: 2023-11-08 | Stop reason: HOSPADM

## 2023-11-08 RX ORDER — CEFAZOLIN SODIUM/WATER 2 G/20 ML
2 SYRINGE (ML) INTRAVENOUS
Status: DISCONTINUED | OUTPATIENT
Start: 2023-11-08 | End: 2023-11-08 | Stop reason: HOSPADM

## 2023-11-08 RX ADMIN — SODIUM CHLORIDE, POTASSIUM CHLORIDE, SODIUM LACTATE AND CALCIUM CHLORIDE: 600; 310; 30; 20 INJECTION, SOLUTION INTRAVENOUS at 06:23

## 2023-11-08 ASSESSMENT — ACTIVITIES OF DAILY LIVING (ADL)
ADLS_ACUITY_SCORE: 20
ADLS_ACUITY_SCORE: 20

## 2023-11-08 NOTE — OR NURSING
Dr Rizvi asked me to contact ID and see Betty this am.  I spoke with Dr Soliman and he will she her here in [facility] today at 0800.  Betty agrees to wait here until she sees Dr Soliman.  I informed Dr Rizvi of this also.   n/a

## 2023-11-08 NOTE — PLAN OF CARE
Patient was seen and examined by me.  She was found to have blisters with cellulitis involving her buttocks.  I will ask infectious disease to see and postpone her coronary artery bypass surgery to a later date.

## 2023-11-08 NOTE — CONSULTS
Consultation - Infectious Disease  Betty Pan,  1975, MRN [id number]    Admitting Dx: CAD (coronary artery disease) [I25.10]    PCP: Cristina Alexis, [phone]   Code status:  Prior       Extended Emergency Contact Information  Primary Emergency Contact: Zan Pan   [facility]  Home Phone: [phone]  Relation: Brother  Secondary Emergency Contact: Reshma Pan   [facility]  Home Phone: [phone]  Relation: Sister       ASSESSMENT   Bilateral buttock wounds, uninfected. Likely from pressure. No sign of viral or bacterial infection.   CHF, CAD, in need of CABG  Recent E coli UTI/pyelonephritis/bacteremia. Treated. Now without dysuria. Has urinary hesitancy. Recent UA with positive LE, no macroscopic exam on that sample.   Doubt recent pneumonia -- clearly had UTI/pyelo. I do not see pneumonia on CT from October. Suspect respiratory symptoms were from CHF.        PLAN   Recommend wound care -- refer to wound/vascular clinic for ongoing care. Provided mepilex today -- change every 2-3 days or remove prior to shower.   Repeat UA with UC if abnormal  Discussed with Dr. Rizvi.     Selvin Soliman MD  [facility]  [phone] clinic  [facility] paging  ______________________________________________________________________        Reason For Consult: Buttock wound     HPI    We have been requested by Dr. Rizvi to evaluate Betty Pan who is a 48 year old year old female for the above. She presented for CABG today, but surgery has been postponed due to buttock wound. I was asked to evaluated stat this morning. She had recently been hospitalized at [facility] 10/11-17 with new diagnosis of CHF, also pneumonia and e coli bacteremia due to pyelonephritis.     Denies pain in buttock area. No fever. No dysuria, but at times has difficulty getting urinary stream going. She takes a diuretic and this helps.     Lives alone.        Medical History  Past Medical History:    Diagnosis Date    Congestive heart failure (H)     Coronary artery disease     Heart failure with reduced ejection fraction (H) 10/27/2023    Hyperlipidemia     Hypertension, unspecified type 10/11/2023    Ischemic cardiomyopathy 10/27/2023    Obese     Obesity due to excess calories 10/27/2023    MANINDER (obstructive sleep apnea) 10/27/2023    Sleep apnea     Surgical History  She  has a past surgical history that includes Coronary Angiogram (N/A, 10/13/2023); Right Heart Catheterization (N/A, 10/13/2023); and Coronary Angiography Adult Order.   Social History  Reviewed, and she  reports that she has never smoked. She has never used smokeless tobacco. She reports that she does not drink alcohol and does not use drugs.   Allergies  No Known Allergies Family History    family history not pertinent to presenting problem.    Psychosocial Needs  Social History     Social History Narrative    Not on file     Additional psychosocial needs reviewed per nursing assessment.         Prior to Admission Medications   Medications Prior to Admission   Medication Sig Dispense Refill Last Dose    atorvastatin (LIPITOR) 40 MG tablet Take 40 mg by mouth daily   11/7/2023 at PM    carvedilol (COREG) 3.125 MG tablet Take 1 tablet (3.125 mg) by mouth 2 times daily (with meals) 60 tablet 3 11/8/2023 at 0400    cyanocobalamin (VITAMIN B-12) 1000 MCG tablet Take 1,000 mcg by mouth daily   11/7/2023    Ferrous Sulfate 324 (65 Fe) MG TBEC Take 1 tablet by mouth daily   11/7/2023 at lunch    furosemide (LASIX) 40 MG tablet Take 40 mg by mouth daily   11/8/2023 at AM    multivitamin, therapeutic (THERA-VIT) TABS tablet Take 1 tablet by mouth daily   11/7/2023 at lunch    nitroGLYcerin (NITROSTAT) 0.4 MG sublingual tablet For chest pain place 1 tablet under the tongue every 5 minutes for 3 doses. If symptoms persist 5 minutes after 1st dose call 911. 20 tablet 3 Unknown at prn    sacubitril-valsartan (ENTRESTO) 24-26 MG per tablet Take 1 tablet  "by mouth 2 times daily 60 tablet 3 11/7/2023 at PM    spironolactone (ALDACTONE) 25 MG tablet Take 1 tablet (25 mg) by mouth daily 30 tablet 3 11/8/2023 at 0400          Anti-infectives: completed  Cultures: reviewed  Imaging: reviewed images          Review of Systems:  All other systems negative in detail except what is noted above. Physical Exam:  Temp:  [97.6  F (36.4  C)] 97.6  F (36.4  C)  Pulse:  [68-72] 68  Resp:  [18] 18  BP: (106-110)/(56-58) 106/56  SpO2:  [94 %] 94 %    GENERAL:  In no acute distress, is alert and oriented to person, place, time.  EYES: No conjunctival injection, pupils equally reactive to light, extra-ocular movement intact  HEAD, EARS, NOSE, MOUTH, AND THROAT: Nontraumatic, mouth without oral ulcers.   RESPIRATORY: Clear breath sounds to auscultation bilaterally.   CARDIOVASCULAR: Regular rate and rhythm, normal S1 and S2, no murmurs, rubs, or gallops.  ABDOMEN: Soft, nontender, no masses, obese.  Normal bowel sounds.  No CVA tenderness.  MUSCULOSKELETAL: No synovitis.  SKIN/HAIR/NAILS: buttock wounds bilaterally that oppose each other -- right around 2-3 cm, no surrounding induration, no pus, no tenderness, left is about 1cm, no induration, no pus, no tenderness.   NEUROLOGIC: Grossly intact.       Pertinent Labs        Lab Results   Component Value Date    ALT 23 10/11/2023    AST 23 10/11/2023    ALKPHOS 77 10/11/2023          Pertinent Radiology  Radiology Results: Reviewed  POC US Guided Vascular Access    Result Date: 11/8/2023  Ultrasound was performed as guidance to an anesthesia procedure.  Click \"PACS images\" hyperlink below to view any stored images.  For specific procedure details, view procedure note authored by anesthesia.    CT Chest w/o Contrast    Result Date: 11/7/2023  EXAM: CT CHEST W/O CONTRAST LOCATION: [facility] DATE: 11/6/2023 INDICATION:  Coronary artery disease involving native coronary artery of native heart without angina pectoris, " Heart failure with reduced ejection fraction (H) COMPARISON: CT pulmonary angiogram 10/11/2023 TECHNIQUE: CT chest without IV contrast. Multiplanar reformats were obtained. Dose reduction techniques were used. CONTRAST: None. FINDINGS: LUNGS AND PLEURA: Assessment of the lung parenchyma is suboptimal due to motion-related blurring artifacts. Parenchymal mosaicism in the lower lobes most likely relates to lobular air trapping. No acute airspace or interstitial opacities. Central airways  are patent. Conspicuous extrapleural fat in the bases and anterior mediastinal fat relates to large patient habitus. No pleural effusions are present. MEDIASTINUM: Physiologic pericardial fluid. Normal caliber thoracic aorta. There are no aortic atheromatous calcifications in the chest. No lymphadenopathy. Esophagus is decompressed. 13 mm nodule arising from the deep right thyroid (series 4, image 48) is stable and does not require specific additional workup or follow-up. CORONARY ARTERY CALCIFICATION: Three-vessel, mild. UPPER ABDOMEN: Previous gastric reduction surgery. 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 Small diffuse thoracic spine degenerative osteophytes and disc space narrowing. No aggressive or destructive bone lesions.     IMPRESSION: 1.  Mild three-vessel atheromatous coronary calcifications. No atheromatous calcifications in the normal caliber thoracic aorta. 2.  Parenchymal mosaicism in the lower lobes likely due to patchy peripheral air trapping associated with BCG.     US Carotid Bilateral    Result Date: 10/14/2023  EXAM: US CAROTID BILATERAL LOCATION: [facility] DATE: 10/14/2023 INDICATION: Multivessel coronary artery disease, preoperative evaluation prior to CABG COMPARISON: None. TECHNIQUE: Duplex exam performed utilizing 2D gray-scale imaging, Doppler interrogation with color-flow and spectral waveform analysis. The percent diameter  stenosis is determined using NASCET criteria and Society of Radiologists in Ultrasound Consensus Criteria. FINDINGS: RIGHT: Mild plaque at the bifurcation. The peak systolic velocity in the ICA is less than 125 cm/sec, consistent with less than 50% stenosis. Normal velocities in the ECA. Antegrade flow within the vertebral artery. LEFT: Mild plaque at the bifurcation. The peak systolic velocity in the ICA is less than 125 cm/sec, consistent with less than 50% stenosis. Normal velocities in the ECA. Antegrade flow within the vertebral artery. VELOCITY CHART: CCA   Right: 62/21 cm/s   Left: 72/28 cm/s ICA   Right: 86/37 cm/s   Left: 93/43 cm/s ECA   Right: 91/18 cm/s   Left: 83/17 cm/s ICA/CCA PSV Ratio   Right: 1.4   Left: 1.3     IMPRESSION: 1.  Mild plaque formation, velocities consistent with less than 50% stenosis in the right internal carotid artery. 2.  Mild plaque formation, velocities consistent with less than 50% stenosis in the left internal carotid artery. 3.  Flow within the vertebral arteries is antegrade.    Cardiac Catheterization    Result Date: 10/13/2023    1st Mrg lesion is 70% stenosed.   Prox LAD to Mid LAD lesion is 90% stenosed.   1st Diag lesion is 90% stenosed.   2nd Diag lesion is 70% stenosed.   Mid LAD lesion is 100% stenosed.   RPDA lesion is 90% stenosed.   Prox RCA lesion is 30% stenosed.   3rd RPL lesion is 80% stenosed.   Right sided filling pressures are normal.   Left sided filling pressures are mildly elevated.   Mild elevated pulmonary hypertension.  Severe multivessel CAD without significant left main stenosis. The proximal to midLAD has severe calcific stensosis involving the ostia of D1 and D2 and the distal LAD is occluded and fills by collaterals. The LCx OM1 branch has severe ostial stenosis. The RPDA has subtotal stensis and there is severe stenosis of a large RPLA  branch. Right side filling pressures are high-normal and left side filling pressures are mildly elevated. RA  mean 8mmHg PA 43/25, mean 31mmHg PCWP mean 19mmHg. Borderline reduced cardiac output/index: CO 4.2L/min, CI 2.1L/min/m2.     Echocardiogram Complete    Result Date: 10/12/2023  [id number]^TADEO^ROSE^[place] [address]  Name: JOHANNA JIMÉNEZ MRN: [id number] : 1975 Study Date: 10/12/2023 09:52 AM Age: 48 yrs Gender: Female Patient Location: [facility] Reason For Study: CHF Ordering Physician: ROSE PIEDRA Performed By:   BSA: 2.0 m2 Height: 59 in Weight: 246 lb HR: 85 ______________________________________________________________________________ Procedure Complete Portable Echo Adult. Definity (NDC #86101-791) given intravenously. ______________________________________________________________________________ Interpretation Summary  Technically very challenging study. Definity contrast utilized. Left ventricle measures mildly enlarged. Mild left ventricular hypertrophy suggested. Left ventricular systolic function is moderate to severely globally reduced with an ejection fraction of 30 to 35% with akinesis of the left ventricular apex.Diastolic Doppler findings (E/E' ratio and/or other parameters) suggest left ventricular filling pressures are increased. The right ventricle is suboptimally visualized. On limited imaging right ventricular systolic function appears potentially reduced. Mild enlargement of the left atrium. Valve structures are suboptimally visualized on this technically challenging study. No obvious hemodynamically significant valve abnormality noted. Consider cardiac MRI to further define cardiac structures. ______________________________________________________________________________ Left Ventricle The left ventricle is mildly dilated. There is mild concentric left ventricular hypertrophy. Diastolic Doppler findings (E/E' ratio and/or other parameters) suggest left ventricular filling pressures are increased. Left-  ventricular systolic function is moderate to severely globally reduced with an ejection fraction estimate of 30 to 35% with akinesis of the left ventricular apex. There is apical akinesis. There is mod-severe global hypokinesia of the left ventricle.  Right Ventricle The right ventricle is not well visualized. The right ventricle is suboptimally visualized. On limited imaging right ventricular systolic function appears reduced.  Atria The left atrium is not well visualized. The left atrium is mildly dilated. Right atrium not well visualized.  Mitral Valve The mitral valve leaflets appear thickened, but open well. There is trace mitral regurgitation.  Tricuspid Valve The tricuspid valve is not well visualized, but is grossly normal. There is trace tricuspid regurgitation. Right ventricular systolic pressure could not be approximated due to inadequate tricuspid regurgitation.  Aortic Valve The aortic valve is not well visualized. The aortic valve is trileaflet with aortic valve sclerosis. No hemodynamically significant valvular aortic stenosis.  Pulmonic Valve The pulmonic valve is not well seen, but is grossly normal. There is trace pulmonic valvular regurgitation.  Vessels The aorta root is normal. Normal size ascending aorta. IVC diameter >2.1 cm collapsing <50% with sniff suggests a high RA pressure estimated at 15 mmHg or greater.  Pericardium Pericardial fat suggested.  ______________________________________________________________________________ MMode/2D Measurements & Calculations IVSd: 1.2 cm LVIDd: 5.6 cm LVIDs: 4.4 cm LVPWd: 1.7 cm  FS: 20.7 % LV mass(C)d: 367.2 grams LV mass(C)dI: 182.4 grams/m2 Ao root diam: 2.7 cm LA dimension: 3.9 cm asc Aorta Diam: 3.3 cm LA/Ao: 1.4 LVOT diam: 1.9 cm LVOT area: 2.8 cm2 Ao root diam index Ht(cm/m): 1.8 Ao root diam index BSA (cm/m2): 1.3 Asc Ao diam index BSA (cm/m2): 1.6 Asc Ao diam index Ht(cm/m): 2.2 LA Volume (BP): 58.3 ml  LA Volume Index (BP): 29.0 ml/m2 LA  Volume Indexed (AL/bp): 30.9 ml/m2 RWT: 0.60  Doppler Measurements & Calculations MV E max herminio: 130.0 cm/sec MV A max herminio: 88.6 cm/sec MV E/A: 1.5 MV max P.4 mmHg MV mean P.0 mmHg MV V2 VTI: 28.9 cm MVA(VTI): 1.8 cm2 MV P1/2t max herminio: 157.0 cm/sec MV P1/2t: 52.3 msec MVA(P1/2t): 4.2 cm2 MV dec slope: 880.0 cm/sec2 MV dec time: 0.15 sec Ao V2 max: 171.5 cm/sec Ao max P.0 mmHg Ao V2 mean: 131.0 cm/sec Ao mean P.0 mmHg Ao V2 VTI: 32.6 cm ENA(I,D): 1.6 cm2 ENA(V,D): 1.9 cm2  LV V1 max P.5 mmHg LV V1 max: 114.9 cm/sec LV V1 VTI: 18.5 cm SV(LVOT): 52.5 ml SI(LVOT): 26.0 ml/m2 PA V2 max: 83.2 cm/sec PA max P.8 mmHg PA acc time: 0.08 sec PI end-d herminio: 112.0 cm/sec AV Herminio Ratio (DI): 0.67 ENA Index (cm2/m2): 0.80 E/E' av.7 Lateral E/e': 15.9 Medial E/e': 31.5 RV S Herminio: 6.1 cm/sec  ______________________________________________________________________________ Report approved by: Joyce Butler 10/12/2023 11:30 AM       CT Abdomen Pelvis w Contrast    Result Date: 10/11/2023  EXAM: CT ABDOMEN PELVIS W CONTRAST LOCATION: [facility] DATE: 10/11/2023 INDICATION: hx o cervical cancer s p tahbso but now with asymmetric lower extremity edema and progressive dyspnea COMPARISON: 2017 TECHNIQUE: CT scan of the abdomen and pelvis was performed following injection of IV contrast. Multiplanar reformats were obtained. Dose reduction techniques were used. CONTRAST: IsoVue 370 90mL FINDINGS: LOWER CHEST: See separate chest CT report for full details. HEPATOBILIARY: Layering sludge and stones in the gallbladder, but no acute inflammation or biliary dilation. No hepatic lesions. PANCREAS: Normal. SPLEEN: Normal. ADRENAL GLANDS: Normal. KIDNEYS/BLADDER: 3.2 x 2.9 x 2.6 cm geographic hypoenhancing area in the upper pole right kidney (3/83, ) with preserved overlying cortex. Another smaller hypoenhancing area also noted in the upper pole left kidney (3/). Tiny simple cyst in  the right lower pole. No collecting system dilation or obstructing urinary tract calculi. No urothelial thickening or periureteral fat stranding. The bladder is unremarkable. BOWEL: No obstruction or inflammatory change. Postsurgical changes in the stomach. LYMPH NODES: No lymphadenopathy. VASCULATURE: IVC and pelvic veins are patent. No abdominal aortic aneurysm. PELVIC ORGANS: Surgically absent. No suspicious soft tissue in the pelvis. Trace free fluid. MUSCULOSKELETAL: No aggressive or destructive osseous lesions. Degenerative changes in the spine.     IMPRESSION: 1.  No findings to explain lower extremity edema. 2.  Geographic hypoenhancing areas in the upper poles of both kidneys most suggestive of pyelonephritis. Consider follow-up renal CT or MRI in 4-6 weeks to ensure complete resolution.    CT Chest Pulmonary Embolism w Contrast    Result Date: 10/11/2023  EXAM: CT CHEST PULMONARY EMBOLISM W CONTRAST LOCATION: [facility] DATE: 10/11/2023 INDICATION: dyspnea progressive.  extremity edema COMPARISON: None. TECHNIQUE: CT chest pulmonary angiogram during arterial phase injection of IV contrast. Multiplanar reformats and MIP reconstructions were performed. Dose reduction techniques were used. CONTRAST: IsoVue 370 90mL FINDINGS: ANGIOGRAM CHEST: Pulmonary arteries are normal caliber and negative for pulmonary emboli. Thoracic aorta is not well opacified and is  indeterminate for dissection. No CT evidence of right heart strain. LUNGS AND PLEURA: Small right pleural effusion with associated right lower lobe and middle lobe consolidation. MEDIASTINUM/AXILLAE: Cardiomegaly. No enlarged thoracic lymph nodes. CORONARY ARTERY CALCIFICATION: Mild. UPPER ABDOMEN: Post surgical changes in the stomach. MUSCULOSKELETAL: Degenerative changes in the spine.     IMPRESSION: 1.  No acute pulmonary emboli. 2.  Small right pleural effusion with associated middle lobe and right lower lobe consolidation,  which could represent compressive atelectasis or pneumonia.    US Lower Extremity Venous Duplex Bilateral    Result Date: 10/11/2023  EXAM: US LOWER EXTREMITY VENOUS DUPLEX BILATERAL LOCATION: [facility] DATE: 10/11/2023 INDICATION: Bilateral lower extremity pain and swelling. Swelling long term worse on left than r.  no previous evaluation. COMPARISON: None. TECHNIQUE: Venous Duplex ultrasound of bilateral lower extremities with and without compression, augmentation and duplex. Color flow and spectral Doppler with waveform analysis performed. FINDINGS: Exam includes the common femoral, femoral, popliteal veins as well as segmentally visualized deep calf veins and greater saphenous vein. RIGHT: No deep vein thrombosis. No superficial thrombophlebitis. Within the right popliteal fossa is an anechoic avascular cyst measuring 4.8 x 3.1 x 1.5 cm. LEFT: No deep vein thrombosis. No superficial thrombophlebitis. Within the left popliteal fossa is an anechoic avascular cyst measuring 2.2 x 1.4 x 0.4 cm.     IMPRESSION: 1.  No deep venous thrombosis in the bilateral lower extremities. 2.  Bilateral popliteal fossa Baker's cysts, larger on the right.    XR Chest 2 Views    Result Date: 10/11/2023  EXAM: XR CHEST 2 VIEWS LOCATION: [facility] DATE: 10/11/2023 INDICATION: dyspnea  COMPARISON: 03/17/2014     IMPRESSION: There is cardiomegaly, pulmonary vascular congestion, perihilar edema and bilateral small effusions, right greater than left. Findings consistent with failure. No signs of pneumonia.

## 2023-11-08 NOTE — PHARMACY-ADMISSION MEDICATION HISTORY
Admission medication history completed at [facility]. Please see Pharmacist Admission Medication History note from 11/06/2023.     Updated times patient took home meds for surgery admit

## 2023-11-09 LAB — BACTERIA UR CULT: NORMAL

## 2023-11-17 ENCOUNTER — OFFICE VISIT (OUTPATIENT)
Dept: CARDIOLOGY | Facility: CLINIC | Age: 48
End: 2023-11-17
Payer: COMMERCIAL

## 2023-11-17 VITALS
BODY MASS INDEX: 48.47 KG/M2 | HEART RATE: 68 BPM | DIASTOLIC BLOOD PRESSURE: 72 MMHG | RESPIRATION RATE: 14 BRPM | SYSTOLIC BLOOD PRESSURE: 108 MMHG | WEIGHT: 240 LBS

## 2023-11-17 DIAGNOSIS — I10 HYPERTENSION, UNSPECIFIED TYPE: ICD-10-CM

## 2023-11-17 DIAGNOSIS — I25.5 ISCHEMIC CARDIOMYOPATHY: ICD-10-CM

## 2023-11-17 DIAGNOSIS — I25.10 CORONARY ARTERY DISEASE INVOLVING NATIVE CORONARY ARTERY OF NATIVE HEART WITHOUT ANGINA PECTORIS: ICD-10-CM

## 2023-11-17 DIAGNOSIS — E66.01 CLASS 3 SEVERE OBESITY DUE TO EXCESS CALORIES WITH BODY MASS INDEX (BMI) OF 45.0 TO 49.9 IN ADULT, UNSPECIFIED WHETHER SERIOUS COMORBIDITY PRESENT (H): ICD-10-CM

## 2023-11-17 DIAGNOSIS — I50.20 HEART FAILURE WITH REDUCED EJECTION FRACTION (H): Primary | ICD-10-CM

## 2023-11-17 DIAGNOSIS — G47.33 OSA (OBSTRUCTIVE SLEEP APNEA): ICD-10-CM

## 2023-11-17 DIAGNOSIS — E66.813 CLASS 3 SEVERE OBESITY DUE TO EXCESS CALORIES WITH BODY MASS INDEX (BMI) OF 45.0 TO 49.9 IN ADULT, UNSPECIFIED WHETHER SERIOUS COMORBIDITY PRESENT (H): ICD-10-CM

## 2023-11-17 PROBLEM — R73.03 PREDIABETES: Status: ACTIVE | Noted: 2023-10-11

## 2023-11-17 PROCEDURE — 99214 OFFICE O/P EST MOD 30 MIN: CPT | Performed by: NURSE PRACTITIONER

## 2023-11-17 NOTE — PROGRESS NOTES
Assessment/Recommendations   Assessment:    1. Ischemic cardiomyopathy, heart failure with reduced ejection fraction, ejection fraction 30-35%, NYHA class II: Compensated.  She has mild fatigue.  Her weight is stable.  She is trying to follow a low-sodium diet  2.  Hypertension: Blood pressure 108/72  3.  MANINDER: Not currently wearing a CPAP.  She was diagnosed with sleep apnea 10 years ago.  I referred her to sleep medicine during our last visit and they were unable to contact her.  Provided her today with her contact information to call to make an appointment  4.  CAD: Denies angina.  Coronary angiogram showed severe multivessel coronary artery disease.  She was scheduled for CABG a week ago which was canceled due to wounds on her buttocks.  She follows with the wound care clinic next week.  5.  Morbid obesity: BMI 48.47    Plan:  1.   Heart failure medications:  - Beta blocker therapy with carvedilol 3.125 mg twice a day  - ARNI therapy with Entresto 24-26 mg twice a day  - SGLT2 inhibitor not started  - Vasodilator therapy not started due to hypotension  - Aldosterone antagonist with spironolactone 25 mg daily  - Diuretic therapy with furosemide 40 mg daily  2.  Follow-up with sleep medicine, provided contact information  3.  Follow-up with wound care next week  4.  Continue current medications.  Unable to titrate due to hypotension    Betty Pan will follow up with Dr. Nur in 3 months and in the heart failure clinic in 1 month.     History of Present Illness/Subjective    Ms. Betty Pan is a 48 year old female seen at [facility] heart failure clinic today for continued follow-up.   She follows up for heart failure with reduced ejection fraction, ischemic cardiomyopathy.  She was hospitalized in October with acute respiratory failure.  She was found to have acute heart failure and pneumonia. She was also found to have pyelonephritis.  She had an echocardiogram which showed ejection  fraction of 30 to 35%.  She had coronary angiogram and right heart catheterization which showed severe multivessel coronary artery disease.  She was scheduled for CABG on November 8 which was canceled due to having wounds on her buttocks.  It was recommended she follow-up with the wound clinic.  Her CABG will have to be rescheduled.  She has a past medical history significant for hypertension, prediabetes, morbid obesity, MANINDER not on CPAP, CAD, hyperlipidemia.     Today, she states she is feeling well.  She denies any acute heart failure symptoms.  She has fatigue.  She denies lightheadedness, shortness of breath, dyspnea on exertion, orthopnea, PND, palpitations, chest pain, abdominal fullness/bloating, and lower extremity edema.      She is monitoring home weights which are stable between 235-237 pounds.  She is trying to follow a low-salt diet.  She is a  at her own café     Physical Examination Review of Systems   /72 (BP Location: Left arm, Patient Position: Sitting, Cuff Size: Adult Large)   Pulse 68   Resp 14   Wt 108.9 kg (240 lb)   BMI 48.47 kg/m    Body mass index is 48.47 kg/m .  Wt Readings from Last 3 Encounters:   11/17/23 108.9 kg (240 lb)   11/08/23 107.4 kg (236 lb 11.2 oz)   11/06/23 108.3 kg (238 lb 11.2 oz)       General Appearance:   no acute distress   ENT/Mouth: No abnormalities   EYES:  no scleral icterus, normal conjunctivae   Neck: no thyromegaly   Chest/Lungs:   lungs are clear to auscultation, no rales or wheezing, equal chest wall expansion    Cardiovascular:   Regular. Normal first and second heart sounds with no murmurs, rubs, or gallops, no edema bilaterally    Abdomen:  Obese, bowel sounds are present   Extremities: no cyanosis or clubbing   Skin: warm   Neurologic: no tremors     Psychiatric: alert and oriented x3    Enc Vitals  BP: 108/72  Pulse: 68  Resp: 14  Weight: 108.9 kg (240 lb)                                         Medical History  Surgical History Family  History Social History   Past Medical History:   Diagnosis Date    Congestive heart failure (H)     Coronary artery disease     Heart failure with reduced ejection fraction (H) 10/27/2023    Hyperlipidemia     Hypertension, unspecified type 10/11/2023    Ischemic cardiomyopathy 10/27/2023    Obese     Obesity due to excess calories 10/27/2023    MANINDER (obstructive sleep apnea) 10/27/2023    Sleep apnea     Past Surgical History:   Procedure Laterality Date    CORONARY ANGIOGRAPHY ADULT ORDER      CV CORONARY ANGIOGRAM N/A 10/13/2023    Procedure: Coronary Angiogram;  Surgeon: Roxana Melgoza MD;  Location: [facility] CATH LAB CV    CV RIGHT HEART CATH MEASUREMENTS RECORDED N/A 10/13/2023    Procedure: Right Heart Catheterization;  Surgeon: Roxana Melgoza MD;  Location: [facility] CATH LAB CV    No family history on file. Social History     Socioeconomic History    Marital status: Single     Spouse name: Not on file    Number of children: Not on file    Years of education: Not on file    Highest education level: Not on file   Occupational History    Not on file   Tobacco Use    Smoking status: Never    Smokeless tobacco: Never   Vaping Use    Vaping Use: Never used   Substance and Sexual Activity    Alcohol use: Never    Drug use: Never    Sexual activity: Not on file   Other Topics Concern    Not on file   Social History Narrative    Not on file     Social Determinants of Health     Financial Resource Strain: Not on file   Food Insecurity: Not on file   Transportation Needs: Not on file   Physical Activity: Not on file   Stress: Not on file   Social Connections: Not on file   Interpersonal Safety: Not on file   Housing Stability: Not on file          Medications  Allergies   Current Outpatient Medications   Medication Sig Dispense Refill    atorvastatin (LIPITOR) 40 MG tablet Take 40 mg by mouth daily      carvedilol (COREG) 3.125 MG tablet Take 1 tablet (3.125 mg) by mouth 2 times daily (with meals) 60 tablet 3     cyanocobalamin (VITAMIN B-12) 1000 MCG tablet Take 1,000 mcg by mouth daily      Ferrous Sulfate 324 (65 Fe) MG TBEC Take 1 tablet by mouth daily      furosemide (LASIX) 40 MG tablet Take 40 mg by mouth daily      multivitamin, therapeutic (THERA-VIT) TABS tablet Take 1 tablet by mouth daily      nitroGLYcerin (NITROSTAT) 0.4 MG sublingual tablet For chest pain place 1 tablet under the tongue every 5 minutes for 3 doses. If symptoms persist 5 minutes after 1st dose call 911. 20 tablet 3    sacubitril-valsartan (ENTRESTO) 24-26 MG per tablet Take 1 tablet by mouth 2 times daily 60 tablet 3    spironolactone (ALDACTONE) 25 MG tablet Take 1 tablet (25 mg) by mouth daily 30 tablet 3    No Known Allergies      Lab Results    Chemistry/lipid CBC Cardiac Enzymes/BNP/TSH/INR   Lab Results   Component Value Date    BUN 20.8 (H) 10/17/2023     10/17/2023    CO2 28 10/17/2023    Lab Results   Component Value Date    WBC 7.4 11/06/2023    HGB 14.2 11/06/2023    HCT 44.2 11/06/2023    MCV 91 11/06/2023     (L) 11/06/2023    Lab Results   Component Value Date    TSH 1.30 10/10/2023    INR 1.05 11/06/2023             This note has been dictated using voice recognition software. Any grammatical, typographical, or context distortions are unintentional and inherent to the software    30 minutes spent on the date of encounter doing chart review, review of outside records, review of test results, interpretation with above tests, patient visit, and documentation.

## 2023-11-17 NOTE — LETTER
11/17/2023    Cristina Alexis MD  [address] [facility] [address]    RE: Betty Pan       Dear Colleague,     I had the pleasure of seeing Betty Pan in the [facility].        Assessment/Recommendations   Assessment:    1. Ischemic cardiomyopathy, heart failure with reduced ejection fraction, ejection fraction 30-35%, NYHA class II: Compensated.  She has mild fatigue.  Her weight is stable.  She is trying to follow a low-sodium diet  2.  Hypertension: Blood pressure 108/72  3.  MANINDER: Not currently wearing a CPAP.  She was diagnosed with sleep apnea 10 years ago.  I referred her to sleep medicine during our last visit and they were unable to contact her.  Provided her today with her contact information to call to make an appointment  4.  CAD: Denies angina.  Coronary angiogram showed severe multivessel coronary artery disease.  She was scheduled for CABG a week ago which was canceled due to wounds on her buttocks.  She follows with the wound care clinic next week.  5.  Morbid obesity: BMI 48.47    Plan:  1.   Heart failure medications:  - Beta blocker therapy with carvedilol 3.125 mg twice a day  - ARNI therapy with Entresto 24-26 mg twice a day  - SGLT2 inhibitor not started  - Vasodilator therapy not started due to hypotension  - Aldosterone antagonist with spironolactone 25 mg daily  - Diuretic therapy with furosemide 40 mg daily  2.  Follow-up with sleep medicine, provided contact information  3.  Follow-up with wound care next week  4.  Continue current medications.  Unable to titrate due to hypotension    Betty Pan will follow up with Dr. Nur in 3 months and in the heart failure clinic in 1 month.     History of Present Illness/Subjective    Ms. Betty Pan is a 48 year old female seen at [facility] heart failure clinic today for continued follow-up.   She follows up for heart failure with reduced ejection fraction, ischemic cardiomyopathy.  She was  hospitalized in October with acute respiratory failure.  She was found to have acute heart failure and pneumonia. She was also found to have pyelonephritis.  She had an echocardiogram which showed ejection fraction of 30 to 35%.  She had coronary angiogram and right heart catheterization which showed severe multivessel coronary artery disease.  She was scheduled for CABG on November 8 which was canceled due to having wounds on her buttocks.  It was recommended she follow-up with the wound clinic.  Her CABG will have to be rescheduled.  She has a past medical history significant for hypertension, prediabetes, morbid obesity, MANINDER not on CPAP, CAD, hyperlipidemia.     Today, she states she is feeling well.  She denies any acute heart failure symptoms.  She has fatigue.  She denies lightheadedness, shortness of breath, dyspnea on exertion, orthopnea, PND, palpitations, chest pain, abdominal fullness/bloating, and lower extremity edema.      She is monitoring home weights which are stable between 235-237 pounds.  She is trying to follow a low-salt diet.  She is a  at her own café     Physical Examination Review of Systems   /72 (BP Location: Left arm, Patient Position: Sitting, Cuff Size: Adult Large)   Pulse 68   Resp 14   Wt 108.9 kg (240 lb)   BMI 48.47 kg/m    Body mass index is 48.47 kg/m .  Wt Readings from Last 3 Encounters:   11/17/23 108.9 kg (240 lb)   11/08/23 107.4 kg (236 lb 11.2 oz)   11/06/23 108.3 kg (238 lb 11.2 oz)       General Appearance:   no acute distress   ENT/Mouth: No abnormalities   EYES:  no scleral icterus, normal conjunctivae   Neck: no thyromegaly   Chest/Lungs:   lungs are clear to auscultation, no rales or wheezing, equal chest wall expansion    Cardiovascular:   Regular. Normal first and second heart sounds with no murmurs, rubs, or gallops, no edema bilaterally    Abdomen:  Obese, bowel sounds are present   Extremities: no cyanosis or clubbing   Skin: warm   Neurologic: no  tremors     Psychiatric: alert and oriented x3    Enc Vitals  BP: 108/72  Pulse: 68  Resp: 14  Weight: 108.9 kg (240 lb)                                         Medical History  Surgical History Family History Social History   Past Medical History:   Diagnosis Date    Congestive heart failure (H)     Coronary artery disease     Heart failure with reduced ejection fraction (H) 10/27/2023    Hyperlipidemia     Hypertension, unspecified type 10/11/2023    Ischemic cardiomyopathy 10/27/2023    Obese     Obesity due to excess calories 10/27/2023    MANINDER (obstructive sleep apnea) 10/27/2023    Sleep apnea     Past Surgical History:   Procedure Laterality Date    CORONARY ANGIOGRAPHY ADULT ORDER      CV CORONARY ANGIOGRAM N/A 10/13/2023    Procedure: Coronary Angiogram;  Surgeon: Roxana Melgoza MD;  Location: [facility] CATH LAB CV    CV RIGHT HEART CATH MEASUREMENTS RECORDED N/A 10/13/2023    Procedure: Right Heart Catheterization;  Surgeon: Roxana Melgoza MD;  Location: [facility] CATH LAB CV    No family history on file. Social History     Socioeconomic History    Marital status: Single     Spouse name: Not on file    Number of children: Not on file    Years of education: Not on file    Highest education level: Not on file   Occupational History    Not on file   Tobacco Use    Smoking status: Never    Smokeless tobacco: Never   Vaping Use    Vaping Use: Never used   Substance and Sexual Activity    Alcohol use: Never    Drug use: Never    Sexual activity: Not on file   Other Topics Concern    Not on file   Social History Narrative    Not on file     Social Determinants of Health     Financial Resource Strain: Not on file   Food Insecurity: Not on file   Transportation Needs: Not on file   Physical Activity: Not on file   Stress: Not on file   Social Connections: Not on file   Interpersonal Safety: Not on file   Housing Stability: Not on file          Medications  Allergies   Current Outpatient Medications   Medication  Sig Dispense Refill    atorvastatin (LIPITOR) 40 MG tablet Take 40 mg by mouth daily      carvedilol (COREG) 3.125 MG tablet Take 1 tablet (3.125 mg) by mouth 2 times daily (with meals) 60 tablet 3    cyanocobalamin (VITAMIN B-12) 1000 MCG tablet Take 1,000 mcg by mouth daily      Ferrous Sulfate 324 (65 Fe) MG TBEC Take 1 tablet by mouth daily      furosemide (LASIX) 40 MG tablet Take 40 mg by mouth daily      multivitamin, therapeutic (THERA-VIT) TABS tablet Take 1 tablet by mouth daily      nitroGLYcerin (NITROSTAT) 0.4 MG sublingual tablet For chest pain place 1 tablet under the tongue every 5 minutes for 3 doses. If symptoms persist 5 minutes after 1st dose call 911. 20 tablet 3    sacubitril-valsartan (ENTRESTO) 24-26 MG per tablet Take 1 tablet by mouth 2 times daily 60 tablet 3    spironolactone (ALDACTONE) 25 MG tablet Take 1 tablet (25 mg) by mouth daily 30 tablet 3    No Known Allergies      Lab Results    Chemistry/lipid CBC Cardiac Enzymes/BNP/TSH/INR   Lab Results   Component Value Date    BUN 20.8 (H) 10/17/2023     10/17/2023    CO2 28 10/17/2023    Lab Results   Component Value Date    WBC 7.4 11/06/2023    HGB 14.2 11/06/2023    HCT 44.2 11/06/2023    MCV 91 11/06/2023     (L) 11/06/2023    Lab Results   Component Value Date    TSH 1.30 10/10/2023    INR 1.05 11/06/2023             This note has been dictated using voice recognition software. Any grammatical, typographical, or context distortions are unintentional and inherent to the software    30 minutes spent on the date of encounter doing chart review, review of outside records, review of test results, interpretation with above tests, patient visit, and documentation.                Thank you for allowing me to participate in the care of your patient.      Sincerely,     ANDREW Florez CNP     [facility]  cc:   ANDREW Florez CNP  [address], [address]  [place] [address]

## 2023-11-17 NOTE — Clinical Note
Please call patient to schedule follow-up appointment with Dr. Nur in 3 months and with myself in 1 month.  Exit  was not present when she left this morning.  Thank you

## 2023-11-17 NOTE — PATIENT INSTRUCTIONS
Betty Pan,    It was a pleasure to see you today at [facility].     My recommendations after this visit include:  - Please follow up with Dr Nur in 3 months   - Please follow up with Sabine Mohr in 1 month  - Please call sleep medicine at [phone] to set up appt with them  - Continue current medications    Sabine Mohr, CNP

## 2023-11-22 ENCOUNTER — OFFICE VISIT (OUTPATIENT)
Dept: VASCULAR SURGERY | Facility: CLINIC | Age: 48
End: 2023-11-22
Attending: NURSE PRACTITIONER
Payer: COMMERCIAL

## 2023-11-22 VITALS
DIASTOLIC BLOOD PRESSURE: 80 MMHG | BODY MASS INDEX: 48.47 KG/M2 | TEMPERATURE: 98.3 F | SYSTOLIC BLOOD PRESSURE: 136 MMHG | RESPIRATION RATE: 18 BRPM | WEIGHT: 240 LBS

## 2023-11-22 DIAGNOSIS — E66.813 CLASS 3 SEVERE OBESITY DUE TO EXCESS CALORIES WITH BODY MASS INDEX (BMI) OF 45.0 TO 49.9 IN ADULT, UNSPECIFIED WHETHER SERIOUS COMORBIDITY PRESENT (H): ICD-10-CM

## 2023-11-22 DIAGNOSIS — E66.01 CLASS 3 SEVERE OBESITY DUE TO EXCESS CALORIES WITH BODY MASS INDEX (BMI) OF 45.0 TO 49.9 IN ADULT, UNSPECIFIED WHETHER SERIOUS COMORBIDITY PRESENT (H): ICD-10-CM

## 2023-11-22 DIAGNOSIS — L89.42 PRESSURE INJURY OF CONTIGUOUS REGION INVOLVING BACK AND RIGHT BUTTOCK, STAGE 2 (H): Primary | ICD-10-CM

## 2023-11-22 PROCEDURE — G0463 HOSPITAL OUTPT CLINIC VISIT: HCPCS | Performed by: NURSE PRACTITIONER

## 2023-11-22 PROCEDURE — 99203 OFFICE O/P NEW LOW 30 MIN: CPT | Performed by: NURSE PRACTITIONER

## 2023-11-22 ASSESSMENT — PAIN SCALES - GENERAL: PAINLEVEL: MILD PAIN (2)

## 2023-11-22 NOTE — PROGRESS NOTES
[facility] Consult Note    Date of Service: November 22, 2023     Requesting Provider: Dr. Cristina Alexis    Chief Complaint: buttocks ulcer    History of Present Illness: Betty Pan is being seen at [facility] today regarding buttocks ulcer. They arrive to the clinic today alone; she walked to the room unassisted. The patient reports that she has been having worsening shortness of breath for 1 year; she had to start sleeping in her chair. She recently sought medical attention for her shortness of breath and was found to have CAD and needs CABG x4; she was scheduled for surgery however on surgery day they did a skin exam and found buttocks wounds; her surgery was canceled and she was referred to our clinic. Was currently/previously using neosporin. Reports pain of 3/10; currently using apap for pain. Denies any fevers, chills, or generalized ill feeling. Denies history of cancer. Sleeps in a bed/recliner with legs elevated.     Review of Systems:   Constitutional:  Negative   EENTM: positive for glasses;  negative [place]  GI:  negative for nausea/vomiting;   negative for constipation   negative diarrhea;   negative for fecal incontinence  negative weight loss  :   negative dysuria,  negative incontinence  MS: patient is ambulatory;  does not use assistive devices  Cardiac: positive CAD  Respiratory:  positive SOB  Endocrine:  positive pre- diabetes  Psych: negative  depression/anxiety    Past Medical History:   Past Medical History:   Diagnosis Date    Aspiration pneumonia of lower lobe, unspecified aspiration pneumonia type, unspecified laterality (H) 10/11/2023    Congestive heart failure (H)     Coronary artery disease     Heart failure with reduced ejection fraction (H) 10/27/2023    Hyperlipidemia     Hypertension, unspecified type 10/11/2023    Ischemic cardiomyopathy 10/27/2023    Obese     Obesity due to excess calories 10/27/2023    MANINDER (obstructive sleep apnea)  10/27/2023    Pleural effusion 10/11/2023    Sleep apnea         Surgical History:   Past Surgical History:   Procedure Laterality Date    CORONARY ANGIOGRAPHY ADULT ORDER      CV CORONARY ANGIOGRAM N/A 10/13/2023    Procedure: Coronary Angiogram;  Surgeon: Roxana Melgoza MD;  Location: [facility] LAB CV    CV RIGHT HEART CATH MEASUREMENTS RECORDED N/A 10/13/2023    Procedure: Right Heart Catheterization;  Surgeon: Roxana Melgoza MD;  Location: [facility] CATH LAB CV        Medications:   Current Outpatient Medications   Medication Sig    atorvastatin (LIPITOR) 40 MG tablet Take 40 mg by mouth daily    carvedilol (COREG) 3.125 MG tablet Take 1 tablet (3.125 mg) by mouth 2 times daily (with meals)    cyanocobalamin (VITAMIN B-12) 1000 MCG tablet Take 1,000 mcg by mouth daily    Ferrous Sulfate 324 (65 Fe) MG TBEC Take 1 tablet by mouth daily    furosemide (LASIX) 40 MG tablet Take 40 mg by mouth daily    multivitamin, therapeutic (THERA-VIT) TABS tablet Take 1 tablet by mouth daily    nitroGLYcerin (NITROSTAT) 0.4 MG sublingual tablet For chest pain place 1 tablet under the tongue every 5 minutes for 3 doses. If symptoms persist 5 minutes after 1st dose call 911.    sacubitril-valsartan (ENTRESTO) 24-26 MG per tablet Take 1 tablet by mouth 2 times daily    spironolactone (ALDACTONE) 25 MG tablet Take 1 tablet (25 mg) by mouth daily     No current facility-administered medications for this visit.       Allergies: No Known Allergies    Family history: No family history on file.     Social History:   Social History     Socioeconomic History    Marital status: Single     Spouse name: Not on file    Number of children: Not on file    Years of education: Not on file    Highest education level: Not on file   Occupational History    Not on file   Tobacco Use    Smoking status: Never    Smokeless tobacco: Never   Vaping Use    Vaping Use: Never used   Substance and Sexual Activity    Alcohol use: Never    Drug use:  "Never    Sexual activity: Not on file   Other Topics Concern    Not on file   Social History Narrative    Not on file     Social Determinants of Health     Financial Resource Strain: Not on file   Food Insecurity: Not on file   Transportation Needs: Not on file   Physical Activity: Not on file   Stress: Not on file   Social Connections: Not on file   Interpersonal Safety: Not on file   Housing Stability: Not on file        Physical Exam  Vitals: /80   Temp 98.3  F (36.8  C)   Resp 18   Wt 240 lb (108.9 kg)   BMI 48.47 kg/m    Weight is 240 lbs 0 oz          Body mass index is 48.47 kg/m .  General: This is a 48 year old female who appears their reported age, not in acute distress  Head: normocephalic, Atraumatic; wearing glasses; non-icteric sclera; no exudate; mild hearing loss   Respiratory: Clear throughout all lung fields; unlabored breathing; no cough   Cardiac: Regular, Rate and Rhythm, no murmurs appreciated   Skin: Uniformly warm and dry  Psych: Alert and oriented x4; calm and cooperative throughout exam  Buttocks: diffuse hyperpigmentation of the buttocks; within this area of hyperpigmentation there is a 2x1cm area of hypopigmentation; newly epithelialized skin; intact; no induration; no pain with palpation; no fluctuance.      Circumferential volume measures:             No data to display                Ulceration(s)/Wound(s):     VASC Wound right buttock (Active)   Pre Size Length 2 11/22/23 1400   Pre Size Width 2 11/22/23 1400   Pre Size Depth 0 11/22/23 1400   Pre Total Sq cm 4 11/22/23 1400       Laboratory studies:     I personally reviewed the following lab results today and those on care everywhere, if indicated     No results found for: \"CRP\"   No results found for: \"SED\"   Last Renal Panel:  Sodium   Date Value Ref Range Status   10/17/2023 140 135 - 145 mmol/L Final     Comment:     Reference intervals for this test were updated on 09/26/2023 to more accurately reflect our healthy " "population. There may be differences in the flagging of prior results with similar values performed with this method. Interpretation of those prior results can be made in the context of the updated reference intervals.      Potassium   Date Value Ref Range Status   10/17/2023 3.5 3.4 - 5.3 mmol/L Final     Chloride   Date Value Ref Range Status   10/17/2023 101 98 - 107 mmol/L Final     Carbon Dioxide (CO2)   Date Value Ref Range Status   10/17/2023 28 22 - 29 mmol/L Final     Anion Gap   Date Value Ref Range Status   10/17/2023 11 7 - 15 mmol/L Final     GLUCOSE BY METER POCT   Date Value Ref Range Status   11/08/2023 135 (H) 70 - 99 mg/dL Final     Urea Nitrogen   Date Value Ref Range Status   10/17/2023 20.8 (H) 6.0 - 20.0 mg/dL Final     Creatinine   Date Value Ref Range Status   10/17/2023 0.78 0.51 - 0.95 mg/dL Final     GFR Estimate   Date Value Ref Range Status   10/17/2023 >90 >60 mL/min/1.73m2 Final     Calcium   Date Value Ref Range Status   10/17/2023 9.5 8.6 - 10.0 mg/dL Final     Albumin   Date Value Ref Range Status   10/11/2023 3.9 3.5 - 5.2 g/dL Final      Lab Results   Component Value Date    WBC 7.4 11/06/2023     Lab Results   Component Value Date    RBC 4.88 11/06/2023     Lab Results   Component Value Date    HGB 14.2 11/06/2023     Lab Results   Component Value Date    HCT 44.2 11/06/2023     No components found for: \"MCT\"  Lab Results   Component Value Date    MCV 91 11/06/2023     Lab Results   Component Value Date    MCH 29.1 11/06/2023     Lab Results   Component Value Date    MCHC 32.1 11/06/2023     Lab Results   Component Value Date    RDW 13.9 11/06/2023     Lab Results   Component Value Date     11/06/2023      Lab Results   Component Value Date    A1C 5.8 10/11/2023    A1C 4.6 02/27/2014      TSH   Date Value Ref Range Status   10/10/2023 1.30 0.30 - 4.20 uIU/mL Final      No results found for: \"VITDT\"       Impression:    Encounter Diagnoses   Name Primary?    Pressure injury " of contiguous region involving back and right buttock, stage 2 (H) Yes    Class 3 severe obesity due to excess calories with body mass index (BMI) of 45.0 to 49.9 in adult, unspecified whether serious comorbidity present (H)              Assessment/Plan:  1. Debridement: healed    2. Treatment: wound treatment will include irrigation and dressings to promote autolytic debridement which will include: wound is healed; will have her apply calmoseptine skin protectant 1-2 times per day; no dressings needed    If for some reason the patient is not able to get your dressing(s) changed as outlined above (due to illness, lack of supplies, lack of help) please do the following: remove old, soiled dressings; wash the ulcers with saline; pat dry; apply ABD pad or other absorbant pad and secure with rolled gauze; avoid tape directly on your skin; Patient instructed to call the clinic as soon as possible to let us know what the current issues are in receiving ulcer care.    3. Edema. na    4. Offloading: wound was likely due to having to sit and sleep in recliner chair due to her breathing; she has stopped doing this; we spoke about positioning in bed and chair; frequent repositioning; and cushion options; handout provided    5. Nutrition: continue to work on diet and weight loss    6. Wound Etiology: pressure ulcer stage 2; healed    Patient to return to clinic in PRN week(s) for re-evaluation. They were instructed to call the clinic sooner with any further questions or concerns. Answered all questions.          Denise Brumfield NP   [facility]  [phone]      This note was electronically signed by Denise Brumfield NP

## 2023-11-27 ENCOUNTER — TELEPHONE (OUTPATIENT)
Dept: CARDIOLOGY | Facility: CLINIC | Age: 48
End: 2023-11-27
Payer: COMMERCIAL

## 2023-11-28 ENCOUNTER — OFFICE VISIT (OUTPATIENT)
Dept: CARDIOLOGY | Facility: CLINIC | Age: 48
End: 2023-11-28
Payer: COMMERCIAL

## 2023-11-28 ENCOUNTER — PREP FOR PROCEDURE (OUTPATIENT)
Dept: ADMINISTRATIVE | Facility: CLINIC | Age: 48
End: 2023-11-28

## 2023-11-28 VITALS
SYSTOLIC BLOOD PRESSURE: 118 MMHG | OXYGEN SATURATION: 97 % | DIASTOLIC BLOOD PRESSURE: 72 MMHG | WEIGHT: 244 LBS | HEART RATE: 62 BPM | RESPIRATION RATE: 18 BRPM | BODY MASS INDEX: 49.28 KG/M2

## 2023-11-28 DIAGNOSIS — I25.5 ISCHEMIC CARDIOMYOPATHY: Primary | ICD-10-CM

## 2023-11-28 DIAGNOSIS — I25.10 CAD (CORONARY ARTERY DISEASE): Primary | ICD-10-CM

## 2023-11-28 DIAGNOSIS — I25.10 CORONARY ARTERY DISEASE INVOLVING NATIVE CORONARY ARTERY OF NATIVE HEART WITHOUT ANGINA PECTORIS: ICD-10-CM

## 2023-11-28 PROCEDURE — 99214 OFFICE O/P EST MOD 30 MIN: CPT | Performed by: THORACIC SURGERY (CARDIOTHORACIC VASCULAR SURGERY)

## 2023-11-28 RX ORDER — SODIUM CHLORIDE, SODIUM GLUCONATE, SODIUM ACETATE, POTASSIUM CHLORIDE AND MAGNESIUM CHLORIDE 526; 502; 368; 37; 30 MG/100ML; MG/100ML; MG/100ML; MG/100ML; MG/100ML
1000 INJECTION, SOLUTION INTRAVENOUS
Status: CANCELLED | OUTPATIENT
Start: 2023-12-01 | End: 2023-12-01

## 2023-11-28 RX ORDER — PHENYLEPHRINE HCL IN 0.9% NACL 50MG/250ML
.1-6 PLASTIC BAG, INJECTION (ML) INTRAVENOUS CONTINUOUS
Status: CANCELLED | OUTPATIENT
Start: 2023-12-01

## 2023-11-28 RX ORDER — CEFAZOLIN SODIUM/WATER 2 G/20 ML
2 SYRINGE (ML) INTRAVENOUS
Status: CANCELLED | OUTPATIENT
Start: 2023-12-01

## 2023-11-28 RX ORDER — LIDOCAINE 40 MG/G
CREAM TOPICAL
Status: CANCELLED | OUTPATIENT
Start: 2023-11-28

## 2023-11-28 RX ORDER — ASPIRIN 81 MG/1
162 TABLET, CHEWABLE ORAL
Status: CANCELLED | OUTPATIENT
Start: 2023-12-01

## 2023-11-28 RX ORDER — CEFAZOLIN SODIUM/WATER 2 G/20 ML
2 SYRINGE (ML) INTRAVENOUS SEE ADMIN INSTRUCTIONS
Status: CANCELLED | OUTPATIENT
Start: 2023-12-01

## 2023-11-28 RX ORDER — CHLORHEXIDINE GLUCONATE ORAL RINSE 1.2 MG/ML
10 SOLUTION DENTAL ONCE
Status: CANCELLED | OUTPATIENT
Start: 2023-12-01 | End: 2023-11-28

## 2023-11-28 RX ORDER — ASPIRIN 81 MG/1
81 TABLET, CHEWABLE ORAL
Status: CANCELLED | OUTPATIENT
Start: 2023-12-01

## 2023-11-28 RX ORDER — DEXMEDETOMIDINE HYDROCHLORIDE 4 UG/ML
.2-1.2 INJECTION, SOLUTION INTRAVENOUS CONTINUOUS
Status: CANCELLED | OUTPATIENT
Start: 2023-12-01

## 2023-11-28 NOTE — PROGRESS NOTES
ASKED BY REFERRING PHYSICIAN: Dr. Nur to evaluate this patient for coronary artery bypass grafting.    CHIEF COMPLAINT: Heart blockages.    HPI: Betty is a 48 year old female who presented heart failure and pneumonia and was scheduled for a coronary artery bypass grafting procedure about a month ago.  She had some tissue break down of her buttocks so her surgery was postponed.      PAST MEDICAL HISTORY:  Past Medical History:   Diagnosis Date    Aspiration pneumonia of lower lobe, unspecified aspiration pneumonia type, unspecified laterality (H) 10/11/2023    Congestive heart failure (H)     Coronary artery disease     Heart failure with reduced ejection fraction (H) 10/27/2023    Hyperlipidemia     Hypertension, unspecified type 10/11/2023    Ischemic cardiomyopathy 10/27/2023    Obese     Obesity due to excess calories 10/27/2023    MANINDER (obstructive sleep apnea) 10/27/2023    Pleural effusion 10/11/2023    Sleep apnea        PAST SURGICAL HISTORY:  Past Surgical History:   Procedure Laterality Date    CORONARY ANGIOGRAPHY ADULT ORDER      CV CORONARY ANGIOGRAM N/A 10/13/2023    Procedure: Coronary Angiogram;  Surgeon: Roxana Melgoza MD;  Location: [facility] CATH LAB CV    CV RIGHT HEART CATH MEASUREMENTS RECORDED N/A 10/13/2023    Procedure: Right Heart Catheterization;  Surgeon: Roxana Melgoza MD;  Location: [facility] CATH LAB CV       FAMILY HISTORY:   No family history on file.    SOCIAL HISTORY:  Social History     Socioeconomic History    Marital status: Single     Spouse name: Not on file    Number of children: Not on file    Years of education: Not on file    Highest education level: Not on file   Occupational History    Not on file   Tobacco Use    Smoking status: Never    Smokeless tobacco: Never   Vaping Use    Vaping Use: Never used   Substance and Sexual Activity    Alcohol use: Never    Drug use: Never    Sexual activity: Not on file   Other Topics Concern    Not on file   Social History  Narrative    Not on file     Social Determinants of Health     Financial Resource Strain: Not on file   Food Insecurity: Not on file   Transportation Needs: Not on file   Physical Activity: Not on file   Stress: Not on file   Social Connections: Not on file   Interpersonal Safety: Not on file   Housing Stability: Not on file        ALLERGIES:   No Known Allergies    CURRENT MEDICATIONS:   Prescription Medications as of 11/28/2023         Rx Number Disp Refills Start End Last Dispensed Date Next Fill Date Owning Pharmacy    atorvastatin (LIPITOR) 40 MG tablet    10/30/2023        Sig: Take 40 mg by mouth daily    Class: Historical    Route: Oral    carvedilol (COREG) 3.125 MG tablet  60 tablet 3 10/17/2023    [facility] [address] [place] [address]    Sig: Take 1 tablet (3.125 mg) by mouth 2 times daily (with meals)    Class: E-Prescribe    Route: Oral    Renewals       Renewal requests to authorizing provider (Nick Sepulveda DO) <b>prohibited</b>            cyanocobalamin (VITAMIN B-12) 1000 MCG tablet            Sig: Take 1,000 mcg by mouth daily    Class: Historical    Route: Oral    Ferrous Sulfate 324 (65 Fe) MG TBEC            Sig: Take 1 tablet by mouth daily    Class: Historical    Route: Oral    furosemide (LASIX) 40 MG tablet            Sig: Take 40 mg by mouth daily    Class: Historical    Route: Oral    multivitamin, therapeutic (THERA-VIT) TABS tablet            Sig: Take 1 tablet by mouth daily    Class: Historical    Route: Oral    nitroGLYcerin (NITROSTAT) 0.4 MG sublingual tablet  20 tablet 3 10/17/2023    [facility] [address] [place] [address]    Sig: For chest pain place 1 tablet under the tongue every 5 minutes for 3 doses. If symptoms persist 5 minutes after 1st dose call 911.    Class: E-Prescribe    Renewals       Renewal requests to authorizing provider (Nick Sepulveda DO)  <b>prohibited</b>            sacubitril-valsartan (ENTRESTO) 24-26 MG per tablet  60 tablet 3 10/17/2023    [facility] [address] [place] [address], [address]    Sig: Take 1 tablet by mouth 2 times daily    Class: E-Prescribe    Route: Oral    Renewals       Renewal requests to authorizing provider (Nick Sepulveda DO) <b>prohibited</b>            spironolactone (ALDACTONE) 25 MG tablet  30 tablet 3 10/18/2023    [facility] [address] [place] [address]    Sig: Take 1 tablet (25 mg) by mouth daily    Class: E-Prescribe    Route: Oral    Renewals       Renewal requests to authorizing provider (Nick Sepulveda DO) <b>prohibited</b>                    REVIEW OF SYSTEMS:   Gen: denies frequent headaches, double/blurry vision, insomnia, fatigue, unexplained weight loss/gain. No previous anesthesia reactions.  CV: heart failure, denies chest pain, shortness of breath, palpitations, peripheral edema.   Pulm: recent pneumonia, denies shortness of breath, asthma or wheezing  GI/: denies liver or kidney problems, blood in stool or BRBPR, difficulty urinating  Endo: Diabetes  Heme/Onc: denies bleeding or clotting disorders, no family problems with bleeding/clotting diorders  MS: no weakness, tremors or gait instability  Neuro: denies depression, memory problems, no dysesthesias, no previous strokes, no migraines, no dysphagia  Skin: No petechiae, purpura or rash.    PHYSICAL EXAMINATION:   /72 (BP Location: Left arm, Patient Position: Sitting, Cuff Size: Adult Large)   Pulse 62   Resp 18   Wt 110.7 kg (244 lb)   SpO2 97%   BMI 49.28 kg/m    General: alert and oriented x 3, pleasant, no acute distress  Skin: healing of the buttocks area   CV: S1 S2, no murmurs, rubs or gallops, regular rate and rhythm, no peripheral edema, no carotid or abdominal bruits, pulses in upper and lower extremities palpable  Pulm:  "bilateral breath sounds, clear to auscultation, easy work of breathing  GI: (+) bowel sounds, soft non-tender and non-distended  : voiding without problems  MS: moves all extremities x 4,  5+/5+ equal strength bilaterally  Neuro: pupils equal round and reactive to light, cranial nerves, II-XII grossly intact, no gross neurologic deficits noted    LABS:  BMP RESULTS:  Lab Results   Component Value Date     10/17/2023    POTASSIUM 3.5 10/17/2023    CHLORIDE 101 10/17/2023    CO2 28 10/17/2023    ANIONGAP 11 10/17/2023     (H) 11/08/2023    BUN 20.8 (H) 10/17/2023    CR 0.78 10/17/2023    GFRESTIMATED >90 10/17/2023    KHADIJAH 9.5 10/17/2023        CBC RESULTS:  Lab Results   Component Value Date    WBC 7.4 11/06/2023    RBC 4.88 11/06/2023    HGB 14.2 11/06/2023    HCT 44.2 11/06/2023    MCV 91 11/06/2023    MCH 29.1 11/06/2023    MCHC 32.1 11/06/2023    RDW 13.9 11/06/2023     (L) 11/06/2023       Lab Results   Component Value Date    INR 1.05 11/06/2023     Lab Results   Component Value Date    PTT 32 11/06/2023     No results found for: \"UA\"  Lab Results   Component Value Date    A1C 5.8 (H) 10/11/2023       PROCEDURES/IMAGING:  Chest X-Ray: Heart is mildly enlarged, soft tissue attenuation at the left lung base.  Angio: Severe triple vessel coronary artery disease  Echo: LVEF is 30 to 35% with akinesis of the LV apex  CT: Not done  MRI: Not done  Carotid US: Mild plaque bilaterally at the bifurcation     ASSESSMENT/PLAN:   Betty is a 48 year old woman who presented with acute systolic heart failure and pneumonia.  Her coronary artery bypass grafting procedure was delayed because of soft tissue break down of her buttocks with associated cellulitis.  This has resolved.  We will plan to do a multi-vessel coronary artery bypass grafting procedure on December 1,2023.  For conduit we will use the left internal mammary artery and reversed saphenous vein graft.  The patient understands that the risks " for this procedure include: bleeding, infection, stroke, sternal dehiscence, myocardial infarction, arrhythmias, the need for a pacemaker, prolonged ventilation, pneumonia, liver/renal failure, aortic dissection, and an operative mortality of 4 to 8 percent.  She accepts these risks and is agreeable with this plan.    1. Complete outpatient work-up  2. Multi-vessel coronary artery bypass grafting on 12-1-2023      Approximately 30 minutes were spent with the patient in clinic at this visit.    CC  Patient Care Team:  Cristina Alexis MD as PCP - General (Family Medicine)  Sabine Mohr APRN CNP as Assigned Heart and Vascular Provider  Denise Brumfield NP as Assigned Surgical Provider

## 2023-11-28 NOTE — LETTER
11/28/2023    Cristina Alexis MD  [address]  [facility] [address]    RE: Betty Pan       Dear Colleague,     I had the pleasure of seeing Betty Pan in the [facility].  ASKED BY REFERRING PHYSICIAN: Dr. Nur to evaluate this patient for coronary artery bypass grafting.    CHIEF COMPLAINT: Heart blockages.    HPI: Betty is a 48 year old female who presented heart failure and pneumonia and was scheduled for a coronary artery bypass grafting procedure about a month ago.  She had some tissue break down of her buttocks so her surgery was postponed.      PAST MEDICAL HISTORY:  Past Medical History:   Diagnosis Date    Aspiration pneumonia of lower lobe, unspecified aspiration pneumonia type, unspecified laterality (H) 10/11/2023    Congestive heart failure (H)     Coronary artery disease     Heart failure with reduced ejection fraction (H) 10/27/2023    Hyperlipidemia     Hypertension, unspecified type 10/11/2023    Ischemic cardiomyopathy 10/27/2023    Obese     Obesity due to excess calories 10/27/2023    MANINDER (obstructive sleep apnea) 10/27/2023    Pleural effusion 10/11/2023    Sleep apnea        PAST SURGICAL HISTORY:  Past Surgical History:   Procedure Laterality Date    CORONARY ANGIOGRAPHY ADULT ORDER      CV CORONARY ANGIOGRAM N/A 10/13/2023    Procedure: Coronary Angiogram;  Surgeon: Roxana Melgoza MD;  Location: [facility] CATH LAB CV    CV RIGHT HEART CATH MEASUREMENTS RECORDED N/A 10/13/2023    Procedure: Right Heart Catheterization;  Surgeon: Roxana Melgoza MD;  Location: [facility] CATH LAB CV       FAMILY HISTORY:   No family history on file.    SOCIAL HISTORY:  Social History     Socioeconomic History    Marital status: Single     Spouse name: Not on file    Number of children: Not on file    Years of education: Not on file    Highest education level: Not on file   Occupational History    Not on file   Tobacco Use    Smoking status: Never    Smokeless tobacco:  Never   Vaping Use    Vaping Use: Never used   Substance and Sexual Activity    Alcohol use: Never    Drug use: Never    Sexual activity: Not on file   Other Topics Concern    Not on file   Social History Narrative    Not on file     Social Determinants of Health     Financial Resource Strain: Not on file   Food Insecurity: Not on file   Transportation Needs: Not on file   Physical Activity: Not on file   Stress: Not on file   Social Connections: Not on file   Interpersonal Safety: Not on file   Housing Stability: Not on file        ALLERGIES:   No Known Allergies    CURRENT MEDICATIONS:   Prescription Medications as of 11/28/2023         Rx Number Disp Refills Start End Last Dispensed Date Next Fill Date Owning Pharmacy    atorvastatin (LIPITOR) 40 MG tablet    10/30/2023        Sig: Take 40 mg by mouth daily    Class: Historical    Route: Oral    carvedilol (COREG) 3.125 MG tablet  60 tablet 3 10/17/2023    [facility] [address] [place] [address]    Sig: Take 1 tablet (3.125 mg) by mouth 2 times daily (with meals)    Class: E-Prescribe    Route: Oral    Renewals       Renewal requests to authorizing provider (Nick Sepulveda, ) <b>prohibited</b>            cyanocobalamin (VITAMIN B-12) 1000 MCG tablet            Sig: Take 1,000 mcg by mouth daily    Class: Historical    Route: Oral    Ferrous Sulfate 324 (65 Fe) MG TBEC            Sig: Take 1 tablet by mouth daily    Class: Historical    Route: Oral    furosemide (LASIX) 40 MG tablet            Sig: Take 40 mg by mouth daily    Class: Historical    Route: Oral    multivitamin, therapeutic (THERA-VIT) TABS tablet            Sig: Take 1 tablet by mouth daily    Class: Historical    Route: Oral    nitroGLYcerin (NITROSTAT) 0.4 MG sublingual tablet  20 tablet 3 10/17/2023    [facility] [address] [place] [address]    Sig: For chest pain place 1 tablet  under the tongue every 5 minutes for 3 doses. If symptoms persist 5 minutes after 1st dose call 911.    Class: E-Prescribe    Renewals       Renewal requests to authorizing provider (Nick Sepulveda DO) <b>prohibited</b>            sacubitril-valsartan (ENTRESTO) 24-26 MG per tablet  60 tablet 3 10/17/2023    [facility] [address] [place] [address], [address]    Sig: Take 1 tablet by mouth 2 times daily    Class: E-Prescribe    Route: Oral    Renewals       Renewal requests to authorizing provider (Nick Sepulveda DO) <b>prohibited</b>            spironolactone (ALDACTONE) 25 MG tablet  30 tablet 3 10/18/2023    [facility] [address] [place] [address], [address]    Sig: Take 1 tablet (25 mg) by mouth daily    Class: E-Prescribe    Route: Oral    Renewals       Renewal requests to authorizing provider (Nick Sepulveda DO) <b>prohibited</b>                    REVIEW OF SYSTEMS:   Gen: denies frequent headaches, double/blurry vision, insomnia, fatigue, unexplained weight loss/gain. No previous anesthesia reactions.  CV: heart failure, denies chest pain, shortness of breath, palpitations, peripheral edema.   Pulm: recent pneumonia, denies shortness of breath, asthma or wheezing  GI/: denies liver or kidney problems, blood in stool or BRBPR, difficulty urinating  Endo: Diabetes  Heme/Onc: denies bleeding or clotting disorders, no family problems with bleeding/clotting diorders  MS: no weakness, tremors or gait instability  Neuro: denies depression, memory problems, no dysesthesias, no previous strokes, no migraines, no dysphagia  Skin: No petechiae, purpura or rash.    PHYSICAL EXAMINATION:   /72 (BP Location: Left arm, Patient Position: Sitting, Cuff Size: Adult Large)   Pulse 62   Resp 18   Wt 110.7 kg (244 lb)   SpO2 97%   BMI 49.28 kg/m    General: alert and oriented x 3, pleasant, no acute distress  Skin:  "healing of the buttocks area   CV: S1 S2, no murmurs, rubs or gallops, regular rate and rhythm, no peripheral edema, no carotid or abdominal bruits, pulses in upper and lower extremities palpable  Pulm: bilateral breath sounds, clear to auscultation, easy work of breathing  GI: (+) bowel sounds, soft non-tender and non-distended  : voiding without problems  MS: moves all extremities x 4,  5+/5+ equal strength bilaterally  Neuro: pupils equal round and reactive to light, cranial nerves, II-XII grossly intact, no gross neurologic deficits noted    LABS:  BMP RESULTS:  Lab Results   Component Value Date     10/17/2023    POTASSIUM 3.5 10/17/2023    CHLORIDE 101 10/17/2023    CO2 28 10/17/2023    ANIONGAP 11 10/17/2023     (H) 11/08/2023    BUN 20.8 (H) 10/17/2023    CR 0.78 10/17/2023    GFRESTIMATED >90 10/17/2023    KHADIJAH 9.5 10/17/2023        CBC RESULTS:  Lab Results   Component Value Date    WBC 7.4 11/06/2023    RBC 4.88 11/06/2023    HGB 14.2 11/06/2023    HCT 44.2 11/06/2023    MCV 91 11/06/2023    MCH 29.1 11/06/2023    MCHC 32.1 11/06/2023    RDW 13.9 11/06/2023     (L) 11/06/2023       Lab Results   Component Value Date    INR 1.05 11/06/2023     Lab Results   Component Value Date    PTT 32 11/06/2023     No results found for: \"UA\"  Lab Results   Component Value Date    A1C 5.8 (H) 10/11/2023       PROCEDURES/IMAGING:  Chest X-Ray: Heart is mildly enlarged, soft tissue attenuation at the left lung base.  Angio: Severe triple vessel coronary artery disease  Echo: LVEF is 30 to 35% with akinesis of the LV apex  CT: Not done  MRI: Not done  Carotid US: Mild plaque bilaterally at the bifurcation     ASSESSMENT/PLAN:   Betty is a 48 year old woman who presented with acute systolic heart failure and pneumonia.  Her coronary artery bypass grafting procedure was delayed because of soft tissue break down of her buttocks with associated cellulitis.  This has resolved.  We will plan to do a " multi-vessel coronary artery bypass grafting procedure on December 1,2023.  For conduit we will use the left internal mammary artery and reversed saphenous vein graft.  The patient understands that the risks for this procedure include: bleeding, infection, stroke, sternal dehiscence, myocardial infarction, arrhythmias, the need for a pacemaker, prolonged ventilation, pneumonia, liver/renal failure, aortic dissection, and an operative mortality of 4 to 8 percent.  She accepts these risks and is agreeable with this plan.    1. Complete outpatient work-up  2. Multi-vessel coronary artery bypass grafting on 12-1-2023      Approximately 30 minutes were spent with the patient in clinic at this visit.    CC  Patient Care Team:  Cristina Alexis MD as PCP - General (Family Medicine)  Sabine Mohr APRN CNP as Assigned Heart and Vascular Provider  Denise Brumfield NP as Assigned Surgical Provider      Thank you for allowing me to participate in the care of your patient.      Sincerely,     Angélica Rizvi MD     [facility]  cc:   No referring provider defined for this encounter.

## 2023-11-30 ENCOUNTER — ANESTHESIA EVENT (OUTPATIENT)
Dept: SURGERY | Facility: HOSPITAL | Age: 48
End: 2023-11-30
Payer: COMMERCIAL

## 2023-12-01 ENCOUNTER — HOSPITAL ENCOUNTER (INPATIENT)
Facility: HOSPITAL | Age: 48
LOS: 5 days | Discharge: HOME | End: 2023-12-06
Attending: THORACIC SURGERY (CARDIOTHORACIC VASCULAR SURGERY) | Admitting: THORACIC SURGERY (CARDIOTHORACIC VASCULAR SURGERY)
Payer: COMMERCIAL

## 2023-12-01 ENCOUNTER — APPOINTMENT (OUTPATIENT)
Dept: RADIOLOGY | Facility: HOSPITAL | Age: 48
End: 2023-12-01
Attending: PHYSICIAN ASSISTANT
Payer: COMMERCIAL

## 2023-12-01 ENCOUNTER — ANESTHESIA (OUTPATIENT)
Dept: SURGERY | Facility: HOSPITAL | Age: 48
End: 2023-12-01
Payer: COMMERCIAL

## 2023-12-01 ENCOUNTER — MEDICAL CORRESPONDENCE (OUTPATIENT)
Dept: HEALTH INFORMATION MANAGEMENT | Facility: CLINIC | Age: 48
End: 2023-12-01

## 2023-12-01 DIAGNOSIS — I25.5 ISCHEMIC CARDIOMYOPATHY: ICD-10-CM

## 2023-12-01 DIAGNOSIS — Z95.1 S/P CABG (CORONARY ARTERY BYPASS GRAFT): Primary | ICD-10-CM

## 2023-12-01 DIAGNOSIS — I50.21 ACUTE SYSTOLIC CONGESTIVE HEART FAILURE (H): ICD-10-CM

## 2023-12-01 DIAGNOSIS — I25.10 CORONARY ARTERY DISEASE INVOLVING NATIVE CORONARY ARTERY OF NATIVE HEART WITHOUT ANGINA PECTORIS: ICD-10-CM

## 2023-12-01 LAB
ALBUMIN SERPL BCG-MCNC: 3.4 G/DL (ref 3.5–5.2)
ALLEN'S TEST: ABNORMAL
ALP SERPL-CCNC: 33 U/L (ref 40–150)
ALT SERPL W P-5'-P-CCNC: 12 U/L (ref 0–50)
ANION GAP SERPL CALCULATED.3IONS-SCNC: 8 MMOL/L (ref 7–15)
APTT PPP: 121 SECONDS (ref 22–38)
APTT PPP: 83 SECONDS (ref 22–38)
APTT PPP: 89 SECONDS (ref 22–38)
AST SERPL W P-5'-P-CCNC: 33 U/L (ref 0–45)
BASE EXCESS BLDA CALC-SCNC: -0.3 MMOL/L
BASE EXCESS BLDA CALC-SCNC: -0.3 MMOL/L
BASE EXCESS BLDA CALC-SCNC: -0.7 MMOL/L
BASE EXCESS BLDA CALC-SCNC: -0.8 MMOL/L
BASE EXCESS BLDA CALC-SCNC: -1.9 MMOL/L
BASE EXCESS BLDA CALC-SCNC: 0.2 MMOL/L
BASE EXCESS BLDA CALC-SCNC: 0.5 MMOL/L
BASE EXCESS BLDA CALC-SCNC: 0.7 MMOL/L
BASE EXCESS BLDA CALC-SCNC: 0.8 MMOL/L
BASE EXCESS BLDA CALC-SCNC: 1.6 MMOL/L
BASE EXCESS BLDV CALC-SCNC: 0.9 MMOL/L
BILIRUB SERPL-MCNC: 0.5 MG/DL
BLD PROD TYP BPU: NORMAL
BLOOD COMPONENT TYPE: NORMAL
BUN SERPL-MCNC: 13.1 MG/DL (ref 6–20)
CA-I BLD-MCNC: 0.93 MMOL/L (ref 1.11–1.3)
CA-I BLD-MCNC: 0.95 MMOL/L (ref 1.11–1.3)
CA-I BLD-MCNC: 0.96 MMOL/L (ref 1.11–1.3)
CA-I BLD-MCNC: 1.11 MMOL/L (ref 1.11–1.3)
CA-I BLD-MCNC: 1.17 MMOL/L (ref 1.11–1.3)
CALCIUM SERPL-MCNC: 7.5 MG/DL (ref 8.6–10)
CALCIUM, IONIZED MEASURED: 1.05 MMOL/L (ref 1.11–1.3)
CALCIUM, IONIZED MEASURED: 1.13 MMOL/L (ref 1.11–1.3)
CALCIUM, IONIZED MEASURED: 1.16 MMOL/L (ref 1.11–1.3)
CHLORIDE SERPL-SCNC: 112 MMOL/L (ref 98–107)
CODING SYSTEM: NORMAL
COHGB MFR BLD: 96.5 % (ref 96–97)
COHGB MFR BLD: 96.8 % (ref 96–97)
COHGB MFR BLD: 97.8 % (ref 96–97)
COHGB MFR BLD: 98.4 % (ref 96–97)
COHGB MFR BLD: 98.4 % (ref 96–97)
COHGB MFR BLD: 98.7 % (ref 96–97)
COHGB MFR BLD: 98.8 % (ref 96–97)
COHGB MFR BLD: 98.8 % (ref 96–97)
COHGB MFR BLD: 98.9 % (ref 96–97)
COHGB MFR BLD: 99.3 % (ref 96–97)
CREAT SERPL-MCNC: 0.72 MG/DL (ref 0.51–0.95)
CROSSMATCH: NORMAL
CROSSMATCH: NORMAL
DEPRECATED HCO3 PLAS-SCNC: 25 MMOL/L (ref 22–29)
EGFRCR SERPLBLD CKD-EPI 2021: >90 ML/MIN/1.73M2
ERYTHROCYTE [DISTWIDTH] IN BLOOD BY AUTOMATED COUNT: 13.6 % (ref 10–15)
ERYTHROCYTE [DISTWIDTH] IN BLOOD BY AUTOMATED COUNT: 13.7 % (ref 10–15)
FIBRINOGEN PPP-MCNC: 221 MG/DL (ref 170–490)
FIBRINOGEN PPP-MCNC: 260 MG/DL (ref 170–490)
FLOW: 4 LPM
GLUCOSE BLD-MCNC: 124 MG/DL (ref 70–125)
GLUCOSE BLD-MCNC: 133 MG/DL (ref 70–125)
GLUCOSE BLD-MCNC: 151 MG/DL (ref 70–125)
GLUCOSE BLD-MCNC: 164 MG/DL (ref 70–125)
GLUCOSE BLD-MCNC: 177 MG/DL (ref 70–125)
GLUCOSE BLDC GLUCOMTR-MCNC: 127 MG/DL (ref 70–99)
GLUCOSE BLDC GLUCOMTR-MCNC: 140 MG/DL (ref 70–99)
GLUCOSE BLDC GLUCOMTR-MCNC: 140 MG/DL (ref 70–99)
GLUCOSE BLDC GLUCOMTR-MCNC: 141 MG/DL (ref 70–99)
GLUCOSE BLDC GLUCOMTR-MCNC: 142 MG/DL (ref 70–99)
GLUCOSE BLDC GLUCOMTR-MCNC: 145 MG/DL (ref 70–99)
GLUCOSE BLDC GLUCOMTR-MCNC: 149 MG/DL (ref 70–99)
GLUCOSE BLDC GLUCOMTR-MCNC: 149 MG/DL (ref 70–99)
GLUCOSE SERPL-MCNC: 138 MG/DL (ref 70–99)
HCO3 BLD-SCNC: 25 MMOL/L (ref 23–29)
HCO3 BLD-SCNC: 25 MMOL/L (ref 23–29)
HCO3 BLD-SCNC: 26 MMOL/L (ref 23–29)
HCO3 BLD-SCNC: 27 MMOL/L (ref 23–29)
HCO3 BLDA-SCNC: 24 MMOL/L (ref 23–29)
HCO3 BLDA-SCNC: 25 MMOL/L (ref 23–29)
HCO3 BLDA-SCNC: 25 MMOL/L (ref 23–29)
HCO3 BLDA-SCNC: 26 MMOL/L (ref 23–29)
HCO3 BLDV-SCNC: 25 MMOL/L (ref 24–30)
HCT VFR BLD AUTO: 26.6 % (ref 35–47)
HCT VFR BLD AUTO: 32.7 % (ref 35–47)
HGB BLD-MCNC: 10.5 G/DL (ref 11.7–15.7)
HGB BLD-MCNC: 13.6 G/DL (ref 11.7–15.7)
HGB BLD-MCNC: 8.7 G/DL (ref 11.7–15.7)
HGB BLD-MCNC: 9 G/DL (ref 11.7–15.7)
HGB BLD-MCNC: 9.4 G/DL (ref 11.7–15.7)
HGB BLD-MCNC: 9.5 G/DL (ref 11.7–15.7)
HGB BLD-MCNC: 9.5 G/DL (ref 11.7–15.7)
INR PPP: 1.34 (ref 0.85–1.15)
INR PPP: 1.57 (ref 0.85–1.15)
INR PPP: 1.64 (ref 0.85–1.15)
ION CA PH 7.4: 0.99 MMOL/L (ref 1.11–1.3)
ION CA PH 7.4: 1.09 MMOL/L (ref 1.11–1.3)
ION CA PH 7.4: 1.14 MMOL/L (ref 1.11–1.3)
ISSUE DATE AND TIME: NORMAL
LACTATE BLD-SCNC: 1 MMOL/L (ref 0.7–2)
LACTATE BLD-SCNC: 1.2 MMOL/L (ref 0.7–2)
LACTATE BLD-SCNC: 1.4 MMOL/L (ref 0.7–2)
LACTATE BLD-SCNC: 1.6 MMOL/L (ref 0.7–2)
LACTATE BLD-SCNC: 1.9 MMOL/L (ref 0.7–2)
LACTATE SERPL-SCNC: 1.5 MMOL/L (ref 0.7–2)
MAGNESIUM SERPL-MCNC: 3.2 MG/DL (ref 1.7–2.3)
MCH RBC QN AUTO: 29.2 PG (ref 26.5–33)
MCH RBC QN AUTO: 29.7 PG (ref 26.5–33)
MCHC RBC AUTO-ENTMCNC: 32.1 G/DL (ref 31.5–36.5)
MCHC RBC AUTO-ENTMCNC: 32.7 G/DL (ref 31.5–36.5)
MCV RBC AUTO: 91 FL (ref 78–100)
MCV RBC AUTO: 91 FL (ref 78–100)
OXYHGB MFR BLD: 96 % (ref 96–97)
OXYHGB MFR BLD: 96.8 % (ref 96–97)
OXYHGB MFR BLD: 97.6 % (ref 96–97)
OXYHGB MFR BLD: 97.7 % (ref 96–97)
PCO2 BLD: 46 MM HG (ref 35–45)
PCO2 BLD: 46 MM HG (ref 35–45)
PCO2 BLD: 47 MM HG (ref 35–45)
PCO2 BLD: 52 MM HG (ref 35–45)
PCO2 BLD: 52 MM HG (ref 35–45)
PCO2 BLD: 57 MM HG (ref 35–45)
PCO2 BLDA: 40 MM HG (ref 35–45)
PCO2 BLDA: 42 MM HG (ref 35–45)
PCO2 BLDA: 44 MM HG (ref 35–45)
PCO2 BLDA: 45 MM HG (ref 35–45)
PCO2 BLDV: 47 MM HG (ref 35–50)
PH BLD: 7.28 [PH] (ref 7.37–7.44)
PH BLD: 7.29 [PH] (ref 7.37–7.44)
PH BLD: 7.3 [PH] (ref 7.37–7.44)
PH BLD: 7.35 [PH] (ref 7.37–7.44)
PH BLD: 7.36 [PH] (ref 7.37–7.44)
PH BLD: 7.36 [PH] (ref 7.37–7.44)
PH BLDA: 7.37 [PH] (ref 7.37–7.44)
PH BLDA: 7.37 [PH] (ref 7.37–7.44)
PH BLDA: 7.38 [PH] (ref 7.37–7.44)
PH BLDA: 7.42 [PH] (ref 7.37–7.44)
PH BLDV: 7.36 [PH] (ref 7.35–7.45)
PH: 7.3 (ref 7.35–7.45)
PH: 7.33 (ref 7.35–7.45)
PH: 7.36 (ref 7.35–7.45)
PHOSPHATE SERPL-MCNC: 3 MG/DL (ref 2.5–4.5)
PLATELET # BLD AUTO: 113 10E3/UL (ref 150–450)
PLATELET # BLD AUTO: 80 10E3/UL (ref 150–450)
PLATELET # BLD AUTO: 83 10E3/UL (ref 150–450)
PLATELET # BLD AUTO: 90 10E3/UL (ref 150–450)
PO2 BLD: 103 MM HG (ref 80–90)
PO2 BLD: 119 MM HG (ref 80–90)
PO2 BLD: 134 MM HG (ref 80–90)
PO2 BLD: 135 MM HG (ref 80–90)
PO2 BLD: 138 MM HG (ref 80–90)
PO2 BLD: 223 MM HG (ref 80–90)
PO2 BLDA: 365 MM HG (ref 80–90)
PO2 BLDA: 394 MM HG (ref 80–90)
PO2 BLDA: 398 MM HG (ref 80–90)
PO2 BLDA: 96 MM HG (ref 80–90)
PO2 BLDV: 52 MM HG (ref 25–47)
POTASSIUM BLD-SCNC: 4 MMOL/L (ref 3.5–5)
POTASSIUM BLD-SCNC: 4.6 MMOL/L (ref 3.5–5)
POTASSIUM BLD-SCNC: 4.7 MMOL/L (ref 3.5–5)
POTASSIUM BLD-SCNC: 5 MMOL/L (ref 3.5–5)
POTASSIUM BLD-SCNC: 5.1 MMOL/L (ref 3.5–5)
POTASSIUM SERPL-SCNC: 4.6 MMOL/L (ref 3.4–5.3)
POTASSIUM SERPL-SCNC: 4.6 MMOL/L (ref 3.4–5.3)
PROT SERPL-MCNC: 5.3 G/DL (ref 6.4–8.3)
RBC # BLD AUTO: 2.93 10E6/UL (ref 3.8–5.2)
RBC # BLD AUTO: 3.6 10E6/UL (ref 3.8–5.2)
SATV LHE POCT: 86.8 % (ref 70–75)
SODIUM BLD-SCNC: 141 MMOL/L (ref 135–145)
SODIUM BLD-SCNC: 143 MMOL/L (ref 135–145)
SODIUM BLD-SCNC: 143 MMOL/L (ref 135–145)
SODIUM BLD-SCNC: 144 MMOL/L (ref 135–145)
SODIUM BLD-SCNC: 144 MMOL/L (ref 135–145)
SODIUM SERPL-SCNC: 145 MMOL/L (ref 135–145)
TEMPERATURE: 37 DEGREES C
UNIT ABO/RH: NORMAL
UNIT NUMBER: NORMAL
UNIT STATUS: NORMAL
UNIT TYPE ISBT: 6200
UNIT TYPE ISBT: 6200
UNIT TYPE ISBT: 7300
WBC # BLD AUTO: 11.3 10E3/UL (ref 4–11)
WBC # BLD AUTO: 14 10E3/UL (ref 4–11)

## 2023-12-01 PROCEDURE — 82805 BLOOD GASES W/O2 SATURATION: CPT | Performed by: INTERNAL MEDICINE

## 2023-12-01 PROCEDURE — 272N000004 HC RX 272: Performed by: THORACIC SURGERY (CARDIOTHORACIC VASCULAR SURGERY)

## 2023-12-01 PROCEDURE — 82330 ASSAY OF CALCIUM: CPT

## 2023-12-01 PROCEDURE — 250N000011 HC RX IP 250 OP 636: Performed by: NURSE ANESTHETIST, CERTIFIED REGISTERED

## 2023-12-01 PROCEDURE — C1760 CLOSURE DEV, VASC: HCPCS | Performed by: THORACIC SURGERY (CARDIOTHORACIC VASCULAR SURGERY)

## 2023-12-01 PROCEDURE — 82805 BLOOD GASES W/O2 SATURATION: CPT | Performed by: THORACIC SURGERY (CARDIOTHORACIC VASCULAR SURGERY)

## 2023-12-01 PROCEDURE — 258N000003 HC RX IP 258 OP 636: Performed by: THORACIC SURGERY (CARDIOTHORACIC VASCULAR SURGERY)

## 2023-12-01 PROCEDURE — 410N000004: Performed by: THORACIC SURGERY (CARDIOTHORACIC VASCULAR SURGERY)

## 2023-12-01 PROCEDURE — P9016 RBC LEUKOCYTES REDUCED: HCPCS | Performed by: THORACIC SURGERY (CARDIOTHORACIC VASCULAR SURGERY)

## 2023-12-01 PROCEDURE — 85610 PROTHROMBIN TIME: CPT | Performed by: NURSE ANESTHETIST, CERTIFIED REGISTERED

## 2023-12-01 PROCEDURE — 93005 ELECTROCARDIOGRAM TRACING: CPT | Performed by: PHYSICIAN ASSISTANT

## 2023-12-01 PROCEDURE — 272N000001 HC OR GENERAL SUPPLY STERILE: Performed by: THORACIC SURGERY (CARDIOTHORACIC VASCULAR SURGERY)

## 2023-12-01 PROCEDURE — 200N000001 HC R&B ICU

## 2023-12-01 PROCEDURE — 33533 CABG ARTERIAL SINGLE: CPT | Performed by: THORACIC SURGERY (CARDIOTHORACIC VASCULAR SURGERY)

## 2023-12-01 PROCEDURE — P9035 PLATELET PHERES LEUKOREDUCED: HCPCS | Performed by: ANESTHESIOLOGY

## 2023-12-01 PROCEDURE — C1898 LEAD, PMKR, OTHER THAN TRANS: HCPCS | Performed by: THORACIC SURGERY (CARDIOTHORACIC VASCULAR SURGERY)

## 2023-12-01 PROCEDURE — 82330 ASSAY OF CALCIUM: CPT | Performed by: PHYSICIAN ASSISTANT

## 2023-12-01 PROCEDURE — 83605 ASSAY OF LACTIC ACID: CPT | Performed by: PHYSICIAN ASSISTANT

## 2023-12-01 PROCEDURE — 83735 ASSAY OF MAGNESIUM: CPT | Performed by: PHYSICIAN ASSISTANT

## 2023-12-01 PROCEDURE — 250N000011 HC RX IP 250 OP 636: Mod: JZ | Performed by: PHYSICIAN ASSISTANT

## 2023-12-01 PROCEDURE — 999N000253 HC STATISTIC WEANING TRIALS

## 2023-12-01 PROCEDURE — 94002 VENT MGMT INPAT INIT DAY: CPT

## 2023-12-01 PROCEDURE — 84155 ASSAY OF PROTEIN SERUM: CPT | Performed by: PHYSICIAN ASSISTANT

## 2023-12-01 PROCEDURE — 999N000065 XR CHEST PORT 1 VIEW

## 2023-12-01 PROCEDURE — 999N000157 HC STATISTIC RCP TIME EA 10 MIN

## 2023-12-01 PROCEDURE — 84132 ASSAY OF SERUM POTASSIUM: CPT | Performed by: PHYSICIAN ASSISTANT

## 2023-12-01 PROCEDURE — 258N000003 HC RX IP 258 OP 636: Performed by: NURSE ANESTHETIST, CERTIFIED REGISTERED

## 2023-12-01 PROCEDURE — 85610 PROTHROMBIN TIME: CPT | Performed by: THORACIC SURGERY (CARDIOTHORACIC VASCULAR SURGERY)

## 2023-12-01 PROCEDURE — P9045 ALBUMIN (HUMAN), 5%, 250 ML: HCPCS | Mod: JZ

## 2023-12-01 PROCEDURE — 250N000011 HC RX IP 250 OP 636: Performed by: THORACIC SURGERY (CARDIOTHORACIC VASCULAR SURGERY)

## 2023-12-01 PROCEDURE — P9035 PLATELET PHERES LEUKOREDUCED: HCPCS | Performed by: THORACIC SURGERY (CARDIOTHORACIC VASCULAR SURGERY)

## 2023-12-01 PROCEDURE — 85384 FIBRINOGEN ACTIVITY: CPT | Performed by: SURGERY

## 2023-12-01 PROCEDURE — P9045 ALBUMIN (HUMAN), 5%, 250 ML: HCPCS | Mod: JZ | Performed by: THORACIC SURGERY (CARDIOTHORACIC VASCULAR SURGERY)

## 2023-12-01 PROCEDURE — 999N000141 HC STATISTIC PRE-PROCEDURE NURSING ASSESSMENT: Performed by: THORACIC SURGERY (CARDIOTHORACIC VASCULAR SURGERY)

## 2023-12-01 PROCEDURE — 82803 BLOOD GASES ANY COMBINATION: CPT

## 2023-12-01 PROCEDURE — 84450 TRANSFERASE (AST) (SGOT): CPT | Performed by: PHYSICIAN ASSISTANT

## 2023-12-01 PROCEDURE — 85730 THROMBOPLASTIN TIME PARTIAL: CPT | Performed by: NURSE ANESTHETIST, CERTIFIED REGISTERED

## 2023-12-01 PROCEDURE — 06BP4ZZ EXCISION OF RIGHT SAPHENOUS VEIN, PERCUTANEOUS ENDOSCOPIC APPROACH: ICD-10-PCS | Performed by: THORACIC SURGERY (CARDIOTHORACIC VASCULAR SURGERY)

## 2023-12-01 PROCEDURE — 86923 COMPATIBILITY TEST ELECTRIC: CPT | Performed by: THORACIC SURGERY (CARDIOTHORACIC VASCULAR SURGERY)

## 2023-12-01 PROCEDURE — 250N000025 HC SEVOFLURANE, PER MIN: Performed by: THORACIC SURGERY (CARDIOTHORACIC VASCULAR SURGERY)

## 2023-12-01 PROCEDURE — P9059 PLASMA, FRZ BETWEEN 8-24HOUR: HCPCS | Performed by: ANESTHESIOLOGY

## 2023-12-01 PROCEDURE — 85027 COMPLETE CBC AUTOMATED: CPT | Performed by: NURSE ANESTHETIST, CERTIFIED REGISTERED

## 2023-12-01 PROCEDURE — 82330 ASSAY OF CALCIUM: CPT | Performed by: THORACIC SURGERY (CARDIOTHORACIC VASCULAR SURGERY)

## 2023-12-01 PROCEDURE — 33519 CABG ARTERY-VEIN THREE: CPT | Performed by: THORACIC SURGERY (CARDIOTHORACIC VASCULAR SURGERY)

## 2023-12-01 PROCEDURE — 85610 PROTHROMBIN TIME: CPT | Performed by: PHYSICIAN ASSISTANT

## 2023-12-01 PROCEDURE — 82805 BLOOD GASES W/O2 SATURATION: CPT | Performed by: NURSE PRACTITIONER

## 2023-12-01 PROCEDURE — 258N000003 HC RX IP 258 OP 636: Performed by: ANESTHESIOLOGY

## 2023-12-01 PROCEDURE — 999N000259 HC STATISTIC EXTUBATION

## 2023-12-01 PROCEDURE — 410N000003 HC PER-PERFUSION 1ST 30 MIN: Performed by: THORACIC SURGERY (CARDIOTHORACIC VASCULAR SURGERY)

## 2023-12-01 PROCEDURE — 250N000009 HC RX 250: Performed by: THORACIC SURGERY (CARDIOTHORACIC VASCULAR SURGERY)

## 2023-12-01 PROCEDURE — 5A1221Z PERFORMANCE OF CARDIAC OUTPUT, CONTINUOUS: ICD-10-PCS | Performed by: THORACIC SURGERY (CARDIOTHORACIC VASCULAR SURGERY)

## 2023-12-01 PROCEDURE — 85730 THROMBOPLASTIN TIME PARTIAL: CPT | Performed by: PHYSICIAN ASSISTANT

## 2023-12-01 PROCEDURE — 85027 COMPLETE CBC AUTOMATED: CPT | Performed by: PHYSICIAN ASSISTANT

## 2023-12-01 PROCEDURE — 250N000013 HC RX MED GY IP 250 OP 250 PS 637: Performed by: THORACIC SURGERY (CARDIOTHORACIC VASCULAR SURGERY)

## 2023-12-01 PROCEDURE — P9045 ALBUMIN (HUMAN), 5%, 250 ML: HCPCS | Mod: JZ | Performed by: NURSE ANESTHETIST, CERTIFIED REGISTERED

## 2023-12-01 PROCEDURE — 250N000011 HC RX IP 250 OP 636: Mod: JZ | Performed by: THORACIC SURGERY (CARDIOTHORACIC VASCULAR SURGERY)

## 2023-12-01 PROCEDURE — 250N000012 HC RX MED GY IP 250 OP 636 PS 637: Performed by: NURSE ANESTHETIST, CERTIFIED REGISTERED

## 2023-12-01 PROCEDURE — 85049 AUTOMATED PLATELET COUNT: CPT | Performed by: THORACIC SURGERY (CARDIOTHORACIC VASCULAR SURGERY)

## 2023-12-01 PROCEDURE — 250N000012 HC RX MED GY IP 250 OP 636 PS 637: Mod: JZ | Performed by: THORACIC SURGERY (CARDIOTHORACIC VASCULAR SURGERY)

## 2023-12-01 PROCEDURE — 360N000079 HC SURGERY LEVEL 6, PER MIN: Performed by: THORACIC SURGERY (CARDIOTHORACIC VASCULAR SURGERY)

## 2023-12-01 PROCEDURE — 250N000011 HC RX IP 250 OP 636: Mod: JZ

## 2023-12-01 PROCEDURE — 370N000017 HC ANESTHESIA TECHNICAL FEE, PER MIN: Performed by: THORACIC SURGERY (CARDIOTHORACIC VASCULAR SURGERY)

## 2023-12-01 PROCEDURE — 85730 THROMBOPLASTIN TIME PARTIAL: CPT | Performed by: THORACIC SURGERY (CARDIOTHORACIC VASCULAR SURGERY)

## 2023-12-01 PROCEDURE — 85384 FIBRINOGEN ACTIVITY: CPT | Performed by: NURSE ANESTHETIST, CERTIFIED REGISTERED

## 2023-12-01 PROCEDURE — 250N000013 HC RX MED GY IP 250 OP 250 PS 637: Performed by: PHYSICIAN ASSISTANT

## 2023-12-01 PROCEDURE — 250N000011 HC RX IP 250 OP 636: Performed by: ANESTHESIOLOGY

## 2023-12-01 PROCEDURE — 250N000009 HC RX 250: Performed by: PHYSICIAN ASSISTANT

## 2023-12-01 PROCEDURE — 250N000011 HC RX IP 250 OP 636: Mod: JZ | Performed by: ANESTHESIOLOGY

## 2023-12-01 PROCEDURE — 06BQ4ZZ EXCISION OF LEFT SAPHENOUS VEIN, PERCUTANEOUS ENDOSCOPIC APPROACH: ICD-10-PCS | Performed by: THORACIC SURGERY (CARDIOTHORACIC VASCULAR SURGERY)

## 2023-12-01 PROCEDURE — C1713 ANCHOR/SCREW BN/BN,TIS/BN: HCPCS | Performed by: THORACIC SURGERY (CARDIOTHORACIC VASCULAR SURGERY)

## 2023-12-01 PROCEDURE — 99291 CRITICAL CARE FIRST HOUR: CPT | Mod: FT | Performed by: NURSE PRACTITIONER

## 2023-12-01 PROCEDURE — 82805 BLOOD GASES W/O2 SATURATION: CPT | Performed by: PHYSICIAN ASSISTANT

## 2023-12-01 PROCEDURE — 021209W BYPASS CORONARY ARTERY, THREE ARTERIES FROM AORTA WITH AUTOLOGOUS VENOUS TISSUE, OPEN APPROACH: ICD-10-PCS | Performed by: THORACIC SURGERY (CARDIOTHORACIC VASCULAR SURGERY)

## 2023-12-01 PROCEDURE — 82805 BLOOD GASES W/O2 SATURATION: CPT | Performed by: SURGERY

## 2023-12-01 PROCEDURE — 250N000009 HC RX 250: Performed by: NURSE ANESTHETIST, CERTIFIED REGISTERED

## 2023-12-01 PROCEDURE — 02100Z9 BYPASS CORONARY ARTERY, ONE ARTERY FROM LEFT INTERNAL MAMMARY, OPEN APPROACH: ICD-10-PCS | Performed by: THORACIC SURGERY (CARDIOTHORACIC VASCULAR SURGERY)

## 2023-12-01 PROCEDURE — 84100 ASSAY OF PHOSPHORUS: CPT | Performed by: PHYSICIAN ASSISTANT

## 2023-12-01 DEVICE — SS SUTURE, 3 PER SLEEVE
Type: IMPLANTABLE DEVICE | Site: CHEST | Status: FUNCTIONAL
Brand: MYO/WIRE II

## 2023-12-01 DEVICE — SS SUTURE, 4 PER SLEEVE
Type: IMPLANTABLE DEVICE | Site: CHEST | Status: FUNCTIONAL
Brand: MYO/WIRE II

## 2023-12-01 RX ORDER — ONDANSETRON 4 MG/1
4 TABLET, ORALLY DISINTEGRATING ORAL EVERY 6 HOURS PRN
Status: DISCONTINUED | OUTPATIENT
Start: 2023-12-01 | End: 2023-12-06 | Stop reason: HOSPADM

## 2023-12-01 RX ORDER — NALOXONE HYDROCHLORIDE 0.4 MG/ML
0.2 INJECTION, SOLUTION INTRAMUSCULAR; INTRAVENOUS; SUBCUTANEOUS
Status: DISCONTINUED | OUTPATIENT
Start: 2023-12-01 | End: 2023-12-06 | Stop reason: HOSPADM

## 2023-12-01 RX ORDER — MULTIVITAMIN,THERAPEUTIC
1 TABLET ORAL DAILY
Status: DISCONTINUED | OUTPATIENT
Start: 2023-12-02 | End: 2023-12-06 | Stop reason: HOSPADM

## 2023-12-01 RX ORDER — HEPARIN SODIUM 1000 [USP'U]/ML
INJECTION, SOLUTION INTRAVENOUS; SUBCUTANEOUS PRN
Status: DISCONTINUED | OUTPATIENT
Start: 2023-12-01 | End: 2023-12-01

## 2023-12-01 RX ORDER — ONDANSETRON 2 MG/ML
4 INJECTION INTRAMUSCULAR; INTRAVENOUS EVERY 6 HOURS PRN
Status: DISCONTINUED | OUTPATIENT
Start: 2023-12-01 | End: 2023-12-06 | Stop reason: HOSPADM

## 2023-12-01 RX ORDER — ASPIRIN 81 MG/1
162 TABLET, CHEWABLE ORAL
Status: COMPLETED | OUTPATIENT
Start: 2023-12-01 | End: 2023-12-01

## 2023-12-01 RX ORDER — KETOROLAC TROMETHAMINE 15 MG/ML
15 INJECTION, SOLUTION INTRAMUSCULAR; INTRAVENOUS ONCE
Qty: 1 ML | Refills: 0 | Status: COMPLETED | OUTPATIENT
Start: 2023-12-01 | End: 2023-12-01

## 2023-12-01 RX ORDER — NITROGLYCERIN 10 MG/100ML
INJECTION INTRAVENOUS PRN
Status: DISCONTINUED | OUTPATIENT
Start: 2023-12-01 | End: 2023-12-01

## 2023-12-01 RX ORDER — METHADONE HYDROCHLORIDE 10 MG/ML
INJECTION, SOLUTION INTRAMUSCULAR; INTRAVENOUS; SUBCUTANEOUS
Status: DISCONTINUED
Start: 2023-12-01 | End: 2023-12-01 | Stop reason: HOSPADM

## 2023-12-01 RX ORDER — KETAMINE HYDROCHLORIDE 10 MG/ML
INJECTION INTRAMUSCULAR; INTRAVENOUS PRN
Status: DISCONTINUED | OUTPATIENT
Start: 2023-12-01 | End: 2023-12-01

## 2023-12-01 RX ORDER — CEFAZOLIN SODIUM/WATER 2 G/20 ML
2 SYRINGE (ML) INTRAVENOUS
Status: DISCONTINUED | OUTPATIENT
Start: 2023-12-01 | End: 2023-12-01 | Stop reason: HOSPADM

## 2023-12-01 RX ORDER — ONDANSETRON 2 MG/ML
INJECTION INTRAMUSCULAR; INTRAVENOUS PRN
Status: DISCONTINUED | OUTPATIENT
Start: 2023-12-01 | End: 2023-12-01

## 2023-12-01 RX ORDER — LIDOCAINE 40 MG/G
CREAM TOPICAL
Status: DISCONTINUED | OUTPATIENT
Start: 2023-12-01 | End: 2023-12-01

## 2023-12-01 RX ORDER — CHLORHEXIDINE GLUCONATE ORAL RINSE 1.2 MG/ML
15 SOLUTION DENTAL EVERY 12 HOURS
Status: DISCONTINUED | OUTPATIENT
Start: 2023-12-01 | End: 2023-12-01

## 2023-12-01 RX ORDER — SODIUM CHLORIDE 9 MG/ML
INJECTION, SOLUTION INTRAVENOUS CONTINUOUS PRN
Status: DISCONTINUED | OUTPATIENT
Start: 2023-12-01 | End: 2023-12-01

## 2023-12-01 RX ORDER — HYDROMORPHONE HCL IN WATER/PF 6 MG/30 ML
0.2 PATIENT CONTROLLED ANALGESIA SYRINGE INTRAVENOUS
Status: DISCONTINUED | OUTPATIENT
Start: 2023-12-01 | End: 2023-12-02

## 2023-12-01 RX ORDER — OXYCODONE HYDROCHLORIDE 5 MG/1
5 TABLET ORAL EVERY 4 HOURS PRN
Status: DISCONTINUED | OUTPATIENT
Start: 2023-12-01 | End: 2023-12-06 | Stop reason: HOSPADM

## 2023-12-01 RX ORDER — PANTOPRAZOLE SODIUM 40 MG/1
40 TABLET, DELAYED RELEASE ORAL DAILY
Status: DISCONTINUED | OUTPATIENT
Start: 2023-12-01 | End: 2023-12-06 | Stop reason: HOSPADM

## 2023-12-01 RX ORDER — PROTAMINE SULFATE 10 MG/ML
INJECTION, SOLUTION INTRAVENOUS PRN
Status: DISCONTINUED | OUTPATIENT
Start: 2023-12-01 | End: 2023-12-01

## 2023-12-01 RX ORDER — FERROUS SULFATE 325(65) MG
324 TABLET ORAL DAILY
Status: DISCONTINUED | OUTPATIENT
Start: 2023-12-02 | End: 2023-12-06 | Stop reason: HOSPADM

## 2023-12-01 RX ORDER — HYDROMORPHONE HCL IN WATER/PF 6 MG/30 ML
0.4 PATIENT CONTROLLED ANALGESIA SYRINGE INTRAVENOUS
Status: DISCONTINUED | OUTPATIENT
Start: 2023-12-01 | End: 2023-12-02

## 2023-12-01 RX ORDER — ASPIRIN 300 MG/1
300 SUPPOSITORY RECTAL DAILY
Status: DISCONTINUED | OUTPATIENT
Start: 2023-12-01 | End: 2023-12-06 | Stop reason: HOSPADM

## 2023-12-01 RX ORDER — PHENYLEPHRINE HCL IN 0.9% NACL 50MG/250ML
.1-6 PLASTIC BAG, INJECTION (ML) INTRAVENOUS CONTINUOUS
Status: DISCONTINUED | OUTPATIENT
Start: 2023-12-01 | End: 2023-12-01 | Stop reason: HOSPADM

## 2023-12-01 RX ORDER — CALCIUM GLUCONATE 20 MG/ML
1 INJECTION, SOLUTION INTRAVENOUS
Status: DISCONTINUED | OUTPATIENT
Start: 2023-12-01 | End: 2023-12-06 | Stop reason: HOSPADM

## 2023-12-01 RX ORDER — LIDOCAINE 4 G/G
1-2 PATCH TOPICAL EVERY 24 HOURS
Status: DISCONTINUED | OUTPATIENT
Start: 2023-12-01 | End: 2023-12-06 | Stop reason: HOSPADM

## 2023-12-01 RX ORDER — HYDRALAZINE HYDROCHLORIDE 20 MG/ML
10 INJECTION INTRAMUSCULAR; INTRAVENOUS EVERY 30 MIN PRN
Status: DISCONTINUED | OUTPATIENT
Start: 2023-12-01 | End: 2023-12-06 | Stop reason: HOSPADM

## 2023-12-01 RX ORDER — PROCHLORPERAZINE MALEATE 10 MG
10 TABLET ORAL EVERY 6 HOURS PRN
Status: DISCONTINUED | OUTPATIENT
Start: 2023-12-01 | End: 2023-12-06 | Stop reason: HOSPADM

## 2023-12-01 RX ORDER — SODIUM CHLORIDE, SODIUM GLUCONATE, SODIUM ACETATE, POTASSIUM CHLORIDE AND MAGNESIUM CHLORIDE 526; 502; 368; 37; 30 MG/100ML; MG/100ML; MG/100ML; MG/100ML; MG/100ML
1000 INJECTION, SOLUTION INTRAVENOUS
Status: DISCONTINUED | OUTPATIENT
Start: 2023-12-01 | End: 2023-12-01

## 2023-12-01 RX ORDER — HEPARIN SODIUM 5000 [USP'U]/.5ML
5000 INJECTION, SOLUTION INTRAVENOUS; SUBCUTANEOUS EVERY 8 HOURS
Status: DISCONTINUED | OUTPATIENT
Start: 2023-12-02 | End: 2023-12-06 | Stop reason: HOSPADM

## 2023-12-01 RX ORDER — METHADONE HYDROCHLORIDE 10 MG/ML
20 INJECTION, SOLUTION INTRAMUSCULAR; INTRAVENOUS; SUBCUTANEOUS ONCE
Status: COMPLETED | OUTPATIENT
Start: 2023-12-01 | End: 2023-12-01

## 2023-12-01 RX ORDER — CALCIUM GLUCONATE 20 MG/ML
2 INJECTION, SOLUTION INTRAVENOUS
Status: DISCONTINUED | OUTPATIENT
Start: 2023-12-01 | End: 2023-12-06 | Stop reason: HOSPADM

## 2023-12-01 RX ORDER — DEXMEDETOMIDINE HYDROCHLORIDE 4 UG/ML
.2-1.2 INJECTION, SOLUTION INTRAVENOUS CONTINUOUS
Status: DISCONTINUED | OUTPATIENT
Start: 2023-12-01 | End: 2023-12-01 | Stop reason: HOSPADM

## 2023-12-01 RX ORDER — LIDOCAINE 40 MG/G
CREAM TOPICAL
Status: DISCONTINUED | OUTPATIENT
Start: 2023-12-01 | End: 2023-12-01 | Stop reason: HOSPADM

## 2023-12-01 RX ORDER — ASPIRIN 81 MG/1
162 TABLET, CHEWABLE ORAL DAILY
Status: DISCONTINUED | OUTPATIENT
Start: 2023-12-01 | End: 2023-12-06 | Stop reason: HOSPADM

## 2023-12-01 RX ORDER — DEXTROSE MONOHYDRATE 25 G/50ML
25-50 INJECTION, SOLUTION INTRAVENOUS
Status: DISCONTINUED | OUTPATIENT
Start: 2023-12-01 | End: 2023-12-02

## 2023-12-01 RX ORDER — PHENYLEPHRINE HCL IN 0.9% NACL 50MG/250ML
.1-4 PLASTIC BAG, INJECTION (ML) INTRAVENOUS CONTINUOUS PRN
Status: DISCONTINUED | OUTPATIENT
Start: 2023-12-01 | End: 2023-12-02

## 2023-12-01 RX ORDER — NALOXONE HYDROCHLORIDE 0.4 MG/ML
0.4 INJECTION, SOLUTION INTRAMUSCULAR; INTRAVENOUS; SUBCUTANEOUS
Status: DISCONTINUED | OUTPATIENT
Start: 2023-12-01 | End: 2023-12-06 | Stop reason: HOSPADM

## 2023-12-01 RX ORDER — DEXMEDETOMIDINE HYDROCHLORIDE 4 UG/ML
.2-.7 INJECTION, SOLUTION INTRAVENOUS CONTINUOUS
Status: DISCONTINUED | OUTPATIENT
Start: 2023-12-01 | End: 2023-12-02

## 2023-12-01 RX ORDER — CEFAZOLIN SODIUM 2 G/100ML
2 INJECTION, SOLUTION INTRAVENOUS EVERY 8 HOURS
Qty: 300 ML | Refills: 0 | Status: COMPLETED | OUTPATIENT
Start: 2023-12-01 | End: 2023-12-02

## 2023-12-01 RX ORDER — NITROGLYCERIN 5 MG/ML
VIAL (ML) INTRAVENOUS
Status: DISCONTINUED
Start: 2023-12-01 | End: 2023-12-01 | Stop reason: HOSPADM

## 2023-12-01 RX ORDER — ASPIRIN 81 MG/1
81 TABLET, CHEWABLE ORAL
Status: COMPLETED | OUTPATIENT
Start: 2023-12-01 | End: 2023-12-01

## 2023-12-01 RX ORDER — EPHEDRINE SULFATE 50 MG/ML
INJECTION, SOLUTION INTRAMUSCULAR; INTRAVENOUS; SUBCUTANEOUS PRN
Status: DISCONTINUED | OUTPATIENT
Start: 2023-12-01 | End: 2023-12-01

## 2023-12-01 RX ORDER — FENTANYL CITRATE 50 UG/ML
INJECTION, SOLUTION INTRAMUSCULAR; INTRAVENOUS PRN
Status: DISCONTINUED | OUTPATIENT
Start: 2023-12-01 | End: 2023-12-01

## 2023-12-01 RX ORDER — POLYETHYLENE GLYCOL 3350 17 G/17G
17 POWDER, FOR SOLUTION ORAL DAILY
Status: DISCONTINUED | OUTPATIENT
Start: 2023-12-02 | End: 2023-12-06 | Stop reason: HOSPADM

## 2023-12-01 RX ORDER — NICOTINE POLACRILEX 4 MG
15-30 LOZENGE BUCCAL
Status: DISCONTINUED | OUTPATIENT
Start: 2023-12-01 | End: 2023-12-02

## 2023-12-01 RX ORDER — VANCOMYCIN HYDROCHLORIDE 1 G/20ML
INJECTION, POWDER, LYOPHILIZED, FOR SOLUTION INTRAVENOUS PRN
Status: DISCONTINUED | OUTPATIENT
Start: 2023-12-01 | End: 2023-12-01 | Stop reason: HOSPADM

## 2023-12-01 RX ORDER — LANOLIN ALCOHOL/MO/W.PET/CERES
1000 CREAM (GRAM) TOPICAL DAILY
Status: DISCONTINUED | OUTPATIENT
Start: 2023-12-02 | End: 2023-12-06 | Stop reason: HOSPADM

## 2023-12-01 RX ORDER — AMOXICILLIN 250 MG
1 CAPSULE ORAL 2 TIMES DAILY
Status: DISCONTINUED | OUTPATIENT
Start: 2023-12-01 | End: 2023-12-06 | Stop reason: HOSPADM

## 2023-12-01 RX ORDER — CEFAZOLIN SODIUM/WATER 2 G/20 ML
2 SYRINGE (ML) INTRAVENOUS SEE ADMIN INSTRUCTIONS
Status: DISCONTINUED | OUTPATIENT
Start: 2023-12-01 | End: 2023-12-01 | Stop reason: HOSPADM

## 2023-12-01 RX ORDER — ACETAMINOPHEN 325 MG/1
975 TABLET ORAL EVERY 8 HOURS
Status: COMPLETED | OUTPATIENT
Start: 2023-12-01 | End: 2023-12-04

## 2023-12-01 RX ORDER — ATORVASTATIN CALCIUM 40 MG/1
40 TABLET, FILM COATED ORAL DAILY
Status: DISCONTINUED | OUTPATIENT
Start: 2023-12-02 | End: 2023-12-02

## 2023-12-01 RX ORDER — SODIUM CHLORIDE, SODIUM LACTATE, POTASSIUM CHLORIDE, CALCIUM CHLORIDE 600; 310; 30; 20 MG/100ML; MG/100ML; MG/100ML; MG/100ML
INJECTION, SOLUTION INTRAVENOUS CONTINUOUS
Status: DISCONTINUED | OUTPATIENT
Start: 2023-12-01 | End: 2023-12-01 | Stop reason: HOSPADM

## 2023-12-01 RX ORDER — BISACODYL 10 MG
10 SUPPOSITORY, RECTAL RECTAL DAILY PRN
Status: DISCONTINUED | OUTPATIENT
Start: 2023-12-01 | End: 2023-12-06 | Stop reason: HOSPADM

## 2023-12-01 RX ORDER — CHLORHEXIDINE GLUCONATE ORAL RINSE 1.2 MG/ML
10 SOLUTION DENTAL ONCE
Status: COMPLETED | OUTPATIENT
Start: 2023-12-01 | End: 2023-12-01

## 2023-12-01 RX ORDER — DEXMEDETOMIDINE HYDROCHLORIDE 4 UG/ML
.2-.7 INJECTION, SOLUTION INTRAVENOUS CONTINUOUS
Status: DISCONTINUED | OUTPATIENT
Start: 2023-12-01 | End: 2023-12-01

## 2023-12-01 RX ORDER — ACETAMINOPHEN 325 MG/1
650 TABLET ORAL EVERY 4 HOURS PRN
Status: DISCONTINUED | OUTPATIENT
Start: 2023-12-04 | End: 2023-12-06 | Stop reason: HOSPADM

## 2023-12-01 RX ORDER — OXYCODONE HYDROCHLORIDE 5 MG/1
10 TABLET ORAL EVERY 4 HOURS PRN
Status: DISCONTINUED | OUTPATIENT
Start: 2023-12-01 | End: 2023-12-06 | Stop reason: HOSPADM

## 2023-12-01 RX ORDER — MAGNESIUM SULFATE 4 G/50ML
4 INJECTION INTRAVENOUS ONCE
Status: COMPLETED | OUTPATIENT
Start: 2023-12-01 | End: 2023-12-01

## 2023-12-01 RX ORDER — ATROPINE SULFATE 0.4 MG/ML
AMPUL (ML) INJECTION PRN
Status: DISCONTINUED | OUTPATIENT
Start: 2023-12-01 | End: 2023-12-01

## 2023-12-01 RX ADMIN — MAGNESIUM SULFATE HEPTAHYDRATE 4 G: 80 INJECTION, SOLUTION INTRAVENOUS at 06:31

## 2023-12-01 RX ADMIN — Medication 5 MG: at 08:09

## 2023-12-01 RX ADMIN — SODIUM CHLORIDE, POTASSIUM CHLORIDE, SODIUM LACTATE AND CALCIUM CHLORIDE: 600; 310; 30; 20 INJECTION, SOLUTION INTRAVENOUS at 06:28

## 2023-12-01 RX ADMIN — ONDANSETRON 4 MG: 2 INJECTION INTRAMUSCULAR; INTRAVENOUS at 13:55

## 2023-12-01 RX ADMIN — Medication 10 MG: at 08:10

## 2023-12-01 RX ADMIN — EPINEPHRINE 0.02 MCG/KG/MIN: 1 INJECTION INTRAMUSCULAR; INTRAVENOUS; SUBCUTANEOUS at 08:10

## 2023-12-01 RX ADMIN — Medication 10 MG: at 08:45

## 2023-12-01 RX ADMIN — KETAMINE HYDROCHLORIDE 40 MG: 10 INJECTION INTRAMUSCULAR; INTRAVENOUS at 07:40

## 2023-12-01 RX ADMIN — Medication 10 MG: at 08:39

## 2023-12-01 RX ADMIN — MIDAZOLAM 1 MG: 1 INJECTION INTRAMUSCULAR; INTRAVENOUS at 07:27

## 2023-12-01 RX ADMIN — Medication 5 MG: at 08:08

## 2023-12-01 RX ADMIN — CHLORHEXIDINE GLUCONATE 10 ML: 1.2 SOLUTION ORAL at 06:43

## 2023-12-01 RX ADMIN — PROTAMINE SULFATE 180 MG: 10 INJECTION, SOLUTION INTRAVENOUS at 12:32

## 2023-12-01 RX ADMIN — DEXMEDETOMIDINE HYDROCHLORIDE 0.5 MCG/KG/HR: 400 INJECTION INTRAVENOUS at 14:26

## 2023-12-01 RX ADMIN — CEFAZOLIN SODIUM 2 G: 2 INJECTION, SOLUTION INTRAVENOUS at 20:26

## 2023-12-01 RX ADMIN — DESMOPRESSIN ACETATE 33.2 MCG: 4 INJECTION, SOLUTION INTRAVENOUS; SUBCUTANEOUS at 12:49

## 2023-12-01 RX ADMIN — CALCIUM GLUCONATE 1 G: 20 INJECTION, SOLUTION INTRAVENOUS at 20:25

## 2023-12-01 RX ADMIN — ALBUMIN HUMAN 12.5 G: 0.05 INJECTION, SOLUTION INTRAVENOUS at 14:10

## 2023-12-01 RX ADMIN — PHENYLEPHRINE HYDROCHLORIDE 100 MCG: 10 INJECTION INTRAVENOUS at 12:52

## 2023-12-01 RX ADMIN — Medication 5 MG: at 07:55

## 2023-12-01 RX ADMIN — DEXMEDETOMIDINE HYDROCHLORIDE 0.5 MCG/KG/HR: 400 INJECTION INTRAVENOUS at 08:41

## 2023-12-01 RX ADMIN — NICARDIPINE HYDROCHLORIDE 5 MG/HR: 0.2 INJECTION, SOLUTION INTRAVENOUS at 12:48

## 2023-12-01 RX ADMIN — PHENYLEPHRINE HYDROCHLORIDE 50 MCG: 10 INJECTION INTRAVENOUS at 13:38

## 2023-12-01 RX ADMIN — Medication 3 G: at 12:12

## 2023-12-01 RX ADMIN — KETAMINE HYDROCHLORIDE 10 MG: 10 INJECTION INTRAMUSCULAR; INTRAVENOUS at 07:27

## 2023-12-01 RX ADMIN — NITROGLYCERIN 10 MCG: 10 INJECTION INTRAVENOUS at 10:22

## 2023-12-01 RX ADMIN — FENTANYL CITRATE 50 MCG: 50 INJECTION INTRAMUSCULAR; INTRAVENOUS at 08:49

## 2023-12-01 RX ADMIN — INSULIN HUMAN 5 UNITS/HR: 1 INJECTION, SOLUTION INTRAVENOUS at 10:57

## 2023-12-01 RX ADMIN — Medication 1 G: at 08:30

## 2023-12-01 RX ADMIN — ALBUMIN HUMAN: 0.05 INJECTION, SOLUTION INTRAVENOUS at 08:03

## 2023-12-01 RX ADMIN — Medication 5 MG: at 08:31

## 2023-12-01 RX ADMIN — PHENYLEPHRINE HYDROCHLORIDE 50 MCG: 10 INJECTION INTRAVENOUS at 12:50

## 2023-12-01 RX ADMIN — Medication 2 G: at 07:50

## 2023-12-01 RX ADMIN — NITROGLYCERIN 100 MCG: 10 INJECTION INTRAVENOUS at 12:34

## 2023-12-01 RX ADMIN — ATROPINE SULFATE 0.4 MG: 0.4 INJECTION, SOLUTION ENDOTRACHEAL; INTRAMEDULLARY; INTRAMUSCULAR; INTRAVENOUS; SUBCUTANEOUS at 07:40

## 2023-12-01 RX ADMIN — HEPARIN SODIUM 25000 UNITS: 1000 INJECTION INTRAVENOUS; SUBCUTANEOUS at 10:03

## 2023-12-01 RX ADMIN — OXYCODONE HYDROCHLORIDE 5 MG: 5 TABLET ORAL at 23:35

## 2023-12-01 RX ADMIN — SODIUM CHLORIDE: 9 INJECTION, SOLUTION INTRAVENOUS at 08:16

## 2023-12-01 RX ADMIN — PROTAMINE SULFATE 20 MG: 10 INJECTION, SOLUTION INTRAVENOUS at 12:31

## 2023-12-01 RX ADMIN — Medication 5 MG: at 08:39

## 2023-12-01 RX ADMIN — KETOROLAC TROMETHAMINE 15 MG: 15 INJECTION, SOLUTION INTRAMUSCULAR; INTRAVENOUS at 17:19

## 2023-12-01 RX ADMIN — ALBUMIN HUMAN 12.5 G: 0.05 INJECTION, SOLUTION INTRAVENOUS at 21:42

## 2023-12-01 RX ADMIN — ROCURONIUM BROMIDE 60 MG: 50 INJECTION, SOLUTION INTRAVENOUS at 07:41

## 2023-12-01 RX ADMIN — Medication 5 MG: at 08:41

## 2023-12-01 RX ADMIN — ROCURONIUM BROMIDE 30 MG: 50 INJECTION, SOLUTION INTRAVENOUS at 11:13

## 2023-12-01 RX ADMIN — Medication 0.2 MCG/KG/MIN: at 12:54

## 2023-12-01 RX ADMIN — AMINOCAPROIC ACID 1 G/HR: 250 INJECTION, SOLUTION INTRAVENOUS at 09:42

## 2023-12-01 RX ADMIN — ASPIRIN 81 MG CHEWABLE TABLET 162 MG: 81 TABLET CHEWABLE at 06:35

## 2023-12-01 RX ADMIN — ROCURONIUM BROMIDE 40 MG: 50 INJECTION, SOLUTION INTRAVENOUS at 09:00

## 2023-12-01 RX ADMIN — INSULIN HUMAN 5 UNITS: 100 INJECTION, SOLUTION PARENTERAL at 10:57

## 2023-12-01 RX ADMIN — FENTANYL CITRATE 50 MCG: 50 INJECTION INTRAMUSCULAR; INTRAVENOUS at 08:46

## 2023-12-01 RX ADMIN — ACETAMINOPHEN 975 MG: 325 TABLET ORAL at 15:36

## 2023-12-01 RX ADMIN — SODIUM CHLORIDE 7.5 G: 900 INJECTION, SOLUTION INTRAVENOUS at 08:39

## 2023-12-01 RX ADMIN — CALCIUM GLUCONATE 2 G: 20 INJECTION, SOLUTION INTRAVENOUS at 14:46

## 2023-12-01 ASSESSMENT — ACTIVITIES OF DAILY LIVING (ADL)
ADLS_ACUITY_SCORE: 22
ADLS_ACUITY_SCORE: 22
ADLS_ACUITY_SCORE: 21
ADLS_ACUITY_SCORE: 22
ADLS_ACUITY_SCORE: 21
ADLS_ACUITY_SCORE: 22
ADLS_ACUITY_SCORE: 21

## 2023-12-01 NOTE — ANESTHESIA CARE TRANSFER NOTE
Patient: Betty Pan    Procedure: Procedure(s):  CORONARY ARTERY BYPASS GRAFT (CABG) X 4 ,LEFT INTERNAL MAMMARY ARTERY HARVEST, BILATERAL LOWER LEG ENDOSCOPIC VESSEL PROCUREMENT, EPIAORTIC ULTRASOUND  ANESTHESIA TRANSESOPHAGEAL ECHOCARDIOGRAM       Diagnosis: CAD (coronary artery disease) [I25.10]  Diagnosis Additional Information: No value filed.    Anesthesia Type:   General     Note:    Oropharynx: endotracheal tube in place, oral gastic tube in place and ventilatory support  Level of Consciousness: iatrogenic sedation  Patient oxygen source: ambu.  Level of Supplemental Oxygen (L/min / FiO2): 8  Independent Airway: airway patency not satisfactory and stable  Dentition: dentition unchanged  Vital Signs Stable: post-procedure vital signs reviewed and stable  Report to RN Given: handoff report given  Patient transferred to: ICU  Comments: Transported to ICU fully monitored w Dr Javier and CV fellow.  Placed on ventilator per ICU settings.  Report given to ICU team at bedside. All questions answered. RN to call Blood Bank for FFP and platelets that were ordered.   ICU Handoff: Call for PAUSE to initiate/utilize ICU HANDOFF, Identified Patient, Identified Responsible Provider, Reviewed the Pertinent Medical History, Discussed Surgical Course, Reviewed Intra-OP Anesthesia Management and Issues during Anesthesia, Set Expectations for Post Procedure Period and Allowed Opportunity for Questions and Acknowledgement of Understanding      Vitals:  Vitals Value Taken Time   /61 12/01/23  1403   Temp     Pulse 97 12/01/23 1403   Resp     SpO2 97 % 12/01/23 1403   Vitals shown include unfiled device data.    Electronically Signed By: ANDREW Escobedo CRNA  December 1, 2023  2:04 PM

## 2023-12-01 NOTE — ANESTHESIA PROCEDURE NOTES
Airway       Patient location during procedure: OR       Procedure Start/Stop Times: 12/1/2023 7:45 AM  Staff -        Anesthesiologist:  Bella Javier MD       CRNA: Brad Lombardi APRN CRNA       Other Anesthesia Staff: Snow Porter RN       Performed By: SRNA  Consent for Airway        Urgency: elective  Indications and Patient Condition       Indications for airway management: trista-procedural       Induction type:intravenous       Mask difficulty assessment: 2 - vent by mask + OA or adjuvant +/- NMBA    Final Airway Details       Final airway type: endotracheal airway       Successful airway: ETT - single  Endotracheal Airway Details        ETT size (mm): 7.0       Cuffed: yes       Cuff volume (mL): 10       Successful intubation technique: video laryngoscopy       VL Blade Size: Glidescope 3       Grade View of Cords: 1       Adjucts: stylet       Position: Center       Measured from: lips       Secured at (cm): 20       Bite block used: None    Post intubation assessment        Placement verified by: capnometry, equal breath sounds and chest rise        Number of attempts at approach: 1       Number of other approaches attempted: 0       Secured with: tape       Ease of procedure: easy       Dentition: Intact and Unchanged    Medication(s) Administered   Medication Administration Time: 12/1/2023 7:45 AM

## 2023-12-01 NOTE — ADDENDUM NOTE
Addendum  created 12/01/23 1423 by Brad Lombardi APRN CRNA    Intraprocedure Event edited, Intraprocedure Meds edited

## 2023-12-01 NOTE — ANESTHESIA PROCEDURE NOTES
Arterial Line Procedure Note    Pre-Procedure   Staff -        Anesthesiologist:  Bella Javier MD       Performed By: anesthesiologist       Location: OR       Pre-Anesthestic Checklist: patient identified, IV checked, risks and benefits discussed, informed consent, monitors and equipment checked, pre-op evaluation and at physician/surgeon's request  Timeout:       Correct Patient: Yes        Correct Procedure: Yes        Correct Site: Yes        Correct Position: Yes   Line Placement:   This line was placed Pre Induction starting at 12/1/2023 7:25 AM and ending at 12/1/2023 7:35 AM  Procedure   Procedure: arterial line       Laterality: right       Insertion Site: brachial.  Sterile Prep        Standard elements of sterile barrier followed       Skin prep: Chloraprep  Insertion/Injection        Technique: ultrasound guided and Seldinger Technique        1. Ultrasound was used to evaluate the access site.       2. Artery evaluated via ultrasound for patency/adequacy.       3. Using real-time ultrasound the needle/catheter was observed entering the artery/vein.       4. Permanent image was captured and entered into the patient's record.       Catheter Type/Size: 20 G, 12 cm  Narrative         Secured by: suture       Tegaderm and Biopatch dressing used.       Complications: None apparent,        Arterial waveform: Yes

## 2023-12-01 NOTE — PROGRESS NOTES
Pt brought from OR to ICU, placed on mechanical ventilator w/settings:     Vent Mode: CMV/AC  (Continuous Mandatory Ventilation/ Assist Control)  FiO2 (%): 100 %  Resp Rate (Set): 18 breaths/min  Tidal Volume (Set, mL): 320 mL  PEEP (cm H2O): 5 cmH2O  Resp: 18    Will change settings as needed depending on ABG results. ETT currently in place w/tape. RT will continue to follow.    Cris Pinon, RT

## 2023-12-01 NOTE — PROGRESS NOTES
At 1730, pt placed on PST 5/5 and 40%; pt tolerated well. Pt was extubated with no issue to 4L NC with SpO2 >92%. BS diminished, no stridor heard.    ABG after extubation:   Latest Reference Range & Units 12/01/23 19:56   pH Arterial 7.37 - 7.44  7.28 (L)   pCO2 Arterial 35 - 45 mm Hg 57 (H)   PO2 Arterial 80 - 90 mm Hg 134 (H)   Bicarbonate Arterial 23 - 29 mmol/L 27   Base Excess Art mmol/L -0.3   Oxyhemoglobin 96.0 - 97.0 % 97.6 (H)   Hamlet's Test  Artline   (L): Data is abnormally low  (H): Data is abnormally high    Pt was placed on BiPAP 10/5 16 and 35%,  For more info refer to the BiPAP flowsheet  Rt will continue to follow.

## 2023-12-01 NOTE — ANESTHESIA PROCEDURE NOTES
Perioperative EL Procedure Note    Staff -        Anesthesiologist:  Bella Javier MD       Performed By: anesthesiologist  Preanesthesia Checklist:  Patient identified, IV assessed, risks and benefits discussed, monitors and equipment assessed, procedure being performed at surgeon's request and anesthesia consent obtained.    EL Probe Insertion    Probe Status PRE Insertion: NO obvious damage  Probe type:  Adult 3D  Bite block used:   Soft  Insertion Technique: Easy, no oropharyngeal manipulation  Insertion complications: None obvious  Billing Report:A EL report is NOT being generated.  Probe Status POST Removal: NO obvious damage

## 2023-12-01 NOTE — OP NOTE
..OPERATIVE REPORT     OPERATING ROOM:  Room 8    DATE OF OPERATION:  December 1, 2023    PROCEDURES PERFORMED:   Median sternotomy   Take down of the left internal mammary artery   Endoscopic greater saphenous vein procurement from the left and right lower extremities   Epiaortic ultrasound of the ascending aorta   Placement on central cardiopulmonary bypass   Quadruple vessel coronary artery bypass grafting;   -  Left internal mammary artery to the left anterior descending coronary artery  -  Separate reversed saphenous vein grafts to the left anterior descending diagonal branch 1, the obtuse marginal 1 and right posterior descending coronary arteries.   7.   Placement of temporary atrial and ventricular pacing wires     SURGEONS:    Attending Surgeon:  Angélica Rizvi MD  First Assistant: Kym Sun. STSAC  Resident Surgeon: Merrill Soto MD     ANESTHESIA:  General endotracheal    SKIN PREP:  Betadine and Duraprep    INCISIONS:  Median sternotomy, skip incisions left and right upper leg.     DRAINS: One 32 Fr mediastinal and one 24 Fr Jesu drain left pleural spaces   CULTURES: None  SPECIMENS: None  CLOSURE: Routine     PREOPERATIVE DIAGNOSES:  Heart failure with reduced left ventricular function  Ischemic cardiomyopathy  Severe multivessel coronary artery disease  Diabetes  Obesity  Recent pneumonia    POSTOPERATIVE DIAGNOSES:  Same     BRIEF HISTORY:    Betty Pan is a 48 year old woman with diabetes who presented over a month ago with heart failure and reduced left ventricular function.  She was found to have severe multi-vessel coronary artery disease.  She has diabetes and is obese.  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     FINDINGS AT OPERATION:  Her ascending aorta was free of plaque by epiaortic ultrasound.  Her pulmonary artery pressures were normal.  Her left ventricular ejection fraction was 30%.   She also had significant left ventricular hypertrophy.  The left internal  mammary artery was a 2 mm in diameter conduit with excellent flow.  The reversed saphenous vein graft measured 6 mm in diameter and was of fair to good quality. The left anterior descending coronary artery was chronically occluded.  In its mid to distal portion it had onion skinning and no lumen.  Distally it was a 1.5 in diameter vessel, a fair vessel for bypass grafting.  The left anterior descending diagonal branch 1 was a 2 mm in diameter target vessel, a good vessel for bypass grafting.  The obtuse marginal 1 was a 2 mm in diameter target vessel, a good vessel for bypass grafting.  The right posterior descending coronary artery was a 2 mm in diameter target vessel, an excellent vessel for bypass grafting.  Her heart was hyperdynamic when we came off cardiopulmonary bypass.      PROCEDURES:  The patient arrived in the operating room and was positioned supine.  An arterial line was placed.  Satisfactory general anesthesia was induced.  A transesophageal probe, Thornton-Jagdeep catheter and Bond catheter were inserted.  The patient's neck, chest, abdomen, both groins and lower extremities were prepped and draped in a standard sterile fashion.      A complete median sternotomy was made.  With the aid of the Rultract retractor, the left internal mammary artery was taken down from the subclavian vein to the xiphoid process.  At the same time Kym Sun made an incision in the left upper leg and 5 cm of the greater saphenous vein was exposed. The endoscope was then passed proximally and distally and the greater saphenous vein was dissected out circumferentially.  Its branches were clipped or cauterized.  It was clipped proximally and distally and extracted. It was distended and its branches were controlled with Ligaclips.  The leg wound was rendered hemostatic and closed in layers using running 2-0 and 3-0 Vicryl.  Dermabond was applied to the skin.  This did not provide us with enough conduit so the exact same procedure  was done on the right upper extremity.  Then there was enough conduit.    Heparin was administered.  The left internal mammary artery was prepared in the usual fashion.  A Payne retractor was inserted.  The pericardium was opened and tented up on pericardial sutures.  The aorta was  from the pulmonary artery.  Epiaortic ultrasound of the ascending aorta was carried out.  Pursestring sutures were placed in the ascending aorta and right atrium for cannulation.  When the ACT was appropriate cannulation was performed and central cardiopulmonary bypass was established.  The patients temperature was allowed to drift.  A retrograde cardioplegia catheter was placed in the coronary sinus via the right atrium.  The aorta was cross-clamped and 500 mL of cold blood cardioplegia was administered antegrade and an additional 700 mL of cold blood cardioplegia was administered retrograde.  A good arrest was achieved.  Cold topical saline and slush was applied to the heart intermittently during the period of aortic cross-clamp.      Attention was first turned to the right posterior descending coronary artery. It was dissected out for a distance of 1 cm and then opened for a distance of 8 mm.  It was bypassed in an end to side fashion using the reversed saphenous vein graft and running 7-0 prolene.  The vein graft was flushed with cold blood cardioplegia and sized to the ascending aorta and the patient received 400 mL of cold blood cardioplegia in a retrograde fashion.  Next attention was turned to the obtuse marginal 1 coronary artery.  It was dissected out for a distance of 1 cm and then opened for a distance of 8 mm.  It was bypassed in an end to side fashion using reversed saphenous vein graft and running 7-0 prolene.  The vein graft was flushed with cold blood cardioplegia and sized to the ascending aorta and the patient received another 400 mL of cold blood cardioplegia in a retrograde fashion. Next attention was  turned to the left anterior descending diagonal branch 1 coronary artery.  It was dissected out for a distance of 1 cm and then opened for a distance of 8 mm.  It was bypassed in an end to side fashion using reversed saphenous vein graft and running 7-0 prolene.  The vein graft was flushed with cold blood cardioplegia and sized to the ascending aorta and the patient received another 400 mL of cold blood cardioplegia in a retrograde fashion.  Finally attention was turned to the left anterior descending coronary artery in the interventricular groove.  It was dissected out in its apical third for a distance of 3 cm and then opened for a distance of 1 cm when a true lumen was encountered.  A pleural rent was created for passage of the left internal mammary artery and then the final distal anastomosis was performed between the left internal mammary artery and the left anterior descending coronary artery in an end to side fashion using running 7-0 prolene. As this last distal anastomosis was being perform gentle warming was begun.  The gray occluder was briefly released from the left internal mammary artery and good flow was seen down the left anterior descending coronary artery.  The gray occluder was once more applied to the left internal mammary artery and the patient received a final dose of cold blood cardioplegia in a retrograde fashion.  It had been decided to perform the 3 proximal aortosaphenous anastomoses with the aortic cross-clamp in place.  Therefore 3, 4 mm punch aortotomies were created.  The proximal aortosaphenous anastomoses were performed in an end to side fashion using running 6-0 prolene.     The gray occluder was released from the left internal mammary artery and a hot shot was delivered in a retrograde fashion.  The aortic cross-clamp was released.  The aortic cross-clamp time was 1 hour and 48 minutes.  The proximal and distal anastomoses were examined and found to be hemostatic. The patient was  defibrillated once.  Ventricular and atrial pacing wires were applied and the patient was A-V sequentially paced at a rate of 90.  The retrograde cardioplegia catheter was removed and that site repaired with two 4-0 prolene.  The left pleural space was drained of any accumulated blood and gentle ventilation resumed. The root vent was removed and that site repaired with plegetted 4-0 prolene.      The patient was placed on low dose epinephrine and neosynephrine.  When she was warm the heart was allowed to fill and eject and the patient  from cardiopulmonary bypass without difficulty. Total time on cardiopulmonary bypass was  2 hour and 3 minutes.  Protamine was administered to reverse the heparin.  The patient was decannulated and the cannulation sites were repaired with 4-0 prolene.  Hemostasis was satisfactory.    The drains were placed and secured to the skin. The sternum was approximated with a combination of single and double stainless steel sternal wires.   The sternal wound was irrigated with warm antibiotic-containing solution.  It was closed in 4 layers using 0 Stratafix for 2 layers, 2-0 Stratafix for the next layer and a subcuticular stitch of 4-0 Monocryl.      The sponge, needle and instrument counts were reported as correct.      The estimated blood loss was 500 mL.      Betty Pan was brought to the Intensive Care Unit in critical but stable condition.    Complications: None     Angélica Rizvi MD on 12/1/2023 at 1:57 PM

## 2023-12-01 NOTE — ANESTHESIA PROCEDURE NOTES
Central Line/PA Catheter Placement    Pre-Procedure   Staff -        Anesthesiologist:  Bella Javier MD       Performed By: anesthesiologist       Location: OR       Pre-Anesthestic Checklist: patient identified, IV checked, site marked, risks and benefits discussed, informed consent, monitors and equipment checked, pre-op evaluation and at physician/surgeon's request  Timeout:       Correct Patient: Yes        Correct Procedure: Yes        Correct Site: Yes        Correct Position: Yes        Correct Laterality: Yes   Line Placement:   This line was placed Post Induction starting at 12/1/2023 7:50 AM and ending at 12/1/2023 8:07 AM    Procedure   Procedure: central line       Laterality: right       Insertion Site: internal jugular.       Patient Position: supine  Sterile Prep        All elements of maximal sterile barrier technique followed       Patient Prep/Sterile Barriers: draped, hand hygiene, gloves , hat , mask , draped, gown, sterile gel and probe cover       Skin prep: Chloraprep  Insertion/Injection        Technique: ultrasound guided and Seldinger Technique        1. Ultrasound was used to evaluate the access site.       2. Vein evaluated via ultrasound for patency/adequacy.       3. Using real-time ultrasound the needle/catheter was observed entering the artery/vein.       4. Permanent image was captured and entered into the patient's record.       Introducer Type: 9 Fr, 2-lumen MAC        Type: PA/CVC with Introducer       PA Catheter Type: CCO         Appropriate RV, RA and PA waveforms noted:  Yes            Balloon down at end of the procedure:   Narrative         Secured by: suture       Tegaderm and Biopatch dressing used.       Complications: None apparent,        blood aspirated from all lumens,        Verification method: Ultrasound

## 2023-12-01 NOTE — ANESTHESIA POSTPROCEDURE EVALUATION
Patient: Betty Pan    Procedure: Procedure(s):  CORONARY ARTERY BYPASS GRAFT (CABG) X 4 ,LEFT INTERNAL MAMMARY ARTERY HARVEST, BILATERAL LOWER LEG ENDOSCOPIC VESSEL PROCUREMENT, EPIAORTIC ULTRASOUND  ANESTHESIA TRANSESOPHAGEAL ECHOCARDIOGRAM       Anesthesia Type:  General    Note:  Disposition: ICU            ICU Sign Out: Anesthesiologist/ICU physician sign out WAS performed   Postop Pain Control:    PONV:    Neuro/Psych:    Airway/Respiratory:             Sign Out: AIRWAY IN SITU/Resp. Support   CV/Hemodynamics: Uneventful            Sign Out: Acceptable CV status; No obvious hypovolemia; No obvious fluid overload   Other NRE: NONE   DID A NON-ROUTINE EVENT OCCUR? No           Last vitals:  Vitals:    12/01/23 0544 12/01/23 0550 12/01/23 0558   BP:  110/67    Pulse: 55  64   Resp: 18     Temp: 36.7  C (98  F)     SpO2:   95%       Electronically Signed By: Bella Javier MD  December 1, 2023  2:10 PM

## 2023-12-01 NOTE — PROGRESS NOTES
"CVTS Daily Progress Note   POD# 1 s/p CABG x4  Attending: Dmitri  LOS: 1    SUBJECTIVE/INTERVAL EVENTS:    Patient arrived to ICU from OR yesterday afternoon. She was subsequently extubated and weaned from pressors. No acute events overnight. Patient progressing well. Maintaining oxygen saturations on 4 LPM/bipap. Normotensive. Up to chair this morning. Pain adequately controlled. -BM / flatus. Tolerating sips but some nausea this morning. UOP adequate. Chest tube output appropriate. Hgb appropriate. Patient denies new chest pain, shortness of breath, abdominal pain, calf pain, nausea. Patient has no questions today.    OBJECTIVE:  Temp:  [96.8  F (36  C)-98.2  F (36.8  C)] 98.2  F (36.8  C)  Pulse:  [79-89] 80  Resp:  [9-24] 16  BP: ()/(47-65) 96/52  MAP:  [54 mmHg-108 mmHg] 77 mmHg  Arterial Line BP: ()/(44-89) 107/63  FiO2 (%):  [35 %-100 %] 35 %  SpO2:  [87 %-100 %] 95 %  Vitals:    12/01/23 0530 12/02/23 0638   Weight: 110.6 kg (243 lb 14.4 oz) 115.8 kg (255 lb 3.2 oz)       Clinically Significant Risk Factors          # Hypocalcemia: Lowest Ca = 7.5 mg/dL in last 2 days, will monitor and replace as appropriate     # Hypoalbuminemia: Lowest albumin = 3.4 g/dL at 12/1/2023  2:09 PM, will monitor as appropriate  # Coagulation Defect: INR = 1.34 (Ref range: 0.85 - 1.15) and/or PTT = 83 Seconds (Ref range: 22 - 38 Seconds), will monitor for bleeding  # Thrombocytopenia: Lowest platelets = 80 in last 2 days, will monitor for bleeding   # Hypertension: Noted on problem list        # Severe Obesity: Estimated body mass index is 51.54 kg/m  as calculated from the following:    Height as of this encounter: 1.499 m (4' 11\").    Weight as of this encounter: 115.8 kg (255 lb 3.2 oz)., PRESENT ON ADMISSION       # Financial/Environmental Concerns:     # History of CABG: noted on surgical history        Current Medications:    Scheduled Meds:   acetaminophen  975 mg Oral Q8H    aspirin  162 mg Oral or NG Tube " Daily    Or    aspirin  300 mg Rectal Daily    atorvastatin  40 mg Oral Daily    ceFAZolin  2 g Intravenous Q8H    cyanocobalamin  1,000 mcg Oral Daily    ferrous sulfate  324 mg Oral Daily    heparin ANTICOAGULANT  5,000 Units Subcutaneous Q8H    lidocaine  1-2 patch Transdermal Q24H    multivitamin, therapeutic  1 tablet Oral Daily    pantoprazole  40 mg Oral or NG Tube Daily    Or    pantoprazole  40 mg Oral Daily    polyethylene glycol  17 g Oral Daily    senna-docusate  1 tablet Oral BID     Continuous Infusions:   dexmedeTOMIDine Stopped (12/01/23 1623)    EPINEPHrine Stopped (12/01/23 1502)    insulin regular 1 Units/hr (12/02/23 0701)    niCARdipine Stopped (12/01/23 1523)    phenylephrine       PRN Meds:.[START ON 12/4/2023] acetaminophen, bisacodyl, calcium gluconate, calcium gluconate, calcium gluconate, glucose **OR** dextrose **OR** glucagon, EPINEPHrine, hydrALAZINE, HYDROmorphone **OR** HYDROmorphone, lactated ringers, magnesium hydroxide, naloxone **OR** naloxone **OR** naloxone **OR** naloxone, niCARdipine, ondansetron **OR** ondansetron, oxyCODONE **OR** oxyCODONE, phenylephrine, prochlorperazine **OR** prochlorperazine    Cardiographics:    Telemetry monitoring demonstrates SR with rates in the 80s per my personal review.    Imaging:  No results found for this visit on 12/01/23.    Labs, personally reviewed.  Hemoglobin   Date Value Ref Range Status   12/02/2023 8.0 (L) 11.7 - 15.7 g/dL Final   12/01/2023 10.5 (L) 11.7 - 15.7 g/dL Final   12/01/2023 8.7 (L) 11.7 - 15.7 g/dL Final     Hemoglobin POCT   Date Value Ref Range Status   12/01/2023 9.0 (L) 11.7 - 15.7 g/dL Final   12/01/2023 9.5 (L) 11.7 - 15.7 g/dL Final   12/01/2023 9.4 (L) 11.7 - 15.7 g/dL Final     WBC Count   Date Value Ref Range Status   12/02/2023 7.7 4.0 - 11.0 10e3/uL Final   12/01/2023 14.0 (H) 4.0 - 11.0 10e3/uL Final   12/01/2023 11.3 (H) 4.0 - 11.0 10e3/uL Final     Platelet Count   Date Value Ref Range Status   12/02/2023  105 (L) 150 - 450 10e3/uL Final   12/01/2023 113 (L) 150 - 450 10e3/uL Final   12/01/2023 83 (L) 150 - 450 10e3/uL Final     Creatinine   Date Value Ref Range Status   12/02/2023 0.85 0.51 - 0.95 mg/dL Final   12/01/2023 0.82 0.51 - 0.95 mg/dL Final   12/01/2023 0.72 0.51 - 0.95 mg/dL Final     Potassium   Date Value Ref Range Status   12/02/2023 4.8 3.4 - 5.3 mmol/L Final   12/01/2023 5.0 3.4 - 5.3 mmol/L Final   12/01/2023 4.6 3.4 - 5.3 mmol/L Final   12/01/2023 4.6 3.4 - 5.3 mmol/L Final     Potassium POCT   Date Value Ref Range Status   12/01/2023 4.6 3.5 - 5.0 mmol/L Final   12/01/2023 5.1 (H) 3.5 - 5.0 mmol/L Final   12/01/2023 5.0 3.5 - 5.0 mmol/L Final     Magnesium   Date Value Ref Range Status   12/02/2023 2.8 (H) 1.7 - 2.3 mg/dL Final   12/01/2023 3.2 (H) 1.7 - 2.3 mg/dL Final   11/06/2023 2.2 1.7 - 2.3 mg/dL Final          I/O:  I/O last 3 completed shifts:  In: 7167.83 [I.V.:4404.83; Other:435; NG/GT:60; IV Piggyback:150]  Out: 4454 [Urine:3522; Blood:472; Chest Tube:460]       Physical Exam:    General: Patient seen up in chair. NAD. Calm, conversant. Pleasant, cooperative on exam.   HEENT: PER, no sclera icterus, moist mucosa, trachea midline, glasses in place  CV: RRR on monitor. Mild edema.   Pulm: Non-labored effort on supplemental oxygen via NC. Chest tubes in place, serosanguinous output, no air leak.  Incision covered with wound vac  Abd: obese, soft, NT, ND  : Bond with clear, straw-colored urine  Ext: Mild pedal edema, SCDs in place, warm  Neuro: CNs grossly intact, IRIZARRY, face symmetric, speech clear      ASSESSMENT/PLAN:    Betty Pan is a 48 year old female with a history of CAD, HFrEF, obesity, HTN, HLD, and MANINDER who is POD# 1 s/p CABG x 4 by Dr. Rizvi.    Principal Problem:    Coronary artery disease involving native coronary artery of native heart without angina pectoris  Active Problems:    Ischemic cardiomyopathy        NEURO:   - Scheduled Tylenol/lidocaine patches and PRN  Tylenol/Robaxin/oxycodone/dilaudid for pain    CV:   - Pre-op LVEF 30-35%  - Cardiology consulted for assistance with GDMT  - Normotensive  - Resume PTA Coreg mg two times a day with hold parameters  - ASA 162mg  - PTA atorvastatin 40mg daily resumed  - Hold PTA Entresto  - Chest tubes to remain today    PULM:   - Maintaining oxygen saturations on supplemental oxygen via NC  - BIPAP at HS  - Encourage pulmonary toilet, EZPAP, OOB/activity    GI:  - Diet: ADAT to cardiac  - Bowel regimen    RENAL/FEN:  - Adequate UOP.  - Cr WNL  - D/C Bond today  - Diuresis with 20mg IV Lasix three times a day with hold parameters  - Maintained PTA on 40mg Lasix daily along with spironolactone 25 mg  - Continue electrolyte replacement protocol    HEME:  - Acute blood loss anemia post-op  - Acute blood loss thrombocytopenia  - Hgb grossly stable, no bleeding concerns. Hep SQ, ASA    ID:  - Corinne op ppx complete, afebrile    ENDO:  Hgb A1C 5.8 - 10/11/2023  - Transition to SSI    PPx:  - DVT: SCDs, SQ heparin TID, OOB/ambulation   - GI: Protonix 40mg PO daily    DISPO:   - Continue critical care in ICU until >24 hrs post-op      Patient seen and discussed with Dr. Ulloa.      Gabriel Jeff, CNP, ACNPC-AG  Cardiothoracic Surgery  [employer] Pager [phone]

## 2023-12-01 NOTE — PRE-PROCEDURE
Patient was seen and examined by me.  There has been no interval change in history or physical examination.  Patient is ready to undergo multi-vessel coronary artery bypass grafting.  Informed consent has been obtained.

## 2023-12-01 NOTE — PHARMACY-ADMISSION MEDICATION HISTORY
Pharmacist Admission Medication History    Admission medication history is complete. The information provided in this note is only as accurate as the sources available at the time of the update.    Information Source(s): Patient, Clinic records, Hospital records, and CareEverywhere/SureScripts via in-person    Pertinent Information: none     Allergies reviewed with patient and updates made in EHR: yes    Medication History Completed By: Tra Ryan [facility] 12/1/2023 6:27 AM    PTA Med List   Medication Sig Last Dose    atorvastatin (LIPITOR) 40 MG tablet Take 40 mg by mouth daily 11/30/2023    carvedilol (COREG) 3.125 MG tablet Take 1 tablet (3.125 mg) by mouth 2 times daily (with meals) 12/1/2023 at 0400    cyanocobalamin (VITAMIN B-12) 1000 MCG tablet Take 1,000 mcg by mouth daily Past Week    Ferrous Sulfate 324 (65 Fe) MG TBEC Take 1 tablet by mouth daily Past Week    furosemide (LASIX) 40 MG tablet Take 40 mg by mouth daily 11/30/2023    multivitamin, therapeutic (THERA-VIT) TABS tablet Take 1 tablet by mouth daily 11/30/2023    nitroGLYcerin (NITROSTAT) 0.4 MG sublingual tablet For chest pain place 1 tablet under the tongue every 5 minutes for 3 doses. If symptoms persist 5 minutes after 1st dose call 911.     sacubitril-valsartan (ENTRESTO) 24-26 MG per tablet Take 1 tablet by mouth 2 times daily 11/30/2023    spironolactone (ALDACTONE) 25 MG tablet Take 1 tablet (25 mg) by mouth daily 11/30/2023

## 2023-12-01 NOTE — CONSULTS
PULMONARY / CRITICAL CARE CONSULTATION NOTE      Consultation - Pulmonary/Critical Care Medicine  Betty Pan,  1975, MRN [id number]  Date / Time of Admission:  2023  5:17 AM    Admitting Dx: CAD (coronary artery disease) [I25.10]  Coronary artery disease involving native coronary artery of native heart without angina pectoris [I25.10]    PCP: Cristina Alexis, [phone]  Consulting physician:  Angélica Rizvi MD   Code status:  Full Code       Extended Emergency Contact Information  Primary Emergency Contact: Zan Pan   [facility]  Home Phone: [phone]  Relation: Brother  Secondary Emergency Contact: Reshma Pan   [facility]  Home Phone: [phone]  Relation: Sister       ID:  Betty Pan is a 48 year old female with severe CAD, HFrEF, ICM with EF 30%, obesity, T2DM. Admitted today for CABG x 4 with Dr. Rizvi.         ASSESSMENT & PLAN BY SYSTEM   Systems to Assess:   Pulmonary: Post op vent management; underlying MANINDER likely - not currently treated.   Wean supplemental O2 as tolerated; goal O2 sat > 92%.  HOB > 30 degrees to limit aspiration risk.    Check ABG and adjust support as indicated; avoid acidotic state.   Check CXR  Once hemodynamically stable, plan to proceed to wake, wean, and extubate. Not a fast track candidate.   May need to extubate to PAP therapy  Cardiovascular: CAD, severe now s/p CABG x 4. HFrEF, ICM  Cardiac monitoring.   SBP goal > 90 mmHg, MAP goal > 65 mmHg.    Goal CI 2-3   Goal PAD upper teens  Vasoactive gtts per CV-surgery.   Temporary pacing wires present; will use in setting of symptomatic arrhythmia.   Currently paced @ 90  Underlying rhythm: sinus  Neurological: sedation for vent synchrony and comfort.    Neuro checks per ICU protocol.   Sedate with precedex until ready to wean and extubate.   Pain control: PRN  Goal RASS 0 to -1   GI/: no acute issues  NPO until extubated and fully awake.   Bond catheter in place for accurate measurement of  I/O.   GI prophylaxis:  Omeprazole  Renal: no acute issues   Monitor I/O's.  Electrolyte repletion PRN.  Avoid/limit nephrotoxic agents.   IVFs: per CV-surgery  Heme/Coag: Acute blood loss anemia; coagulopathy secondary to pump run.   Monitor H/H.   Transfuse per CV-surgery.   Monitor chest tube output hourly; notify CV-surgery for excessive output or abrupt stop in output.   DVT prophylaxis:  SubQ heparin  Infectious disease:   General precautions.   Remove lines and drains once no longer required.   Endocrine: T2DM with hyperglycemia   RHI gtt per ICU protocol.   Musculoskeletal:   Bedrest; initiate mobility protocol.     Lines: A-line, Day# 1, Central line, Day# 1 or [place] Jagdeep, Day# 1      Code Status:  FULL CODE    Thank you for this consultation.  Please call if any questions or concerns.        This patient is considered critically ill and requires ICU level of care due to immediate post CV-surgical procedure. High risk for acute hemodynamic collapse and death.     Total Critical Care time, not including separate billable procedure time:  35 minutes.    Love Sierra, CNP  [facility] Pulmonary/Critical Care      Chief Complaint chief complaint       HPI    Betty Pan is a 48 year old female with severe CAD, HFrEF, ICM with EF 30%; obesity, MANINDER, and T2DM. Admitted today for CABG x 4 with Dr. Rizvi. Procedure reported as largely straight forward. Airway reported as easy, no difficulty with intubation. Lines placed without difficulty. Received 20mg IV Methadone, 50mg IV Ketamine. Preop echo initially showed EF 30%, but improved to 40% once fully asleep. No difficulty going on pump though did require epi throughout the procedure.  mL; received 435mL Cell Saver and a dose of DDAVP. Platelets were low and elevated INR at end of case; 2 of FFP and 1 of platelet ordered and will be transfused in ICU. Arrived in ICU on epi, sedated on precedex, on full vent support.   Direct sign out received at  the bedside from Dr. Javier.           Review of Systems   Review of systems not obtained due to patient factors.     Active Problem List   Patient Active Problem List    Diagnosis Date Noted    Heart failure with reduced ejection fraction (H) 10/27/2023     Priority: Medium    Ischemic cardiomyopathy 10/27/2023     Priority: Medium    Obesity due to excess calories 10/27/2023     Priority: Medium    Coronary artery disease involving native coronary artery of native heart without angina pectoris 10/27/2023     Priority: Medium    MANINDER (obstructive sleep apnea) 10/27/2023     Priority: Medium    Acute systolic congestive heart failure (H) 10/13/2023     Priority: Medium    Pyelonephritis, acute 10/11/2023     Priority: Medium    Peripheral edema 10/11/2023     Priority: Medium    Pleural effusion 10/11/2023     Priority: Medium    Renal mass 10/11/2023     Priority: Medium    Blood glucose elevated 10/11/2023     Priority: Medium    Renal insufficiency 10/11/2023     Priority: Medium    Aspiration pneumonia of lower lobe, unspecified aspiration pneumonia type, unspecified laterality (H) 10/11/2023     Priority: Medium    Hypertension, unspecified type 10/11/2023     Priority: Medium    Prediabetes 10/11/2023     Priority: Medium     Formatting of this note might be different from the original. A1c 5.8 10/2023           Medical/Surgical History   Past Medical History:   Diagnosis Date    Aspiration pneumonia of lower lobe, unspecified aspiration pneumonia type, unspecified laterality (H) 10/11/2023    Congestive heart failure (H)     Coronary artery disease     Heart failure with reduced ejection fraction (H) 10/27/2023    Hyperlipidemia     Hypertension, unspecified type 10/11/2023    Ischemic cardiomyopathy 10/27/2023    Obese     Obesity due to excess calories 10/27/2023    MANINDER (obstructive sleep apnea) 10/27/2023    Pleural effusion 10/11/2023    Sleep apnea      Past Surgical History:   Procedure Laterality Date     CORONARY ANGIOGRAPHY ADULT ORDER      CV CORONARY ANGIOGRAM N/A 10/13/2023    Procedure: Coronary Angiogram;  Surgeon: Roxana Melgoza MD;  Location: [facility] CATH LAB CV    CV RIGHT HEART CATH MEASUREMENTS RECORDED N/A 10/13/2023    Procedure: Right Heart Catheterization;  Surgeon: Roxana Melgoza MD;  Location: [facility] CATH LAB CV         Allergies   No Known Allergies      Medications:  OUTpatient medications   Prior to Admission medications    Medication Sig Start Date End Date Taking? Authorizing Provider   atorvastatin (LIPITOR) 40 MG tablet Take 40 mg by mouth daily 10/30/23  Yes Reported, Patient   carvedilol (COREG) 3.125 MG tablet Take 1 tablet (3.125 mg) by mouth 2 times daily (with meals) 10/17/23  Yes Nick Sepulveda DO   cyanocobalamin (VITAMIN B-12) 1000 MCG tablet Take 1,000 mcg by mouth daily   Yes Unknown, Entered By History   Ferrous Sulfate 324 (65 Fe) MG TBEC Take 1 tablet by mouth daily   Yes Unknown, Entered By History   furosemide (LASIX) 40 MG tablet Take 40 mg by mouth daily   Yes Unknown, Entered By History   multivitamin, therapeutic (THERA-VIT) TABS tablet Take 1 tablet by mouth daily   Yes Unknown, Entered By History   nitroGLYcerin (NITROSTAT) 0.4 MG sublingual tablet For chest pain place 1 tablet under the tongue every 5 minutes for 3 doses. If symptoms persist 5 minutes after 1st dose call 911. 10/17/23  Yes Nick Sepulveda DO   sacubitril-valsartan (ENTRESTO) 24-26 MG per tablet Take 1 tablet by mouth 2 times daily 10/17/23  Yes Nick Sepulveda DO   spironolactone (ALDACTONE) 25 MG tablet Take 1 tablet (25 mg) by mouth daily 10/18/23  Yes Nick Sepulveda DO           Medications:  INpatient medications    acetaminophen  975 mg Oral Q8H    albumin human        aspirin  162 mg Oral or NG Tube Daily    Or    aspirin  300 mg Rectal Daily    [START ON 12/2/2023] atorvastatin  40 mg Oral Daily    ceFAZolin  2 g Intravenous Q8H    chlorhexidine  15 mL Mouth/Throat Q12H    [START ON  "12/2/2023] cyanocobalamin  1,000 mcg Oral Daily    [START ON 12/2/2023] ferrous sulfate  324 mg Oral Daily    [START ON 12/2/2023] heparin ANTICOAGULANT  5,000 Units Subcutaneous Q8H    lidocaine  1-2 patch Transdermal Q24H    [START ON 12/2/2023] multivitamin, therapeutic  1 tablet Oral Daily    pantoprazole  40 mg Oral or NG Tube Daily    Or    pantoprazole  40 mg Oral Daily    [START ON 12/2/2023] polyethylene glycol  17 g Oral Daily    senna-docusate  1 tablet Oral BID           Family History  Social History   Reviewed  History reviewed. No pertinent family history.  Reviewed  Social History     Socioeconomic History    Marital status: Single     Spouse name: Not on file    Number of children: Not on file    Years of education: Not on file    Highest education level: Not on file   Occupational History    Not on file   Tobacco Use    Smoking status: Never    Smokeless tobacco: Never   Vaping Use    Vaping Use: Never used   Substance and Sexual Activity    Alcohol use: Never    Drug use: Never    Sexual activity: Not on file   Other Topics Concern    Not on file   Social History Narrative    Not on file     Social Determinants of Health     Financial Resource Strain: Not on file   Food Insecurity: Not on file   Transportation Needs: Not on file   Physical Activity: Not on file   Stress: Not on file   Social Connections: Not on file   Interpersonal Safety: Not on file   Housing Stability: Not on file      Psychosocial Needs  Social History     Social History Narrative    Not on file     Additional psychosocial needs reviewed per nursing assessment.      Physical Exam   VITALS:  /67 (BP Location: Left arm)   Pulse 64   Temp 98  F (36.7  C) (Oral)   Resp 18   Ht 1.499 m (4' 11\")   Wt 110.6 kg (243 lb 14.4 oz)   SpO2 99%   BMI 49.26 kg/m         PHYSICAL EXAM:   GEN: intubated and sedated  HEENT:  OETT in place   NECK: Supple. Short, thick. RIJ introducer with PA-C in place.   PULM:  unlabored on full " vent; lungs are clear and equal   CVS:   Paced at 90; S1S2 no murmur. Chest tubes in place.   ABDOMEN:   obese; soft ntnd  EXTREMITIES/SKIN:    visible skin intact  NEURO:  sedated    I&O:    Intake/Output Summary (Last 24 hours) at 12/1/2023 1416  Last data filed at 12/1/2023 1310  Gross per 24 hour   Intake 3635 ml   Output 2672 ml   Net 963 ml        Pertinent Labs   Lab Results: personally reviewed.      CBC:  Recent Labs   Lab 12/01/23  1250 12/01/23  1212   WBC 11.3*  --    HGB 8.7*  9.0* 9.5*   HCT 26.6*  --    PLT 80*  --      Chemistry:  Recent Labs   Lab 12/01/23  1250 12/01/23  1212 12/01/23  1133    144 143     Coags:  Lab Results   Component Value Date    INR 1.64 (H) 12/01/2023    INR 1.05 11/06/2023       Microbiology:  None this admission       Radiology:  Chest X-Ray: post op film not yet done

## 2023-12-01 NOTE — BRIEF OP NOTE
[facility]    Brief Operative Note    Pre-operative diagnosis: CAD (coronary artery disease) [I25.10]  Post-operative diagnosis Same as pre-operative diagnosis    Procedure: CORONARY ARTERY BYPASS GRAFT (CABG) X 4 , INTERNAL MAMMARY ARTERY HARVEST, LEFT LEG HARVEST, RIGHT LEG HARVEST, ENDOSCOPIC VESSEL PROCUREMENT, EPIAORTIC ULTRASOUND, N/A - Chest  ANESTHESIA TRANSESOPHAGEAL ECHOCARDIOGRAM, N/A - Heart    Surgeon: Surgeon(s) and Role:     * Angélica Rizvi MD - Primary     * Merrill Soto MD - Fellow - Assisting  Anesthesia: General   Estimated Blood Loss: 500 ml    Drains: One mediastinal, one left pleural  Specimens: * No specimens in log *  Findings:   Heavily diseased LAD, LIMA to LAD, RSVG to rPDA, OM1, and Diag.  Complications: None.  Implants:   Implant Name Type Inv. Item Serial No.  Lot No. LRB No. Used Action   [employer] 046-267 - [id number] Wire [employer] 046-267  [employer] [id number] N/A 1 Implanted   [employer] STERNAL WIRE SINGLE #6 046-032 - [id number] Wire [employer] ST[employer] SINGLE #6 [id number]  [employer] [id number] N/A 1 Implanted     Signed:    Merrill Soto MD 12/1/2023 at 1:06 PM  Cardiothoracic Surgery Fellow  Pager: [phone]

## 2023-12-02 ENCOUNTER — APPOINTMENT (OUTPATIENT)
Dept: OCCUPATIONAL THERAPY | Facility: HOSPITAL | Age: 48
End: 2023-12-02
Attending: PHYSICIAN ASSISTANT
Payer: COMMERCIAL

## 2023-12-02 ENCOUNTER — APPOINTMENT (OUTPATIENT)
Dept: RADIOLOGY | Facility: HOSPITAL | Age: 48
End: 2023-12-02
Attending: PHYSICIAN ASSISTANT
Payer: COMMERCIAL

## 2023-12-02 ENCOUNTER — APPOINTMENT (OUTPATIENT)
Dept: OCCUPATIONAL THERAPY | Facility: HOSPITAL | Age: 48
End: 2023-12-02
Attending: THORACIC SURGERY (CARDIOTHORACIC VASCULAR SURGERY)
Payer: COMMERCIAL

## 2023-12-02 LAB
ALBUMIN SERPL BCG-MCNC: 3.8 G/DL (ref 3.5–5.2)
ALBUMIN SERPL BCG-MCNC: 3.9 G/DL (ref 3.5–5.2)
ALP SERPL-CCNC: 35 U/L (ref 40–150)
ALP SERPL-CCNC: 38 U/L (ref 40–150)
ALT SERPL W P-5'-P-CCNC: 11 U/L (ref 0–50)
ALT SERPL W P-5'-P-CCNC: 12 U/L (ref 0–50)
ANION GAP SERPL CALCULATED.3IONS-SCNC: 8 MMOL/L (ref 7–15)
ANION GAP SERPL CALCULATED.3IONS-SCNC: 9 MMOL/L (ref 7–15)
AST SERPL W P-5'-P-CCNC: 34 U/L (ref 0–45)
AST SERPL W P-5'-P-CCNC: 35 U/L (ref 0–45)
BASE EXCESS BLDA CALC-SCNC: -0.1 MMOL/L
BASE EXCESS BLDV CALC-SCNC: 1.2 MMOL/L
BILIRUB SERPL-MCNC: 0.4 MG/DL
BILIRUB SERPL-MCNC: 0.4 MG/DL
BUN SERPL-MCNC: 14.5 MG/DL (ref 6–20)
BUN SERPL-MCNC: 15.6 MG/DL (ref 6–20)
CALCIUM SERPL-MCNC: 8.6 MG/DL (ref 8.6–10)
CALCIUM SERPL-MCNC: 8.7 MG/DL (ref 8.6–10)
CALCIUM, IONIZED MEASURED: 1.14 MMOL/L (ref 1.11–1.3)
CHLORIDE SERPL-SCNC: 110 MMOL/L (ref 98–107)
CHLORIDE SERPL-SCNC: 111 MMOL/L (ref 98–107)
COHGB MFR BLD: 98.3 % (ref 96–97)
CREAT SERPL-MCNC: 0.82 MG/DL (ref 0.51–0.95)
CREAT SERPL-MCNC: 0.85 MG/DL (ref 0.51–0.95)
DEPRECATED HCO3 PLAS-SCNC: 26 MMOL/L (ref 22–29)
DEPRECATED HCO3 PLAS-SCNC: 26 MMOL/L (ref 22–29)
EGFRCR SERPLBLD CKD-EPI 2021: 84 ML/MIN/1.73M2
EGFRCR SERPLBLD CKD-EPI 2021: 87 ML/MIN/1.73M2
ERYTHROCYTE [DISTWIDTH] IN BLOOD BY AUTOMATED COUNT: 14 % (ref 10–15)
GLUCOSE BLDC GLUCOMTR-MCNC: 122 MG/DL (ref 70–99)
GLUCOSE BLDC GLUCOMTR-MCNC: 127 MG/DL (ref 70–99)
GLUCOSE BLDC GLUCOMTR-MCNC: 131 MG/DL (ref 70–99)
GLUCOSE BLDC GLUCOMTR-MCNC: 138 MG/DL (ref 70–99)
GLUCOSE BLDC GLUCOMTR-MCNC: 142 MG/DL (ref 70–99)
GLUCOSE BLDC GLUCOMTR-MCNC: 154 MG/DL (ref 70–99)
GLUCOSE BLDC GLUCOMTR-MCNC: 154 MG/DL (ref 70–99)
GLUCOSE SERPL-MCNC: 150 MG/DL (ref 70–99)
GLUCOSE SERPL-MCNC: 151 MG/DL (ref 70–99)
HCO3 BLD-SCNC: 26 MMOL/L (ref 23–29)
HCO3 BLDV-SCNC: 27 MMOL/L (ref 24–30)
HCT VFR BLD AUTO: 25.6 % (ref 35–47)
HGB BLD-MCNC: 8 G/DL (ref 11.7–15.7)
INR PPP: 1.34 (ref 0.85–1.15)
ION CA PH 7.4: 1.11 MMOL/L (ref 1.11–1.3)
LACTATE SERPL-SCNC: 1 MMOL/L (ref 0.7–2)
MAGNESIUM SERPL-MCNC: 2.8 MG/DL (ref 1.7–2.3)
MCH RBC QN AUTO: 28.8 PG (ref 26.5–33)
MCHC RBC AUTO-ENTMCNC: 31.3 G/DL (ref 31.5–36.5)
MCV RBC AUTO: 92 FL (ref 78–100)
OXYHGB MFR BLD: 97 % (ref 96–97)
OXYHGB MFR BLDV: 81 % (ref 70–75)
PCO2 BLD: 47 MM HG (ref 35–45)
PCO2 BLDV: 50 MM HG (ref 35–50)
PH BLD: 7.34 [PH] (ref 7.37–7.44)
PH BLDV: 7.34 [PH] (ref 7.35–7.45)
PH: 7.35 (ref 7.35–7.45)
PHOSPHATE SERPL-MCNC: 5.3 MG/DL (ref 2.5–4.5)
PLATELET # BLD AUTO: 105 10E3/UL (ref 150–450)
PO2 BLD: 144 MM HG (ref 80–90)
PO2 BLDV: 51 MM HG (ref 25–47)
POTASSIUM SERPL-SCNC: 4.8 MMOL/L (ref 3.4–5.3)
POTASSIUM SERPL-SCNC: 5 MMOL/L (ref 3.4–5.3)
PROT SERPL-MCNC: 5.8 G/DL (ref 6.4–8.3)
PROT SERPL-MCNC: 5.8 G/DL (ref 6.4–8.3)
RBC # BLD AUTO: 2.78 10E6/UL (ref 3.8–5.2)
SAO2 % BLDV: 83.1 % (ref 70–75)
SODIUM SERPL-SCNC: 145 MMOL/L (ref 135–145)
SODIUM SERPL-SCNC: 145 MMOL/L (ref 135–145)
TEMPERATURE: 37 DEGREES C
WBC # BLD AUTO: 7.7 10E3/UL (ref 4–11)

## 2023-12-02 PROCEDURE — 82805 BLOOD GASES W/O2 SATURATION: CPT | Performed by: THORACIC SURGERY (CARDIOTHORACIC VASCULAR SURGERY)

## 2023-12-02 PROCEDURE — 97535 SELF CARE MNGMENT TRAINING: CPT | Mod: GO

## 2023-12-02 PROCEDURE — 272N000202 HC AEROBIKA WITH MANOMETER

## 2023-12-02 PROCEDURE — 250N000013 HC RX MED GY IP 250 OP 250 PS 637: Performed by: INTERNAL MEDICINE

## 2023-12-02 PROCEDURE — 250N000013 HC RX MED GY IP 250 OP 250 PS 637: Performed by: PHYSICIAN ASSISTANT

## 2023-12-02 PROCEDURE — 85027 COMPLETE CBC AUTOMATED: CPT | Performed by: PHYSICIAN ASSISTANT

## 2023-12-02 PROCEDURE — 94660 CPAP INITIATION&MGMT: CPT

## 2023-12-02 PROCEDURE — 200N000001 HC R&B ICU

## 2023-12-02 PROCEDURE — 80053 COMPREHEN METABOLIC PANEL: CPT | Performed by: SURGERY

## 2023-12-02 PROCEDURE — 999N000156 HC STATISTIC RCP CONSULT EA 30 MIN

## 2023-12-02 PROCEDURE — 97110 THERAPEUTIC EXERCISES: CPT | Mod: GO

## 2023-12-02 PROCEDURE — 250N000012 HC RX MED GY IP 250 OP 636 PS 637: Performed by: SURGERY

## 2023-12-02 PROCEDURE — P9047 ALBUMIN (HUMAN), 25%, 50ML: HCPCS | Performed by: INTERNAL MEDICINE

## 2023-12-02 PROCEDURE — 999N000157 HC STATISTIC RCP TIME EA 10 MIN

## 2023-12-02 PROCEDURE — 85610 PROTHROMBIN TIME: CPT | Performed by: SURGERY

## 2023-12-02 PROCEDURE — 99255 IP/OBS CONSLTJ NEW/EST HI 80: CPT | Performed by: INTERNAL MEDICINE

## 2023-12-02 PROCEDURE — 71045 X-RAY EXAM CHEST 1 VIEW: CPT

## 2023-12-02 PROCEDURE — 83735 ASSAY OF MAGNESIUM: CPT | Performed by: PHYSICIAN ASSISTANT

## 2023-12-02 PROCEDURE — 99232 SBSQ HOSP IP/OBS MODERATE 35: CPT | Performed by: NURSE PRACTITIONER

## 2023-12-02 PROCEDURE — 250N000011 HC RX IP 250 OP 636: Performed by: INTERNAL MEDICINE

## 2023-12-02 PROCEDURE — 82805 BLOOD GASES W/O2 SATURATION: CPT | Performed by: SURGERY

## 2023-12-02 PROCEDURE — 97166 OT EVAL MOD COMPLEX 45 MIN: CPT | Mod: GO

## 2023-12-02 PROCEDURE — P9045 ALBUMIN (HUMAN), 5%, 250 ML: HCPCS | Mod: JZ | Performed by: THORACIC SURGERY (CARDIOTHORACIC VASCULAR SURGERY)

## 2023-12-02 PROCEDURE — 250N000011 HC RX IP 250 OP 636: Mod: JZ | Performed by: PHYSICIAN ASSISTANT

## 2023-12-02 PROCEDURE — 84100 ASSAY OF PHOSPHORUS: CPT | Performed by: PHYSICIAN ASSISTANT

## 2023-12-02 PROCEDURE — 250N000011 HC RX IP 250 OP 636: Mod: JZ | Performed by: THORACIC SURGERY (CARDIOTHORACIC VASCULAR SURGERY)

## 2023-12-02 PROCEDURE — 83605 ASSAY OF LACTIC ACID: CPT | Performed by: SURGERY

## 2023-12-02 PROCEDURE — 94799 UNLISTED PULMONARY SVC/PX: CPT

## 2023-12-02 PROCEDURE — 5A09557 ASSISTANCE WITH RESPIRATORY VENTILATION, GREATER THAN 96 CONSECUTIVE HOURS, CONTINUOUS POSITIVE AIRWAY PRESSURE: ICD-10-PCS | Performed by: SURGERY

## 2023-12-02 PROCEDURE — 82330 ASSAY OF CALCIUM: CPT | Performed by: PHYSICIAN ASSISTANT

## 2023-12-02 RX ORDER — HYDROMORPHONE HCL IN WATER/PF 6 MG/30 ML
0.4 PATIENT CONTROLLED ANALGESIA SYRINGE INTRAVENOUS EVERY 4 HOURS PRN
Status: DISCONTINUED | OUTPATIENT
Start: 2023-12-02 | End: 2023-12-04

## 2023-12-02 RX ORDER — DEXTROSE MONOHYDRATE 25 G/50ML
25-50 INJECTION, SOLUTION INTRAVENOUS
Status: DISCONTINUED | OUTPATIENT
Start: 2023-12-02 | End: 2023-12-06

## 2023-12-02 RX ORDER — NICOTINE POLACRILEX 4 MG
15-30 LOZENGE BUCCAL
Status: DISCONTINUED | OUTPATIENT
Start: 2023-12-02 | End: 2023-12-06

## 2023-12-02 RX ORDER — METOPROLOL SUCCINATE 50 MG/1
50 TABLET, EXTENDED RELEASE ORAL DAILY
Status: DISCONTINUED | OUTPATIENT
Start: 2023-12-02 | End: 2023-12-02

## 2023-12-02 RX ORDER — FUROSEMIDE 10 MG/ML
60 INJECTION INTRAMUSCULAR; INTRAVENOUS EVERY 6 HOURS
Status: COMPLETED | OUTPATIENT
Start: 2023-12-02 | End: 2023-12-02

## 2023-12-02 RX ORDER — METHOCARBAMOL 500 MG/1
500 TABLET, FILM COATED ORAL 4 TIMES DAILY PRN
Status: DISCONTINUED | OUTPATIENT
Start: 2023-12-02 | End: 2023-12-06 | Stop reason: HOSPADM

## 2023-12-02 RX ORDER — CARVEDILOL 3.12 MG/1
3.12 TABLET ORAL 2 TIMES DAILY WITH MEALS
Status: DISCONTINUED | OUTPATIENT
Start: 2023-12-02 | End: 2023-12-02

## 2023-12-02 RX ORDER — ALBUMIN (HUMAN) 12.5 G/50ML
50 SOLUTION INTRAVENOUS ONCE
Status: COMPLETED | OUTPATIENT
Start: 2023-12-02 | End: 2023-12-02

## 2023-12-02 RX ORDER — FUROSEMIDE 10 MG/ML
20 INJECTION INTRAMUSCULAR; INTRAVENOUS EVERY 6 HOURS
Status: DISCONTINUED | OUTPATIENT
Start: 2023-12-02 | End: 2023-12-02

## 2023-12-02 RX ORDER — HYDROMORPHONE HCL IN WATER/PF 6 MG/30 ML
0.2 PATIENT CONTROLLED ANALGESIA SYRINGE INTRAVENOUS EVERY 4 HOURS PRN
Status: DISCONTINUED | OUTPATIENT
Start: 2023-12-02 | End: 2023-12-04

## 2023-12-02 RX ORDER — ATORVASTATIN CALCIUM 40 MG/1
80 TABLET, FILM COATED ORAL DAILY
Status: DISCONTINUED | OUTPATIENT
Start: 2023-12-03 | End: 2023-12-06 | Stop reason: HOSPADM

## 2023-12-02 RX ADMIN — ACETAMINOPHEN 975 MG: 325 TABLET ORAL at 22:34

## 2023-12-02 RX ADMIN — CEFAZOLIN SODIUM 2 G: 2 INJECTION, SOLUTION INTRAVENOUS at 11:32

## 2023-12-02 RX ADMIN — OXYCODONE HYDROCHLORIDE 5 MG: 5 TABLET ORAL at 14:46

## 2023-12-02 RX ADMIN — PANTOPRAZOLE SODIUM 40 MG: 40 TABLET, DELAYED RELEASE ORAL at 08:30

## 2023-12-02 RX ADMIN — INSULIN ASPART 1 UNITS: 100 INJECTION, SOLUTION INTRAVENOUS; SUBCUTANEOUS at 11:32

## 2023-12-02 RX ADMIN — ACETAMINOPHEN 975 MG: 325 TABLET ORAL at 05:40

## 2023-12-02 RX ADMIN — SENNOSIDES AND DOCUSATE SODIUM 1 TABLET: 8.6; 5 TABLET ORAL at 20:40

## 2023-12-02 RX ADMIN — FUROSEMIDE 60 MG: 10 INJECTION, SOLUTION INTRAMUSCULAR; INTRAVENOUS at 15:01

## 2023-12-02 RX ADMIN — CEFAZOLIN SODIUM 2 G: 2 INJECTION, SOLUTION INTRAVENOUS at 03:31

## 2023-12-02 RX ADMIN — ALBUMIN HUMAN 12.5 G: 0.05 INJECTION, SOLUTION INTRAVENOUS at 01:10

## 2023-12-02 RX ADMIN — HYDROMORPHONE HYDROCHLORIDE 0.2 MG: 0.2 INJECTION, SOLUTION INTRAMUSCULAR; INTRAVENOUS; SUBCUTANEOUS at 02:13

## 2023-12-02 RX ADMIN — OXYCODONE HYDROCHLORIDE 5 MG: 5 TABLET ORAL at 02:59

## 2023-12-02 RX ADMIN — HYDROMORPHONE HYDROCHLORIDE 0.2 MG: 0.2 INJECTION, SOLUTION INTRAMUSCULAR; INTRAVENOUS; SUBCUTANEOUS at 08:36

## 2023-12-02 RX ADMIN — THERA TABS 1 TABLET: TAB at 08:30

## 2023-12-02 RX ADMIN — ALBUMIN HUMAN 50 G: 0.25 SOLUTION INTRAVENOUS at 14:07

## 2023-12-02 RX ADMIN — PANTOPRAZOLE SODIUM 40 MG: 40 TABLET, DELAYED RELEASE ORAL at 00:24

## 2023-12-02 RX ADMIN — METOPROLOL SUCCINATE 50 MG: 50 TABLET, EXTENDED RELEASE ORAL at 14:06

## 2023-12-02 RX ADMIN — CYANOCOBALAMIN TAB 1000 MCG 1000 MCG: 1000 TAB at 08:30

## 2023-12-02 RX ADMIN — ACETAMINOPHEN 975 MG: 325 TABLET ORAL at 00:24

## 2023-12-02 RX ADMIN — FERROUS SULFATE TAB 325 MG (65 MG ELEMENTAL FE) 324 MG: 325 (65 FE) TAB at 08:30

## 2023-12-02 RX ADMIN — ONDANSETRON 4 MG: 2 INJECTION INTRAMUSCULAR; INTRAVENOUS at 04:53

## 2023-12-02 RX ADMIN — OXYCODONE HYDROCHLORIDE 5 MG: 5 TABLET ORAL at 05:42

## 2023-12-02 RX ADMIN — ASPIRIN 81 MG CHEWABLE TABLET 162 MG: 81 TABLET CHEWABLE at 00:24

## 2023-12-02 RX ADMIN — PROCHLORPERAZINE EDISYLATE 10 MG: 5 INJECTION INTRAMUSCULAR; INTRAVENOUS at 08:33

## 2023-12-02 RX ADMIN — SENNOSIDES AND DOCUSATE SODIUM 1 TABLET: 8.6; 5 TABLET ORAL at 08:30

## 2023-12-02 RX ADMIN — SENNOSIDES AND DOCUSATE SODIUM 1 TABLET: 8.6; 5 TABLET ORAL at 00:39

## 2023-12-02 RX ADMIN — OXYCODONE HYDROCHLORIDE 5 MG: 5 TABLET ORAL at 18:43

## 2023-12-02 RX ADMIN — ATORVASTATIN CALCIUM 40 MG: 40 TABLET, FILM COATED ORAL at 08:30

## 2023-12-02 RX ADMIN — HEPARIN SODIUM 5000 UNITS: 5000 INJECTION, SOLUTION INTRAVENOUS; SUBCUTANEOUS at 11:32

## 2023-12-02 RX ADMIN — HEPARIN SODIUM 5000 UNITS: 5000 INJECTION, SOLUTION INTRAVENOUS; SUBCUTANEOUS at 20:37

## 2023-12-02 RX ADMIN — POLYETHYLENE GLYCOL 3350 17 G: 17 POWDER, FOR SOLUTION ORAL at 08:30

## 2023-12-02 RX ADMIN — ACETAMINOPHEN 975 MG: 325 TABLET ORAL at 14:07

## 2023-12-02 RX ADMIN — ASPIRIN 81 MG CHEWABLE TABLET 162 MG: 81 TABLET CHEWABLE at 08:30

## 2023-12-02 ASSESSMENT — ACTIVITIES OF DAILY LIVING (ADL)
ADLS_ACUITY_SCORE: 22
DEPENDENT_IADLS:: INDEPENDENT
ADLS_ACUITY_SCORE: 22
PREVIOUS_RESPONSIBILITIES: MEAL PREP;HOUSEKEEPING;LAUNDRY;SHOPPING;MEDICATION MANAGEMENT;FINANCES;DRIVING
ADLS_ACUITY_SCORE: 22

## 2023-12-02 NOTE — PLAN OF CARE
[facility] - ICU    RN Progress Note:            Pertinent Assessments:      Please refer to flowsheet rows for full assessment     Required Bipap for improvement in ABG results. Effective.  Weak congested cough. Requiring 2-4L O2 while off Bipap. Lung sounds diminished.   Chest tubes draining dark red, no air leak.   Urine output adequate.   Did not require vasopressors.            Key Events - This Shift:       Dangled at bedside at midnight, up in chair at 0600.   Small clear phlegm emesis x2. Once wit coughing once wit movement.                    Barriers to Discharge / Downgrade:     Hubbard catheter, arterial line.          Point of Contact Update YES-OR-NO: Yes- Brother and father  Summary of Conversation: Plan of care for night, expectations of the day shift.         Problem: Cardiovascular Surgery  Goal: Optimal Coping with Heart Surgery  Outcome: Progressing     Problem: Cardiovascular Surgery  Goal: Absence of Bleeding  Outcome: Progressing     Problem: Cardiovascular Surgery  Goal: Effective Cardiac Function  Outcome: Progressing   Goal Outcome Evaluation:

## 2023-12-02 NOTE — CONSULTS
"HEART CARE NOTE        Thank you, Dr. Rizvi, for asking the [facility] Heart Care team to see Betty Pan to evaluate HFrEF.      Assessment/Recommendations     1. Severe HFrEF   Assessment / Plan  Hypervolemic on physical exam - continue IV diuresis  New HFrEF/ICM s/p CABG; GDMT as detailed below     Current Pharmacotherapy AHA Guideline-Directed Medical Therapy   Sacubitril-valsartan 24-26 mg twice daily - on hold 2/2 borderline hemodynamics Lisinopril 20 mg twice daily   Metoprolol succinate 50 mg daily Carvedilol 25 mg twice daily   Spironolactone 25 mg daily -held Spironolactone 25 mg once daily   Hydralazine NA Hydralazine 100 mg three times daily   Isosorbide dinitrate NA Isosorbide dinitrate 40 mg three times daily   SGLT2 inhibitor:Dapagliflozin/Empagliflozin - not started Dapagliflozin or Empagliflozin 10 mg daily      2. CAD  Assessment / Plan  Severe multi-vessel CAD s/p CABG  Denies angina; continue ASA, high intensity atorvastatin, metoprolol    Clinically Significant Risk Factors          # Hypocalcemia: Lowest Ca = 7.5 mg/dL in last 2 days, will monitor and replace as appropriate     # Hypoalbuminemia: Lowest albumin = 3.4 g/dL at 12/1/2023  2:09 PM, will monitor as appropriate  # Coagulation Defect: INR = 1.34 (Ref range: 0.85 - 1.15) and/or PTT = 83 Seconds (Ref range: 22 - 38 Seconds), will monitor for bleeding  # Thrombocytopenia: Lowest platelets = 80 in last 2 days, will monitor for bleeding   # Hypertension: Noted on problem list        # Severe Obesity: Estimated body mass index is 51.54 kg/m  as calculated from the following:    Height as of this encounter: 1.499 m (4' 11\").    Weight as of this encounter: 115.8 kg (255 lb 3.2 oz)., PRESENT ON ADMISSION     # Financial/Environmental Concerns: none   # History of CABG: noted on surgical history      Cardiomyopathy  Systolic acute    Fluid overload, unspecified, Other fluid overload, and Other disorders of electrolyte and fluid " "balance, not elsewhere classified    80 minutes spent reviewing prior records (including documentation, laboratory studies, cardiac testing/imaging), history and physical exam, planning, and subsequent documentation.    History of Present Illness/Subjective    Ms. Betty Pan is a 48 year old female who presents in acute hypoxic respiratory failure concerning for ADHF     Today, Mrs. Pan denies any acute cardiac events or complaints; Management plan as detailed above      ECG: personally reviewed 12/2/23: sinus tachycardia.     ECHO (personnaly Reviewed on 10/1323):   Technically very challenging study. Definity contrast utilized.  Left ventricle measures mildly enlarged. Mild left ventricular hypertrophy  suggested. Left ventricular systolic function is moderate to severely globally  reduced with an ejection fraction of 30 to 35% with akinesis of the left  ventricular apex.Diastolic Doppler findings (E/E' ratio and/or other  parameters) suggest left ventricular filling pressures are increased.  The right ventricle is suboptimally visualized. On limited imaging right  ventricular systolic function appears potentially reduced.  Mild enlargement of the left atrium.  Valve structures are suboptimally visualized on this technically challenging  study. No obvious hemodynamically significant valve abnormality noted.  Consider cardiac MRI to further define cardiac structures.    Telemetry: personally reviewed December 2, 2023; notable for sinus tachycardia     Medical history and pertinent documents reviewed in Care Everywhere please where applicable see details above          Physical Examination Review of Systems   /57   Pulse 71   Temp 98  F (36.7  C) (Oral)   Resp 18   Ht 1.499 m (4' 11\")   Wt 115.8 kg (255 lb 3.2 oz)   SpO2 96%   BMI 51.54 kg/m    Body mass index is 51.54 kg/m .  Wt Readings from Last 3 Encounters:   12/02/23 115.8 kg (255 lb 3.2 oz)   11/28/23 110.7 kg (244 lb)   11/22/23 108.9 kg " (240 lb)     General Appearance:   no distress   ENT/Mouth: membranes moist, no oral lesions or bleeding gums.      EYES:  no scleral icterus, normal conjunctivae   Neck: no carotid bruits or thyromegaly   Chest/Lungs:   lungs are clear to auscultation, no rales or wheezing, equal chest wall expansion    Cardiovascular:   Regular. Normal first and second heart sounds with no murmurs, rubs, or gallops; the carotid, radial and posterior tibial pulses are intact, + JVD and LE edema bilaterally    Abdomen:  no organomegaly, masses, bruits, or tenderness; bowel sounds are present   Extremities: no cyanosis or clubbing   Skin: no xanthelasma, warm.    Neurologic: NAD     Psychiatric: alert and oriented x3, calm     A complete 10 systems ROS was reviewed  And is negative except what is listed in the HPI.          Medical History  Surgical History Family History Social History   Past Medical History:   Diagnosis Date    Aspiration pneumonia of lower lobe, unspecified aspiration pneumonia type, unspecified laterality (H) 10/11/2023    Congestive heart failure (H)     Coronary artery disease     Heart failure with reduced ejection fraction (H) 10/27/2023    Hyperlipidemia     Hypertension, unspecified type 10/11/2023    Ischemic cardiomyopathy 10/27/2023    Obese     Obesity due to excess calories 10/27/2023    MANINDER (obstructive sleep apnea) 10/27/2023    Pleural effusion 10/11/2023    Sleep apnea     Past Surgical History:   Procedure Laterality Date    CORONARY ANGIOGRAPHY ADULT ORDER      CORONARY ARTERY BYPASS GRAFT, WITH ENDOSCOPIC VESSEL PROCUREMENT N/A 12/1/2023    Procedure: CORONARY ARTERY BYPASS GRAFT TIMES FOUR ,LEFT INTERNAL MAMMARY ARTERY HARVEST, BILATERAL LOWER LEG ENDOSCOPIC VESSEL PROCUREMENT, EPIAORTIC ULTRASOUND;  Surgeon: Angélica Rizvi MD;  Location: [facility] Main OR    CV CORONARY ANGIOGRAM N/A 10/13/2023    Procedure: Coronary Angiogram;  Surgeon: Roxana Melgoza MD;  Location: [facility] CATH LAB  "CV    CV RIGHT HEART CATH MEASUREMENTS RECORDED N/A 10/13/2023    Procedure: Right Heart Catheterization;  Surgeon: Roxana Melgoza MD;  Location: [facility] CATH LAB CV    TRANSESOPHAGEAL ECHOCARDIOGRAM INTRAOPERATIVE N/A 12/1/2023    Procedure: ANESTHESIA TRANSESOPHAGEAL ECHOCARDIOGRAM;  Surgeon: Angélica Rizvi MD;  Location: [facility] Main OR    no family history of premature coronary artery disease Social History     Socioeconomic History    Marital status: Single     Spouse name: Not on file    Number of children: Not on file    Years of education: Not on file    Highest education level: Not on file   Occupational History    Not on file   Tobacco Use    Smoking status: Never    Smokeless tobacco: Never   Vaping Use    Vaping Use: Never used   Substance and Sexual Activity    Alcohol use: Never    Drug use: Never    Sexual activity: Not on file   Other Topics Concern    Not on file   Social History Narrative    Not on file     Social Determinants of Health     Financial Resource Strain: Not on file   Food Insecurity: Not on file   Transportation Needs: Not on file   Physical Activity: Not on file   Stress: Not on file   Social Connections: Not on file   Interpersonal Safety: Not on file   Housing Stability: Not on file           Lab Results    Chemistry/lipid CBC Cardiac Enzymes/BNP/TSH/INR   Lab Results   Component Value Date    BUN 15.6 12/02/2023     12/02/2023    CO2 26 12/02/2023    Lab Results   Component Value Date    WBC 7.7 12/02/2023    HGB 8.0 (L) 12/02/2023    HCT 25.6 (L) 12/02/2023    MCV 92 12/02/2023     (L) 12/02/2023    Lab Results   Component Value Date    TSH 1.30 10/10/2023    INR 1.34 (H) 12/02/2023     No results found for: \"CKTOTAL\", \"CKMB\", \"TROPONINI\"       Weight:    Wt Readings from Last 3 Encounters:   12/02/23 115.8 kg (255 lb 3.2 oz)   11/28/23 110.7 kg (244 lb)   11/22/23 108.9 kg (240 lb)       Allergies  No Known Allergies      Surgical History  Past Surgical " History:   Procedure Laterality Date    CORONARY ANGIOGRAPHY ADULT ORDER      CORONARY ARTERY BYPASS GRAFT, WITH ENDOSCOPIC VESSEL PROCUREMENT N/A 12/1/2023    Procedure: CORONARY ARTERY BYPASS GRAFT TIMES FOUR ,LEFT INTERNAL MAMMARY ARTERY HARVEST, BILATERAL LOWER LEG ENDOSCOPIC VESSEL PROCUREMENT, EPIAORTIC ULTRASOUND;  Surgeon: Angélica Rizvi MD;  Location: [facility] OR    CV CORONARY ANGIOGRAM N/A 10/13/2023    Procedure: Coronary Angiogram;  Surgeon: Roxana Melgoza MD;  Location: [facility] CATH LAB CV    CV RIGHT HEART CATH MEASUREMENTS RECORDED N/A 10/13/2023    Procedure: Right Heart Catheterization;  Surgeon: Roxana Melgoza MD;  Location: [facility] CV    TRANSESOPHAGEAL ECHOCARDIOGRAM INTRAOPERATIVE N/A 12/1/2023    Procedure: ANESTHESIA TRANSESOPHAGEAL ECHOCARDIOGRAM;  Surgeon: Angélica Rizvi MD;  Location: [facility] OR       Social History  Tobacco:   History   Smoking Status    Never   Smokeless Tobacco    Never    Alcohol:   Social History    Substance and Sexual Activity      Alcohol use: Never   Illicit Drugs:   History   Drug Use Unknown       Family History  History reviewed. No pertinent family history.       Yenny Crespo MD on 12/2/2023      cc: Cristina Alexis

## 2023-12-02 NOTE — PLAN OF CARE
[facility] ICU          Pertinent Assessments:      Please refer to flowsheet rows for full assessment     VS/Pain           Key Events - This Shift:     Pt arrived from the OR at ~1400. VSS/afebrile. 2 units of FFP and 2 units of platelets given. Epi/Cardene both titrated off. Pain controlled with APAP and 1x dose of Toradol. No excessive bleeding noted from chest tubes. Insulin gtt infusing per protocol.    SJN SAT (Sedation Awakening Trial): For use ONLY if intubated    SAT Safety Screen PASSED   If FAILED why?    SAT Performed PASSED   If FAILED why?               Barriers to Discharge / Downgrade:     ICU level of cares         Point of Contact Update YES-OR-NO: Yes  Name: Zan  Phone Number: at bedside  Summary of Conversation: updated on POC     CT EXTUBATION FAST TRACK:    [facility]     FastTrack Candidate: Yes, Extubation Goal Time ( 6 Hours ) 1951     Patient Status: Extubated @ 1823 to 4L      Problem: Cardiovascular Surgery  Goal: Absence of Bleeding  Outcome: Progressing  Goal: Effective Cardiac Function  Outcome: Progressing  Goal: Optimal Cerebral Tissue Perfusion  Outcome: Progressing  Intervention: Protect and Optimize Cerebral Perfusion  Recent Flowsheet Documentation  Taken 12/1/2023 1500 by Rupali Cassidy, RN  Head of Bed (HOB) Positioning: HOB at 30-45 degrees  Goal: Fluid and Electrolyte Balance  Outcome: Progressing  Goal: Blood Glucose Level Within Targeted Range  Outcome: Progressing  Goal: Anesthesia/Sedation Recovery  Outcome: Met  Goal: Acceptable Pain Control  Outcome: Progressing  Goal: Effective Oxygenation and Ventilation  Outcome: Progressing  Intervention: Optimize Oxygenation and Ventilation  Recent Flowsheet Documentation  Taken 12/1/2023 1500 by Rupali Cassidy, RN  Chest Tube Safety:   all connections secured   rubber-tipped hemostat(s) with patient     Problem: Restraint, Nonviolent  Goal: Absence of Harm or  Injury  Outcome: Met  Intervention: Protect Dignity, Rights and Personal Wellbeing  Recent Flowsheet Documentation  Taken 12/1/2023 1500 by Rupali Cassidy RN  Trust Relationship/Rapport:   care explained   reassurance provided  Intervention: Protect Skin and Joint Integrity  Recent Flowsheet Documentation  Taken 12/1/2023 1500 by Rupali Cassidy, RN  Body Position:   turned   supine    Rupali Cassidy, BERNA

## 2023-12-02 NOTE — CONSULTS
Care Management Initial Consult    General Information  Assessment completed with: Patient,         Primary Care Provider verified and updated as needed:     Readmission within the last 30 days:        Reason for Consult: discharge planning  Advance Care Planning:            Communication Assessment  Patient's communication style: spoken language (English or Bilingual)    Hearing Difficulty or Deaf: no   Wear Glasses or Blind: yes    Cognitive  Cognitive/Neuro/Behavioral: WDL  Level of Consciousness: lethargic  Arousal Level: arouses to voice  Orientation: oriented x 4  Mood/Behavior: calm     Speech: endotracheal tube    Living Environment:   People in home: alone     Current living Arrangements: apartment      Able to return to prior arrangements: yes       Family/Social Support:  Care provided by: self  Provides care for: no one  Marital Status: Single             Description of Support System:           Current Resources:   Patient receiving home care services:       Community Resources:    Equipment currently used at home: shower chair  Supplies currently used at home:      Employment/Financial:  Employment Status: self-employed        Financial Concerns: none           Does the patient's insurance plan have a 3 day qualifying hospital stay waiver?  No    Lifestyle & Psychosocial Needs:  Social Determinants of Health     Food Insecurity: Not on file   Depression: Not on file   Housing Stability: Not on file   Tobacco Use: Low Risk  (12/1/2023)    Patient History     Smoking Tobacco Use: Never     Smokeless Tobacco Use: Never     Passive Exposure: Not on file   Financial Resource Strain: Not on file   Alcohol Use: Not on file   Transportation Needs: Not on file   Physical Activity: Not on file   Interpersonal Safety: Not on file   Stress: Not on file   Social Connections: Not on file       Functional Status:  Prior to admission patient needed assistance:   Dependent ADLs:: Independent, Ambulation-no assistive  device  Dependent IADLs:: Independent       Mental Health Status:          Chemical Dependency Status:                Values/Beliefs:  Spiritual, Cultural Beliefs, Jew Practices, Values that affect care:                 Additional Information:  RNCM met with patient at bedside, role explained, initial assessment completed.     Patient lives in apartment alone. Independent, no DME no services. Anticipating home with outpatient cardiac rehab, family transport.     CM will continue to follow care progression and aide in discharge planning as needed.     Odilia Zuniga RN

## 2023-12-02 NOTE — TREATMENT PLAN
RCAT Treatment Plan    Patient Score: 10  Patient Acuity: 4    Clinical Indication for Therapy: productive cough and atelectasis    Therapy Ordered: Continue Aerobika/IS QID for bronchial hygiene and volume expansion. Add EzPAP QID for volume expansion.    Assessment Summary: Denies shortness of breath. Respiratory effort normal. Using Aerobika, EzPAP, and IS. -1000 mL. Also on BiPAP NOCS and PRN days. Will continue to assess respiratory status and adjust therapies as appropriate.      Daisy Avila, RT  12/2/2023

## 2023-12-02 NOTE — PROGRESS NOTES
Critical Care Progress Note  12/2/2023   9:43 AM    Admit Date: 12/1/2023  ICU Admission Day: 12/1  Code Status: FULL CODE      Problem List:   Principal Problem:    Coronary artery disease involving native coronary artery of native heart without angina pectoris  Active Problems:    Ischemic cardiomyopathy       Plan by System:     Pulmonary: Post op vent management; underlying MANINDER likely - not currently treated.   Wean supplemental O2 as tolerated; goal O2 sat > 92%.  HOB > 30 degrees to limit aspiration risk.    On BiPAP overnight; would continue to use this with sleep.   Strongly recommend outpatient sleep evaluation.   Cardiovascular: CAD, severe now s/p CABG x 4. HFrEF, ICM  Cardiac monitoring.   SBP goal > 90 mmHg, MAP goal > 65 mmHg.    Vasoactive gtts per CV-surgery.   Temporary pacing wires present; will use in setting of symptomatic arrhythmia.   Currently paced @ 90  Underlying rhythm: sinus  Neurological: acute pain post op    Pain control with PRN meds  GI/: no acute issues  Clears and ADAT   Bond catheter in place for accurate measurement of I/O.   GI prophylaxis:  Omeprazole  Renal: no acute issues   Monitor I/O's.  Electrolyte repletion PRN.  Avoid/limit nephrotoxic agents.   IVFs: per CV-surgery  Heme/Coag: Acute blood loss anemia; coagulopathy secondary to pump run.   Monitor H/H.   Transfuse per CV-surgery.   Monitor chest tube output hourly; notify CV-surgery for excessive output or abrupt stop in output.   DVT prophylaxis:  SubQ heparin  Infectious disease:   General precautions.   Remove lines and drains once no longer required.   Endocrine: T2DM with hyperglycemia   RHI gtt per ICU protocol.   Musculoskeletal:   Bedrest; initiate mobility protocol.     Clinically Significant Risk Factors          # Hypocalcemia: Lowest Ca = 7.5 mg/dL in last 2 days, will monitor and replace as appropriate     # Hypoalbuminemia: Lowest albumin = 3.4 g/dL at 12/1/2023  2:09 PM, will monitor as appropriate  #  "Coagulation Defect: INR = 1.34 (Ref range: 0.85 - 1.15) and/or PTT = 83 Seconds (Ref range: 22 - 38 Seconds), will monitor for bleeding  # Thrombocytopenia: Lowest platelets = 80 in last 2 days, will monitor for bleeding   # Hypertension: Noted on problem list        # Severe Obesity: Estimated body mass index is 51.54 kg/m  as calculated from the following:    Height as of this encounter: 1.499 m (4' 11\").    Weight as of this encounter: 115.8 kg (255 lb 3.2 oz)., PRESENT ON ADMISSION     # Financial/Environmental Concerns:     # History of CABG: noted on surgical history              Progressing as expected; plan of care as directed by surgery. Our service will sign off.     Love Sierra, [facility] Pulmonary/Critical Care     _______________________________________________________________    HPI: 48 year old female with severe CAD, HFrEF, ICM with EF 30%; obesity, MANINDER, and T2DM. Admitted today for CABG x 4 with Dr. Rizvi. Procedure reported as largely straight forward. Airway reported as easy, no difficulty with intubation. Lines placed without difficulty. Received 20mg IV Methadone, 50mg IV Ketamine. Preop echo initially showed EF 30%, but improved to 40% once fully asleep. No difficulty going on pump though did require epi throughout the procedure.  mL; received 435mL Cell Saver and a dose of DDAVP. Platelets were low and elevated INR at end of case; 2 of FFP and 1 of platelet ordered and will be transfused in ICU. Arrived in ICU on epi, sedated on precedex, on full vent support.   Extubated last evening without difficulty but did develop some hypercapnea post extubation as expected. BiPAP used overnight with good results.     ROS: pain well controlled; no shortness of breath or nausea.     Events over last 24-hours: as above    Objective:     Vitals:    12/02/23 0830 12/02/23 0845 12/02/23 0900 12/02/23 0930   BP:    101/56   BP Location:       Pulse: 55 60 69 73   Resp:       Temp:    "    TempSrc:       SpO2: 99% 95% 95% 96%   Weight:       Height:              I/O:   Intake/Output Summary (Last 24 hours) at 12/2/2023 0943  Last data filed at 12/2/2023 0600  Gross per 24 hour   Intake 5767.83 ml   Output 4344 ml   Net 1423.83 ml     Wt Readings from Last 3 Encounters:   12/02/23 115.8 kg (255 lb 3.2 oz)   11/28/23 110.7 kg (244 lb)   11/22/23 108.9 kg (240 lb)      Weight change: 5.126 kg (11 lb 4.8 oz)    Physical Exam:  Gen: awake and alert, up in chair, no distress  HEENMT: AT/NC  NEURO: grossly nonfocal  CARDIOVASCULAR: RRR S1S2; chest tubes in place  PULMONARY: unlabored on NC; lungs diminished, clear.   GASTROINTESTINAL: soft  INTEGUMENT: visible skin intact  PSYCH: calm, pleasant    Labs:   Recent Labs   Lab 12/02/23  0415      CO2 26   BUN 15.6   ALKPHOS 35*   ALT 11   AST 35       Recent Labs   Lab 12/02/23  0415   WBC 7.7   HGB 8.0*   HCT 25.6*   *       Micro:   None this admission    Imaging: all imaging personalized reviewed   12/2 CXR - Sternotomy, CABG, mediastinal drain and left chest tube. The patient has been extubated. Enteric tube has been removed. [place] catheter via right IJ access, tip in the left main pulmonary artery. Mild cardiac enlargement. Venous congestion   has improved. Strands of platelike atelectasis in the lower lungs, more apparent on the left, slight interval improvement. Likely small left pleural effusion. Monitoring leads overlying the chest.      Love Sierra, CNP  [facility] Pulmonary/Critical Care

## 2023-12-02 NOTE — PROGRESS NOTES
12/02/23 0855   Appointment Info   Signing Clinician's Name / Credentials (OT) Magdalena Romero OT   Living Environment   People in Home alone   Current Living Arrangements apartment   Home Accessibility stairs to enter home;stairs within home   Number of Stairs, Main Entrance 5   Stair Railings, Main Entrance railings safe and in good condition   Number of Stairs, Within Home, Primary greater than 10 stairs   Stair Railings, Within Home, Primary railings safe and in good condition   Transportation Anticipated family or friend will provide   Self-Care   Usual Activity Tolerance good   Current Activity Tolerance fair   Equipment Currently Used at Home shower chair   Fall history within last six months no   Instrumental Activities of Daily Living (IADL)   Previous Responsibilities meal prep;housekeeping;laundry;shopping;medication management;finances;driving   General Information   Onset of Illness/Injury or Date of Surgery 12/01/23   Referring Physician Dr Rizvi   Patient/Family Therapy Goal Statement (OT) go home   Additional Occupational Profile Info/Pertinent History of Current Problem CORONARY ARTERY BYPASS GRAFT (CABG) X 4 ,LEFT INTERNAL MAMMARY ARTERY HARVEST, BILATERAL LOWER LEG ENDOSCOPIC VESSEL PROCUREMENT, EPIAORTIC ULTRASOUND  ANESTHESIA TRANSESOPHAGEAL ECHOCARDIOGRAM   Existing Precautions/Restrictions cardiac   Left Upper Extremity (Weight-bearing Status) non weight-bearing (NWB)   Right Upper Extremity (Weight-bearing Status) non weight-bearing (NWB)   Left Lower Extremity (Weight-bearing Status) full weight-bearing (FWB)   Right Lower Extremity (Weight-bearing Status) full weight-bearing (FWB)   Cognitive Status Examination   Orientation Status orientation to person, place and time   Visual Perception   Visual Impairment/Limitations corrective lenses full-time   Sensory   Sensory Quick Adds sensation intact   Pain Assessment   Patient Currently in Pain Yes, see Vital Sign flowsheet  (incision  site)   Posture   Posture forward head position   Range of Motion Comprehensive   General Range of Motion no range of motion deficits identified   Strength Comprehensive (MMT)   General Manual Muscle Testing (MMT) Assessment no strength deficits identified   Muscle Tone Assessment   Muscle Tone Quick Adds No deficits were identified   Coordination   Upper Extremity Coordination No deficits were identified   Bed Mobility   Bed Mobility sit-supine   Sit-Supine [place] (Bed Mobility) minimum assist (75% patient effort)   Assistive Device (Bed Mobility) other (see comments)  (logroll instruction)   Transfers   Transfers bed-chair transfer   Transfer Skill: Bed to Chair/Chair to Bed   Bed-Chair [place] (Transfers) contact guard   Balance   Balance Assessment standing static balance   Standing Balance: Static contact guard   Activities of Daily Living   BADL Assessment/Intervention lower body dressing   Lower Body Dressing Assessment/Training   [place] Level (Lower Body Dressing) independent   Clinical Impression   Criteria for Skilled Therapeutic Interventions Met (OT) Yes, treatment indicated   OT Diagnosis CABG   Influenced by the following impairments fatigue, decreased ADLS,   OT Problem List-Impairments impacting ADL problems related to;activity tolerance impaired;balance   Assessment of Occupational Performance 3-5 Performance Deficits   Planned Therapy Interventions (OT) ADL retraining   Clinical Decision Making Complexity (OT) detailed assessment/moderate complexity   Risk & Benefits of therapy have been explained evaluation/treatment results reviewed;care plan/treatment goals reviewed;risks/benefits reviewed;participants voiced agreement with care plan   OT Total Evaluation Time   OT Eval, Moderate Complexity Minutes (75827) 15   OT Goals   Therapy Frequency (OT) 2 times/day   OT Predicted Duration/Target Date for Goal Attainment 12/08/23   OT Goals Cardiac Phase 1   OT: Understanding of cardiac  education to maximize quality of life, condition management, and health outcomes Patient;Caregiver;Verbalize;Demonstrate   OT: Perform aerobic activity with stable cardiovascular response 10 minutes;continuous   OT: Functional/aerobic ambulation tolerance with stable cardiovascular response in order to return to home and community environment Greater than 300 feet;Modified independent   OT: Navigation of stairs simulating home set up with stable cardiovascular response in order to return to home and community environment Greater than 10 stairs;Modified independent   Self-Care/Home Management   Self-Care/Home Mgmt/ADL, Compensatory, Meal Prep Minutes (34492) 10   Symptoms Noted During/After Treatment (Meal Preparation/Planning Training) fatigue   Treatment Detail/Skilled Intervention reviewed precautions,  transfers CGA w/out a device w/insturction in bed mobility w/min A   Therapeutic Procedures/Exercise   Therapeutic Procedure: strength, endurance, ROM, flexibillity minutes (19794) 8   Symptoms Noted During/After Treatment fatigue   Treatment Detail/Skilled Intervention see exercises below   Treatment Time Includes (CR Only) See specific exercise details intervention group(s)   Calisthenics   Type Hip Abductors;Hip Flexor: kicks;March in place;Knee Flexion;Knee Bends  (ankle pumps)   Cardiovascular Response Normal   Exercise Details LE cals 10 reps x6 ex w/visual cues   Vital Signs Details before /60, HR 79. O2 94, after /62, HR 76, O2 98   Cardiac Education   Education Provided Precautions;Signs and symptoms;Stop light tool   Education Packet Given to Patient Yes   All Patient Education Handouts Reviewed with Patient and/or Family Yes   Cardiac Rehab Phase II Plan   Phase II Order Received Yes   Phase II Appointment Status Scheduled   Date/Time 12/12 9;45   Location [facility]   OT Discharge Planning   OT Plan transfers/standing, LE cals, precautions   OT Discharge Recommendation (DC Rec) home with  outpatient cardiac rehab   OT Rationale for DC Rec pt is going home and staying w/family for a week plus   OT Brief overview of current status CGA w/transfers   Total Session Time   Timed Code Treatment Minutes 18   Total Session Time (sum of timed and untimed services) 33

## 2023-12-03 ENCOUNTER — APPOINTMENT (OUTPATIENT)
Dept: OCCUPATIONAL THERAPY | Facility: HOSPITAL | Age: 48
End: 2023-12-03
Attending: THORACIC SURGERY (CARDIOTHORACIC VASCULAR SURGERY)
Payer: COMMERCIAL

## 2023-12-03 ENCOUNTER — APPOINTMENT (OUTPATIENT)
Dept: RADIOLOGY | Facility: HOSPITAL | Age: 48
End: 2023-12-03
Attending: SURGERY
Payer: COMMERCIAL

## 2023-12-03 ENCOUNTER — APPOINTMENT (OUTPATIENT)
Dept: CARDIOLOGY | Facility: HOSPITAL | Age: 48
End: 2023-12-03
Attending: INTERNAL MEDICINE
Payer: COMMERCIAL

## 2023-12-03 LAB
ALBUMIN SERPL BCG-MCNC: 4 G/DL (ref 3.5–5.2)
ALP SERPL-CCNC: 32 U/L (ref 40–150)
ALT SERPL W P-5'-P-CCNC: 7 U/L (ref 0–50)
ANION GAP SERPL CALCULATED.3IONS-SCNC: 8 MMOL/L (ref 7–15)
AST SERPL W P-5'-P-CCNC: 30 U/L (ref 0–45)
BASE EXCESS BLDA CALC-SCNC: 0.6 MMOL/L
BILIRUB SERPL-MCNC: 0.4 MG/DL
BUN SERPL-MCNC: 22.9 MG/DL (ref 6–20)
CALCIUM SERPL-MCNC: 8.7 MG/DL (ref 8.6–10)
CALCIUM, IONIZED MEASURED: 1.12 MMOL/L (ref 1.11–1.3)
CHLORIDE SERPL-SCNC: 107 MMOL/L (ref 98–107)
COHGB MFR BLD: 97.2 % (ref 96–97)
CREAT SERPL-MCNC: 1.01 MG/DL (ref 0.51–0.95)
DEPRECATED HCO3 PLAS-SCNC: 27 MMOL/L (ref 22–29)
EGFRCR SERPLBLD CKD-EPI 2021: 68 ML/MIN/1.73M2
ERYTHROCYTE [DISTWIDTH] IN BLOOD BY AUTOMATED COUNT: 14.2 % (ref 10–15)
GLUCOSE BLDC GLUCOMTR-MCNC: 111 MG/DL (ref 70–99)
GLUCOSE BLDC GLUCOMTR-MCNC: 120 MG/DL (ref 70–99)
GLUCOSE BLDC GLUCOMTR-MCNC: 121 MG/DL (ref 70–99)
GLUCOSE BLDC GLUCOMTR-MCNC: 89 MG/DL (ref 70–99)
GLUCOSE SERPL-MCNC: 114 MG/DL (ref 70–99)
HCO3 BLD-SCNC: 27 MMOL/L (ref 23–29)
HCT VFR BLD AUTO: 24.9 % (ref 35–47)
HGB BLD-MCNC: 7.7 G/DL (ref 11.7–15.7)
INR PPP: 1.33 (ref 0.85–1.15)
ION CA PH 7.4: 1.11 MMOL/L (ref 1.11–1.3)
LACTATE SERPL-SCNC: 0.8 MMOL/L (ref 0.7–2)
LVEF ECHO: NORMAL
MAGNESIUM SERPL-MCNC: 2.8 MG/DL (ref 1.7–2.3)
MCH RBC QN AUTO: 29.6 PG (ref 26.5–33)
MCHC RBC AUTO-ENTMCNC: 30.9 G/DL (ref 31.5–36.5)
MCV RBC AUTO: 96 FL (ref 78–100)
OXYHGB MFR BLD: 95.2 % (ref 96–97)
PCO2 BLD: 56 MM HG (ref 35–45)
PH BLD: 7.29 [PH] (ref 7.37–7.44)
PH: 7.38 (ref 7.35–7.45)
PHOSPHATE SERPL-MCNC: 4.8 MG/DL (ref 2.5–4.5)
PLATELET # BLD AUTO: 95 10E3/UL (ref 150–450)
PO2 BLD: 92 MM HG (ref 80–90)
POTASSIUM SERPL-SCNC: 4.7 MMOL/L (ref 3.4–5.3)
PROT SERPL-MCNC: 6.1 G/DL (ref 6.4–8.3)
RBC # BLD AUTO: 2.6 10E6/UL (ref 3.8–5.2)
SODIUM SERPL-SCNC: 142 MMOL/L (ref 135–145)
TEMPERATURE: 37 DEGREES C
WBC # BLD AUTO: 8.6 10E3/UL (ref 4–11)

## 2023-12-03 PROCEDURE — 250N000011 HC RX IP 250 OP 636: Performed by: INTERNAL MEDICINE

## 2023-12-03 PROCEDURE — 83735 ASSAY OF MAGNESIUM: CPT | Performed by: SURGERY

## 2023-12-03 PROCEDURE — 82805 BLOOD GASES W/O2 SATURATION: CPT | Performed by: PHYSICIAN ASSISTANT

## 2023-12-03 PROCEDURE — 82330 ASSAY OF CALCIUM: CPT | Performed by: PHYSICIAN ASSISTANT

## 2023-12-03 PROCEDURE — 93325 DOPPLER ECHO COLOR FLOW MAPG: CPT | Mod: 26 | Performed by: INTERNAL MEDICINE

## 2023-12-03 PROCEDURE — 94660 CPAP INITIATION&MGMT: CPT

## 2023-12-03 PROCEDURE — 93321 DOPPLER ECHO F-UP/LMTD STD: CPT

## 2023-12-03 PROCEDURE — 93308 TTE F-UP OR LMTD: CPT | Mod: 26 | Performed by: INTERNAL MEDICINE

## 2023-12-03 PROCEDURE — 250N000011 HC RX IP 250 OP 636: Mod: JZ | Performed by: PHYSICIAN ASSISTANT

## 2023-12-03 PROCEDURE — 85027 COMPLETE CBC AUTOMATED: CPT | Performed by: SURGERY

## 2023-12-03 PROCEDURE — 200N000001 HC R&B ICU

## 2023-12-03 PROCEDURE — 84100 ASSAY OF PHOSPHORUS: CPT | Performed by: SURGERY

## 2023-12-03 PROCEDURE — 97535 SELF CARE MNGMENT TRAINING: CPT | Mod: GO

## 2023-12-03 PROCEDURE — 97110 THERAPEUTIC EXERCISES: CPT | Mod: GO

## 2023-12-03 PROCEDURE — 36600 WITHDRAWAL OF ARTERIAL BLOOD: CPT

## 2023-12-03 PROCEDURE — P9047 ALBUMIN (HUMAN), 25%, 50ML: HCPCS | Performed by: INTERNAL MEDICINE

## 2023-12-03 PROCEDURE — 85610 PROTHROMBIN TIME: CPT | Performed by: SURGERY

## 2023-12-03 PROCEDURE — 250N000013 HC RX MED GY IP 250 OP 250 PS 637: Performed by: INTERNAL MEDICINE

## 2023-12-03 PROCEDURE — 250N000013 HC RX MED GY IP 250 OP 250 PS 637: Performed by: PHYSICIAN ASSISTANT

## 2023-12-03 PROCEDURE — 255N000002 HC RX 255 OP 636: Performed by: THORACIC SURGERY (CARDIOTHORACIC VASCULAR SURGERY)

## 2023-12-03 PROCEDURE — 999N000157 HC STATISTIC RCP TIME EA 10 MIN

## 2023-12-03 PROCEDURE — 80053 COMPREHEN METABOLIC PANEL: CPT | Performed by: SURGERY

## 2023-12-03 PROCEDURE — 99233 SBSQ HOSP IP/OBS HIGH 50: CPT | Mod: 25 | Performed by: INTERNAL MEDICINE

## 2023-12-03 PROCEDURE — 36415 COLL VENOUS BLD VENIPUNCTURE: CPT | Performed by: PHYSICIAN ASSISTANT

## 2023-12-03 PROCEDURE — 999N000208 ECHOCARDIOGRAM LIMITED

## 2023-12-03 PROCEDURE — 94799 UNLISTED PULMONARY SVC/PX: CPT

## 2023-12-03 PROCEDURE — 83605 ASSAY OF LACTIC ACID: CPT | Performed by: SURGERY

## 2023-12-03 PROCEDURE — 71045 X-RAY EXAM CHEST 1 VIEW: CPT

## 2023-12-03 PROCEDURE — 93321 DOPPLER ECHO F-UP/LMTD STD: CPT | Mod: 26 | Performed by: INTERNAL MEDICINE

## 2023-12-03 RX ORDER — FUROSEMIDE 10 MG/ML
60 INJECTION INTRAMUSCULAR; INTRAVENOUS EVERY 12 HOURS
Status: DISCONTINUED | OUTPATIENT
Start: 2023-12-03 | End: 2023-12-04

## 2023-12-03 RX ORDER — FUROSEMIDE 10 MG/ML
60 INJECTION INTRAMUSCULAR; INTRAVENOUS EVERY 6 HOURS
Status: DISCONTINUED | OUTPATIENT
Start: 2023-12-03 | End: 2023-12-03

## 2023-12-03 RX ORDER — ALBUMIN (HUMAN) 12.5 G/50ML
50 SOLUTION INTRAVENOUS ONCE
Status: COMPLETED | OUTPATIENT
Start: 2023-12-03 | End: 2023-12-03

## 2023-12-03 RX ADMIN — HEPARIN SODIUM 5000 UNITS: 5000 INJECTION, SOLUTION INTRAVENOUS; SUBCUTANEOUS at 20:57

## 2023-12-03 RX ADMIN — PERFLUTREN 2 ML: 6.52 INJECTION, SUSPENSION INTRAVENOUS at 10:32

## 2023-12-03 RX ADMIN — HEPARIN SODIUM 5000 UNITS: 5000 INJECTION, SOLUTION INTRAVENOUS; SUBCUTANEOUS at 13:44

## 2023-12-03 RX ADMIN — FERROUS SULFATE TAB 325 MG (65 MG ELEMENTAL FE) 324 MG: 325 (65 FE) TAB at 08:06

## 2023-12-03 RX ADMIN — ASPIRIN 81 MG CHEWABLE TABLET 162 MG: 81 TABLET CHEWABLE at 08:07

## 2023-12-03 RX ADMIN — POLYETHYLENE GLYCOL 3350 17 G: 17 POWDER, FOR SOLUTION ORAL at 08:07

## 2023-12-03 RX ADMIN — ACETAMINOPHEN 975 MG: 325 TABLET ORAL at 20:57

## 2023-12-03 RX ADMIN — ACETAMINOPHEN 975 MG: 325 TABLET ORAL at 13:44

## 2023-12-03 RX ADMIN — THERA TABS 1 TABLET: TAB at 08:07

## 2023-12-03 RX ADMIN — OXYCODONE HYDROCHLORIDE 5 MG: 5 TABLET ORAL at 20:59

## 2023-12-03 RX ADMIN — ATORVASTATIN CALCIUM 80 MG: 40 TABLET, FILM COATED ORAL at 08:07

## 2023-12-03 RX ADMIN — OXYCODONE HYDROCHLORIDE 5 MG: 5 TABLET ORAL at 08:11

## 2023-12-03 RX ADMIN — SENNOSIDES AND DOCUSATE SODIUM 1 TABLET: 8.6; 5 TABLET ORAL at 08:07

## 2023-12-03 RX ADMIN — CYANOCOBALAMIN TAB 1000 MCG 1000 MCG: 1000 TAB at 08:06

## 2023-12-03 RX ADMIN — HEPARIN SODIUM 5000 UNITS: 5000 INJECTION, SOLUTION INTRAVENOUS; SUBCUTANEOUS at 03:45

## 2023-12-03 RX ADMIN — SENNOSIDES AND DOCUSATE SODIUM 1 TABLET: 8.6; 5 TABLET ORAL at 20:57

## 2023-12-03 RX ADMIN — ACETAMINOPHEN 975 MG: 325 TABLET ORAL at 06:07

## 2023-12-03 RX ADMIN — PANTOPRAZOLE SODIUM 40 MG: 40 TABLET, DELAYED RELEASE ORAL at 08:07

## 2023-12-03 RX ADMIN — ALBUMIN HUMAN 50 G: 0.25 SOLUTION INTRAVENOUS at 08:07

## 2023-12-03 ASSESSMENT — ACTIVITIES OF DAILY LIVING (ADL)
ADLS_ACUITY_SCORE: 24
ADLS_ACUITY_SCORE: 22
ADLS_ACUITY_SCORE: 24
ADLS_ACUITY_SCORE: 24
ADLS_ACUITY_SCORE: 22
ADLS_ACUITY_SCORE: 28
ADLS_ACUITY_SCORE: 22
ADLS_ACUITY_SCORE: 24
ADLS_ACUITY_SCORE: 28
ADLS_ACUITY_SCORE: 28
ADLS_ACUITY_SCORE: 24
ADLS_ACUITY_SCORE: 24

## 2023-12-03 NOTE — PROGRESS NOTES
Pt hypotensive and bradycardic this AM. Lasix and Metoprolol held per parameters. Temp pacer turned back with rate at 80; BP improving with this change. Bipap placed at 0800 for resp acidosis.

## 2023-12-03 NOTE — PROGRESS NOTES
HEART CARE NOTE          Assessment/Recommendation     1. Severe HFrEF   Assessment / Plan  Hypervolemic on physical exam - no changes to regimen at this time; continue IV diuresis  New HFrEF/ICM s/p CABG; GDMT as detailed below     Current Pharmacotherapy AHA Guideline-Directed Medical Therapy   Sacubitril-valsartan 24-26 mg twice daily - on hold 2/2 borderline hemodynamics Lisinopril 20 mg twice daily   Metoprolol succinate 50 mg daily Carvedilol 25 mg twice daily   Spironolactone 25 mg daily -held Spironolactone 25 mg once daily   Hydralazine NA Hydralazine 100 mg three times daily   Isosorbide dinitrate NA Isosorbide dinitrate 40 mg three times daily   SGLT2 inhibitor:Dapagliflozin/Empagliflozin - not started Dapagliflozin or Empagliflozin 10 mg daily      2. CAD  Assessment / Plan  Severe multi-vessel CAD s/p CABG  Denies angina; continue ASA, high intensity atorvastatin    3. Bradycardia  Assessment / Plan  Overnight course c/b bradycardia and associated hypotension which resolved with pacing- continue to hold AV node blocking agents  Continue pacing - EP consult placed to evaluate for PPM      60 minutes spent reviewing prior records (including documentation, laboratory studies, cardiac testing/imaging), history and physical exam, planning, and subsequent documentation.    History of Present Illness/Subjective    Ms. Betty Pan is a 48 year old female who presents in acute hypoxic respiratory failure concerning for ADHF     Today, Mrs. Pan denies is without cardiac events or complaints; observed resting on BIPAP; Management plan as detailed above      ECG: personally reviewed 12/2/23: sinus tachycardia.     ECHO (personnaly Reviewed on 10/1323):   Technically very challenging study. Definity contrast utilized.  Left ventricle measures mildly enlarged. Mild left ventricular hypertrophy  suggested. Left ventricular systolic function is moderate to severely globally  reduced with an ejection fraction of  "30 to 35% with akinesis of the left  ventricular apex.Diastolic Doppler findings (E/E' ratio and/or other  parameters) suggest left ventricular filling pressures are increased.  The right ventricle is suboptimally visualized. On limited imaging right  ventricular systolic function appears potentially reduced.  Mild enlargement of the left atrium.  Valve structures are suboptimally visualized on this technically challenging  study. No obvious hemodynamically significant valve abnormality noted.  Consider cardiac MRI to further define cardiac structures.    Telemetry: personally reviewed December 3, 2023; notable for paced rhythm     Medical history and pertinent documents reviewed in Care Everywhere please where applicable see details above        Physical Examination Review of Systems   /56   Pulse 60   Temp 98.5  F (36.9  C) (Oral)   Resp 16   Ht 1.499 m (4' 11\")   Wt 116.6 kg (257 lb)   SpO2 97%   BMI 51.91 kg/m    Body mass index is 51.91 kg/m .  Wt Readings from Last 3 Encounters:   12/03/23 116.6 kg (257 lb)   11/28/23 110.7 kg (244 lb)   11/22/23 108.9 kg (240 lb)     General Appearance:   no distress, normal body habitus   ENT/Mouth: membranes moist, no oral lesions or bleeding gums.      EYES:  no scleral icterus, normal conjunctivae   Neck: no carotid bruits or thyromegaly   Chest/Lungs:   lungs are clear to auscultation, no rales or wheezing, equal chest wall expansion    Cardiovascular:   Regular. Normal first and second heart sounds with no murmurs, rubs, or gallops; the carotid, radial and posterior tibial pulses are intact, + JVD and LE edema bilaterally    Abdomen:  no organomegaly, masses, bruits, or tenderness; bowel sounds are present   Extremities: no cyanosis or clubbing   Skin: no xanthelasma, warm.    Neurologic: NAD     Psychiatric: NAD     A complete 10 systems ROS was reviewed  And is negative except what is listed in the HPI.          Medical History  Surgical History Family " History Social History   Past Medical History:   Diagnosis Date    Aspiration pneumonia of lower lobe, unspecified aspiration pneumonia type, unspecified laterality (H) 10/11/2023    Congestive heart failure (H)     Coronary artery disease     Heart failure with reduced ejection fraction (H) 10/27/2023    Hyperlipidemia     Hypertension, unspecified type 10/11/2023    Ischemic cardiomyopathy 10/27/2023    Obese     Obesity due to excess calories 10/27/2023    MANINDER (obstructive sleep apnea) 10/27/2023    Pleural effusion 10/11/2023    Sleep apnea     Past Surgical History:   Procedure Laterality Date    CORONARY ANGIOGRAPHY ADULT ORDER      CORONARY ARTERY BYPASS GRAFT, WITH ENDOSCOPIC VESSEL PROCUREMENT N/A 12/1/2023    Procedure: CORONARY ARTERY BYPASS GRAFT TIMES FOUR ,LEFT INTERNAL MAMMARY ARTERY HARVEST, BILATERAL LOWER LEG ENDOSCOPIC VESSEL PROCUREMENT, EPIAORTIC ULTRASOUND;  Surgeon: Angélica Rizvi MD;  Location: [facility] OR    CV CORONARY ANGIOGRAM N/A 10/13/2023    Procedure: Coronary Angiogram;  Surgeon: Roxana Melgoza MD;  Location: [facility] CATH LAB CV    CV RIGHT HEART CATH MEASUREMENTS RECORDED N/A 10/13/2023    Procedure: Right Heart Catheterization;  Surgeon: Roxana Melgoza MD;  Location: [facility] CATH LAB CV    TRANSESOPHAGEAL ECHOCARDIOGRAM INTRAOPERATIVE N/A 12/1/2023    Procedure: ANESTHESIA TRANSESOPHAGEAL ECHOCARDIOGRAM;  Surgeon: Angélica Rizvi MD;  Location: [facility] OR    no family history of premature coronary artery disease Social History     Socioeconomic History    Marital status: Single     Spouse name: Not on file    Number of children: Not on file    Years of education: Not on file    Highest education level: Not on file   Occupational History    Not on file   Tobacco Use    Smoking status: Never    Smokeless tobacco: Never   Vaping Use    Vaping Use: Never used   Substance and Sexual Activity    Alcohol use: Never    Drug use: Never    Sexual activity: Not on  "file   Other Topics Concern    Not on file   Social History Narrative    Not on file     Social Determinants of Health     Financial Resource Strain: Not on file   Food Insecurity: Not on file   Transportation Needs: Not on file   Physical Activity: Not on file   Stress: Not on file   Social Connections: Not on file   Interpersonal Safety: Not on file   Housing Stability: Not on file           Lab Results    Chemistry/lipid CBC Cardiac Enzymes/BNP/TSH/INR   Lab Results   Component Value Date    BUN 22.9 (H) 12/03/2023     12/03/2023    CO2 27 12/03/2023    Lab Results   Component Value Date    WBC 8.6 12/03/2023    HGB 7.7 (L) 12/03/2023    HCT 24.9 (L) 12/03/2023    MCV 96 12/03/2023    PLT 95 (L) 12/03/2023    Lab Results   Component Value Date    TSH 1.30 10/10/2023    INR 1.33 (H) 12/03/2023     No results found for: \"CKTOTAL\", \"CKMB\", \"TROPONINI\"       Weight:    Wt Readings from Last 3 Encounters:   12/03/23 116.6 kg (257 lb)   11/28/23 110.7 kg (244 lb)   11/22/23 108.9 kg (240 lb)       Allergies  No Known Allergies      Surgical History  Past Surgical History:   Procedure Laterality Date    CORONARY ANGIOGRAPHY ADULT ORDER      CORONARY ARTERY BYPASS GRAFT, WITH ENDOSCOPIC VESSEL PROCUREMENT N/A 12/1/2023    Procedure: CORONARY ARTERY BYPASS GRAFT TIMES FOUR ,LEFT INTERNAL MAMMARY ARTERY HARVEST, BILATERAL LOWER LEG ENDOSCOPIC VESSEL PROCUREMENT, EPIAORTIC ULTRASOUND;  Surgeon: Angélica Rizvi MD;  Location: [facility] Main OR    CV CORONARY ANGIOGRAM N/A 10/13/2023    Procedure: Coronary Angiogram;  Surgeon: Roxana Melgoza MD;  Location: [facility] CATH LAB CV    CV RIGHT HEART CATH MEASUREMENTS RECORDED N/A 10/13/2023    Procedure: Right Heart Catheterization;  Surgeon: Roxana Melgoza MD;  Location: [facility] CATH LAB CV    TRANSESOPHAGEAL ECHOCARDIOGRAM INTRAOPERATIVE N/A 12/1/2023    Procedure: ANESTHESIA TRANSESOPHAGEAL ECHOCARDIOGRAM;  Surgeon: Angélica Rizvi MD;  Location: [facility]" Main OR       Social History  Tobacco:   History   Smoking Status    Never   Smokeless Tobacco    Never    Alcohol:   Social History    Substance and Sexual Activity      Alcohol use: Never   Illicit Drugs:   History   Drug Use Unknown       Family History  History reviewed. No pertinent family history.       Yenny Crespo MD on 12/3/2023      cc: Cristina Alexis

## 2023-12-03 NOTE — PLAN OF CARE
"  Problem: Adult Inpatient Plan of Care  Goal: Plan of Care Review  Description: The Plan of Care Review/Shift note should be completed every shift.  The Outcome Evaluation is a brief statement about your assessment that the patient is improving, declining, or no change.  This information will be displayed automatically on your shift  note.  Outcome: Progressing  Goal: Patient-Specific Goal (Individualized)  Description: You can add care plan individualizations to a care plan. Examples of Individualization might be:  \"Parent requests to be called daily at 9am for status\", \"I have a hard time hearing out of my right ear\", or \"Do not touch me to wake me up as it startles  me\".  Outcome: Progressing  Goal: Absence of Hospital-Acquired Illness or Injury  Outcome: Progressing  Intervention: Identify and Manage Fall Risk  Recent Flowsheet Documentation  Taken 12/3/2023 0400 by Tiera Barreto, RN  Safety Promotion/Fall Prevention:   activity supervised   increase visualization of patient   increased rounding and observation   clutter free environment maintained   nonskid shoes/slippers when out of bed   patient and family education   room door open   room near nurse's station   room organization consistent   supervised activity   safety round/check completed  Taken 12/3/2023 0000 by Tiera Barreto, RN  Safety Promotion/Fall Prevention:   activity supervised   increase visualization of patient   increased rounding and observation   clutter free environment maintained   nonskid shoes/slippers when out of bed   patient and family education   room door open   room near nurse's station   room organization consistent   supervised activity   safety round/check completed  Taken 12/2/2023 2000 by Tiera Barreto, RN  Safety Promotion/Fall Prevention:   activity supervised   increase visualization of patient   increased rounding and observation   clutter free environment maintained   nonskid shoes/slippers when out of bed   " patient and family education   room door open   room near nurse's station   room organization consistent   supervised activity   safety round/check completed  Intervention: Prevent Skin Injury  Recent Flowsheet Documentation  Taken 12/3/2023 0400 by Tiera Barreto RN  Body Position: turned  Taken 12/3/2023 0000 by Tiera Barreto RN  Body Position: turned  Taken 12/2/2023 2000 by Tiera Barreto RN  Body Position:   weight shifting   turned  Intervention: Prevent and Manage VTE (Venous Thromboembolism) Risk  Recent Flowsheet Documentation  Taken 12/3/2023 0400 by Tiera Barreto RN  VTE Prevention/Management: SCDs (sequential compression devices) on  Taken 12/3/2023 0000 by Tiera Barreto RN  VTE Prevention/Management: SCDs (sequential compression devices) on  Taken 12/2/2023 2000 by Tiera Barreto RN  VTE Prevention/Management: SCDs (sequential compression devices) on  Goal: Optimal Comfort and Wellbeing  Outcome: Progressing  Intervention: Monitor Pain and Promote Comfort  Recent Flowsheet Documentation  Taken 12/3/2023 0400 by Tiera Barreto RN  Pain Management Interventions:   medication (see MAR)   care clustered   emotional support   pillow support provided   relaxation techniques promoted   repositioned  Taken 12/2/2023 2000 by Tiera Barreto RN  Pain Management Interventions:   care clustered   emotional support   pillow support provided   quiet environment facilitated   relaxation techniques promoted   repositioned  Intervention: Provide Person-Centered Care  Recent Flowsheet Documentation  Taken 12/3/2023 0400 by Tiera Barreto RN  Trust Relationship/Rapport:   care explained   choices provided   emotional support provided   empathic listening provided   questions answered   questions encouraged   reassurance provided   thoughts/feelings acknowledged  Taken 12/3/2023 0000 by Tiera Barreto RN  Trust Relationship/Rapport:   care explained   choices provided   emotional support  provided   empathic listening provided   questions answered   questions encouraged   reassurance provided   thoughts/feelings acknowledged  Taken 12/2/2023 2000 by Tiera Barreto RN  Trust Relationship/Rapport:   care explained   choices provided   emotional support provided   empathic listening provided   questions answered   questions encouraged   reassurance provided   thoughts/feelings acknowledged  Goal: Readiness for Transition of Care  Outcome: Progressing     Problem: Cardiovascular Surgery  Goal: Improved Activity Tolerance  Outcome: Progressing  Goal: Optimal Coping with Heart Surgery  Outcome: Progressing  Goal: Absence of Bleeding  Outcome: Progressing  Goal: Effective Bowel Elimination  Outcome: Progressing  Goal: Effective Cardiac Function  Outcome: Progressing  Goal: Optimal Cerebral Tissue Perfusion  Outcome: Progressing  Intervention: Protect and Optimize Cerebral Perfusion  Recent Flowsheet Documentation  Taken 12/3/2023 0400 by Tiera Barreto RN  Head of Bed (HOB) Positioning: HOB at 30 degrees  Taken 12/3/2023 0000 by Tiera Barreto RN  Head of Bed (HOB) Positioning: HOB at 30 degrees  Taken 12/2/2023 2000 by Tiera Barreto RN  Head of Bed (HOB) Positioning: HOB at 30 degrees  Goal: Fluid and Electrolyte Balance  Outcome: Progressing  Goal: Blood Glucose Level Within Targeted Range  Outcome: Progressing  Goal: Absence of Infection Signs and Symptoms  Outcome: Progressing  Goal: Acceptable Pain Control  Outcome: Progressing  Intervention: Prevent or Manage Pain  Recent Flowsheet Documentation  Taken 12/3/2023 0400 by Tiera Barreto RN  Pain Management Interventions:   medication (see MAR)   care clustered   emotional support   pillow support provided   relaxation techniques promoted   repositioned  Taken 12/2/2023 2000 by Tiera Barreto RN  Pain Management Interventions:   care clustered   emotional support   pillow support provided   quiet environment facilitated   relaxation  techniques promoted   repositioned  Goal: Nausea and Vomiting Relief  Outcome: Progressing  Goal: Effective Urinary Elimination  Outcome: Progressing  Goal: Effective Oxygenation and Ventilation  Outcome: Progressing  Intervention: Promote Airway Secretion Clearance  Recent Flowsheet Documentation  Taken 12/3/2023 0400 by Tiera Barreto RN  Cough And Deep Breathing: done with encouragement  Taken 12/3/2023 0000 by Tiera Barreto RN  Cough And Deep Breathing: done with encouragement  Taken 12/2/2023 2000 by Tiera Barreto RN  Cough And Deep Breathing: done with encouragement     Problem: Pain Acute  Goal: Optimal Pain Control and Function  Outcome: Progressing  Intervention: Develop Pain Management Plan  Recent Flowsheet Documentation  Taken 12/3/2023 0400 by Tiera Barreto RN  Pain Management Interventions:   medication (see MAR)   care clustered   emotional support   pillow support provided   relaxation techniques promoted   repositioned  Taken 12/2/2023 2000 by Tiera Barreto RN  Pain Management Interventions:   care clustered   emotional support   pillow support provided   quiet environment facilitated   relaxation techniques promoted   repositioned  Intervention: Prevent or Manage Pain  Recent Flowsheet Documentation  Taken 12/3/2023 0400 by Tiera Barreto RN  Medication Review/Management: medications reviewed  Taken 12/3/2023 0000 by Tiera Barreto RN  Medication Review/Management: medications reviewed  Taken 12/2/2023 2000 by Tiera Barreto RN  Medication Review/Management: medications reviewed   Goal Outcome Evaluation:           [facility] - ICU    RN Progress Note:            Pertinent Assessments:      Please refer to flowsheet rows for full assessment     Pain controlled with scheduled tylenol. Patient tolerated  BIPAP overnight.           Key Events - This Shift:     Patient with low BP at start shift, has improved without further interventions. Heart  rate andre down to 48 beats per min when patient sleeping. pacemaker reconnected with a backup rate of 60. Pacer placed on standby after patient up to chair this AM.                  Barriers to Discharge / Downgrade:     Heart rate, BP

## 2023-12-03 NOTE — PLAN OF CARE
[facility] - ICU          Pertinent Assessments:      Please refer to flowsheet rows for full assessment     VS/Pain           Key Events - This Shift:     VSS/afebrile. NSR with 1st degree AVB. Pain well controlled with APAP/Oxy/Dilaudid. Pt up to chair x 2. Lines dc'd this AM.          Barriers to Discharge / Downgrade:     ICU level of cares; pt should stepdown to cardiac tele Sunday         Point of Contact Update YES-OR-NO: Yes  Name: Zan  Summary of Conversation: updated on POC     Rupali Cassidy RN

## 2023-12-03 NOTE — PLAN OF CARE
[facility] - ICU          Pertinent Assessments:      Please refer to flowsheet rows for full assessment     VS/Pain           Key Events - This Shift:     See previous note re: BP/HR. Pt on BiPAP most of the day, transitioned to 2L NC at ~1330. Pt up to chair at 1420. Poor appetite. Pt 100% AV Paced d/t underlying bradycardia and hypotension. Pressures much improved this afternoon. Pain controlled with Dilaudid/APAP.          Barriers to Discharge / Downgrade:     ICU level of cares         Point of Contact Update YES-OR-NO: Yes  Summary of Conversation: family at bedside and updated on POC     Rupali Cassidy RN

## 2023-12-03 NOTE — PROVIDER NOTIFICATION
Patient with BP's 83/42, 82/47. Spoke with on call cardiologist Dr Arias re: low BP. New orders for limited ECHO in AM, and to change Metoprolol  XL to BID metoprolol. Lasix held per order parameters. Upon recheck of BP Patient 93/52 Map 65.

## 2023-12-03 NOTE — PROGRESS NOTES
Patient on Bipap overnight 14/6 R 16 25%. Patient tolerating well. Good effort with   EZ PAP and Flutter done as scheduled. RT will continue to follow.      Stephanie Elizalde, RT

## 2023-12-03 NOTE — PROGRESS NOTES
"CVTS Daily Progress Note   POD# 1 s/p CABG x4  Attending: Dmitri  LOS: 2    SUBJECTIVE/INTERVAL EVENTS:    Patient arrived to ICU from OR yesterday afternoon. She was subsequently extubated and weaned from pressors. No acute events overnight. Patient progressing well. Maintaining oxygen saturations on 4 LPM/bipap. Normotensive. Up to chair this morning. Pain adequately controlled. -BM / flatus. Tolerating sips but some nausea this morning. UOP adequate. Chest tube output appropriate. Hgb appropriate. Patient denies new chest pain, shortness of breath, abdominal pain, calf pain, nausea. Patient has no questions today.    OBJECTIVE:  Temp:  [97.9  F (36.6  C)-98.6  F (37  C)] 98.1  F (36.7  C)  Pulse:  [48-80] 80  Resp:  [15-20] 20  BP: ()/(42-64) 114/63  FiO2 (%):  [25 %-35 %] 30 %  SpO2:  [88 %-100 %] 98 %  Vitals:    12/01/23 0530 12/02/23 0638 12/03/23 0600   Weight: 110.6 kg (243 lb 14.4 oz) 115.8 kg (255 lb 3.2 oz) 116.6 kg (257 lb)       Clinically Significant Risk Factors          # Hypocalcemia: Lowest Ca = 7.5 mg/dL in last 2 days, will monitor and replace as appropriate     # Hypoalbuminemia: Lowest albumin = 3.4 g/dL at 12/1/2023  2:09 PM, will monitor as appropriate  # Coagulation Defect: INR = 1.33 (Ref range: 0.85 - 1.15) and/or PTT = 83 Seconds (Ref range: 22 - 38 Seconds), will monitor for bleeding  # Thrombocytopenia: Lowest platelets = 83 in last 2 days, will monitor for bleeding   # Hypertension: Noted on problem list  # Acute heart failure with reduced ejection fraction: last echo with EF <40% and receiving IV diuretics       # Severe Obesity: Estimated body mass index is 51.91 kg/m  as calculated from the following:    Height as of this encounter: 1.499 m (4' 11\").    Weight as of this encounter: 116.6 kg (257 lb)., PRESENT ON ADMISSION       # Financial/Environmental Concerns: none   # History of CABG: noted on surgical history        Current Medications:    Scheduled Meds:   " acetaminophen  975 mg Oral Q8H    aspirin  162 mg Oral or NG Tube Daily    Or    aspirin  300 mg Rectal Daily    atorvastatin  80 mg Oral Daily    cyanocobalamin  1,000 mcg Oral Daily    ferrous sulfate  324 mg Oral Daily    furosemide  60 mg Intravenous Q12H    heparin ANTICOAGULANT  5,000 Units Subcutaneous Q8H    insulin aspart  1-7 Units Subcutaneous TID AC    insulin aspart  1-5 Units Subcutaneous At Bedtime    lidocaine  1-2 patch Transdermal Q24H    [Held by provider] metoprolol tartrate  12.5 mg Oral BID    multivitamin, therapeutic  1 tablet Oral Daily    pantoprazole  40 mg Oral or NG Tube Daily    Or    pantoprazole  40 mg Oral Daily    polyethylene glycol  17 g Oral Daily    senna-docusate  1 tablet Oral BID     Continuous Infusions:      PRN Meds:.[START ON 12/4/2023] acetaminophen, bisacodyl, calcium gluconate, calcium gluconate, calcium gluconate, glucose **OR** dextrose **OR** glucagon, hydrALAZINE, HYDROmorphone **OR** HYDROmorphone, magnesium hydroxide, methocarbamol, naloxone **OR** naloxone **OR** naloxone **OR** naloxone, ondansetron **OR** ondansetron, oxyCODONE **OR** oxyCODONE, prochlorperazine **OR** prochlorperazine    Cardiographics:    Telemetry monitoring demonstrates SR with rates in the 80s per my personal review.    Imaging:  No results found for this visit on 12/01/23.    Labs, personally reviewed.  Hemoglobin   Date Value Ref Range Status   12/03/2023 7.7 (L) 11.7 - 15.7 g/dL Final   12/02/2023 8.0 (L) 11.7 - 15.7 g/dL Final   12/01/2023 10.5 (L) 11.7 - 15.7 g/dL Final     Hemoglobin POCT   Date Value Ref Range Status   12/01/2023 9.0 (L) 11.7 - 15.7 g/dL Final   12/01/2023 9.5 (L) 11.7 - 15.7 g/dL Final   12/01/2023 9.4 (L) 11.7 - 15.7 g/dL Final     WBC Count   Date Value Ref Range Status   12/03/2023 8.6 4.0 - 11.0 10e3/uL Final   12/02/2023 7.7 4.0 - 11.0 10e3/uL Final   12/01/2023 14.0 (H) 4.0 - 11.0 10e3/uL Final     Platelet Count   Date Value Ref Range Status   12/03/2023 95  (L) 150 - 450 10e3/uL Final   12/02/2023 105 (L) 150 - 450 10e3/uL Final   12/01/2023 113 (L) 150 - 450 10e3/uL Final     Creatinine   Date Value Ref Range Status   12/03/2023 1.01 (H) 0.51 - 0.95 mg/dL Final   12/02/2023 0.85 0.51 - 0.95 mg/dL Final   12/01/2023 0.82 0.51 - 0.95 mg/dL Final     Potassium   Date Value Ref Range Status   12/03/2023 4.7 3.4 - 5.3 mmol/L Final   12/02/2023 4.8 3.4 - 5.3 mmol/L Final   12/01/2023 5.0 3.4 - 5.3 mmol/L Final     Potassium POCT   Date Value Ref Range Status   12/01/2023 4.6 3.5 - 5.0 mmol/L Final   12/01/2023 5.1 (H) 3.5 - 5.0 mmol/L Final   12/01/2023 5.0 3.5 - 5.0 mmol/L Final     Magnesium   Date Value Ref Range Status   12/03/2023 2.8 (H) 1.7 - 2.3 mg/dL Final   12/02/2023 2.8 (H) 1.7 - 2.3 mg/dL Final   12/01/2023 3.2 (H) 1.7 - 2.3 mg/dL Final          I/O:  I/O last 3 completed shifts:  In: 1601.94 [P.O.:1060; I.V.:341.94]  Out: 1425 [Urine:1045; Chest Tube:380]       Physical Exam:    General: Patient seen up in chair. NAD. Calm, conversant. Pleasant, cooperative on exam.   HEENT: PER, no sclera icterus, moist mucosa, trachea midline, glasses in place  CV: RRR on monitor. Mild edema.   Pulm: Non-labored effort on supplemental oxygen via NC. Chest tubes in place, serosanguinous output, no air leak.  Incision covered with wound vac  Abd: obese, soft, NT, ND  : Bond with clear, straw-colored urine  Ext: Mild pedal edema, SCDs in place, warm  Neuro: CNs grossly intact, IRIZARRY, face symmetric, speech clear      ASSESSMENT/PLAN:    Betty Pan is a 48 year old female with a history of CAD, HFrEF, obesity, HTN, HLD, and MANINDER who is POD# 1 s/p CABG x 4 by Dr. Rizvi.    Principal Problem:    Coronary artery disease involving native coronary artery of native heart without angina pectoris  Active Problems:    Ischemic cardiomyopathy    Acute blood loss anemia    Acute blood loss thrombocytopenia    Acute post op pain    HFrEF    Respiratory acidosis    C/f OHS    Hx of  MANINDER          NEURO:   - Scheduled Tylenol/lidocaine patches and PRN Tylenol/Robaxin/oxycodone/dilaudid for pain    CV:   - Pre-op LVEF 30-35%  - Cardiology consulted for assistance with GDMT  - Mildly hypotensive and bradycardic requiring supplemental pacing  - BB per Cards  - Diuresis per Cards  - ASA 162mg  - PTA atorvastatin resumed at increased dose of 80mg daily  - Hold PTA Entresto  - Chest tubes to remain today    PULM:   - Maintaining oxygen saturations on supplemental oxygen via NC  - BIPAP/CPAP at HS  - Encourage pulmonary toilet, EZPAP, OOB/activity    GI:  - Diet: ADAT to cardiac  - Bowel regimen    RENAL/FEN:  - Adequate UOP  - Cr WNL  - Bond continued in s/o relative immobility, diuresis, and recent perineal/buttocks wound  - Diuresis per Cards  - Maintained PTA on 40mg Lasix daily along with spironolactone 25 mg  - Continue electrolyte replacement protocol    HEME:  - Acute blood loss anemia post-op  - Acute blood loss thrombocytopenia  - Hgb grossly stable, no bleeding concerns. Hep SQ, ASA  - PTA iron/B12 supplements ordered    ID:  - Corinne op ppx complete, afebrile    ENDO:  Hgb A1C 5.8 - 10/11/2023  - Transition to SSI    PPx:  - DVT: SCDs, SQ heparin TID, OOB/ambulation   - GI: Protonix 40mg PO daily    DISPO:   - Continue critical care in ICU given ongoing need for supplemental pacing.      Dr. Ulloa updated.      Gabriel Jeff, CNP, ACNPC-AG  Cardiothoracic Surgery  [employer] Pager [phone]

## 2023-12-03 NOTE — PROGRESS NOTES
No shortness of breath. Respiratory effort normal. On BiPAP from ~ 2670-8238. Mode changed from ST to AVAPS for better control of fluctuating tidal volumes. This afternoon pt more awake. Participating in bronchial hygiene and volume expansion therapies (Aerobika, IS, EzPAP). Remains on 2L NC. Plan is to encourage respiratory therapy treatments, BiPAP NOC and PRN days.    Daisy Avila, RT

## 2023-12-04 ENCOUNTER — APPOINTMENT (OUTPATIENT)
Dept: OCCUPATIONAL THERAPY | Facility: HOSPITAL | Age: 48
End: 2023-12-04
Attending: THORACIC SURGERY (CARDIOTHORACIC VASCULAR SURGERY)
Payer: COMMERCIAL

## 2023-12-04 ENCOUNTER — APPOINTMENT (OUTPATIENT)
Dept: RADIOLOGY | Facility: HOSPITAL | Age: 48
End: 2023-12-04
Attending: SURGERY
Payer: COMMERCIAL

## 2023-12-04 LAB
ALBUMIN SERPL BCG-MCNC: 4.1 G/DL (ref 3.5–5.2)
ALLEN'S TEST: YES
ALP SERPL-CCNC: 31 U/L (ref 40–150)
ALT SERPL W P-5'-P-CCNC: 6 U/L (ref 0–50)
ANION GAP SERPL CALCULATED.3IONS-SCNC: 8 MMOL/L (ref 7–15)
AST SERPL W P-5'-P-CCNC: 21 U/L (ref 0–45)
ATRIAL RATE - MUSE: 68 BPM
BASE EXCESS BLDA CALC-SCNC: 1.5 MMOL/L
BILIRUB SERPL-MCNC: 0.4 MG/DL
BUN SERPL-MCNC: 26.2 MG/DL (ref 6–20)
CALCIUM SERPL-MCNC: 8.8 MG/DL (ref 8.6–10)
CALCIUM, IONIZED MEASURED: 1.16 MMOL/L (ref 1.11–1.3)
CHLORIDE SERPL-SCNC: 104 MMOL/L (ref 98–107)
COHGB MFR BLD: 97 % (ref 96–97)
CREAT SERPL-MCNC: 0.88 MG/DL (ref 0.51–0.95)
DEPRECATED HCO3 PLAS-SCNC: 28 MMOL/L (ref 22–29)
DIASTOLIC BLOOD PRESSURE - MUSE: NORMAL MMHG
EGFRCR SERPLBLD CKD-EPI 2021: 80 ML/MIN/1.73M2
ERYTHROCYTE [DISTWIDTH] IN BLOOD BY AUTOMATED COUNT: 13.9 % (ref 10–15)
GLUCOSE BLDC GLUCOMTR-MCNC: 88 MG/DL (ref 70–99)
GLUCOSE BLDC GLUCOMTR-MCNC: 91 MG/DL (ref 70–99)
GLUCOSE BLDC GLUCOMTR-MCNC: 96 MG/DL (ref 70–99)
GLUCOSE BLDC GLUCOMTR-MCNC: 97 MG/DL (ref 70–99)
GLUCOSE SERPL-MCNC: 90 MG/DL (ref 70–99)
HCO3 BLD-SCNC: 28 MMOL/L (ref 23–29)
HCT VFR BLD AUTO: 24.6 % (ref 35–47)
HGB BLD-MCNC: 7.4 G/DL (ref 11.7–15.7)
INR PPP: 1.25 (ref 0.85–1.15)
INTERPRETATION ECG - MUSE: NORMAL
ION CA PH 7.4: 1.14 MMOL/L (ref 1.11–1.3)
LACTATE SERPL-SCNC: 0.8 MMOL/L (ref 0.7–2)
MAGNESIUM SERPL-MCNC: 2.6 MG/DL (ref 1.7–2.3)
MCH RBC QN AUTO: 28.9 PG (ref 26.5–33)
MCHC RBC AUTO-ENTMCNC: 30.1 G/DL (ref 31.5–36.5)
MCV RBC AUTO: 96 FL (ref 78–100)
O2/TOTAL GAS SETTING VFR VENT: 30 %
OXYHGB MFR BLD: 94.4 % (ref 96–97)
P AXIS - MUSE: 18 DEGREES
PCO2 BLD: 55 MM HG (ref 35–45)
PEEP: 8 CM H2O
PH BLD: 7.31 [PH] (ref 7.37–7.44)
PH: 7.36 (ref 7.35–7.45)
PHOSPHATE SERPL-MCNC: 3.5 MG/DL (ref 2.5–4.5)
PLATELET # BLD AUTO: 92 10E3/UL (ref 150–450)
PO2 BLD: 90 MM HG (ref 80–90)
POTASSIUM SERPL-SCNC: 4.6 MMOL/L (ref 3.4–5.3)
PR INTERVAL - MUSE: 292 MS
PROT SERPL-MCNC: 6.3 G/DL (ref 6.4–8.3)
QRS DURATION - MUSE: 90 MS
QT - MUSE: 432 MS
QTC - MUSE: 459 MS
R AXIS - MUSE: 73 DEGREES
RATE: 16 RR/MIN
RBC # BLD AUTO: 2.56 10E6/UL (ref 3.8–5.2)
SODIUM SERPL-SCNC: 140 MMOL/L (ref 135–145)
SYSTOLIC BLOOD PRESSURE - MUSE: NORMAL MMHG
T AXIS - MUSE: 71 DEGREES
TEMPERATURE: 37 DEGREES C
VENTILATION MODE: ABNORMAL
VENTILATOR TIDAL VOLUME: 400 ML
VENTRICULAR RATE- MUSE: 68 BPM
WBC # BLD AUTO: 7.7 10E3/UL (ref 4–11)

## 2023-12-04 PROCEDURE — 80053 COMPREHEN METABOLIC PANEL: CPT | Performed by: SURGERY

## 2023-12-04 PROCEDURE — 84100 ASSAY OF PHOSPHORUS: CPT | Performed by: SURGERY

## 2023-12-04 PROCEDURE — 250N000013 HC RX MED GY IP 250 OP 250 PS 637: Performed by: INTERNAL MEDICINE

## 2023-12-04 PROCEDURE — 250N000011 HC RX IP 250 OP 636: Mod: JZ | Performed by: PHYSICIAN ASSISTANT

## 2023-12-04 PROCEDURE — 94660 CPAP INITIATION&MGMT: CPT

## 2023-12-04 PROCEDURE — G0463 HOSPITAL OUTPT CLINIC VISIT: HCPCS

## 2023-12-04 PROCEDURE — 250N000013 HC RX MED GY IP 250 OP 250 PS 637: Performed by: THORACIC SURGERY (CARDIOTHORACIC VASCULAR SURGERY)

## 2023-12-04 PROCEDURE — 200N000001 HC R&B ICU

## 2023-12-04 PROCEDURE — 999N000156 HC STATISTIC RCP CONSULT EA 30 MIN

## 2023-12-04 PROCEDURE — 93005 ELECTROCARDIOGRAM TRACING: CPT

## 2023-12-04 PROCEDURE — P9047 ALBUMIN (HUMAN), 25%, 50ML: HCPCS | Performed by: INTERNAL MEDICINE

## 2023-12-04 PROCEDURE — 71045 X-RAY EXAM CHEST 1 VIEW: CPT

## 2023-12-04 PROCEDURE — 94799 UNLISTED PULMONARY SVC/PX: CPT

## 2023-12-04 PROCEDURE — 36415 COLL VENOUS BLD VENIPUNCTURE: CPT | Performed by: PHYSICIAN ASSISTANT

## 2023-12-04 PROCEDURE — 250N000013 HC RX MED GY IP 250 OP 250 PS 637: Performed by: PHYSICIAN ASSISTANT

## 2023-12-04 PROCEDURE — 250N000011 HC RX IP 250 OP 636: Mod: JZ | Performed by: INTERNAL MEDICINE

## 2023-12-04 PROCEDURE — 82330 ASSAY OF CALCIUM: CPT | Performed by: PHYSICIAN ASSISTANT

## 2023-12-04 PROCEDURE — 85610 PROTHROMBIN TIME: CPT | Performed by: SURGERY

## 2023-12-04 PROCEDURE — 83735 ASSAY OF MAGNESIUM: CPT | Performed by: SURGERY

## 2023-12-04 PROCEDURE — 97110 THERAPEUTIC EXERCISES: CPT | Mod: GO

## 2023-12-04 PROCEDURE — 82805 BLOOD GASES W/O2 SATURATION: CPT | Performed by: SURGERY

## 2023-12-04 PROCEDURE — 99233 SBSQ HOSP IP/OBS HIGH 50: CPT | Performed by: INTERNAL MEDICINE

## 2023-12-04 PROCEDURE — 85014 HEMATOCRIT: CPT | Performed by: SURGERY

## 2023-12-04 PROCEDURE — 999N000157 HC STATISTIC RCP TIME EA 10 MIN

## 2023-12-04 PROCEDURE — 97535 SELF CARE MNGMENT TRAINING: CPT | Mod: GO

## 2023-12-04 PROCEDURE — 999N000127 HC STATISTIC PERIPHERAL IV START W US GUIDANCE

## 2023-12-04 PROCEDURE — 99222 1ST HOSP IP/OBS MODERATE 55: CPT | Performed by: NURSE PRACTITIONER

## 2023-12-04 PROCEDURE — 83605 ASSAY OF LACTIC ACID: CPT | Performed by: SURGERY

## 2023-12-04 PROCEDURE — 93010 ELECTROCARDIOGRAM REPORT: CPT | Performed by: INTERNAL MEDICINE

## 2023-12-04 RX ORDER — FUROSEMIDE 10 MG/ML
60 INJECTION INTRAMUSCULAR; INTRAVENOUS EVERY 8 HOURS
Status: DISCONTINUED | OUTPATIENT
Start: 2023-12-04 | End: 2023-12-05

## 2023-12-04 RX ORDER — ALBUMIN (HUMAN) 12.5 G/50ML
50 SOLUTION INTRAVENOUS ONCE
Status: COMPLETED | OUTPATIENT
Start: 2023-12-04 | End: 2023-12-04

## 2023-12-04 RX ADMIN — FUROSEMIDE 60 MG: 10 INJECTION, SOLUTION INTRAMUSCULAR; INTRAVENOUS at 22:19

## 2023-12-04 RX ADMIN — FUROSEMIDE 60 MG: 10 INJECTION, SOLUTION INTRAMUSCULAR; INTRAVENOUS at 14:20

## 2023-12-04 RX ADMIN — MICONAZOLE NITRATE ANTIFUNGAL POWDER: 2 POWDER TOPICAL at 20:16

## 2023-12-04 RX ADMIN — ASPIRIN 81 MG CHEWABLE TABLET 162 MG: 81 TABLET CHEWABLE at 08:32

## 2023-12-04 RX ADMIN — ACETAMINOPHEN 975 MG: 325 TABLET ORAL at 05:40

## 2023-12-04 RX ADMIN — ALBUMIN HUMAN 50 G: 0.25 SOLUTION INTRAVENOUS at 06:32

## 2023-12-04 RX ADMIN — POLYETHYLENE GLYCOL 3350 17 G: 17 POWDER, FOR SOLUTION ORAL at 08:35

## 2023-12-04 RX ADMIN — SENNOSIDES AND DOCUSATE SODIUM 1 TABLET: 8.6; 5 TABLET ORAL at 08:32

## 2023-12-04 RX ADMIN — HEPARIN SODIUM 5000 UNITS: 5000 INJECTION, SOLUTION INTRAVENOUS; SUBCUTANEOUS at 12:03

## 2023-12-04 RX ADMIN — FERROUS SULFATE TAB 325 MG (65 MG ELEMENTAL FE) 324 MG: 325 (65 FE) TAB at 08:35

## 2023-12-04 RX ADMIN — MICONAZOLE NITRATE ANTIFUNGAL POWDER: 2 POWDER TOPICAL at 12:03

## 2023-12-04 RX ADMIN — FUROSEMIDE 60 MG: 10 INJECTION, SOLUTION INTRAMUSCULAR; INTRAVENOUS at 06:37

## 2023-12-04 RX ADMIN — HEPARIN SODIUM 5000 UNITS: 5000 INJECTION, SOLUTION INTRAVENOUS; SUBCUTANEOUS at 20:15

## 2023-12-04 RX ADMIN — SENNOSIDES AND DOCUSATE SODIUM 1 TABLET: 8.6; 5 TABLET ORAL at 20:15

## 2023-12-04 RX ADMIN — ATORVASTATIN CALCIUM 80 MG: 40 TABLET, FILM COATED ORAL at 08:32

## 2023-12-04 RX ADMIN — HEPARIN SODIUM 5000 UNITS: 5000 INJECTION, SOLUTION INTRAVENOUS; SUBCUTANEOUS at 04:37

## 2023-12-04 RX ADMIN — CYANOCOBALAMIN TAB 1000 MCG 1000 MCG: 1000 TAB at 08:35

## 2023-12-04 RX ADMIN — LIDOCAINE 1 PATCH: 4 PATCH TOPICAL at 14:24

## 2023-12-04 RX ADMIN — PANTOPRAZOLE SODIUM 40 MG: 40 TABLET, DELAYED RELEASE ORAL at 08:32

## 2023-12-04 RX ADMIN — THERA TABS 1 TABLET: TAB at 08:32

## 2023-12-04 ASSESSMENT — ACTIVITIES OF DAILY LIVING (ADL)
ADLS_ACUITY_SCORE: 28

## 2023-12-04 NOTE — DISCHARGE INSTRUCTIONS
"WOUND CARES  Location: buttock, perianal and gluteal cleft   Care: provided twice daily   1. Cleanse twice a day with incontinence cleanser (or baby wash, or like hygiene) and then dry well   2. Apply topical antifungal powder per medicine list, apply to hand and then pat into place. Rub powder into skin to activate the antifungal   3. Keep a 2x2\" over the right buttock open area, ok to lift for routine assessments and reapply, ok to use each for up to 5 days if not grossly soiled with drainage touching three corners. Ok to stop using this 2x2 mepilex when this area stops weeping/draining   "

## 2023-12-04 NOTE — PROGRESS NOTES
Patient has been on 2 lpm NC in the evening, did EZPAP and flutter valve well, BS diminished with strong dexter-productive cough. Patient wore BIPAP overnight on AVAPS mode- ABG done this mornin.31, PCO2 55, PO2 90, HCO3 28

## 2023-12-04 NOTE — CONSULTS
"[facility] Nurse Inpatient Assessment     Consulted for: Pressure Injury buttocks (pt had a recent stage 2 pressure injury)    Summary: patient with present on prior admission skin breakdown to buttock noted on flowsheet documentation, denies urinary and fecal incontinence, patient is able to stand with min assist for exam, suspected topical fungal infection and masd related to skin breakdown of perianal, started topical antifungal 12/4     Patient History (according to provider note(s):      \"48 year old female with severe CAD, HFrEF, ICM with EF 30%; obesity, MANINDER, and T2DM. Admitted today for CABG x 4 with Dr. Rizvi. Procedure reported as largely straight forward. Airway reported as easy, no difficulty with intubation. Lines placed without difficulty. Received 20mg IV Methadone, 50mg IV Ketamine. Preop echo initially showed EF 30%, but improved to 40% once fully asleep. No difficulty going on pump though did require epi throughout the procedure.  mL; received 435mL Cell Saver and a dose of DDAVP. Platelets were low and elevated INR at end of case; 2 of FFP and 1 of platelet ordered and will be transfused in ICU. Arrived in ICU on epi, sedated on precedex, on full vent support.   Extubated last evening without difficulty but did develop some hypercapnea post extubation as expected. BiPAP used overnight with good results. \"    Assessment:      Areas visualized during today's visit: Focused: and Sacrum/coccyx    Wound location: buttock perianal gluteal cleft           Last photo: 12/4  Wound due to: Moisture Associated Skin Damage (MASD) and suspected topical fungal, patient denies urinary and fecal incontinence, suspect masd related to sweat with BMI 52.43  Wound history/plan of care: found open to air   Wound base:  right buttock open area dermis, epidermis     Palpation of the wound bed: normal      Drainage: scant     Description of drainage: serosanguinous     Measurements " "(length x width x depth, in cm): 0.7  x 0.7  x  0.1 cm right buttock open area      Tunneling: N/A     Undermining: N/A  Periwound skin: Rash      Color:  darker brown       Temperature: normal   Odor: none  Pain: moderate, intermittent \"when I sit too long\"  Pain interventions prior to dressing change: patient tolerated well and no significant pain present   Treatment goal: Heal  and Decrease moisture  STATUS: initial assessment  Supplies ordered: discussed with RN and discussed with patient        Treatment Plan:       miconazole (MICATIN) 2 % powder  Start:  12/04/23 1030,   Topical,   2 TIMES DAILY,   Routine        Admin Instructions: Apply BID to perianal (gluteal cleft) after cleansing and drying well. Apply to gloved hand, then pat powder onto desired skin, Rub Into Skin To Activate          Wound care  Start:  12/04/23 1800,   2 TIMES DAILY,   Routine        Comments: Location: buttock, perianal and gluteal cleft  Care: provided BID by primary RN  1. Cleanse BID and PRN with incontinence cleanser (or baby wash, or like hygiene) and then dry well  2. Apply antifungal powder per MAR BID, apply to gloved hand and then pat into place. Rub powder into skin to activate the antifungal  3. Keep a 2x2\" over the right buttock open area, ok to lift for routine assessments and reapply, ok to use each for up to 5 days if not grossly soiled with drainage touching three corners. Ok to stop using this 2x2 mepilex when this area stops weeping/draining          Orders: Written    RECOMMEND PRIMARY TEAM ORDER: None, at this time  Education provided: plan of care, Moisture management, and Hygiene  Discussed plan of care with: Patient and Nurse  WOC nurse follow-up plan: weekly  Notify WOC if wound(s) deteriorate.  Nursing to notify the Provider(s) and re-consult the WOC Nurse if new skin concern.    DATA:     Current support surface: Standard  Low air loss (BRITTON pump, Isolibrium, Pulsate, skin guard, etc)  Containment of " urine/stool: Continent of bladder, Continent of bowel, and Indwelling catheter reports continence at home, denies chronic reyna use   BMI: Body mass index is 52.43 kg/m .   Active diet order: Orders Placed This Encounter      Advance Diet as Tolerated: Fully Advanced to diet(s) per Provider order; Low Saturated Fat Na <2400mg Diet     Output: I/O last 3 completed shifts:  In: 845 [P.O.:615; I.V.:30]  Out: 1120 [Urine:900; Chest Tube:220]     Labs:   Recent Labs   Lab 12/04/23  0401   ALBUMIN 4.1   HGB 7.4*   INR 1.25*   WBC 7.7     Pressure injury risk assessment:   Sensory Perception: 4-->no impairment  Moisture: 4-->rarely moist  Activity: 2-->chairfast  Mobility: 3-->slightly limited  Nutrition: 2-->probably inadequate  Friction and Shear: 2-->potential problem  Raymond Score: 17    Minda MORALES   [facility]: [facility]  Vocera: [facility]

## 2023-12-04 NOTE — PROGRESS NOTES
"Care Management Follow Up    Length of Stay (days): 3    Expected Discharge Date: 12/05/2023     Concerns to be Addressed:   discharge planning     Patient plan of care discussed at interdisciplinary rounds: Yes    Anticipated Discharge Disposition:       Anticipated Discharge Services:    Education Provided on the Discharge Plan:  Per Care Team   Patient/Family in Agreement with the Plan:  Yes    Referrals Placed by CM/SW:    Private pay costs discussed: Not applicable    Additional Information:  Chart reviewed.    Cm updates:  POD# 3 s/p CABG x4     Plan for home with OP CR, per therapy note, \"plans on staying with family for a wk plus.\"    Social Hx:  \"Patient lives in apartment alone, discharge plan for home with outpatient cardiac rehab. Family transport. \"      Cm will continue to follow plan of care,review recommendations, and assist with any discharge needs anticipated.     Reshma Malik RN      "

## 2023-12-04 NOTE — PROGRESS NOTES
NUTRITION EDUCATION      REASON : provider order for diet education after CV surgery    NUTRITION HISTORY:  Patient reports decreased po intake after surgery.  She recently started working on eating a low sodium diet.   Diet education for low sodium diet noted 10/13/23.      CURRENT DIET:  Low saturated fat, <2400 mg sodium.    NUTRITION DIAGNOSIS:  Food- and nutrition-related knowledge deficit R/t heart disease    INTERVENTIONS:    Nutrition Prescription:  Heart Healthy, consistent carbohydrate diet    Implementation:      *  Nutrition Education (Content):   A)  Provided handout Heart Healthy, Consistent Carbohydrate Nutriiton Therapy, Label reading tips, Eat right with My plate.   B)  Discussed Low fat, low sodium consistent carb diet.  Encouraged increasing vegetable portion, include meat and choose whole grain, low sugar drink at meals.      *  Nutrition Education (Application):   A)  Discussed current eating habits and recommended alternative food choices      *  Anticipate good compliance      *  Diet Education - refer to Education Flowsheet    Patient with surgical incisions with wound vac.  Also buttocks PI, see WOC RN note of 12/4/23.  Patient declined Protein Supplements at this time.    Goals:      *  Patient will participate in diet education      *  All of the above goals met during the education session    Follow Up/Monitoring:      *  Provided RD contact information for future questions      *  Recommended Out-Patient Nutrition Referral, if further diet instructions are needed

## 2023-12-04 NOTE — PLAN OF CARE
[facility] - ICU    RN Progress Note:            Pertinent Assessments:      Please refer to flowsheet rows for full assessment     VSS. Albuimin 50ml x4  IV ordered  and Furosemide increased to every 8 hours.SBP >100.  Able to give morning dose.           Key Events - This Shift:     Tolerated BIPAP overnight. pH this morning is 7.31, PCO2 55.Encouraged use of IS and FV. Can only go up to 750 ml on IS.          CARLINE SAT (Sedation Awakening Trial): For use ONLY if intubated    SAT Safety Screen N/A   If FAILED why?    SAT Performed N/A   If FAILED why?               Barriers to Discharge / Downgrade:     100% AV paced. EP consult today for possible permanent pacemaker.

## 2023-12-04 NOTE — CONSULTS
[facility]  Cardiac Electrophysiology  [address]  [place] [address]   Office: [phone]  Fax: [phone]     Cardiac Electrophysiology Consultation    Patient: Betty Pan   : 1975     Referring Provider: Dr Crespo    CHIEF COMPLAINT/REASON FOR CONSULTATION  Bradycardia    Assessment/Recommendations   Bradycardia  -noted on 12/3/23 with rates in the 50's, patient symptomatic, temporary pacing initiated at rate of 80 bpm, Metoprolol and Lasix held  -no further bradycardia noted on 23, temporary pacer rate decreased to rate of 45 bpm. Underlying rhythm NSR with first degree AV block in the 60's, verified by 12 lead EKG. AV skip blocking medications remain on hold.  -case discussed with Dr Ontiveros  -EP will sign off    Physician Attestation    I, personally discussed Betty Pan today (2023 Gaby Goodman) as part of a shared KELLEE/PA visit.  I have reviewed and discussed with the advanced practice provider the patient's history, physical, and plan.    I personally reviewed the patient's vital signs, medications, and pertinent tests/laboratories.    I personally provided a substantial portion of the care for this patient.  I personally performed the entire medical decision making and I agree with the assessment and plan as written by Gaby Goodman CNP.    Key management decisions made by me:  Decrease rate of temporary pacing to 45 bpm.  Monitor patient for evidence of heart block or significant bradycardia with pacing rate decreased.  If patient manifests adequate heart rate then okay to discontinue temporary pacing wire.  EP team will sign off at this time.    Ady Ontiveros MD  Date of service: 2023         History of Present Illness   48 year old female with severe CAD, HFrEF, ICM with EF 30%; obesity, MANINDER, and T2DM. Admitted today for CABG x 4 with Dr. Rizvi. Procedure reported as largely straight forward. Airway reported as  "easy, no difficulty with intubation. Lines placed without difficulty. Received 20mg IV Methadone, 50mg IV Ketamine. Preop echo initially showed EF 30%, but improved to 40% once fully asleep. No difficulty going on pump though did require epi throughout the procedure.  mL; received 435mL Cell Saver and a dose of DDAVP. Platelets were low and elevated INR at end of case; 2 of FFP and 1 of platelet ordered and will be transfused in ICU. Arrived in ICU on epi, sedated on precedex, on full vent support.   Extubated last evening 12/1/23 without difficulty but did develop some hypercapnea post extubation as expected. BiPAP used overnight with good results.     On 12/23/23, patient reportedly became hypotensive and bradycardic on the day shift. Lasix and Metoprolol were both held. Temporary external pacing initiated at 80 bpm, 100% AV paced. BIPAP placed back on patient due to respiratory acidosis. Was able to transition to 2L NC by 1330 and get up to chair by 1420 that afternoon. Patient remained 100% AV paced d/t underlying bradycardia and ongoing low blood pressures.     12/4/23: remains 100% AV paced per ICU nursing staff. EP consult placed for possible pacemaker placement.   Temporary pacer rate decreased to rate of 45 bpm. Underlying rhythm NSR with first degree AV block in the 60's, verified by 12 lead EKG. AV skip blocking medications remain on hold. Patient asymptomatic. Sitting up in chair, adequate pain control. Chest tubes have been removed.        Physical Examination  Review of Systems   VITALS: /60   Pulse 80   Temp 98  F (36.7  C) (Axillary)   Resp 19   Ht 1.499 m (4' 11\")   Wt 117.8 kg (259 lb 9.6 oz)   SpO2 99%   BMI 52.43 kg/m    Wt Readings from Last 3 Encounters:   12/04/23 117.8 kg (259 lb 9.6 oz)   11/28/23 110.7 kg (244 lb)   11/22/23 108.9 kg (240 lb)       Intake/Output Summary (Last 24 hours) at 12/4/2023 1017  Last data filed at 12/4/2023 0800  Gross per 24 hour   Intake 885 " ml   Output 1020 ml   Net -135 ml     CONSTITUTIONAL: no distress, sitting up in chair  EYES:  Conjunctivae pink, sclerae clear.    E/N/T:  Oral mucosa pink, moist mucous membranes  RESPIRATORY:  Respiratory effort is normal, lungs CTA in all lobes  CARDIOVASCULAR:  normal S1 and S2, no murmur or gallop  GASTROINTESTINAL:  Abdomen without masses or tenderness  EXTREMITIES:  No clubbing or cyanosis.    MUSCULOSKELETAL:  Overall grossly normal muscle strength  SKIN:  Overall, skin warm and dry, no lesions.  NEURO/PSYCH:  Oriented x 3 with normal affect.   Constitutional:  No weight loss or loss of appetite    Eyes:  No difficulty with vision, no double vision, no dry eyes  ENT:  No sore throat, difficulty swallowing; changes in hearing or tinnitus  Cardiovascular: As detailed above  Respiratory:  No cough  Musculoskeletal  No joint pain, muscle aches  Neurologic:  No syncope, lightheadedness, fainting spells   Hematologic: No easy bruising, excessive bleeding tendency   Gastrointestinal:  No jaundice, abdominal pain or abdominal bloating  Genitourinary: No changes in urinary habits, no trouble urinating    Psychiatric: No anxiety or depression      Medical History  Surgical History   Past Medical History:   Diagnosis Date    Aspiration pneumonia of lower lobe, unspecified aspiration pneumonia type, unspecified laterality (H) 10/11/2023    Congestive heart failure (H)     Coronary artery disease     Heart failure with reduced ejection fraction (H) 10/27/2023    Hyperlipidemia     Hypertension, unspecified type 10/11/2023    Ischemic cardiomyopathy 10/27/2023    Obese     Obesity due to excess calories 10/27/2023    MANINDER (obstructive sleep apnea) 10/27/2023    Pleural effusion 10/11/2023    Sleep apnea     Past Surgical History:   Procedure Laterality Date    CORONARY ANGIOGRAPHY ADULT ORDER      CORONARY ARTERY BYPASS GRAFT, WITH ENDOSCOPIC VESSEL PROCUREMENT N/A 12/1/2023    Procedure: CORONARY ARTERY BYPASS GRAFT  TIMES FOUR ,LEFT INTERNAL MAMMARY ARTERY HARVEST, BILATERAL LOWER LEG ENDOSCOPIC VESSEL PROCUREMENT, EPIAORTIC ULTRASOUND;  Surgeon: Angélica Rizvi MD;  Location: [facility] [place] OR    CV CORONARY ANGIOGRAM N/A 10/13/2023    Procedure: Coronary Angiogram;  Surgeon: Roxana Melgoza MD;  Location: [facility] CATH LAB CV    CV RIGHT HEART CATH MEASUREMENTS RECORDED N/A 10/13/2023    Procedure: Right Heart Catheterization;  Surgeon: Roxana Melgoza MD;  Location: [facility] CATH LAB CV    TRANSESOPHAGEAL ECHOCARDIOGRAM INTRAOPERATIVE N/A 12/1/2023    Procedure: ANESTHESIA TRANSESOPHAGEAL ECHOCARDIOGRAM;  Surgeon: Angélica Rizvi MD;  Location: [facility] [place] OR         Family History Social History   History reviewed. No pertinent family history.     Social History     Tobacco Use    Smoking status: Never    Smokeless tobacco: Never   Vaping Use    Vaping Use: Never used   Substance Use Topics    Alcohol use: Never    Drug use: Never         Medications  Allergies     Current Facility-Administered Medications:     acetaminophen (TYLENOL) tablet 650 mg, 650 mg, Oral, Q4H PRN, Jayne De Leon PA-C    aspirin (ASA) chewable tablet 162 mg, 162 mg, Oral or NG Tube, Daily, 162 mg at 12/04/23 0832 **OR** aspirin (ASA) Suppository 300 mg, 300 mg, Rectal, Daily, Jayne De Leon PA-C    atorvastatin (LIPITOR) tablet 80 mg, 80 mg, Oral, Daily, Yenny Crespo MD, 80 mg at 12/04/23 0832    bisacodyl (DULCOLAX) suppository 10 mg, 10 mg, Rectal, Daily PRN, Jayne De Leon PA-C    calcium gluconate 1 g in 50 mL in sodium chloride intermittent infusion, 1 g, Intravenous, Once PRN, Jayne De Leon PA-C, 1 g at 12/01/23 2025    calcium gluconate 2 g in  mL intermittent infusion, 2 g, Intravenous, Once PRN, Jayne De Leon PA-C, 2 g at 12/01/23 1446    calcium gluconate 3 g in sodium chloride 0.9 % 100 mL intermittent infusion, 3 g, Intravenous, Once PRN, Jayne De Leon  AYDEN    cyanocobalamin (VITAMIN B-12) tablet 1,000 mcg, 1,000 mcg, Oral, Daily, Jayne De Leon PA-C, 1,000 mcg at 12/04/23 0835    glucose gel 15-30 g, 15-30 g, Oral, Q15 Min PRN **OR** dextrose 50 % injection 25-50 mL, 25-50 mL, Intravenous, Q15 Min PRN **OR** glucagon injection 1 mg, 1 mg, Subcutaneous, Q15 Min PRN, Gabriel Jeff NP    ferrous sulfate (FEROSUL) tablet 324 mg, 324 mg, Oral, Daily, Jayne De Leon PA-C, 324 mg at 12/04/23 0835    furosemide (LASIX) injection 60 mg, 60 mg, Intravenous, Q8H, Yenny Crespo MD, 60 mg at 12/04/23 0637    heparin ANTICOAGULANT injection 5,000 Units, 5,000 Units, Subcutaneous, Q8H, Jayne De Leon PA-C, 5,000 Units at 12/04/23 0437    hydrALAZINE (APRESOLINE) injection 10 mg, 10 mg, Intravenous, Q30 Min PRN, Jayne De Leon PA-C    insulin aspart (NovoLOG) injection (RAPID ACTING), 1-7 Units, Subcutaneous, TID AC, Gabriel Jeff NP, 1 Units at 12/02/23 1132    insulin aspart (NovoLOG) injection (RAPID ACTING), 1-5 Units, Subcutaneous, At Bedtime, Gabriel Jeff NP    Lidocaine (LIDOCARE) 4 % Patch 1-2 patch, 1-2 patch, Transdermal, Q24H, Jayne De Leon PA-C    magnesium hydroxide (MILK OF MAGNESIA) suspension 30 mL, 30 mL, Oral, Daily PRN, Jayne De Leon PA-C    methocarbamol (ROBAXIN) tablet 500 mg, 500 mg, Oral, 4x Daily PRN, Gabriel Jeff NP    [Held by provider] metoprolol tartrate (LOPRESSOR) half-tab 12.5 mg, 12.5 mg, Oral, BID, Luiz Nuñez MD    miconazole (MICATIN) 2 % powder, , Topical, BID, Angélica Rizvi MD    multivitamin, therapeutic (THERA-VIT) tablet 1 tablet, 1 tablet, Oral, Daily, Jayne De Leon PA-C, 1 tablet at 12/04/23 0832    naloxone (NARCAN) injection 0.2 mg, 0.2 mg, Intravenous, Q2 Min PRN **OR** naloxone (NARCAN) injection 0.4 mg, 0.4 mg, Intravenous, Q2 Min PRN **OR** naloxone (NARCAN) injection 0.2 mg, 0.2 mg, Intramuscular, Q2 Min PRN **OR** naloxone  "(NARCAN) injection 0.4 mg, 0.4 mg, Intramuscular, Q2 Min PRN, Jayne De Leon PA-C    ondansetron (ZOFRAN ODT) ODT tab 4 mg, 4 mg, Oral, Q6H PRN **OR** ondansetron (ZOFRAN) injection 4 mg, 4 mg, Intravenous, Q6H PRN, Jayne De Leon PA-C, 4 mg at 12/02/23 0453    oxyCODONE (ROXICODONE) tablet 5 mg, 5 mg, Oral, Q4H PRN, 5 mg at 12/03/23 2059 **OR** oxyCODONE (ROXICODONE) tablet 10 mg, 10 mg, Oral, Q4H PRN, Jayne De Leon PA-C    pantoprazole (PROTONIX) 2 mg/mL suspension 40 mg, 40 mg, Oral or NG Tube, Daily **OR** pantoprazole (PROTONIX) EC tablet 40 mg, 40 mg, Oral, Daily, Jayne De Leon PA-C, 40 mg at 12/04/23 0832    polyethylene glycol (MIRALAX) Packet 17 g, 17 g, Oral, Daily, Jayne De Leon PA-C, 17 g at 12/04/23 0835    prochlorperazine (COMPAZINE) injection 10 mg, 10 mg, Intravenous, Q6H PRN, 10 mg at 12/02/23 0833 **OR** prochlorperazine (COMPAZINE) tablet 10 mg, 10 mg, Oral, Q6H PRN, Jayne De Leon PA-C    senna-docusate (SENOKOT-S/PERICOLACE) 8.6-50 MG per tablet 1 tablet, 1 tablet, Oral, BID, Jayne De Leon PA-C, 1 tablet at 12/04/23 0832   No Known Allergies       Lab Results    Chemistry CBC Cardiac Enzymes/BNP/TSH/INR   Recent Labs   Lab Test 12/04/23  0818 12/04/23  0401   NA  --  140   POTASSIUM  --  4.6   CHLORIDE  --  104   CO2  --  28   GLC 97 90   BUN  --  26.2*   CR  --  0.88   GFRESTIMATED  --  80   KHADIJAH  --  8.8     Recent Labs   Lab Test 12/04/23  0401 12/03/23  0410 12/02/23 0415   CR 0.88 1.01* 0.85          Recent Labs   Lab Test 12/04/23 0401   WBC 7.7   HGB 7.4*   HCT 24.6*   MCV 96   PLT 92*     Recent Labs   Lab Test 12/04/23  0401 12/03/23 0410 12/02/23 0415   HGB 7.4* 7.7* 8.0*    No results for input(s): \"TROPONINI\" in the last 09572 hours.  Recent Labs   Lab Test 10/11/23  1057 10/10/23  2012   NTBNPI 5,007*  --    NTBNP  --  5,019*     Recent Labs   Lab Test 10/10/23  2012   TSH 1.30     Recent Labs   Lab Test " 12/04/23  0401 12/03/23  0410 12/02/23  0415   INR 1.25* 1.33* 1.34*         Data Review    ECGs (all tracings independently reviewed)    12/4/23 NSR with first degree AV block rate 68 bpm QT/QTC: 432/499 ms        ECHO 12/3/23  1.Left ventricular function is decreased. The ejection fraction is 30-35%  (moderately reduced).  2.There is mild to moderate anterior, septal, and apical wall  hypokinesis.Difficult to say but this might be in distribution of stress  induced cardiomyopathy.  3.Normal right ventricle size and systolic function.  4.No hemodynamically significant valvular abnormalities on 2D or color flow  imaging altho limited study without complete Doppler done.  5.The study was technically difficult.  Compared to the prior study dated 10/12/2023, thev overall LV EF looks  improved, prior echo felt to have a lower EF then reported.             Gaby Goodman NP  Clinical Cardiac Electrophysiology  [facility]  Office: [phone]  Fax: [phone]   Nurses: [phone]     33 minutes spent reviewing prior records (including documentation, laboratory studies, cardiac testing/imaging), history and physical exam, planning, and subsequent documentation.

## 2023-12-04 NOTE — PROGRESS NOTES
"CVTS Daily Progress Note   POD# 3 s/p CABG x4  Attending: Dmitri  LOS: 3    SUBJECTIVE/INTERVAL EVENTS:    No acute events overnight. Patient progressing well. Maintaining oxygen saturations on 2 LPM/bipap. Normotensive. Atrial paced with backup at 70. Up to chair this morning. Pain adequately controlled. -BM / +flatus. Tolerating diet. UOP adequate. Chest tube output appropriate. Hgb 7.4. Patient denies new chest pain, shortness of breath, abdominal pain, calf pain, nausea. Patient has no questions today.    OBJECTIVE:  Temp:  [97.2  F (36.2  C)-98.1  F (36.7  C)] 98  F (36.7  C)  Pulse:  [48-80] 79  Resp:  [16-20] 19  BP: ()/(48-70) 103/59  FiO2 (%):  [30 %] 30 %  SpO2:  [95 %-100 %] 98 %  Vitals:    12/01/23 0530 12/02/23 0638 12/03/23 0600 12/04/23 0600   Weight: 110.6 kg (243 lb 14.4 oz) 115.8 kg (255 lb 3.2 oz) 116.6 kg (257 lb) 117.8 kg (259 lb 9.6 oz)       Clinically Significant Risk Factors              # Hypoalbuminemia: Lowest albumin = 3.4 g/dL at 12/1/2023  2:09 PM, will monitor as appropriate  # Coagulation Defect: INR = 1.25 (Ref range: 0.85 - 1.15) and/or PTT = 83 Seconds (Ref range: 22 - 38 Seconds), will monitor for bleeding  # Thrombocytopenia: Lowest platelets = 92 in last 2 days, will monitor for bleeding   # Hypertension: Noted on problem list  # Acute heart failure with reduced ejection fraction: last echo with EF <40% and receiving IV diuretics       # Severe Obesity: Estimated body mass index is 52.43 kg/m  as calculated from the following:    Height as of this encounter: 1.499 m (4' 11\").    Weight as of this encounter: 117.8 kg (259 lb 9.6 oz)., PRESENT ON ADMISSION       # Financial/Environmental Concerns: none   # History of CABG: noted on surgical history        Current Medications:    Scheduled Meds:   aspirin  162 mg Oral or NG Tube Daily    Or    aspirin  300 mg Rectal Daily    atorvastatin  80 mg Oral Daily    cyanocobalamin  1,000 mcg Oral Daily    ferrous sulfate  324 mg " Oral Daily    furosemide  60 mg Intravenous Q8H    heparin ANTICOAGULANT  5,000 Units Subcutaneous Q8H    insulin aspart  1-7 Units Subcutaneous TID AC    insulin aspart  1-5 Units Subcutaneous At Bedtime    lidocaine  1-2 patch Transdermal Q24H    [Held by provider] metoprolol tartrate  12.5 mg Oral BID    multivitamin, therapeutic  1 tablet Oral Daily    pantoprazole  40 mg Oral or NG Tube Daily    Or    pantoprazole  40 mg Oral Daily    polyethylene glycol  17 g Oral Daily    senna-docusate  1 tablet Oral BID     Continuous Infusions:      PRN Meds:.acetaminophen, bisacodyl, calcium gluconate, calcium gluconate, calcium gluconate, glucose **OR** dextrose **OR** glucagon, hydrALAZINE, HYDROmorphone **OR** HYDROmorphone, magnesium hydroxide, methocarbamol, naloxone **OR** naloxone **OR** naloxone **OR** naloxone, ondansetron **OR** ondansetron, oxyCODONE **OR** oxyCODONE, prochlorperazine **OR** prochlorperazine    Cardiographics:    Telemetry monitoring demonstrates SR with backup atrial paced rate at 70 per my personal review.    Imaging:  No results found for this visit on 12/01/23.    Labs, personally reviewed.  Hemoglobin   Date Value Ref Range Status   12/04/2023 7.4 (L) 11.7 - 15.7 g/dL Final   12/03/2023 7.7 (L) 11.7 - 15.7 g/dL Final   12/02/2023 8.0 (L) 11.7 - 15.7 g/dL Final     WBC Count   Date Value Ref Range Status   12/04/2023 7.7 4.0 - 11.0 10e3/uL Final   12/03/2023 8.6 4.0 - 11.0 10e3/uL Final   12/02/2023 7.7 4.0 - 11.0 10e3/uL Final     Platelet Count   Date Value Ref Range Status   12/04/2023 92 (L) 150 - 450 10e3/uL Final   12/03/2023 95 (L) 150 - 450 10e3/uL Final   12/02/2023 105 (L) 150 - 450 10e3/uL Final     Creatinine   Date Value Ref Range Status   12/04/2023 0.88 0.51 - 0.95 mg/dL Final   12/03/2023 1.01 (H) 0.51 - 0.95 mg/dL Final   12/02/2023 0.85 0.51 - 0.95 mg/dL Final     Potassium   Date Value Ref Range Status   12/04/2023 4.6 3.4 - 5.3 mmol/L Final   12/03/2023 4.7 3.4 - 5.3  mmol/L Final   12/02/2023 4.8 3.4 - 5.3 mmol/L Final     Potassium POCT   Date Value Ref Range Status   12/01/2023 4.6 3.5 - 5.0 mmol/L Final   12/01/2023 5.1 (H) 3.5 - 5.0 mmol/L Final   12/01/2023 5.0 3.5 - 5.0 mmol/L Final     Magnesium   Date Value Ref Range Status   12/04/2023 2.6 (H) 1.7 - 2.3 mg/dL Final   12/03/2023 2.8 (H) 1.7 - 2.3 mg/dL Final   12/02/2023 2.8 (H) 1.7 - 2.3 mg/dL Final          I/O:  I/O last 3 completed shifts:  In: 845 [P.O.:615; I.V.:30]  Out: 1120 [Urine:900; Chest Tube:220]       Physical Exam:    General: Patient seen up in chair. NAD. Calm, conversant. Pleasant, cooperative on exam.   HEENT: PER, no sclera icterus, moist mucosa  CV: RRR on monitor. Mild edema.   Pulm: Non-labored effort on 2L NC/Bipap at night. Chest tubes in place, serosanguinous output, no air leak.  Incision covered with wound vac  Abd: obese, soft, NT, ND  : Bond with clear, straw-colored urine  Ext: Mild pedal edema, SCDs in place, warm  Neuro: CNs grossly intact      ASSESSMENT/PLAN:    Betty Pan is a 48 year old female with a history of CAD, HFrEF, obesity, HTN, HLD, and MANINDER who is POD# 1 s/p CABG x 4 by Dr. Rizvi.    Principal Problem:    Coronary artery disease involving native coronary artery of native heart without angina pectoris  Active Problems:    Ischemic cardiomyopathy    Acute blood loss anemia    Acute blood loss thrombocytopenia    Acute post op pain    HFrEF    Respiratory acidosis    C/f OHS    Hx of MANINDER          NEURO:   - Scheduled Tylenol/lidocaine patches and PRN Tylenol/Robaxin/oxycodone for pain    CV:   - Pre-op LVEF 30-35%  - Cardiology consulted for assistance with GDMT  - Mildly hypotensive and bradycardic requiring supplemental pacing  - BB per Cards-held  - Diuresis per Cards  - ASA 162mg  - PTA atorvastatin 80mg daily  - Hold PTA Entresto  - Chest tubes removed without immediate complications  -TPW's in place  - EP consult 12/3    PULM:   - Maintaining oxygen saturations  on 2L NC  - BIPAP/CPAP at HS  - Encourage pulmonary toilet, EZPAP, OOB/activity    GI:  - Diet: ADAT to cardiac  - Bowel regimen    RENAL/FEN:  - Adequate UOP  - Cr WNL  - Bond to be removed  - Diuresis per Cards  - Lasix 60 mg IV every 8 hrs.  - Continue electrolyte replacement protocol    HEME:  - Acute blood loss anemia post-op  - Acute blood loss thrombocytopenia  - Hgb 7.4, no bleeding concerns. Hep SQ, ASA  - PTA iron/B12 supplements ordered    ID:  - Corinne op ppx complete, afebrile    ENDO:  Hgb A1C 5.8 - 10/11/2023  - Transition to SSI    PPx:  - DVT: SCDs, SQ heparin TID, OOB/ambulation   - GI: Protonix 40mg PO daily    DISPO:   - Continue critical care in ICU given ongoing need for supplemental pacing and EP consult      Discussed with Dr. Artemio Ren PA-C  [facility] Cardiothoracic Surgery  Vocera or pager [phone]

## 2023-12-04 NOTE — PROGRESS NOTES
HEART CARE NOTE          Assessment/Recommendations     1. Severe HFrEF   Assessment / Plan  Volume status improving - no changes to regimen at this time; continue IV diuresis  New HFrEF/ICM s/p CABG; GDMT as detailed below     Current Pharmacotherapy AHA Guideline-Directed Medical Therapy   Sacubitril-valsartan 24-26 mg twice daily - on hold 2/2 borderline hemodynamics Lisinopril 20 mg twice daily   Metoprolol succinate 50 mg daily Carvedilol 25 mg twice daily   Spironolactone 25 mg daily -held Spironolactone 25 mg once daily   Hydralazine NA Hydralazine 100 mg three times daily   Isosorbide dinitrate NA Isosorbide dinitrate 40 mg three times daily   SGLT2 inhibitor:Dapagliflozin/Empagliflozin - not started Dapagliflozin or Empagliflozin 10 mg daily      2. CAD  Assessment / Plan  Severe multi-vessel CAD s/p CABG  Denies angina; continue ASA, high intensity atorvastatin     3. Bradycardia  Assessment / Plan  Overnight course c/b bradycardia and associated hypotension which resolved with pacing- continue to hold AV node blocking agents  Continue pacing - EP consult placed to evaluate for PPM    Plan of care discussed on December 4, 2023 with patient at bedside, and primary team overseeing patient's care    60 minutes spent reviewing prior records (including documentation, laboratory studies, cardiac testing/imaging), history and physical exam, planning, and subsequent documentation.    History of Present Illness/Subjective    Ms. Betty Pan is a 48 year old female who presents in acute hypoxic respiratory failure concerning for ADHF     Today, Mrs. Pan denies acute cardiac events or complaints; observed resting on BIPAP; Management plan as detailed above      ECG: personally reviewed 12/2/23: sinus tachycardia.     ECHO (personnaly Reviewed on 10/1323):   Technically very challenging study. Definity contrast utilized.  Left ventricle measures mildly enlarged. Mild left ventricular hypertrophy  suggested. Left  "ventricular systolic function is moderate to severely globally  reduced with an ejection fraction of 30 to 35% with akinesis of the left  ventricular apex.Diastolic Doppler findings (E/E' ratio and/or other  parameters) suggest left ventricular filling pressures are increased.  The right ventricle is suboptimally visualized. On limited imaging right  ventricular systolic function appears potentially reduced.  Mild enlargement of the left atrium.  Valve structures are suboptimally visualized on this technically challenging  study. No obvious hemodynamically significant valve abnormality noted.  Consider cardiac MRI to further define cardiac structures.    Telemetry: personally reviewed December 4, 2023; notable for paced rhythm     Medical history and pertinent documents reviewed in Care Everywhere please where applicable see details above        Physical Examination Review of Systems   /58   Pulse 79   Temp 98  F (36.7  C) (Axillary)   Resp 19   Ht 1.499 m (4' 11\")   Wt 116.6 kg (257 lb)   SpO2 100%   BMI 51.91 kg/m    Body mass index is 51.91 kg/m .  Wt Readings from Last 3 Encounters:   12/03/23 116.6 kg (257 lb)   11/28/23 110.7 kg (244 lb)   11/22/23 108.9 kg (240 lb)     General Appearance:   no distress, normal body habitus   ENT/Mouth: membranes moist, no oral lesions or bleeding gums.      EYES:  no scleral icterus, normal conjunctivae   Neck: no carotid bruits or thyromegaly   Chest/Lungs:   lungs are clear to auscultation, no rales or wheezing, equal chest wall expansion    Cardiovascular:   Regular. Normal first and second heart sounds with no murmurs, rubs, or gallops; the carotid, radial and posterior tibial pulses are intact, + JVD and LE edema bilaterally    Abdomen:  no organomegaly, masses, bruits, or tenderness; bowel sounds are present   Extremities: no cyanosis or clubbing   Skin: no xanthelasma, warm.    Neurologic: NAD     Psychiatric: alert and oriented x3, calm     A complete 10 " systems ROS was reviewed  And is negative except what is listed in the HPI.          Medical History  Surgical History Family History Social History   Past Medical History:   Diagnosis Date    Aspiration pneumonia of lower lobe, unspecified aspiration pneumonia type, unspecified laterality (H) 10/11/2023    Congestive heart failure (H)     Coronary artery disease     Heart failure with reduced ejection fraction (H) 10/27/2023    Hyperlipidemia     Hypertension, unspecified type 10/11/2023    Ischemic cardiomyopathy 10/27/2023    Obese     Obesity due to excess calories 10/27/2023    MANINDER (obstructive sleep apnea) 10/27/2023    Pleural effusion 10/11/2023    Sleep apnea     Past Surgical History:   Procedure Laterality Date    CORONARY ANGIOGRAPHY ADULT ORDER      CORONARY ARTERY BYPASS GRAFT, WITH ENDOSCOPIC VESSEL PROCUREMENT N/A 12/1/2023    Procedure: CORONARY ARTERY BYPASS GRAFT TIMES FOUR ,LEFT INTERNAL MAMMARY ARTERY HARVEST, BILATERAL LOWER LEG ENDOSCOPIC VESSEL PROCUREMENT, EPIAORTIC ULTRASOUND;  Surgeon: Angélica Rizvi MD;  Location: [facility] OR     CORONARY ANGIOGRAM N/A 10/13/2023    Procedure: Coronary Angiogram;  Surgeon: Roxana Melgoza MD;  Location: [facility] CATH LAB CV    CV RIGHT HEART CATH MEASUREMENTS RECORDED N/A 10/13/2023    Procedure: Right Heart Catheterization;  Surgeon: Roxana Melgoza MD;  Location: [facility] LAB CV    TRANSESOPHAGEAL ECHOCARDIOGRAM INTRAOPERATIVE N/A 12/1/2023    Procedure: ANESTHESIA TRANSESOPHAGEAL ECHOCARDIOGRAM;  Surgeon: Angélica Rizvi MD;  Location: [facility] OR    no family history of premature coronary artery disease Social History     Socioeconomic History    Marital status: Single     Spouse name: Not on file    Number of children: Not on file    Years of education: Not on file    Highest education level: Not on file   Occupational History    Not on file   Tobacco Use    Smoking status: Never    Smokeless tobacco: Never   Vaping Use  "   Vaping Use: Never used   Substance and Sexual Activity    Alcohol use: Never    Drug use: Never    Sexual activity: Not on file   Other Topics Concern    Not on file   Social History Narrative    Not on file     Social Determinants of Health     Financial Resource Strain: Not on file   Food Insecurity: Not on file   Transportation Needs: Not on file   Physical Activity: Not on file   Stress: Not on file   Social Connections: Not on file   Interpersonal Safety: Not on file   Housing Stability: Not on file           Lab Results    Chemistry/lipid CBC Cardiac Enzymes/BNP/TSH/INR   Lab Results   Component Value Date    BUN 26.2 (H) 12/04/2023     12/04/2023    CO2 28 12/04/2023    Lab Results   Component Value Date    WBC 7.7 12/04/2023    HGB 7.4 (L) 12/04/2023    HCT 24.6 (L) 12/04/2023    MCV 96 12/04/2023    PLT 92 (L) 12/04/2023    Lab Results   Component Value Date    TSH 1.30 10/10/2023    INR 1.25 (H) 12/04/2023     No results found for: \"CKTOTAL\", \"CKMB\", \"TROPONINI\"       Weight:    Wt Readings from Last 3 Encounters:   12/03/23 116.6 kg (257 lb)   11/28/23 110.7 kg (244 lb)   11/22/23 108.9 kg (240 lb)       Allergies  No Known Allergies      Surgical History  Past Surgical History:   Procedure Laterality Date    CORONARY ANGIOGRAPHY ADULT ORDER      CORONARY ARTERY BYPASS GRAFT, WITH ENDOSCOPIC VESSEL PROCUREMENT N/A 12/1/2023    Procedure: CORONARY ARTERY BYPASS GRAFT TIMES FOUR ,LEFT INTERNAL MAMMARY ARTERY HARVEST, BILATERAL LOWER LEG ENDOSCOPIC VESSEL PROCUREMENT, EPIAORTIC ULTRASOUND;  Surgeon: Angélica Rizvi MD;  Location: [facility] Main OR    CV CORONARY ANGIOGRAM N/A 10/13/2023    Procedure: Coronary Angiogram;  Surgeon: Roxana Melgoza MD;  Location: [facility] CATH LAB CV    CV RIGHT HEART CATH MEASUREMENTS RECORDED N/A 10/13/2023    Procedure: Right Heart Catheterization;  Surgeon: Roxana Melgoza MD;  Location: [facility] CATH LAB CV    TRANSESOPHAGEAL ECHOCARDIOGRAM INTRAOPERATIVE " N/A 12/1/2023    Procedure: ANESTHESIA TRANSESOPHAGEAL ECHOCARDIOGRAM;  Surgeon: Angélica Rizvi MD;  Location: [facility] Main OR       Social History  Tobacco:   History   Smoking Status    Never   Smokeless Tobacco    Never    Alcohol:   Social History    Substance and Sexual Activity      Alcohol use: Never   Illicit Drugs:   History   Drug Use Unknown       Family History  History reviewed. No pertinent family history.       Yenny Crespo MD on 12/4/2023      cc: Cristina Alexis

## 2023-12-05 ENCOUNTER — APPOINTMENT (OUTPATIENT)
Dept: OCCUPATIONAL THERAPY | Facility: HOSPITAL | Age: 48
End: 2023-12-05
Attending: THORACIC SURGERY (CARDIOTHORACIC VASCULAR SURGERY)
Payer: COMMERCIAL

## 2023-12-05 ENCOUNTER — APPOINTMENT (OUTPATIENT)
Dept: RADIOLOGY | Facility: HOSPITAL | Age: 48
End: 2023-12-05
Attending: PHYSICIAN ASSISTANT
Payer: COMMERCIAL

## 2023-12-05 LAB
ALBUMIN SERPL BCG-MCNC: 4.3 G/DL (ref 3.5–5.2)
ALP SERPL-CCNC: 34 U/L (ref 40–150)
ALT SERPL W P-5'-P-CCNC: 6 U/L (ref 0–50)
ANION GAP SERPL CALCULATED.3IONS-SCNC: 8 MMOL/L (ref 7–15)
AST SERPL W P-5'-P-CCNC: 15 U/L (ref 0–45)
BILIRUB SERPL-MCNC: 0.7 MG/DL
BUN SERPL-MCNC: 24.9 MG/DL (ref 6–20)
CALCIUM SERPL-MCNC: 9 MG/DL (ref 8.6–10)
CALCIUM, IONIZED MEASURED: 1.12 MMOL/L (ref 1.11–1.3)
CHLORIDE SERPL-SCNC: 101 MMOL/L (ref 98–107)
CREAT SERPL-MCNC: 0.76 MG/DL (ref 0.51–0.95)
DEPRECATED HCO3 PLAS-SCNC: 31 MMOL/L (ref 22–29)
EGFRCR SERPLBLD CKD-EPI 2021: >90 ML/MIN/1.73M2
ERYTHROCYTE [DISTWIDTH] IN BLOOD BY AUTOMATED COUNT: 14 % (ref 10–15)
GLUCOSE BLDC GLUCOMTR-MCNC: 101 MG/DL (ref 70–99)
GLUCOSE BLDC GLUCOMTR-MCNC: 108 MG/DL (ref 70–99)
GLUCOSE BLDC GLUCOMTR-MCNC: 108 MG/DL (ref 70–99)
GLUCOSE BLDC GLUCOMTR-MCNC: 96 MG/DL (ref 70–99)
GLUCOSE SERPL-MCNC: 97 MG/DL (ref 70–99)
HCT VFR BLD AUTO: 25.6 % (ref 35–47)
HGB BLD-MCNC: 7.9 G/DL (ref 11.7–15.7)
INR PPP: 1.21 (ref 0.85–1.15)
ION CA PH 7.4: 1.12 MMOL/L (ref 1.11–1.3)
LACTATE SERPL-SCNC: 0.7 MMOL/L (ref 0.7–2)
MAGNESIUM SERPL-MCNC: 2 MG/DL (ref 1.7–2.3)
MCH RBC QN AUTO: 29 PG (ref 26.5–33)
MCHC RBC AUTO-ENTMCNC: 30.9 G/DL (ref 31.5–36.5)
MCV RBC AUTO: 94 FL (ref 78–100)
PH: 7.4 (ref 7.35–7.45)
PHOSPHATE SERPL-MCNC: 3.3 MG/DL (ref 2.5–4.5)
PLATELET # BLD AUTO: 123 10E3/UL (ref 150–450)
POTASSIUM SERPL-SCNC: 4.1 MMOL/L (ref 3.4–5.3)
PROT SERPL-MCNC: 6.7 G/DL (ref 6.4–8.3)
RBC # BLD AUTO: 2.72 10E6/UL (ref 3.8–5.2)
SODIUM SERPL-SCNC: 140 MMOL/L (ref 135–145)
WBC # BLD AUTO: 7.9 10E3/UL (ref 4–11)

## 2023-12-05 PROCEDURE — 999N000065 XR CHEST PORT 1 VIEW

## 2023-12-05 PROCEDURE — P9047 ALBUMIN (HUMAN), 25%, 50ML: HCPCS | Performed by: INTERNAL MEDICINE

## 2023-12-05 PROCEDURE — 999N000156 HC STATISTIC RCP CONSULT EA 30 MIN

## 2023-12-05 PROCEDURE — 200N000001 HC R&B ICU

## 2023-12-05 PROCEDURE — 94660 CPAP INITIATION&MGMT: CPT

## 2023-12-05 PROCEDURE — 36415 COLL VENOUS BLD VENIPUNCTURE: CPT | Performed by: SURGERY

## 2023-12-05 PROCEDURE — 250N000011 HC RX IP 250 OP 636: Mod: JZ | Performed by: PHYSICIAN ASSISTANT

## 2023-12-05 PROCEDURE — 250N000011 HC RX IP 250 OP 636: Performed by: INTERNAL MEDICINE

## 2023-12-05 PROCEDURE — 250N000013 HC RX MED GY IP 250 OP 250 PS 637: Performed by: PHYSICIAN ASSISTANT

## 2023-12-05 PROCEDURE — 85610 PROTHROMBIN TIME: CPT | Performed by: SURGERY

## 2023-12-05 PROCEDURE — 97535 SELF CARE MNGMENT TRAINING: CPT | Mod: GO

## 2023-12-05 PROCEDURE — 82330 ASSAY OF CALCIUM: CPT | Performed by: SURGERY

## 2023-12-05 PROCEDURE — 85027 COMPLETE CBC AUTOMATED: CPT | Performed by: SURGERY

## 2023-12-05 PROCEDURE — 99233 SBSQ HOSP IP/OBS HIGH 50: CPT | Performed by: INTERNAL MEDICINE

## 2023-12-05 PROCEDURE — 83605 ASSAY OF LACTIC ACID: CPT | Performed by: SURGERY

## 2023-12-05 PROCEDURE — 80053 COMPREHEN METABOLIC PANEL: CPT | Performed by: SURGERY

## 2023-12-05 PROCEDURE — 84100 ASSAY OF PHOSPHORUS: CPT | Performed by: SURGERY

## 2023-12-05 PROCEDURE — 83735 ASSAY OF MAGNESIUM: CPT | Performed by: SURGERY

## 2023-12-05 PROCEDURE — 999N000157 HC STATISTIC RCP TIME EA 10 MIN

## 2023-12-05 PROCEDURE — 250N000013 HC RX MED GY IP 250 OP 250 PS 637: Performed by: INTERNAL MEDICINE

## 2023-12-05 PROCEDURE — 97110 THERAPEUTIC EXERCISES: CPT | Mod: GO

## 2023-12-05 RX ORDER — MAGNESIUM OXIDE 400 MG/1
400 TABLET ORAL EVERY 4 HOURS
Qty: 2 TABLET | Refills: 0 | Status: COMPLETED | OUTPATIENT
Start: 2023-12-05 | End: 2023-12-05

## 2023-12-05 RX ORDER — ALBUMIN (HUMAN) 12.5 G/50ML
50 SOLUTION INTRAVENOUS ONCE
Qty: 200 ML | Refills: 0 | Status: COMPLETED | OUTPATIENT
Start: 2023-12-05 | End: 2023-12-05

## 2023-12-05 RX ORDER — METOPROLOL SUCCINATE 25 MG/1
25 TABLET, EXTENDED RELEASE ORAL DAILY
Status: DISCONTINUED | OUTPATIENT
Start: 2023-12-05 | End: 2023-12-06 | Stop reason: HOSPADM

## 2023-12-05 RX ORDER — SPIRONOLACTONE 25 MG
12.5 TABLET ORAL DAILY
Status: DISCONTINUED | OUTPATIENT
Start: 2023-12-05 | End: 2023-12-06

## 2023-12-05 RX ORDER — BUMETANIDE 2 MG/1
2 TABLET ORAL
Status: DISCONTINUED | OUTPATIENT
Start: 2023-12-05 | End: 2023-12-06

## 2023-12-05 RX ADMIN — SENNOSIDES AND DOCUSATE SODIUM 1 TABLET: 8.6; 5 TABLET ORAL at 08:24

## 2023-12-05 RX ADMIN — BUMETANIDE 2 MG: 2 TABLET ORAL at 08:24

## 2023-12-05 RX ADMIN — METOPROLOL TARTRATE 6.25 MG: 25 TABLET, FILM COATED ORAL at 09:55

## 2023-12-05 RX ADMIN — HEPARIN SODIUM 5000 UNITS: 5000 INJECTION, SOLUTION INTRAVENOUS; SUBCUTANEOUS at 12:16

## 2023-12-05 RX ADMIN — FERROUS SULFATE TAB 325 MG (65 MG ELEMENTAL FE) 324 MG: 325 (65 FE) TAB at 08:26

## 2023-12-05 RX ADMIN — METOPROLOL SUCCINATE 25 MG: 25 TABLET, EXTENDED RELEASE ORAL at 14:47

## 2023-12-05 RX ADMIN — POLYETHYLENE GLYCOL 3350 17 G: 17 POWDER, FOR SOLUTION ORAL at 08:24

## 2023-12-05 RX ADMIN — HEPARIN SODIUM 5000 UNITS: 5000 INJECTION, SOLUTION INTRAVENOUS; SUBCUTANEOUS at 06:18

## 2023-12-05 RX ADMIN — HEPARIN SODIUM 5000 UNITS: 5000 INJECTION, SOLUTION INTRAVENOUS; SUBCUTANEOUS at 20:03

## 2023-12-05 RX ADMIN — MAGNESIUM OXIDE TAB 400 MG (241.3 MG ELEMENTAL MG) 400 MG: 400 (241.3 MG) TAB at 06:18

## 2023-12-05 RX ADMIN — EMPAGLIFLOZIN 10 MG: 10 TABLET, FILM COATED ORAL at 14:48

## 2023-12-05 RX ADMIN — CYANOCOBALAMIN TAB 1000 MCG 1000 MCG: 1000 TAB at 08:26

## 2023-12-05 RX ADMIN — ASPIRIN 81 MG CHEWABLE TABLET 162 MG: 81 TABLET CHEWABLE at 08:24

## 2023-12-05 RX ADMIN — ALBUMIN HUMAN 50 G: 0.25 SOLUTION INTRAVENOUS at 08:23

## 2023-12-05 RX ADMIN — MICONAZOLE NITRATE ANTIFUNGAL POWDER: 2 POWDER TOPICAL at 20:07

## 2023-12-05 RX ADMIN — ATORVASTATIN CALCIUM 80 MG: 40 TABLET, FILM COATED ORAL at 08:24

## 2023-12-05 RX ADMIN — MICONAZOLE NITRATE ANTIFUNGAL POWDER: 2 POWDER TOPICAL at 08:26

## 2023-12-05 RX ADMIN — SENNOSIDES AND DOCUSATE SODIUM 1 TABLET: 8.6; 5 TABLET ORAL at 20:03

## 2023-12-05 RX ADMIN — THERA TABS 1 TABLET: TAB at 08:24

## 2023-12-05 RX ADMIN — PANTOPRAZOLE SODIUM 40 MG: 40 TABLET, DELAYED RELEASE ORAL at 08:24

## 2023-12-05 RX ADMIN — MAGNESIUM OXIDE TAB 400 MG (241.3 MG ELEMENTAL MG) 400 MG: 400 (241.3 MG) TAB at 09:55

## 2023-12-05 RX ADMIN — BUMETANIDE 2 MG: 2 TABLET ORAL at 17:26

## 2023-12-05 RX ADMIN — Medication 12.5 MG: at 14:48

## 2023-12-05 ASSESSMENT — ACTIVITIES OF DAILY LIVING (ADL)
ADLS_ACUITY_SCORE: 28

## 2023-12-05 NOTE — TREATMENT PLAN
RCAT Treatment Plan    Patient Score: 10  Patient Acuity: 4    Clinical Indication for Therapy: prevent atelectasis    Therapy Ordered: Ezpap qid, flutter qid, Bipap now prn    Assessment Summary: Pt resting in chair with no respiratory distress.  Pt has good productive cough.  BRS:  clear with fine crackles in bilateral bases.  Pt often gagging with use of ezpap.  Pt is able to do good technique with flutter.  Pt working on increasing use of IS.  Pt able to obtain 1000 on IS.  Pt and I discussed plan for atelectasis prevention.  Will discontinue ezpap.  Will leave flutter at this time.  Encourage d/b and cough.      Brianna Ayon, RT  12/5/2023

## 2023-12-05 NOTE — PLAN OF CARE
[facility] - ICU    RN Progress Note:            Pertinent Assessments:      Please refer to flowsheet rows for full assessment     - alert and orientedx4. Able to got some rest tonight.        Key Events - This Shift:     - Denies of pain. No discomfort in bed. Slept better tonight.  - Removed reyna catheter. Good urine output.   - Vital signs stable. MAP >65. HR has been 65 to 80's. Pacer Wires are capped.   - on BIPAP the whole night. On nasal cannula at 1L/min. 02 sats >95%. No Shortness of breathing tonight.   - Up in the chair and tolerating it well.         Barriers to Discharge / Downgrade:     - can be downgrade to cardiac telemetry.

## 2023-12-05 NOTE — PROGRESS NOTES
HEART CARE NOTE          Assessment/Recommendations     1. Severe HFrEF   Assessment / Plan  Transition to oral diuretic regimen and continue to monitor UOP and renal function closely  New HFrEF/ICM s/p CABG; GDMT as detailed below     Current Pharmacotherapy AHA Guideline-Directed Medical Therapy   Sacubitril-valsartan 24-26 mg twice daily - on hold 2/2 borderline hemodynamics Lisinopril 20 mg twice daily   Metoprolol succinate 25 mg daily Carvedilol 25 mg twice daily   Spironolactone 12.5 mg daily Spironolactone 25 mg once daily   Hydralazine NA Hydralazine 100 mg three times daily   Isosorbide dinitrate NA Isosorbide dinitrate 40 mg three times daily   SGLT2 inhibitor:Empagliflozin 10 mg daily Dapagliflozin or Empagliflozin 10 mg daily      2. CAD  Assessment / Plan  Severe multi-vessel CAD s/p CABG  Denies angina; continue ASA, high intensity atorvastatin     3. Bradycardia  Assessment / Plan  Resolved -  epicardial leads removed; patient maintaining NSR    Plan of care discussed on December 5, 2023 with patient at bedside, and primary team overseeing patient's care    60 minutes spent reviewing prior records (including documentation, laboratory studies, cardiac testing/imaging), history and physical exam, planning, and subsequent documentation.    History of Present Illness/Subjective    Ms. Betty Pan is a 48 year old female who presents in acute hypoxic respiratory failure concerning for ADHF     Today, Mrs. Pan denies acute cardiac events or complaints; observed resting on BIPAP; Management plan as detailed above      ECG: personally reviewed 12/2/23: sinus tachycardia.     ECHO (personnaly Reviewed on 10/1323):   Technically very challenging study. Definity contrast utilized.  Left ventricle measures mildly enlarged. Mild left ventricular hypertrophy  suggested. Left ventricular systolic function is moderate to severely globally  reduced with an ejection fraction of 30 to 35% with akinesis of the  "left  ventricular apex.Diastolic Doppler findings (E/E' ratio and/or other  parameters) suggest left ventricular filling pressures are increased.  The right ventricle is suboptimally visualized. On limited imaging right  ventricular systolic function appears potentially reduced.  Mild enlargement of the left atrium.  Valve structures are suboptimally visualized on this technically challenging  study. No obvious hemodynamically significant valve abnormality noted.  Consider cardiac MRI to further define cardiac structures.    Telemetry: personally reviewed December 5, 2023; notable for sinus rhythm     Medical history and pertinent documents reviewed in Care Everywhere please where applicable see details above        Physical Examination Review of Systems   BP 92/51   Pulse 73   Temp 98.3  F (36.8  C) (Oral)   Resp 19   Ht 1.499 m (4' 11\")   Wt 113.3 kg (249 lb 11.2 oz)   SpO2 97%   BMI 50.43 kg/m    Body mass index is 50.43 kg/m .  Wt Readings from Last 3 Encounters:   12/05/23 113.3 kg (249 lb 11.2 oz)   11/28/23 110.7 kg (244 lb)   11/22/23 108.9 kg (240 lb)     General Appearance:   no distress, normal body habitus   ENT/Mouth: membranes moist, no oral lesions or bleeding gums.      EYES:  no scleral icterus, normal conjunctivae   Neck: no carotid bruits or thyromegaly   Chest/Lungs:   lungs are clear to auscultation, no rales or wheezing, equal chest wall expansion    Cardiovascular:   Regular. Normal first and second heart sounds with no murmurs, rubs, or gallops; the carotid, radial and posterior tibial pulses are intact, no JVD or LE edema bilaterally    Abdomen:  no organomegaly, masses, bruits, or tenderness; bowel sounds are present   Extremities: no cyanosis or clubbing   Skin: no xanthelasma, warm.    Neurologic: NAD     Psychiatric: alert and oriented x3, calm     A complete 10 systems ROS was reviewed  And is negative except what is listed in the HPI.          Medical History  Surgical History " Family History Social History   Past Medical History:   Diagnosis Date    Aspiration pneumonia of lower lobe, unspecified aspiration pneumonia type, unspecified laterality (H) 10/11/2023    Congestive heart failure (H)     Coronary artery disease     Heart failure with reduced ejection fraction (H) 10/27/2023    Hyperlipidemia     Hypertension, unspecified type 10/11/2023    Ischemic cardiomyopathy 10/27/2023    Obese     Obesity due to excess calories 10/27/2023    MANINDER (obstructive sleep apnea) 10/27/2023    Pleural effusion 10/11/2023    Sleep apnea     Past Surgical History:   Procedure Laterality Date    CORONARY ANGIOGRAPHY ADULT ORDER      CORONARY ARTERY BYPASS GRAFT, WITH ENDOSCOPIC VESSEL PROCUREMENT N/A 12/1/2023    Procedure: CORONARY ARTERY BYPASS GRAFT TIMES FOUR ,LEFT INTERNAL MAMMARY ARTERY HARVEST, BILATERAL LOWER LEG ENDOSCOPIC VESSEL PROCUREMENT, EPIAORTIC ULTRASOUND;  Surgeon: Angélica Rizvi MD;  Location: [facility] OR    CV CORONARY ANGIOGRAM N/A 10/13/2023    Procedure: Coronary Angiogram;  Surgeon: Roxana Melgoza MD;  Location: [facility] CATH LAB CV    CV RIGHT HEART CATH MEASUREMENTS RECORDED N/A 10/13/2023    Procedure: Right Heart Catheterization;  Surgeon: Roxana Melgoza MD;  Location: [facility] CATH LAB CV    TRANSESOPHAGEAL ECHOCARDIOGRAM INTRAOPERATIVE N/A 12/1/2023    Procedure: ANESTHESIA TRANSESOPHAGEAL ECHOCARDIOGRAM;  Surgeon: Angélica Rizvi MD;  Location: [facility] OR    no family history of premature coronary artery disease Social History     Socioeconomic History    Marital status: Single     Spouse name: Not on file    Number of children: Not on file    Years of education: Not on file    Highest education level: Not on file   Occupational History    Not on file   Tobacco Use    Smoking status: Never    Smokeless tobacco: Never   Vaping Use    Vaping Use: Never used   Substance and Sexual Activity    Alcohol use: Never    Drug use: Never    Sexual activity:  "Not on file   Other Topics Concern    Not on file   Social History Narrative    Not on file     Social Determinants of Health     Financial Resource Strain: Not on file   Food Insecurity: Not on file   Transportation Needs: Not on file   Physical Activity: Not on file   Stress: Not on file   Social Connections: Not on file   Interpersonal Safety: Not on file   Housing Stability: Not on file           Lab Results    Chemistry/lipid CBC Cardiac Enzymes/BNP/TSH/INR   Lab Results   Component Value Date    BUN 24.9 (H) 12/05/2023     12/05/2023    CO2 31 (H) 12/05/2023    Lab Results   Component Value Date    WBC 7.9 12/05/2023    HGB 7.9 (L) 12/05/2023    HCT 25.6 (L) 12/05/2023    MCV 94 12/05/2023     (L) 12/05/2023    Lab Results   Component Value Date    TSH 1.30 10/10/2023    INR 1.21 (H) 12/05/2023     No results found for: \"CKTOTAL\", \"CKMB\", \"TROPONINI\"       Weight:    Wt Readings from Last 3 Encounters:   12/05/23 113.3 kg (249 lb 11.2 oz)   11/28/23 110.7 kg (244 lb)   11/22/23 108.9 kg (240 lb)       Allergies  No Known Allergies      Surgical History  Past Surgical History:   Procedure Laterality Date    CORONARY ANGIOGRAPHY ADULT ORDER      CORONARY ARTERY BYPASS GRAFT, WITH ENDOSCOPIC VESSEL PROCUREMENT N/A 12/1/2023    Procedure: CORONARY ARTERY BYPASS GRAFT TIMES FOUR ,LEFT INTERNAL MAMMARY ARTERY HARVEST, BILATERAL LOWER LEG ENDOSCOPIC VESSEL PROCUREMENT, EPIAORTIC ULTRASOUND;  Surgeon: Angélica Rizvi MD;  Location: [facility] Main OR    CV CORONARY ANGIOGRAM N/A 10/13/2023    Procedure: Coronary Angiogram;  Surgeon: Roxana Melgoza MD;  Location: [facility] CATH LAB CV    CV RIGHT HEART CATH MEASUREMENTS RECORDED N/A 10/13/2023    Procedure: Right Heart Catheterization;  Surgeon: Roxana Melgoza MD;  Location: [facility] CATH LAB CV    TRANSESOPHAGEAL ECHOCARDIOGRAM INTRAOPERATIVE N/A 12/1/2023    Procedure: ANESTHESIA TRANSESOPHAGEAL ECHOCARDIOGRAM;  Surgeon: Angélica Rizvi MD;  " Location: [facility] Main OR       Social History  Tobacco:   History   Smoking Status    Never   Smokeless Tobacco    Never    Alcohol:   Social History    Substance and Sexual Activity      Alcohol use: Never   Illicit Drugs:   History   Drug Use Unknown       Family History  History reviewed. No pertinent family history.       Yenny Crespo MD on 12/5/2023      cc: Cristina Alexis

## 2023-12-05 NOTE — TREATMENT PLAN
RCAT Treatment Plan    Patient Score: 10  Patient Acuity: 4    Clinical Indication for Therapy: prevent atelectasis    Therapy Ordered: continue current therapy    Assessment Summary: pt is s/p CABG x4. CXR today bilateral pleural effusions passive atelectasis. RR 18-20, NPC, 1L NC SpO2 95. Will reassess tomorrow morning.       Kendell Simental, RT  12/4/2023

## 2023-12-05 NOTE — PROGRESS NOTES
"CVTS Daily Progress Note   POD# 4 s/p CABG x4  Attending: Dmitri  LOS: 4    SUBJECTIVE/INTERVAL EVENTS:    No acute events overnight. Patient progressing well. Maintaining oxygen saturations on 1 LPM/bipap. Normotensive. NSR. Up to chair this morning. Pain adequately controlled. -BM / +flatus. Tolerating diet. UOP adequate.  Hgb 7.9(7.4). Patient denies new chest pain, shortness of breath, abdominal pain, calf pain, nausea. Patient has no questions today.    OBJECTIVE:  Temp:  [97.6  F (36.4  C)-98.3  F (36.8  C)] 97.6  F (36.4  C)  Pulse:  [61-87] 75  Resp:  [19-22] 22  BP: ()/(51-68) 117/59  FiO2 (%):  [30 %] 30 %  SpO2:  [89 %-100 %] 97 %  Vitals:    12/01/23 0530 12/02/23 0638 12/03/23 0600 12/04/23 0600   Weight: 110.6 kg (243 lb 14.4 oz) 115.8 kg (255 lb 3.2 oz) 116.6 kg (257 lb) 117.8 kg (259 lb 9.6 oz)    12/05/23 0640   Weight: 113.3 kg (249 lb 11.2 oz)       Clinically Significant Risk Factors              # Hypoalbuminemia: Lowest albumin = 3.4 g/dL at 12/1/2023  2:09 PM, will monitor as appropriate  # Coagulation Defect: INR = 1.21 (Ref range: 0.85 - 1.15) and/or PTT = 83 Seconds (Ref range: 22 - 38 Seconds), will monitor for bleeding  # Thrombocytopenia: Lowest platelets = 92 in last 2 days, will monitor for bleeding   # Hypertension: Noted on problem list  # Acute heart failure with reduced ejection fraction: last echo with EF <40% and receiving IV diuretics       # Severe Obesity: Estimated body mass index is 50.43 kg/m  as calculated from the following:    Height as of this encounter: 1.499 m (4' 11\").    Weight as of this encounter: 113.3 kg (249 lb 11.2 oz).        # Financial/Environmental Concerns: none   # History of CABG: noted on surgical history        Current Medications:    Scheduled Meds:   albumin human  50 g Intravenous Once    aspirin  162 mg Oral or NG Tube Daily    Or    aspirin  300 mg Rectal Daily    atorvastatin  80 mg Oral Daily    bumetanide  2 mg Oral BID    " cyanocobalamin  1,000 mcg Oral Daily    ferrous sulfate  324 mg Oral Daily    heparin ANTICOAGULANT  5,000 Units Subcutaneous Q8H    insulin aspart  1-7 Units Subcutaneous TID AC    insulin aspart  1-5 Units Subcutaneous At Bedtime    lidocaine  1-2 patch Transdermal Q24H    magnesium oxide  400 mg Oral Q4H    metoprolol tartrate  6.25 mg Oral BID    miconazole   Topical BID    multivitamin, therapeutic  1 tablet Oral Daily    pantoprazole  40 mg Oral or NG Tube Daily    Or    pantoprazole  40 mg Oral Daily    polyethylene glycol  17 g Oral Daily    senna-docusate  1 tablet Oral BID     Continuous Infusions:      PRN Meds:.acetaminophen, bisacodyl, calcium gluconate, calcium gluconate, calcium gluconate, glucose **OR** dextrose **OR** glucagon, hydrALAZINE, magnesium hydroxide, methocarbamol, naloxone **OR** naloxone **OR** naloxone **OR** naloxone, ondansetron **OR** ondansetron, oxyCODONE **OR** oxyCODONE, prochlorperazine **OR** prochlorperazine    Cardiographics:    Telemetry monitoring demonstrates NSR with rates in the 70's bpm per my personal review.    Imaging:  No results found for this visit on 12/01/23.    Labs, personally reviewed.  Hemoglobin   Date Value Ref Range Status   12/05/2023 7.9 (L) 11.7 - 15.7 g/dL Final   12/04/2023 7.4 (L) 11.7 - 15.7 g/dL Final   12/03/2023 7.7 (L) 11.7 - 15.7 g/dL Final     WBC Count   Date Value Ref Range Status   12/05/2023 7.9 4.0 - 11.0 10e3/uL Final   12/04/2023 7.7 4.0 - 11.0 10e3/uL Final   12/03/2023 8.6 4.0 - 11.0 10e3/uL Final     Platelet Count   Date Value Ref Range Status   12/05/2023 123 (L) 150 - 450 10e3/uL Final   12/04/2023 92 (L) 150 - 450 10e3/uL Final   12/03/2023 95 (L) 150 - 450 10e3/uL Final     Creatinine   Date Value Ref Range Status   12/05/2023 0.76 0.51 - 0.95 mg/dL Final   12/04/2023 0.88 0.51 - 0.95 mg/dL Final   12/03/2023 1.01 (H) 0.51 - 0.95 mg/dL Final     Potassium   Date Value Ref Range Status   12/05/2023 4.1 3.4 - 5.3 mmol/L Final    12/04/2023 4.6 3.4 - 5.3 mmol/L Final   12/03/2023 4.7 3.4 - 5.3 mmol/L Final     Potassium POCT   Date Value Ref Range Status   12/01/2023 4.6 3.5 - 5.0 mmol/L Final   12/01/2023 5.1 (H) 3.5 - 5.0 mmol/L Final   12/01/2023 5.0 3.5 - 5.0 mmol/L Final     Magnesium   Date Value Ref Range Status   12/05/2023 2.0 1.7 - 2.3 mg/dL Final   12/04/2023 2.6 (H) 1.7 - 2.3 mg/dL Final   12/03/2023 2.8 (H) 1.7 - 2.3 mg/dL Final          I/O:  I/O last 3 completed shifts:  In: 1420 [P.O.:1420]  Out: 4400 [Urine:4400]       Physical Exam:    General: Patient seen up in chair. NAD. Calm, conversant. Pleasant, cooperative on exam.   HEENT: PER, no sclera icterus, moist mucosa  CV: RRR on monitor. Mild edema.   Pulm: Non-labored effort on 1L NC/Bipap at night. Incision covered with wound vac  Abd: obese, soft, NT, ND  : Voiding  Ext: Mild pedal edema, SCDs in place, warm  Neuro: CNs grossly intact      ASSESSMENT/PLAN:    Betty Pan is a 48 year old female with a history of CAD, HFrEF, obesity, HTN, HLD, and MANINDER who is POD# 4 s/p CABG x 4 by Dr. Rizvi.    Principal Problem:    Coronary artery disease involving native coronary artery of native heart without angina pectoris  Active Problems:    Ischemic cardiomyopathy    Acute blood loss anemia    Acute blood loss thrombocytopenia    Acute post op pain    HFrEF    Respiratory acidosis    C/f OHS    Hx of MANINDER          NEURO:   - Scheduled Tylenol/lidocaine patches and PRN Tylenol/Robaxin/oxycodone for pain    CV:   - Pre-op LVEF 30-35%  - Cardiology consulted for assistance with GDMT  - Metoprolol 3.25 PO BID with parameters  - Diuresis per Cards  - ASA 162mg  - PTA atorvastatin 80mg daily  - Hold PTA Entresto  - Chest tubes removed without immediate complications  -TPW's in place-remove 12/6  - EP consult 12/3-No PPM needed    PULM:   - Maintaining oxygen saturations on 1L NC  - BIPAP/CPAP at HS  - Encourage pulmonary toilet, EZPAP, OOB/activity    GI:  - Diet: ADAT to  cardiac  - Bowel regimen    RENAL/FEN:  - Adequate UOP  - Cr WNL  - Bond removed POD #3  - Diuresis per Cards  - Lasix 60 mg IV every 8 hrs.-stopped, changed to Bumex 2 mg PO BID   - Continue electrolyte replacement protocol    HEME:  - Acute blood loss anemia post-op  - Acute blood loss thrombocytopenia  - Hgb 7.9, no bleeding concerns. Hep SQ, ASA  - PTA iron/B12 supplements ordered    ID:  - Corinne op ppx complete, afebrile    ENDO:  Hgb A1C 5.8 - 10/11/2023  - Transition to SSI    PPx:  - DVT: SCDs, SQ heparin TID, OOB/ambulation   - GI: Protonix 40mg PO daily    DISPO:   - Transfer to general telemetry unit  - Home 24-48 hrs     Discussed with Dr. Artemio Ren PA-C  [facility] Cardiothoracic Surgery  [place] or pager [phone]

## 2023-12-05 NOTE — PROGRESS NOTES
"Care Management Follow Up    Length of Stay (days): 4    Expected Discharge Date: 12/06/2023     Concerns to be Addressed: discharge planning     Patient plan of care discussed at interdisciplinary rounds: Yes    Anticipated Discharge Disposition: Home, Outpatient Rehab (PT, OT, SLP, Cardiac or Pulmonary)     Anticipated Discharge Services:    Anticipated Discharge DME:      Patient/family educated on Medicare website which has current facility and service quality ratings:    Education Provided on the Discharge Plan: Yes  Patient/Family in Agreement with the Plan: yes    Referrals Placed by CM/SW:    Private pay costs discussed: Not applicable    Additional Information:    Chart reviewed.  CABG x4 on 12/1.    Anticipate discharge to a family member's home, family transport, OP Cardiac Rehab.    Social hx per CM consult note 12/2: \"Patient lives in apartment alone. Independent, no DME no services. Anticipating home with outpatient cardiac rehab, family transport.\"    CM will continue to follow.    Yves Mullins RN    "

## 2023-12-05 NOTE — PROGRESS NOTES
Patient did EZPAP treatment as schedule, along with flutter valve- has strong NPC. Patient placed on BIPAP overnight to rest. RT will follow.     Acknowledge AM ABG order- ABG stick attempted by two RT's, but  was unsuccessful.

## 2023-12-05 NOTE — PROGRESS NOTES
Respiratory Care    Pt on 1L NC SpO2 95. LS diminished. IS to 750. Tolerates Ezpap well. FV done. She has a NPC. No respiratory distress noted. Will continue to follow.      Kendell Simental, RT

## 2023-12-06 ENCOUNTER — TELEPHONE (OUTPATIENT)
Dept: CARDIOLOGY | Facility: CLINIC | Age: 48
End: 2023-12-06
Payer: COMMERCIAL

## 2023-12-06 ENCOUNTER — APPOINTMENT (OUTPATIENT)
Dept: RADIOLOGY | Facility: HOSPITAL | Age: 48
End: 2023-12-06
Attending: SURGERY
Payer: COMMERCIAL

## 2023-12-06 ENCOUNTER — APPOINTMENT (OUTPATIENT)
Dept: OCCUPATIONAL THERAPY | Facility: HOSPITAL | Age: 48
End: 2023-12-06
Attending: THORACIC SURGERY (CARDIOTHORACIC VASCULAR SURGERY)
Payer: COMMERCIAL

## 2023-12-06 VITALS
RESPIRATION RATE: 20 BRPM | DIASTOLIC BLOOD PRESSURE: 67 MMHG | WEIGHT: 242.8 LBS | HEART RATE: 77 BPM | TEMPERATURE: 97.9 F | BODY MASS INDEX: 48.95 KG/M2 | HEIGHT: 59 IN | SYSTOLIC BLOOD PRESSURE: 127 MMHG | OXYGEN SATURATION: 94 %

## 2023-12-06 DIAGNOSIS — I50.9 ACUTE DECOMPENSATED HEART FAILURE (H): Primary | ICD-10-CM

## 2023-12-06 LAB
ALBUMIN SERPL BCG-MCNC: 4.8 G/DL (ref 3.5–5.2)
ALP SERPL-CCNC: 38 U/L (ref 40–150)
ALT SERPL W P-5'-P-CCNC: 7 U/L (ref 0–50)
ANION GAP SERPL CALCULATED.3IONS-SCNC: 11 MMOL/L (ref 7–15)
AST SERPL W P-5'-P-CCNC: 18 U/L (ref 0–45)
BILIRUB SERPL-MCNC: 1.2 MG/DL
BUN SERPL-MCNC: 24.6 MG/DL (ref 6–20)
CALCIUM SERPL-MCNC: 9.4 MG/DL (ref 8.6–10)
CALCIUM, IONIZED MEASURED: 1.11 MMOL/L (ref 1.11–1.3)
CHLORIDE SERPL-SCNC: 97 MMOL/L (ref 98–107)
CREAT SERPL-MCNC: 0.78 MG/DL (ref 0.51–0.95)
DEPRECATED HCO3 PLAS-SCNC: 32 MMOL/L (ref 22–29)
EGFRCR SERPLBLD CKD-EPI 2021: >90 ML/MIN/1.73M2
ERYTHROCYTE [DISTWIDTH] IN BLOOD BY AUTOMATED COUNT: 13.8 % (ref 10–15)
GLUCOSE SERPL-MCNC: 109 MG/DL (ref 70–99)
HCT VFR BLD AUTO: 26.5 % (ref 35–47)
HGB BLD-MCNC: 8.3 G/DL (ref 11.7–15.7)
ION CA PH 7.4: 1.14 MMOL/L (ref 1.11–1.3)
LACTATE SERPL-SCNC: 0.9 MMOL/L (ref 0.7–2)
MAGNESIUM SERPL-MCNC: 2 MG/DL (ref 1.7–2.3)
MCH RBC QN AUTO: 29 PG (ref 26.5–33)
MCHC RBC AUTO-ENTMCNC: 31.3 G/DL (ref 31.5–36.5)
MCV RBC AUTO: 93 FL (ref 78–100)
PH: 7.44 (ref 7.35–7.45)
PHOSPHATE SERPL-MCNC: 3.5 MG/DL (ref 2.5–4.5)
PLATELET # BLD AUTO: 138 10E3/UL (ref 150–450)
POTASSIUM SERPL-SCNC: 3.6 MMOL/L (ref 3.4–5.3)
PROT SERPL-MCNC: 7.2 G/DL (ref 6.4–8.3)
RBC # BLD AUTO: 2.86 10E6/UL (ref 3.8–5.2)
SODIUM SERPL-SCNC: 140 MMOL/L (ref 135–145)
WBC # BLD AUTO: 8.3 10E3/UL (ref 4–11)

## 2023-12-06 PROCEDURE — 250N000013 HC RX MED GY IP 250 OP 250 PS 637: Performed by: THORACIC SURGERY (CARDIOTHORACIC VASCULAR SURGERY)

## 2023-12-06 PROCEDURE — 250N000013 HC RX MED GY IP 250 OP 250 PS 637: Performed by: INTERNAL MEDICINE

## 2023-12-06 PROCEDURE — 250N000011 HC RX IP 250 OP 636: Mod: JZ | Performed by: PHYSICIAN ASSISTANT

## 2023-12-06 PROCEDURE — 97535 SELF CARE MNGMENT TRAINING: CPT | Mod: GO

## 2023-12-06 PROCEDURE — 85027 COMPLETE CBC AUTOMATED: CPT | Performed by: SURGERY

## 2023-12-06 PROCEDURE — 83605 ASSAY OF LACTIC ACID: CPT | Performed by: SURGERY

## 2023-12-06 PROCEDURE — 99233 SBSQ HOSP IP/OBS HIGH 50: CPT | Performed by: INTERNAL MEDICINE

## 2023-12-06 PROCEDURE — 83735 ASSAY OF MAGNESIUM: CPT | Performed by: SURGERY

## 2023-12-06 PROCEDURE — 36415 COLL VENOUS BLD VENIPUNCTURE: CPT | Performed by: SURGERY

## 2023-12-06 PROCEDURE — 97110 THERAPEUTIC EXERCISES: CPT | Mod: GO

## 2023-12-06 PROCEDURE — 94799 UNLISTED PULMONARY SVC/PX: CPT

## 2023-12-06 PROCEDURE — 80053 COMPREHEN METABOLIC PANEL: CPT | Performed by: SURGERY

## 2023-12-06 PROCEDURE — 84100 ASSAY OF PHOSPHORUS: CPT | Performed by: SURGERY

## 2023-12-06 PROCEDURE — 250N000011 HC RX IP 250 OP 636: Mod: JZ | Performed by: INTERNAL MEDICINE

## 2023-12-06 PROCEDURE — 250N000013 HC RX MED GY IP 250 OP 250 PS 637: Performed by: PHYSICIAN ASSISTANT

## 2023-12-06 PROCEDURE — 82330 ASSAY OF CALCIUM: CPT | Performed by: PHYSICIAN ASSISTANT

## 2023-12-06 PROCEDURE — 999N000156 HC STATISTIC RCP CONSULT EA 30 MIN

## 2023-12-06 PROCEDURE — 999N000157 HC STATISTIC RCP TIME EA 10 MIN

## 2023-12-06 PROCEDURE — 71045 X-RAY EXAM CHEST 1 VIEW: CPT

## 2023-12-06 RX ORDER — SACUBITRIL AND VALSARTAN 24; 26 MG/1; MG/1
0.5 TABLET, FILM COATED ORAL 2 TIMES DAILY
COMMUNITY
Start: 2023-12-06 | End: 2023-12-27

## 2023-12-06 RX ORDER — METOPROLOL SUCCINATE 25 MG/1
25 TABLET, EXTENDED RELEASE ORAL DAILY
Qty: 60 TABLET | Refills: 1 | Status: SHIPPED | OUTPATIENT
Start: 2023-12-07 | End: 2023-12-27

## 2023-12-06 RX ORDER — PANTOPRAZOLE SODIUM 40 MG/1
40 TABLET, DELAYED RELEASE ORAL DAILY
Qty: 21 TABLET | Refills: 0 | Status: SHIPPED | OUTPATIENT
Start: 2023-12-07 | End: 2024-05-16

## 2023-12-06 RX ORDER — ACETAMINOPHEN 325 MG/1
650 TABLET ORAL EVERY 6 HOURS PRN
Qty: 100 TABLET | Refills: 0 | Status: SHIPPED | OUTPATIENT
Start: 2023-12-06

## 2023-12-06 RX ORDER — ATORVASTATIN CALCIUM 80 MG/1
80 TABLET, FILM COATED ORAL DAILY
Qty: 30 TABLET | Refills: 1 | Status: SHIPPED | OUTPATIENT
Start: 2023-12-07 | End: 2023-12-27

## 2023-12-06 RX ORDER — OXYCODONE HYDROCHLORIDE 5 MG/1
5 TABLET ORAL EVERY 4 HOURS PRN
Qty: 15 TABLET | Refills: 0 | Status: SHIPPED | OUTPATIENT
Start: 2023-12-06 | End: 2024-03-19

## 2023-12-06 RX ORDER — ASPIRIN 81 MG/1
162 TABLET, CHEWABLE ORAL DAILY
Qty: 180 TABLET | Refills: 3 | Status: SHIPPED | OUTPATIENT
Start: 2023-12-07 | End: 2024-01-31

## 2023-12-06 RX ORDER — MAGNESIUM OXIDE 400 MG/1
400 TABLET ORAL EVERY 4 HOURS
Status: DISCONTINUED | OUTPATIENT
Start: 2023-12-06 | End: 2023-12-06 | Stop reason: HOSPADM

## 2023-12-06 RX ORDER — AMOXICILLIN 250 MG
1-2 CAPSULE ORAL 2 TIMES DAILY PRN
Qty: 120 TABLET | Refills: 0 | Status: SHIPPED | OUTPATIENT
Start: 2023-12-06

## 2023-12-06 RX ORDER — BUMETANIDE 2 MG/1
2 TABLET ORAL DAILY
Status: DISCONTINUED | OUTPATIENT
Start: 2023-12-07 | End: 2023-12-06 | Stop reason: HOSPADM

## 2023-12-06 RX ORDER — POTASSIUM CHLORIDE 1500 MG/1
20 TABLET, EXTENDED RELEASE ORAL ONCE
Status: COMPLETED | OUTPATIENT
Start: 2023-12-06 | End: 2023-12-06

## 2023-12-06 RX ORDER — SPIRONOLACTONE 25 MG/1
25 TABLET ORAL DAILY
Status: DISCONTINUED | OUTPATIENT
Start: 2023-12-07 | End: 2023-12-06 | Stop reason: HOSPADM

## 2023-12-06 RX ORDER — BUMETANIDE 2 MG/1
2 TABLET ORAL DAILY
Qty: 30 TABLET | Refills: 1 | Status: SHIPPED | OUTPATIENT
Start: 2023-12-07 | End: 2023-12-27

## 2023-12-06 RX ORDER — ACETAZOLAMIDE 500 MG/5ML
500 INJECTION, POWDER, LYOPHILIZED, FOR SOLUTION INTRAVENOUS ONCE
Qty: 5 ML | Refills: 0 | Status: COMPLETED | OUTPATIENT
Start: 2023-12-06 | End: 2023-12-06

## 2023-12-06 RX ORDER — POLYETHYLENE GLYCOL 3350 17 G/17G
17 POWDER, FOR SOLUTION ORAL DAILY PRN
Qty: 510 G | Refills: 0 | Status: SHIPPED | OUTPATIENT
Start: 2023-12-06

## 2023-12-06 RX ADMIN — POLYETHYLENE GLYCOL 3350 17 G: 17 POWDER, FOR SOLUTION ORAL at 08:08

## 2023-12-06 RX ADMIN — EMPAGLIFLOZIN 10 MG: 10 TABLET, FILM COATED ORAL at 08:05

## 2023-12-06 RX ADMIN — METOPROLOL SUCCINATE 25 MG: 25 TABLET, EXTENDED RELEASE ORAL at 08:04

## 2023-12-06 RX ADMIN — SENNOSIDES AND DOCUSATE SODIUM 1 TABLET: 8.6; 5 TABLET ORAL at 08:05

## 2023-12-06 RX ADMIN — POTASSIUM CHLORIDE 20 MEQ: 1500 TABLET, EXTENDED RELEASE ORAL at 08:08

## 2023-12-06 RX ADMIN — ACETAZOLAMIDE 500 MG: 500 INJECTION, POWDER, LYOPHILIZED, FOR SOLUTION INTRAVENOUS at 10:30

## 2023-12-06 RX ADMIN — Medication 12.5 MG: at 10:31

## 2023-12-06 RX ADMIN — ATORVASTATIN CALCIUM 80 MG: 40 TABLET, FILM COATED ORAL at 08:04

## 2023-12-06 RX ADMIN — FERROUS SULFATE TAB 325 MG (65 MG ELEMENTAL FE) 324 MG: 325 (65 FE) TAB at 08:05

## 2023-12-06 RX ADMIN — BISACODYL 10 MG: 10 SUPPOSITORY RECTAL at 08:04

## 2023-12-06 RX ADMIN — HEPARIN SODIUM 5000 UNITS: 5000 INJECTION, SOLUTION INTRAVENOUS; SUBCUTANEOUS at 04:24

## 2023-12-06 RX ADMIN — CYANOCOBALAMIN TAB 1000 MCG 1000 MCG: 1000 TAB at 08:05

## 2023-12-06 RX ADMIN — BUMETANIDE 2 MG: 2 TABLET ORAL at 08:05

## 2023-12-06 RX ADMIN — PANTOPRAZOLE SODIUM 40 MG: 40 TABLET, DELAYED RELEASE ORAL at 08:08

## 2023-12-06 RX ADMIN — MICONAZOLE NITRATE ANTIFUNGAL POWDER: 2 POWDER TOPICAL at 08:06

## 2023-12-06 RX ADMIN — THERA TABS 1 TABLET: TAB at 08:08

## 2023-12-06 RX ADMIN — ASPIRIN 81 MG CHEWABLE TABLET 162 MG: 81 TABLET CHEWABLE at 08:04

## 2023-12-06 RX ADMIN — MAGNESIUM OXIDE TAB 400 MG (241.3 MG ELEMENTAL MG) 400 MG: 400 (241.3 MG) TAB at 08:05

## 2023-12-06 ASSESSMENT — ACTIVITIES OF DAILY LIVING (ADL)
ADLS_ACUITY_SCORE: 28

## 2023-12-06 NOTE — PROGRESS NOTES
[facility] - ICU    RN Progress Note:            Pertinent Assessments:      Please refer to flowsheet rows for full assessment     - alert and orientedx4. Denies of pain. Vital signs stable.   - On nasal cannula at 1L. 02 sats >95%.  - No output in Sternal wound vac.        Key Events - This Shift:     - ok to be on CPAP at night per CV surgery M.D.   - tolerating CPAP without any issues. 02 sats >95%.           Barriers to Discharge / Downgrade:     - Can be downgrade to cardiac telemetry.

## 2023-12-06 NOTE — PLAN OF CARE
Goal Outcome Evaluation:  [facility] - ICU    RN Progress Note:            Pertinent Assessments:      Please refer to flowsheet rows for full assessment     Vital signs stable, alert and oriented, denies pain/ discomfort, walked and voided to the bathroom, steady gait, pacer wire capped, protocols addressed, Scheduled CXR portable done this am.            Key Events - This Shift:     Uneventful shift         Barriers to Discharge / Downgrade:   On intermediate care

## 2023-12-06 NOTE — PLAN OF CARE
Cardiac Rehab Discharge Summary    Reason for therapy discharge:    Discharged to home with outpatient therapy.    Progress towards therapy goal(s). See goals on Care Plan in [facility] electronic health record for goal details.  Goals met    Therapy recommendation(s):    Continued therapy is recommended.  Rationale/Recommendations:  outpatient cardiac rehab.

## 2023-12-06 NOTE — TREATMENT PLAN
RCAT Treatment Plan    Patient Score: 9  Patient Acuity: 4    Clinical Indication for Therapy: productive cough and prevent atelectasis    Therapy Ordered: IS/Aerobika BID with RT. Continue independently and w/RN per cardiac surgery protocol.    Assessment Summary: Better doing better. Down to 1-2 L NC. IS to 1000 mL. Aerobika for productive cough. Utilizing IS/Aerobika independently. Will continue to follow and assess daily.    Daisy Avila, RT  12/6/2023

## 2023-12-06 NOTE — PROGRESS NOTES
HEART CARE NOTE          Assessment/Recommendations     1. Severe HFrEF   Assessment / Plan  Tolerating oral diuretic regimen - no changes at this time; continue to monitor UOP and renal function closely  New HFrEF/ICM s/p CABG; GDMT as detailed below     Current Pharmacotherapy AHA Guideline-Directed Medical Therapy   Sacubitril-valsartan 24-26 mg twice daily (half tablets BID) Lisinopril 20 mg twice daily   Metoprolol succinate 25 mg daily Carvedilol 25 mg twice daily   Spironolactone 25 mg daily Spironolactone 25 mg once daily   Hydralazine NA Hydralazine 100 mg three times daily   Isosorbide dinitrate NA Isosorbide dinitrate 40 mg three times daily   SGLT2 inhibitor:Empagliflozin 10 mg daily Dapagliflozin or Empagliflozin 10 mg daily      2. CAD  Assessment / Plan  Severe multi-vessel CAD s/p CABG  Denies angina; continue ASA, high intensity atorvastatin     3. Bradycardia  Assessment / Plan  Resolved -  epicardial leads removed; patient maintaining NSR    Plan of care discussed on December 6, 2023 with patient at bedside, and primary team overseeing patient's care     60 minutes spent reviewing prior records (including documentation, laboratory studies, cardiac testing/imaging), history and physical exam, planning, and subsequent documentation.    Cardiology team will sign-off for now. Please do not hesitate to consult us again if new questions or concerns arise. Follow-up appointment will be arranged by CORE/HF clinic.       History of Present Illness/Subjective    Ms. Betty Pan is a 48 year old female who presents in acute hypoxic respiratory failure concerning for ADHF     Today, Mrs. Pan denies acute cardiac events or complaints; observed resting on BIPAP; Management plan as detailed above      ECG: personally reviewed 12/2/23: sinus tachycardia.     ECHO (personnaly Reviewed on 10/1323):   Technically very challenging study. Definity contrast utilized.  Left ventricle measures mildly enlarged. Mild  "left ventricular hypertrophy  suggested. Left ventricular systolic function is moderate to severely globally  reduced with an ejection fraction of 30 to 35% with akinesis of the left  ventricular apex.Diastolic Doppler findings (E/E' ratio and/or other  parameters) suggest left ventricular filling pressures are increased.  The right ventricle is suboptimally visualized. On limited imaging right  ventricular systolic function appears potentially reduced.  Mild enlargement of the left atrium.  Valve structures are suboptimally visualized on this technically challenging  study. No obvious hemodynamically significant valve abnormality noted.  Consider cardiac MRI to further define cardiac structures.    Medical history and pertinent documents reviewed in Care Everywhere please where applicable see details above        Physical Examination Review of Systems   /60 (BP Location: Left arm)   Pulse 78   Temp 98.3  F (36.8  C) (Axillary)   Resp 20   Ht 1.499 m (4' 11\")   Wt 110.1 kg (242 lb 12.8 oz)   SpO2 94%   BMI 49.04 kg/m    Body mass index is 49.04 kg/m .  Wt Readings from Last 3 Encounters:   12/06/23 110.1 kg (242 lb 12.8 oz)   11/28/23 110.7 kg (244 lb)   11/22/23 108.9 kg (240 lb)     General Appearance:   no distress, normal body habitus   ENT/Mouth: membranes moist, no oral lesions or bleeding gums.      EYES:  no scleral icterus, normal conjunctivae   Neck: no carotid bruits or thyromegaly   Chest/Lungs:   lungs are clear to auscultation, no rales or wheezing, equal chest wall expansion    Cardiovascular:   Regular. Normal first and second heart sounds with no murmurs, rubs, or gallops; the carotid, radial and posterior tibial pulses are intact, no JVD and trace LE edema bilaterally    Abdomen:  no organomegaly, masses, bruits, or tenderness; bowel sounds are present   Extremities: no cyanosis or clubbing   Skin: no xanthelasma, warm.    Neurologic: NAD     Psychiatric: alert and oriented x3, calm  "    A complete 10 systems ROS was reviewed  And is negative except what is listed in the HPI.          Medical History  Surgical History Family History Social History   Past Medical History:   Diagnosis Date    Aspiration pneumonia of lower lobe, unspecified aspiration pneumonia type, unspecified laterality (H) 10/11/2023    Congestive heart failure (H)     Coronary artery disease     Heart failure with reduced ejection fraction (H) 10/27/2023    Hyperlipidemia     Hypertension, unspecified type 10/11/2023    Ischemic cardiomyopathy 10/27/2023    Obese     Obesity due to excess calories 10/27/2023    MANINDER (obstructive sleep apnea) 10/27/2023    Pleural effusion 10/11/2023    Sleep apnea     Past Surgical History:   Procedure Laterality Date    CORONARY ANGIOGRAPHY ADULT ORDER      CORONARY ARTERY BYPASS GRAFT, WITH ENDOSCOPIC VESSEL PROCUREMENT N/A 12/1/2023    Procedure: CORONARY ARTERY BYPASS GRAFT TIMES FOUR ,LEFT INTERNAL MAMMARY ARTERY HARVEST, BILATERAL LOWER LEG ENDOSCOPIC VESSEL PROCUREMENT, EPIAORTIC ULTRASOUND;  Surgeon: Angélica Rizvi MD;  Location: [facility] OR     CORONARY ANGIOGRAM N/A 10/13/2023    Procedure: Coronary Angiogram;  Surgeon: Roxana Melgoza MD;  Location: [facility] CATH LAB CV    CV RIGHT HEART CATH MEASUREMENTS RECORDED N/A 10/13/2023    Procedure: Right Heart Catheterization;  Surgeon: Roxana Melgoza MD;  Location: [facility] LAB CV    TRANSESOPHAGEAL ECHOCARDIOGRAM INTRAOPERATIVE N/A 12/1/2023    Procedure: ANESTHESIA TRANSESOPHAGEAL ECHOCARDIOGRAM;  Surgeon: Angélica Rizvi MD;  Location: [facility] OR    no family history of premature coronary artery disease Social History     Socioeconomic History    Marital status: Single     Spouse name: Not on file    Number of children: Not on file    Years of education: Not on file    Highest education level: Not on file   Occupational History    Not on file   Tobacco Use    Smoking status: Never    Smokeless tobacco:  "Never   Vaping Use    Vaping Use: Never used   Substance and Sexual Activity    Alcohol use: Never    Drug use: Never    Sexual activity: Not on file   Other Topics Concern    Not on file   Social History Narrative    Not on file     Social Determinants of Health     Financial Resource Strain: Not on file   Food Insecurity: Not on file   Transportation Needs: Not on file   Physical Activity: Not on file   Stress: Not on file   Social Connections: Not on file   Interpersonal Safety: Not on file   Housing Stability: Not on file           Lab Results    Chemistry/lipid CBC Cardiac Enzymes/BNP/TSH/INR   Lab Results   Component Value Date    BUN 24.6 (H) 12/06/2023     12/06/2023    CO2 32 (H) 12/06/2023    Lab Results   Component Value Date    WBC 8.3 12/06/2023    HGB 8.3 (L) 12/06/2023    HCT 26.5 (L) 12/06/2023    MCV 93 12/06/2023     (L) 12/06/2023    Lab Results   Component Value Date    TSH 1.30 10/10/2023    INR 1.21 (H) 12/05/2023     No results found for: \"CKTOTAL\", \"CKMB\", \"TROPONINI\"       Weight:    Wt Readings from Last 3 Encounters:   12/06/23 110.1 kg (242 lb 12.8 oz)   11/28/23 110.7 kg (244 lb)   11/22/23 108.9 kg (240 lb)       Allergies  No Known Allergies      Surgical History  Past Surgical History:   Procedure Laterality Date    CORONARY ANGIOGRAPHY ADULT ORDER      CORONARY ARTERY BYPASS GRAFT, WITH ENDOSCOPIC VESSEL PROCUREMENT N/A 12/1/2023    Procedure: CORONARY ARTERY BYPASS GRAFT TIMES FOUR ,LEFT INTERNAL MAMMARY ARTERY HARVEST, BILATERAL LOWER LEG ENDOSCOPIC VESSEL PROCUREMENT, EPIAORTIC ULTRASOUND;  Surgeon: Angélica Rizvi MD;  Location: [facility] Main OR    CV CORONARY ANGIOGRAM N/A 10/13/2023    Procedure: Coronary Angiogram;  Surgeon: Roxana Melgoza MD;  Location: [facility] CATH LAB CV    CV RIGHT HEART CATH MEASUREMENTS RECORDED N/A 10/13/2023    Procedure: Right Heart Catheterization;  Surgeon: Roxana Melgoza MD;  Location: [facility] CATH LAB CV    TRANSESOPHAGEAL " ECHOCARDIOGRAM INTRAOPERATIVE N/A 12/1/2023    Procedure: ANESTHESIA TRANSESOPHAGEAL ECHOCARDIOGRAM;  Surgeon: Angélica Rizvi MD;  Location: [facility] Main OR       Social History  Tobacco:   History   Smoking Status    Never   Smokeless Tobacco    Never    Alcohol:   Social History    Substance and Sexual Activity      Alcohol use: Never   Illicit Drugs:   History   Drug Use Unknown       Family History  History reviewed. No pertinent family history.       Yenny Crespo MD on 12/6/2023      cc: Cristina Alexis

## 2023-12-06 NOTE — PROGRESS NOTES
Care Management Discharge Note    Discharge Date: 12/06/2023       Discharge Disposition: Home, Outpatient Rehab (PT, OT, SLP, Cardiac or Pulmonary)    Discharge Services: None    Discharge DME: None    Discharge Transportation: family or friend will provide    Private pay costs discussed: Not applicable    Education Provided on the Discharge Plan: Yes AVS per bedside nurse     Persons Notified of Discharge Plans: patient    Patient/Family in Agreement with the Plan: yes    Handoff Referral Completed: Yes    Additional Information:    Pt discharging to her parents' house.  Family can transport.  OP followup, OP cardiac rehab.    No further CM needs identified nor expressed by pt.    Yves Mullins RN

## 2023-12-06 NOTE — DISCHARGE SUMMARY
Cardiovascular Surgery Discharge Summary    Primary Care Physician:  Cristina Alexis  Discharge Provider: Steph Victoria DNP, CNP  Admission Date: 12/1/2023  Admission Diagnoses: CAD (coronary artery disease) [I25.10]  Coronary artery disease involving native coronary artery of native heart without angina pectoris [I25.10]  Discharge Date: 12/6/2023  Disposition: Home   Condition at Discharge: Good  Code Status: Full Code     Principal Diagnosis:   Coronary artery disease involving native coronary artery of native heart without angina pectoris s/p CABG X4 (LIMA to LAD, SVG to LAD diagonal branch 1, OM 1 and RPDA) 12/1/2023     Discharge Diagnoses:    Active Problems:      Patient Active Problem List   Diagnosis    Pyelonephritis, acute    Peripheral edema    Pleural effusion    Renal mass    Blood glucose elevated    Renal insufficiency    Aspiration pneumonia of lower lobe, unspecified aspiration pneumonia type, unspecified laterality (H)    Hypertension, unspecified type    Acute systolic congestive heart failure (H)    Heart failure with reduced ejection fraction (H)    Ischemic cardiomyopathy    Obesity due to excess calories    Coronary artery disease involving native coronary artery of native heart without angina pectoris    MANINDER (obstructive sleep apnea)    Prediabetes         Consult/s: Dietary, critical care medicine, cardiology, CORE clinic, Electrophysiology     Surgery:   December 1, 2023 with Dr. Rizvi  PROCEDURES PERFORMED:   Median sternotomy   Take down of the left internal mammary artery   Endoscopic greater saphenous vein procurement from the left and right lower extremities   Epiaortic ultrasound of the ascending aorta   Placement on central cardiopulmonary bypass   Quadruple vessel coronary artery bypass grafting;   -  Left internal mammary artery to the left anterior descending coronary artery  -  Separate reversed saphenous vein grafts to the left anterior descending diagonal branch 1, the  obtuse marginal 1 and right posterior descending coronary arteries.   7.   Placement of temporary atrial and ventricular pacing wires    FINDINGS AT OPERATION:  Her ascending aorta was free of plaque by epiaortic ultrasound.  Her pulmonary artery pressures were normal.  Her left ventricular ejection fraction was 30%.   She also had significant left ventricular hypertrophy.  The left internal mammary artery was a 2 mm in diameter conduit with excellent flow.  The reversed saphenous vein graft measured 6 mm in diameter and was of fair to good quality. The left anterior descending coronary artery was chronically occluded.  In its mid to distal portion it had onion skinning and no lumen.  Distally it was a 1.5 in diameter vessel, a fair vessel for bypass grafting.  The left anterior descending diagonal branch 1 was a 2 mm in diameter target vessel, a good vessel for bypass grafting.  The obtuse marginal 1 was a 2 mm in diameter target vessel, a good vessel for bypass grafting.  The right posterior descending coronary artery was a 2 mm in diameter target vessel, an excellent vessel for bypass grafting.  Her heart was hyperdynamic when we came off cardiopulmonary bypass.     Discharge Medications:      Review of your medicines        START taking        Dose / Directions   acetaminophen 325 MG tablet  Commonly known as: TYLENOL  Used for: S/P CABG (coronary artery bypass graft)      Dose: 650 mg  Take 2 tablets (650 mg) by mouth every 6 hours as needed for other or mild pain  Quantity: 100 tablet  Refills: 0     aspirin 81 MG chewable tablet  Commonly known as: ASA  Used for: S/P CABG (coronary artery bypass graft)      Dose: 162 mg  Start taking on: December 7, 2023  Take 2 tablets (162 mg) by mouth daily  Quantity: 180 tablet  Refills: 3     bumetanide 2 MG tablet  Commonly known as: BUMEX  Used for: Ischemic cardiomyopathy, S/P CABG (coronary artery bypass graft), Acute systolic congestive heart failure (H)      Dose:  2 mg  Start taking on: December 7, 2023  Take 1 tablet (2 mg) by mouth daily  Quantity: 30 tablet  Refills: 1     empagliflozin 10 MG Tabs tablet  Commonly known as: JARDIANCE  Used for: S/P CABG (coronary artery bypass graft)      Dose: 10 mg  Start taking on: December 7, 2023  Take 1 tablet (10 mg) by mouth daily  Quantity: 90 tablet  Refills: 1     metoprolol succinate ER 25 MG 24 hr tablet  Commonly known as: TOPROL XL  Used for: Ischemic cardiomyopathy, S/P CABG (coronary artery bypass graft), Acute systolic congestive heart failure (H)      Dose: 25 mg  Start taking on: December 7, 2023  Take 1 tablet (25 mg) by mouth daily  Quantity: 60 tablet  Refills: 1     miconazole 2 % external powder  Commonly known as: MICATIN  Used for: S/P CABG (coronary artery bypass graft)      Apply topically 2 times daily Apply BID to perianal (gluteal cleft) after cleansing and drying well. Apply to gloved hand, then pat powder onto desired skin, Rub Into Skin To Activate  Quantity: 43 g  Refills: 0     oxyCODONE 5 MG tablet  Commonly known as: ROXICODONE  Used for: S/P CABG (coronary artery bypass graft)      Dose: 5 mg  Take 1 tablet (5 mg) by mouth every 4 hours as needed for moderate pain  Quantity: 15 tablet  Refills: 0     pantoprazole 40 MG EC tablet  Commonly known as: PROTONIX  Used for: S/P CABG (coronary artery bypass graft)      Dose: 40 mg  Start taking on: December 7, 2023  Take 1 tablet (40 mg) by mouth daily  Quantity: 21 tablet  Refills: 0     polyethylene glycol 17 GM/Dose powder  Commonly known as: MIRALAX  Used for: S/P CABG (coronary artery bypass graft)      Dose: 17 g  Take 17 g by mouth daily as needed for constipation  Quantity: 510 g  Refills: 0     senna-docusate 8.6-50 MG tablet  Commonly known as: SENOKOT-S/PERICOLACE  Used for: S/P CABG (coronary artery bypass graft)      Dose: 1-2 tablet  Take 1-2 tablets by mouth 2 times daily as needed for constipation  Quantity: 120 tablet  Refills: 0             CONTINUE these medicines which may have CHANGED, or have new prescriptions. If we are uncertain of the size of tablets/capsules you have at home, strength may be listed as something that might have changed.        Dose / Directions   atorvastatin 80 MG tablet  Commonly known as: LIPITOR  This may have changed:   medication strength  how much to take  Used for: S/P CABG (coronary artery bypass graft)      Dose: 80 mg  Start taking on: December 7, 2023  Take 1 tablet (80 mg) by mouth daily  Quantity: 30 tablet  Refills: 1     Entresto 24-26 MG per tablet  This may have changed: Another medication with the same name was removed. Continue taking this medication, and follow the directions you see here.  Generic drug: sacubitril-valsartan      Dose: 0.5 tablet  Take 0.5 tablets by mouth 2 times daily  Refills: 0            CONTINUE these medicines which have NOT CHANGED        Dose / Directions   cyanocobalamin 1000 MCG tablet  Commonly known as: VITAMIN B-12      Dose: 1,000 mcg  Take 1,000 mcg by mouth daily  Refills: 0     Ferrous Sulfate 324 (65 Fe) MG Tbec      Dose: 1 tablet  Take 1 tablet by mouth daily  Refills: 0     multivitamin, therapeutic Tabs tablet      Dose: 1 tablet  Take 1 tablet by mouth daily  Refills: 0     spironolactone 25 MG tablet  Commonly known as: ALDACTONE  Used for: Acute systolic congestive heart failure (H)      Dose: 25 mg  Take 1 tablet (25 mg) by mouth daily  Quantity: 30 tablet  Refills: 3            STOP taking      carvedilol 3.125 MG tablet  Commonly known as: COREG        furosemide 40 MG tablet  Commonly known as: LASIX        nitroGLYcerin 0.4 MG sublingual tablet  Commonly known as: NITROSTAT                  Where to get your medicines        These medications were sent to [facility] [address] [address]      Phone: [phone]   acetaminophen 325 MG tablet  aspirin 81 MG chewable  "tablet  atorvastatin 80 MG tablet  bumetanide 2 MG tablet  empagliflozin 10 MG Tabs tablet  metoprolol succinate ER 25 MG 24 hr tablet  miconazole 2 % external powder  oxyCODONE 5 MG tablet  pantoprazole 40 MG EC tablet  polyethylene glycol 17 GM/Dose powder  senna-docusate 8.6-50 MG tablet         Discharge Instructions:    Follow up appointment with Primary Care Physician: Cristina Alexis within 7 days of discharge.  Follow up appointment with Specialist:    Follow with CV Surgery as scheduled. (12/26/2023 at 12:00 pm)   Follow up with the Heart Failure clinic as scheduled. (12/27/2023 at 1:30 pm)   Follow-up with cardiology as scheduled. (2/27/2024 at 3:50 pm)    Diet: Cardiac    Activity/Restrictions: As tolerated with sternal precautions in mind (see below). No driving for 4 weeks or while on pain medication.     - Shower and wash your incisions daily with soap and water. No tub baths/hot tubs for 4 weeks. An antibacterial soap such as Dial or Safeguard is recommended.    - Check your incisions every day. If you notice any redness, drainage, or anything unusual, please call the surgeons office.    - No driving for 4 weeks after surgery or while on pain medication     - Do not lift anything more than 10 pounds for 6 weeks after surgery. After 6 weeks, advance lifting is tolerated.    - You may have watery drainage from your chest tube site for 2-3 weeks after surgery. Your may cover with a Band-Aid to protect your clothing. Remove the Band-Aid every day and wash the site.    - If you have a leg lesion, you may have swelling for 2-3 months. Elevate your leg any time you are not walking. You have staples in your both legs that can be removed in 2 weeks after discharge. Keep areas clean and dry.     - If you feel any \"popping\" or \"clicking\" sensations in your chest, your arms are out too far or you are putting too much weight into arm movements. Do not reach over your head or out to the side to pull something. Do not " do any arm exercises or use any exercise equipment that involves arm movement. If you feel your sternum moving, call the surgeon's office.    - Increase your daily activity as explained by Cardiac Rehab. You are encouraged to enroll in an Outpatient Cardiac Rehab Program.    - No active sports using your upper arms for 3 months. This includes fishing, hunting, bowling, swimming, tennis or golf.    - No physical activity such as cutting the grass, raking, vacuuming, changing sheets on your bed, snow shoveling, or using a  for 3 months.    - Use incentive spirometer 6-8 times per day for 2 weeks.       Hospital Summary:   Betty Pan is a 48 year old female who was admitted to [facility] on 12/1/2023 for a scheduled cardiac surgery. Ms. Pan was seen in outpatient clinic by Dr. Rizvi in consultation for possible CABG. She had recently been hospitalized with acute heart failure with decreased LVEF. She underwent a coronary angiogram which revealed multi vessel CAD. She was initially scheduled for CABG 11/8/2023, but presented with soft tissue breakdown of her buttocks associated with cellulitis. She was seen by ID and sent home with wound care. She now returns for elective CABG.      Patient underwent a 4 vessel coronary artery bypass and endoscopic vein harvest from both legs. Surgery was uneventful and patient was brought to the ICU post-operatively. She was extubated on POD#0 and weaned from pressors. Patient was awake and alert, afebrile, and with stable vitals. Insulin drip was discontinued and she was transitioned to a sliding scale. She was started on BB POD#1, then had mild hypotensive with bradycardia requiring back up pacing. Her BB was held and she was monitored in the ICU with pacing. EP was consulted for possible PPM 12/4/2023. Was determined she had resumed SR with HR's 60's and improved blood pressures, therefore no pacemaker recommended.     Ms. Pan was deemed appropriate for  "telemetry transfer on POD# 5, but remained in the ICU due to bed not being available. She has had return of bowel function today with suppository and ongoing bowel regimen, is maintaining oxygen saturations on room air with Cpap at night, had her chest tubes and TPW's removed, and has no complaints of chest pain or shortness of breath. She is on room air during the day and CPAP at night, as prior to admission. On 12/06/23, patient was stable enough to be discharged to home with her parents and outpatient Cardiac Rehab.    Of note, Betty does have ischemic cardiomyopathy with reduced heart failure, LVEF 30-35% per echo 12/3/2023 (same as pre-op). She was seen by Heart Failure and the CORE and restarted on some of HF medications. She will follow up with the HF clinic in 3 weeks.      Vital signs:  Temp: 97.9  F (36.6  C) Temp src: Oral BP: 127/67 Pulse: 77   Resp: 20 SpO2: 94 % O2 Device: Nasal cannula Oxygen Delivery: 2 LPM Height: 149.9 cm (4' 11\") (stated) Weight: 110.1 kg (242 lb 12.8 oz)  Estimated body mass index is 49.04 kg/m  as calculated from the following:    Height as of this encounter: 1.499 m (4' 11\").    Weight as of this encounter: 110.1 kg (242 lb 12.8 oz).    Physical Exam:    Pertinent exam findings on day of discharge include:  Gen: Seen up in chair. NAD. Pleasant and conversant. Working with therapies.   CV: RRR on monitor. No murmur. Mild to moderate hoang LE edema.  Pulm: Non-labored breathing on room air at rest. Lungs are CTA hoang.   Abd: Morbidly obese, soft, non-tender, non-distended  Neuro: CNs grossly intact  Inc: Sternal incision C/D/I, preveena removed today. CT sites clean, trace serous drainage. Has hoang upper mid thigh and ankle staples in place at VG sites.     Diagnostics:   CXR 12/6/2023: IMPRESSION:     Low lung volumes accentuate heart size and pulmonary vascularity  Heart size and pulmonary vascularity upper limits of normal. Mild elevation right hemidiaphragm. Mediastinal clip. " No focal lung infiltrates. Osseous structures grossly intact. Visualized   upper abdomen unremarkable.     Echocardiogram 12/3/2023: Interpretation Summary   1.Left ventricular function is decreased. The ejection fraction is 30-35%  (moderately reduced).  2.There is mild to moderate anterior, septal, and apical wall  hypokinesis.Difficult to say but this might be in distribution of stress  induced cardiomyopathy.  3.Normal right ventricle size and systolic function.  4.No hemodynamically significant valvular abnormalities on 2D or color flow  imaging altho limited study without complete Doppler done.  5.The study was technically difficult.  Compared to the prior study dated 10/12/2023, thev overall LV EF looks  improved, prior echo felt to have a lower EF then reported.    Lab:   Last Comprehensive Metabolic Panel:  Lab Results   Component Value Date     12/06/2023    POTASSIUM 3.6 12/06/2023    CHLORIDE 97 (L) 12/06/2023    CO2 32 (H) 12/06/2023    ANIONGAP 11 12/06/2023     (H) 12/06/2023    BUN 24.6 (H) 12/06/2023    CR 0.78 12/06/2023    GFRESTIMATED >90 12/06/2023    KHADIJAH 9.4 12/06/2023   CBC RESULTS:   Recent Labs   Lab Test 12/06/23  0421   WBC 8.3   RBC 2.86*   HGB 8.3*   HCT 26.5*   MCV 93   MCH 29.0   MCHC 31.3*   RDW 13.8   *       Steph Victoria, DNP, CNP  [facility] Cardiothoracic Surgery

## 2023-12-07 ENCOUNTER — PATIENT OUTREACH (OUTPATIENT)
Dept: CARE COORDINATION | Facility: CLINIC | Age: 48
End: 2023-12-07
Payer: COMMERCIAL

## 2023-12-07 NOTE — PROGRESS NOTES
Clinic Care Coordination Contact  [facility]: Post-Discharge Note  SITUATION                                                      Admission:    Admission Date: 12/01/23   Reason for Admission: Coronary artery disease involving native coronary artery of native heart without angina pectoris  Discharge:   Discharge Date: 12/06/23  Discharge Diagnosis: Coronary artery disease involving native coronary artery of native heart without angina pectoris    BACKGROUND                                                      Per hospital discharge summary and inpatient provider notes:  Betty Pan is a 48 year old female who was admitted to [facility] on 12/1/2023 for a scheduled cardiac surgery. Ms. Pan was seen in outpatient clinic by Dr. Rizvi in consultation for possible CABG. She had recently been hospitalized with acute heart failure with decreased LVEF. She underwent a coronary angiogram which revealed multi vessel CAD. She was initially scheduled for CABG 11/8/2023, but presented with soft tissue breakdown of her buttocks associated with cellulitis. She was seen by ID and sent home with wound care. She now returns for elective CABG.       Patient underwent a 4 vessel coronary artery bypass and endoscopic vein harvest from both legs. Surgery was uneventful and patient was brought to the ICU post-operatively. She was extubated on POD#0 and weaned from pressors. Patient was awake and alert, afebrile, and with stable vitals. Insulin drip was discontinued and she was transitioned to a sliding scale. She was started on BB POD#1, then had mild hypotensive with bradycardia requiring back up pacing. Her BB was held and she was monitored in the ICU with pacing. EP was consulted for possible PPM 12/4/2023. Was determined she had resumed SR with HR's 60's and improved blood pressures, therefore no pacemaker recommended.      Ms. Pan was deemed appropriate for telemetry transfer on POD# 5, but remained in the ICU  "due to bed not being available. She has had return of bowel function today with suppository and ongoing bowel regimen, is maintaining oxygen saturations on room air with Cpap at night, had her chest tubes and TPW's removed, and has no complaints of chest pain or shortness of breath. She is on room air during the day and CPAP at night, as prior to admission. On 12/06/23, patient was stable enough to be discharged to home with her parents and outpatient Cardiac Rehab.     Of note, Betty does have ischemic cardiomyopathy with reduced heart failure, LVEF 30-35% per echo 12/3/2023 (same as pre-op). She was seen by Heart Failure and the [facility] and restarted on some of HF medications. She will follow up with the HF clinic in 3 weeks.    ASSESSMENT           Discharge Assessment  How are you doing now that you are home?: \" Doing Fine\"  How are your symptoms? (Red Flag symptoms escalate to triage hotline per guidelines): Improved  Do you feel your condition is stable enough to be safe at home until your provider visit?: Yes  Does the patient have their discharge instructions? : Yes  Does the patient have questions regarding their discharge instructions? : No  Were you started on any new medications or were there changes to any of your previous medications? : Yes  Does the patient have all of their medications?: Yes  Do you have questions regarding any of your medications? : No  Do you have all of your needed medical supplies or equipment (DME)?  (i.e. oxygen tank, CPAP, cane, etc.): Yes  Discharge follow-up appointment scheduled within 14 calendar days? : Yes  Discharge Follow Up Appointment Date: 12/26/23  Discharge Follow Up Appointment Scheduled with?: Specialty Care Provider    Post-op ([facility] CTA Only)  If the patient had a surgery or procedure, do they have any questions for a nurse?: No             PLAN                                                      Outpatient Plan:  Follow up appointment with Primary Care " "Physician: Cristina Alexsi within 7 days of discharge.  Follow up appointment with Specialist:               Follow with CV Surgery as scheduled. (12/26/2023 at 12:00 pm)              Follow up with the Heart Failure clinic as scheduled. (12/27/2023 at 1:30 pm)              Follow-up with cardiology as scheduled. (2/27/2024 at 3:50 pm)     Diet: Cardiac     Activity/Restrictions: As tolerated with sternal precautions in mind (see below). No driving for 4 weeks or while on pain medication.      - Shower and wash your incisions daily with soap and water. No tub baths/hot tubs for 4 weeks. An antibacterial soap such as Dial or Safeguard is recommended.     - Check your incisions every day. If you notice any redness, drainage, or anything unusual, please call the surgeons office.     - No driving for 4 weeks after surgery or while on pain medication      - Do not lift anything more than 10 pounds for 6 weeks after surgery. After 6 weeks, advance lifting is tolerated.     - You may have watery drainage from your chest tube site for 2-3 weeks after surgery. Your may cover with a Band-Aid to protect your clothing. Remove the Band-Aid every day and wash the site.     - If you have a leg lesion, you may have swelling for 2-3 months. Elevate your leg any time you are not walking. You have staples in your both legs that can be removed in 2 weeks after discharge. Keep areas clean and dry.      - If you feel any \"popping\" or \"clicking\" sensations in your chest, your arms are out too far or you are putting too much weight into arm movements. Do not reach over your head or out to the side to pull something. Do not do any arm exercises or use any exercise equipment that involves arm movement. If you feel your sternum moving, call the surgeon's office.     - Increase your daily activity as explained by Cardiac Rehab. You are encouraged to enroll in an Outpatient Cardiac Rehab Program.     - No active sports using your upper arms for 3 " months. This includes fishing, hunting, bowling, swimming, tennis or golf.     - No physical activity such as cutting the grass, raking, vacuuming, changing sheets on your bed, snow shoveling, or using a  for 3 months.     - Use incentive spirometer 6-8 times per day for 2 weeks.                     Future Appointments   Date Time Provider Department Center   12/12/2023 10:00 AM [facility] 2 [facility]   12/26/2023 12:00 PM JN CVTS KELLEE [facility]   12/27/2023  1:30 PM Sabine Mohr, ANDREW CNP [facility]   2/27/2024  3:50 PM Carlito Nur MD [facility]   3/27/2024  8:30 AM Ancelmo Mullins, DO [facility]         For any urgent concerns, please contact our 24 hour nurse triage line: [phone] ([phone])         Rupali Godfrey MA

## 2023-12-12 ENCOUNTER — HOSPITAL ENCOUNTER (OUTPATIENT)
Dept: CARDIAC REHAB | Facility: HOSPITAL | Age: 48
Discharge: HOME | End: 2023-12-12
Attending: PHYSICIAN ASSISTANT
Payer: COMMERCIAL

## 2023-12-12 DIAGNOSIS — Z95.1 S/P CABG (CORONARY ARTERY BYPASS GRAFT): ICD-10-CM

## 2023-12-12 PROCEDURE — 93797 PHYS/QHP OP CAR RHAB WO ECG: CPT

## 2023-12-12 PROCEDURE — 93798 PHYS/QHP OP CAR RHAB W/ECG: CPT

## 2023-12-19 ENCOUNTER — HOSPITAL ENCOUNTER (OUTPATIENT)
Dept: CARDIAC REHAB | Facility: HOSPITAL | Age: 48
Discharge: HOME | End: 2023-12-19
Attending: INTERNAL MEDICINE
Payer: COMMERCIAL

## 2023-12-19 PROCEDURE — 93798 PHYS/QHP OP CAR RHAB W/ECG: CPT

## 2023-12-21 ENCOUNTER — HOSPITAL ENCOUNTER (OUTPATIENT)
Dept: CARDIAC REHAB | Facility: HOSPITAL | Age: 48
Discharge: HOME | End: 2023-12-21
Attending: INTERNAL MEDICINE
Payer: COMMERCIAL

## 2023-12-21 PROCEDURE — 93798 PHYS/QHP OP CAR RHAB W/ECG: CPT

## 2023-12-21 PROCEDURE — 93797 PHYS/QHP OP CAR RHAB WO ECG: CPT

## 2023-12-26 ENCOUNTER — HOSPITAL ENCOUNTER (OUTPATIENT)
Dept: CARDIAC REHAB | Facility: HOSPITAL | Age: 48
Discharge: HOME | End: 2023-12-26
Attending: INTERNAL MEDICINE
Payer: COMMERCIAL

## 2023-12-26 ENCOUNTER — OFFICE VISIT (OUTPATIENT)
Dept: CARDIOLOGY | Facility: CLINIC | Age: 48
End: 2023-12-26
Payer: COMMERCIAL

## 2023-12-26 VITALS
SYSTOLIC BLOOD PRESSURE: 76 MMHG | HEIGHT: 59 IN | BODY MASS INDEX: 47.7 KG/M2 | HEART RATE: 80 BPM | DIASTOLIC BLOOD PRESSURE: 66 MMHG | WEIGHT: 236.6 LBS | RESPIRATION RATE: 16 BRPM

## 2023-12-26 DIAGNOSIS — T81.31XA POSTOPERATIVE WOUND DEHISCENCE, INITIAL ENCOUNTER: ICD-10-CM

## 2023-12-26 DIAGNOSIS — Z95.1 S/P CABG (CORONARY ARTERY BYPASS GRAFT): Primary | ICD-10-CM

## 2023-12-26 PROCEDURE — 93798 PHYS/QHP OP CAR RHAB W/ECG: CPT

## 2023-12-26 PROCEDURE — 99024 POSTOP FOLLOW-UP VISIT: CPT

## 2023-12-26 RX ORDER — CEPHALEXIN 500 MG/1
500 CAPSULE ORAL 4 TIMES DAILY
Qty: 20 CAPSULE | Refills: 0 | Status: SHIPPED | OUTPATIENT
Start: 2023-12-26 | End: 2024-02-16

## 2023-12-26 NOTE — PROGRESS NOTES
CARDIOTHORACIC SURGERY FOLLOW-UP VISIT     Betty Pan   1975   [id number]      Reason for visit: Post operative clinic visit. Patient underwent s/p CABG X4 (LIMA to LAD, SVG to LAD diagonal branch 1, OM 1 and RPDA) with Dr. Rizvi on December 1, 2023.    HPI: Betty Pan is a 48 year old year old female seen in clinic for a routine follow-up appointment after surgery. Ms Pan was admitted to [facility] on 12/1/2023 for a rescheduled cardiac surgery. She was initially scheduled for CABG 11/8/2023, but presented with soft tissue breakdown of her buttocks associated with cellulitis. She was seen by ID and sent home with wound care. She now has undergone CABG X4 12/1/2023 and is here today for a post-op follow up.      Hospital course was eventful for requiring temporary pacing post op. She was seen by EP and her rhythm gradually improved to sinus rhythm with no pacemaker needed. She has ischemic cardiomyopathy with reduced heart failure, LVEF 30-35% per echo 12/3/2023 (same as pre-op). She was seen by Heart Failure and the CORE clinic, was restarted on HF medications with plans to follow with the HF clinic in 3 weeks, which she has done by telephone and she has seen her primary in clinic.       Patient has been doing well since discharge. Patient did follow-up with primary care since leaving the hospital. Reports that incision is healing well. Denies fevers, peripheral edema. Appetite is improving and patient is voiding without difficulty. Weight has been stable. Pain management at this point without pain meds. Patient has been attending cardiac rehab and this is going well. Patient is not on anti-coagulation.     PAST MEDICAL HISTORY:  Past Medical History:   Diagnosis Date    Aspiration pneumonia of lower lobe, unspecified aspiration pneumonia type, unspecified laterality (H) 10/11/2023    Congestive heart failure (H)     Coronary artery disease     Heart failure with reduced ejection fraction  (H) 10/27/2023    Hyperlipidemia     Hypertension, unspecified type 10/11/2023    Ischemic cardiomyopathy 10/27/2023    Obese     Obesity due to excess calories 10/27/2023    MANINDER (obstructive sleep apnea) 10/27/2023    Pleural effusion 10/11/2023    Sleep apnea        PAST SURGICAL HISTORY:  Past Surgical History:   Procedure Laterality Date    CORONARY ANGIOGRAPHY ADULT ORDER      CORONARY ARTERY BYPASS GRAFT, WITH ENDOSCOPIC VESSEL PROCUREMENT N/A 12/1/2023    Procedure: CORONARY ARTERY BYPASS GRAFT TIMES FOUR ,LEFT INTERNAL MAMMARY ARTERY HARVEST, BILATERAL LOWER LEG ENDOSCOPIC VESSEL PROCUREMENT, EPIAORTIC ULTRASOUND;  Surgeon: Angélica Rizvi MD;  [place] [facility] [place]    CV CORONARY ANGIOGRAM N/A 10/13/2023    Procedure: Coronary Angiogram;  Surgeon: Roxana Melgoza MD;  Location: [facility] CATH LAB CV    CV RIGHT HEART CATH MEASUREMENTS RECORDED N/A 10/13/2023    Procedure: Right Heart Catheterization;  Surgeon: Roxana Melgoza MD;  Location: [facility] CATH LAB CV    TRANSESOPHAGEAL ECHOCARDIOGRAM INTRAOPERATIVE N/A 12/1/2023    Procedure: ANESTHESIA TRANSESOPHAGEAL ECHOCARDIOGRAM;  Surgeon: Angélica Rizvi MD;  [place] [facility] [place]       CURRENT MEDICATIONS:     Current Outpatient Medications:     acetaminophen (TYLENOL) 325 MG tablet, Take 2 tablets (650 mg) by mouth every 6 hours as needed for other or mild pain, Disp: 100 tablet, Rfl: 0    aspirin (ASA) 81 MG chewable tablet, Take 2 tablets (162 mg) by mouth daily, Disp: 180 tablet, Rfl: 3    atorvastatin (LIPITOR) 80 MG tablet, Take 1 tablet (80 mg) by mouth daily, Disp: 30 tablet, Rfl: 1    bumetanide (BUMEX) 2 MG tablet, Take 1 tablet (2 mg) by mouth daily, Disp: 30 tablet, Rfl: 1    cyanocobalamin (VITAMIN B-12) 1000 MCG tablet, Take 1,000 mcg by mouth daily, Disp: , Rfl:     empagliflozin (JARDIANCE) 10 MG TABS tablet, Take 1 tablet (10 mg) by mouth daily, Disp: 90 tablet, Rfl: 1    Ferrous Sulfate 324 (65 Fe) MG TBEC, Take 1  tablet by mouth daily, Disp: , Rfl:     metoprolol succinate ER (TOPROL XL) 25 MG 24 hr tablet, Take 1 tablet (25 mg) by mouth daily, Disp: 60 tablet, Rfl: 1    miconazole (MICATIN) 2 % external powder, Apply topically 2 times daily Apply BID to perianal (gluteal cleft) after cleansing and drying well. Apply to gloved hand, then pat powder onto desired skin, Rub Into Skin To Activate, Disp: 43 g, Rfl: 0    multivitamin, therapeutic (THERA-VIT) TABS tablet, Take 1 tablet by mouth daily, Disp: , Rfl:     oxyCODONE (ROXICODONE) 5 MG tablet, Take 1 tablet (5 mg) by mouth every 4 hours as needed for moderate pain, Disp: 15 tablet, Rfl: 0    pantoprazole (PROTONIX) 40 MG EC tablet, Take 1 tablet (40 mg) by mouth daily, Disp: 21 tablet, Rfl: 0    polyethylene glycol (MIRALAX) 17 GM/Dose powder, Take 17 g by mouth daily as needed for constipation, Disp: 510 g, Rfl: 0    sacubitril-valsartan (ENTRESTO) 24-26 MG per tablet, Take 0.5 tablets by mouth 2 times daily, Disp: , Rfl:     senna-docusate (SENOKOT-S/PERICOLACE) 8.6-50 MG tablet, Take 1-2 tablets by mouth 2 times daily as needed for constipation, Disp: 120 tablet, Rfl: 0    spironolactone (ALDACTONE) 25 MG tablet, Take 1 tablet (25 mg) by mouth daily, Disp: 30 tablet, Rfl: 3    ALLERGIES:    No Known Allergies    ROS:  Gen: No fevers, weight loss/gain  CV: Denies chest pain, peripheral edema  Pulm: Denies SOB  GI/: Voiding without problems, appetite improving.     LABS:  Last Comprehensive Metabolic Panel:  Lab Results   Component Value Date     12/06/2023    POTASSIUM 3.6 12/06/2023    CHLORIDE 97 (L) 12/06/2023    CO2 32 (H) 12/06/2023    ANIONGAP 11 12/06/2023     (H) 12/06/2023    BUN 24.6 (H) 12/06/2023    CR 0.78 12/06/2023    GFRESTIMATED >90 12/06/2023    KHADIJAH 9.4 12/06/2023     CBC RESULTS:   Recent Labs   Lab Test 12/06/23  0421   WBC 8.3   RBC 2.86*   HGB 8.3*   HCT 26.5*   MCV 93   MCH 29.0   MCHC 31.3*   RDW 13.8   *     "    IMAGING:  None    PHYSICAL EXAM:   BP (!) 76/66 (BP Location: Left arm, Patient Position: Sitting, Cuff Size: Adult Large)   Pulse 80   Resp 16   Ht 1.499 m (4' 11\")   Wt 107.3 kg (236 lb 9.6 oz)   BMI 47.79 kg/m    General: Alert and oriented, pleasant, no acute distress.  CV:  No peripheral edema.  Pulm: Easy work of breathing on room air.   GI: Soft, non-tender, and non-distended  Incision: Chest and leg incisions clean dry and intact without erythema, swelling or drainage. Staples removed from hoang graft sites, upper thighs and ankles.   Sternal incision clean and dry for the most part, small are in the distal portion open without drainage or redness.    Neuro: CNs grossly intact.      ASSESSMENT/PLAN:  Betty Pan is a 48 year old year old female with known CAD and ICMP who is status post CABG X4 (LIMA to LAD, SVG to LAD diagonal branch 1, OM 1 and RPDA) 12/1/2023 with Dr. Rizvi.   who returns to clinic for postop visit.     - Surgically doing well.  Incisions are healing well with no signs of infection, but distal sternal incision does have a small open area with some fibrous tissue noted, no redness, swelling or drainage. Sternum is stable.   - Hemodynamics are stable. Blood pressure reported to be low, but patient completely asymptomatic. She just came from Rehab with no low blood pressures. No medication changes were needed today.  - Follow up with your cardiologist as scheduled tomorrow   - Start Keflex 500 mg QID for five days, keep sternal incision clean and dry.  - Continue Cardiac Rehab until completed.   - May start driving 4 weeks post-op, next week.   - Continue strict sternal precautions until three months out from surgery. No lifting >10bs; may gradually increase at this point (6 weeks post-op).   - No need for further follow-up with CV surgery unless concerns, such as sternal wound having ongoing opening with addition of drainage, redness, or having fevers. Feel free to call our " office with questions. [phone]         Steph Victoria, JAX, CNP  Cardiothoracic Surgery  Pgr [phone]

## 2023-12-26 NOTE — PATIENT INSTRUCTIONS
After today's visit the plan is:   Continue all current medications.   Follow up with cardiology tomorrow as scheduled.   Start Keflex 500 mg QID for 5 days for open portion of sternum incision and keep site clean and dry.   Continue Cardiac Rehab until completed.   May start driving 4 weeks post-op, next week.   Continue strict sternal precautions until three months out from surgery. No lifting >10bs; may gradually increase at this point (6 weeks post-op).   No need for further follow-up with CV surgery unless concerns, such as sternal wound having ongoing opening with addition of drainage, redness, or having fevers. Feel free to call our office with questions. [phone]

## 2023-12-27 ENCOUNTER — OFFICE VISIT (OUTPATIENT)
Dept: CARDIOLOGY | Facility: CLINIC | Age: 48
End: 2023-12-27
Payer: COMMERCIAL

## 2023-12-27 VITALS
HEIGHT: 59 IN | RESPIRATION RATE: 16 BRPM | SYSTOLIC BLOOD PRESSURE: 86 MMHG | BODY MASS INDEX: 46.57 KG/M2 | WEIGHT: 231 LBS | HEART RATE: 68 BPM | DIASTOLIC BLOOD PRESSURE: 56 MMHG

## 2023-12-27 DIAGNOSIS — I10 HYPERTENSION, UNSPECIFIED TYPE: ICD-10-CM

## 2023-12-27 DIAGNOSIS — E66.813 CLASS 3 SEVERE OBESITY DUE TO EXCESS CALORIES WITH BODY MASS INDEX (BMI) OF 45.0 TO 49.9 IN ADULT, UNSPECIFIED WHETHER SERIOUS COMORBIDITY PRESENT (H): ICD-10-CM

## 2023-12-27 DIAGNOSIS — I50.20 HEART FAILURE WITH REDUCED EJECTION FRACTION (H): Primary | ICD-10-CM

## 2023-12-27 DIAGNOSIS — I50.21 ACUTE SYSTOLIC CONGESTIVE HEART FAILURE (H): ICD-10-CM

## 2023-12-27 DIAGNOSIS — Z95.1 S/P CABG (CORONARY ARTERY BYPASS GRAFT): ICD-10-CM

## 2023-12-27 DIAGNOSIS — I25.10 CORONARY ARTERY DISEASE INVOLVING NATIVE CORONARY ARTERY OF NATIVE HEART WITHOUT ANGINA PECTORIS: ICD-10-CM

## 2023-12-27 DIAGNOSIS — E66.01 CLASS 3 SEVERE OBESITY DUE TO EXCESS CALORIES WITH BODY MASS INDEX (BMI) OF 45.0 TO 49.9 IN ADULT, UNSPECIFIED WHETHER SERIOUS COMORBIDITY PRESENT (H): ICD-10-CM

## 2023-12-27 DIAGNOSIS — G47.33 OSA (OBSTRUCTIVE SLEEP APNEA): ICD-10-CM

## 2023-12-27 DIAGNOSIS — I25.5 ISCHEMIC CARDIOMYOPATHY: ICD-10-CM

## 2023-12-27 PROCEDURE — 99214 OFFICE O/P EST MOD 30 MIN: CPT | Performed by: NURSE PRACTITIONER

## 2023-12-27 RX ORDER — SACUBITRIL AND VALSARTAN 24; 26 MG/1; MG/1
0.5 TABLET, FILM COATED ORAL 2 TIMES DAILY
Qty: 90 TABLET | Refills: 1 | Status: SHIPPED | OUTPATIENT
Start: 2023-12-27 | End: 2024-01-31

## 2023-12-27 RX ORDER — FUROSEMIDE 40 MG
40 TABLET ORAL DAILY
COMMUNITY
Start: 2023-12-12 | End: 2023-12-27

## 2023-12-27 RX ORDER — BUMETANIDE 2 MG/1
2 TABLET ORAL DAILY
Qty: 90 TABLET | Refills: 1 | Status: SHIPPED | OUTPATIENT
Start: 2023-12-27 | End: 2024-05-16

## 2023-12-27 RX ORDER — SPIRONOLACTONE 25 MG/1
25 TABLET ORAL DAILY
Qty: 90 TABLET | Refills: 3 | Status: SHIPPED | OUTPATIENT
Start: 2023-12-27

## 2023-12-27 RX ORDER — ATORVASTATIN CALCIUM 80 MG/1
80 TABLET, FILM COATED ORAL DAILY
Qty: 90 TABLET | Refills: 1 | Status: SHIPPED | OUTPATIENT
Start: 2023-12-27 | End: 2024-05-16

## 2023-12-27 RX ORDER — METOPROLOL SUCCINATE 25 MG/1
25 TABLET, EXTENDED RELEASE ORAL DAILY
Qty: 90 TABLET | Refills: 1 | Status: SHIPPED | OUTPATIENT
Start: 2023-12-27

## 2023-12-27 NOTE — PROGRESS NOTES
Assessment/Recommendations   Assessment:    1. Ischemic cardiomyopathy, heart failure with reduced ejection fraction, ejection fraction 30-35%, NYHA class II: Compensated.  She states she is feeling well.  She has fatigue and mild dyspnea exertion.  Her weight has been stable.  She is trying to follow a low-sodium diet.  2.  CAD: Denies angina.  Status post four-vessel CABG on December 1.  She continues atorvastatin and aspirin.  She is participating in cardiac rehab.  3.  Obesity: BMI 46.66  4.  MANINDER: Scheduled with sleep medicine March 27.  She was diagnosed with sleep apnea approximately 10 years ago and does not currently wear a CPAP  5.  Hypertension: Blood pressure 86/56.  Denies any lightheadedness or dizziness.  Her blood pressure at cardiac rehab has been stable    Plan:  1.   Heart failure medications:  - Beta blocker therapy with metoprolol succinate 25 mg daily  - ARNI therapy with Entresto half a tablet of 24 to 26 mg twice a day  - SGLT2 inhibitor with Jardiance 10 mg daily  - Vasodilator therapy not started  - Aldosterone antagonist with spironolactone 25 mg daily  - Diuretic therapy with bumetanide 2 mg daily  2.  Continue current medications  3.  Continue monitoring weights and following a low-sodium diet  4.  Continue regular exercise    Betty Pan will follow up with Dr. Nur February 27 and in the heart failure clinic in 1 month.     History of Present Illness/Subjective    Ms. Betty Pan is a 48 year old female seen at [facility] heart failure clinic today for continued follow-up.  She follows up for heart failure with reduced ejection fraction, ischemic cardiomyopathy.  She was recently hospitalized early December and underwent four-vessel CABG on December 1 with Dr. Rizvi.  She had a limited echocardiogram postsurgery which showed an ejection fraction of 30 to 35%.  She has a past medical history significant for hypertension, prediabetes, morbid obesity, MANNIDER not on  "CPAP, CAD with four-vessel CABG, hyperlipidemia.    Today, she has mild fatigue and dyspnea on exertion.  She denies lightheadedness, shortness of breath, orthopnea, PND, palpitations, chest pain, abdominal fullness/bloating, and lower extremity edema.      She is monitoring home weights which are stable around 231 pounds.  She is following a low sodium diet.  She participates in regular physical activity including cardiac rehab.        LIMITED ECHOCARDIOGRAM: 12/3/2023-reviewed  Interpretation Summary     1.Left ventricular function is decreased. The ejection fraction is 30-35%  (moderately reduced).  2.There is mild to moderate anterior, septal, and apical wall  hypokinesis.Difficult to say but this might be in distribution of stress  induced cardiomyopathy.  3.Normal right ventricle size and systolic function.  4.No hemodynamically significant valvular abnormalities on 2D or color flow  imaging altho limited study without complete Doppler done.  5.The study was technically difficult.  Compared to the prior study dated 10/12/2023, thev overall LV EF looks  improved, prior echo felt to have a lower EF then reported.     Physical Examination Review of Systems   BP (!) 86/56 (BP Location: Left arm, Patient Position: Sitting, Cuff Size: Adult Large)   Pulse 68   Resp 16   Ht 1.499 m (4' 11\")   Wt 104.8 kg (231 lb)   BMI 46.66 kg/m    Body mass index is 46.66 kg/m .  Wt Readings from Last 3 Encounters:   12/27/23 104.8 kg (231 lb)   12/26/23 107.3 kg (236 lb 9.6 oz)   12/06/23 110.1 kg (242 lb 12.8 oz)       General Appearance:   no acute distress   ENT/Mouth: No abnormalities   EYES:  no scleral icterus, normal conjunctivae   Neck: no thyromegaly   Chest/Lungs:   lungs are clear to auscultation, no rales or wheezing, equal chest wall expansion    Cardiovascular:   Regular. Normal first and second heart sounds with no murmurs, rubs, or gallops, trace edema bilaterally    Abdomen:  bowel sounds are present "   Extremities: no cyanosis or clubbing   Skin: warm   Neurologic: no tremors     Psychiatric: alert and oriented x3                                              Medical History  Surgical History Family History Social History   Past Medical History:   Diagnosis Date    Aspiration pneumonia of lower lobe, unspecified aspiration pneumonia type, unspecified laterality (H) 10/11/2023    Congestive heart failure (H)     Coronary artery disease     Heart failure with reduced ejection fraction (H) 10/27/2023    Hyperlipidemia     Hypertension, unspecified type 10/11/2023    Ischemic cardiomyopathy 10/27/2023    Obese     Obesity due to excess calories 10/27/2023    MANINDER (obstructive sleep apnea) 10/27/2023    Pleural effusion 10/11/2023    Sleep apnea     Past Surgical History:   Procedure Laterality Date    CORONARY ANGIOGRAPHY ADULT ORDER      CORONARY ARTERY BYPASS      CORONARY ARTERY BYPASS GRAFT, WITH ENDOSCOPIC VESSEL PROCUREMENT N/A 12/01/2023    Procedure: CORONARY ARTERY BYPASS GRAFT TIMES FOUR ,LEFT INTERNAL MAMMARY ARTERY HARVEST, BILATERAL LOWER LEG ENDOSCOPIC VESSEL PROCUREMENT, EPIAORTIC ULTRASOUND;  Surgeon: Angélica Rizvi MD;  Location: [facility] [place]     CORONARY ANGIOGRAM N/A 10/13/2023    Procedure: Coronary Angiogram;  Surgeon: Roxana Melgoza MD;  Location: [facility] CATH LAB CV    CV RIGHT HEART CATH MEASUREMENTS RECORDED N/A 10/13/2023    Procedure: Right Heart Catheterization;  Surgeon: Roxana Melgoza MD;  Location: [facility] LAB CV    TRANSESOPHAGEAL ECHOCARDIOGRAM INTRAOPERATIVE N/A 12/01/2023    Procedure: ANESTHESIA TRANSESOPHAGEAL ECHOCARDIOGRAM;  Surgeon: Angélica Rizvi MD;  Location: [facility] [place]    No family history on file. Social History     Socioeconomic History    Marital status: Single     Spouse name: Not on file    Number of children: Not on file    Years of education: Not on file    Highest education level: Not on file   Occupational History    Not on file    Tobacco Use    Smoking status: Never    Smokeless tobacco: Never   Vaping Use    Vaping Use: Never used   Substance and Sexual Activity    Alcohol use: Never    Drug use: Never    Sexual activity: Not on file   Other Topics Concern    Not on file   Social History Narrative    Not on file     Social Determinants of Health     Financial Resource Strain: Not on file   Food Insecurity: Not on file   Transportation Needs: Not on file   Physical Activity: Not on file   Stress: Not on file   Social Connections: Not on file   Interpersonal Safety: Not on file   Housing Stability: Not on file          Medications  Allergies   Current Outpatient Medications   Medication Sig Dispense Refill    acetaminophen (TYLENOL) 325 MG tablet Take 2 tablets (650 mg) by mouth every 6 hours as needed for other or mild pain 100 tablet 0    aspirin (ASA) 81 MG chewable tablet Take 2 tablets (162 mg) by mouth daily 180 tablet 3    atorvastatin (LIPITOR) 80 MG tablet Take 1 tablet (80 mg) by mouth daily 90 tablet 1    bumetanide (BUMEX) 2 MG tablet Take 1 tablet (2 mg) by mouth daily 90 tablet 1    cephALEXin (KEFLEX) 500 MG capsule Take 1 capsule (500 mg) by mouth 4 times daily 20 capsule 0    cyanocobalamin (VITAMIN B-12) 1000 MCG tablet Take 1,000 mcg by mouth daily      empagliflozin (JARDIANCE) 10 MG TABS tablet Take 1 tablet (10 mg) by mouth daily 90 tablet 1    Ferrous Sulfate 324 (65 Fe) MG TBEC Take 1 tablet by mouth daily      metoprolol succinate ER (TOPROL XL) 25 MG 24 hr tablet Take 1 tablet (25 mg) by mouth daily 90 tablet 1    miconazole (MICATIN) 2 % external powder Apply topically 2 times daily Apply BID to perianal (gluteal cleft) after cleansing and drying well. Apply to gloved hand, then pat powder onto desired skin, Rub Into Skin To Activate 43 g 0    multivitamin, therapeutic (THERA-VIT) TABS tablet Take 1 tablet by mouth daily      oxyCODONE (ROXICODONE) 5 MG tablet Take 1 tablet (5 mg) by mouth every 4 hours as needed  for moderate pain 15 tablet 0    pantoprazole (PROTONIX) 40 MG EC tablet Take 1 tablet (40 mg) by mouth daily 21 tablet 0    polyethylene glycol (MIRALAX) 17 GM/Dose powder Take 17 g by mouth daily as needed for constipation 510 g 0    sacubitril-valsartan (ENTRESTO) 24-26 MG per tablet Take 0.5 tablets by mouth 2 times daily 90 tablet 1    senna-docusate (SENOKOT-S/PERICOLACE) 8.6-50 MG tablet Take 1-2 tablets by mouth 2 times daily as needed for constipation 120 tablet 0    spironolactone (ALDACTONE) 25 MG tablet Take 1 tablet (25 mg) by mouth daily 90 tablet 3    No Known Allergies      Lab Results    Chemistry/lipid CBC Cardiac Enzymes/BNP/TSH/INR   Lab Results   Component Value Date    BUN 24.6 (H) 12/06/2023     12/06/2023    CO2 32 (H) 12/06/2023    Lab Results   Component Value Date    WBC 8.3 12/06/2023    HGB 8.3 (L) 12/06/2023    HCT 26.5 (L) 12/06/2023    MCV 93 12/06/2023     (L) 12/06/2023    Lab Results   Component Value Date    TSH 1.30 10/10/2023    INR 1.21 (H) 12/05/2023             This note has been dictated using voice recognition software. Any grammatical, typographical, or context distortions are unintentional and inherent to the software    30 minutes spent on the date of encounter doing chart review, review of outside records, review of test results, interpretation with above tests, patient visit, and documentation.

## 2023-12-27 NOTE — PATIENT INSTRUCTIONS
Betty Pan,    It was a pleasure to see you today at [facility] HEART [facility].     My recommendations after this visit include:  - Please follow up with Dr Nur February 27   - Please follow up with Sabine Mohr in 1 month  - Sleep medicine scheduled March 27  - Continue current medications    Sabine Mohr, CNP

## 2023-12-27 NOTE — LETTER
12/27/2023    Cristina Alexis MD  [address] [facility] [address]    RE: Betty Pan       Dear Colleague,     I had the pleasure of seeing Betty Pan in the [facility].        Assessment/Recommendations   Assessment:    1. Ischemic cardiomyopathy, heart failure with reduced ejection fraction, ejection fraction 30-35%, NYHA class II: Compensated.  She states she is feeling well.  She has fatigue and mild dyspnea exertion.  Her weight has been stable.  She is trying to follow a low-sodium diet.  2.  CAD: Denies angina.  Status post four-vessel CABG on December 1.  She continues atorvastatin and aspirin.  She is participating in cardiac rehab.  3.  Obesity: BMI 46.66  4.  MANINDER: Scheduled with sleep medicine March 27.  She was diagnosed with sleep apnea approximately 10 years ago and does not currently wear a CPAP  5.  Hypertension: Blood pressure 86/56.  Denies any lightheadedness or dizziness.  Her blood pressure at cardiac rehab has been stable    Plan:  1.   Heart failure medications:  - Beta blocker therapy with metoprolol succinate 25 mg daily  - ARNI therapy with Entresto half a tablet of 24 to 26 mg twice a day  - SGLT2 inhibitor with Jardiance 10 mg daily  - Vasodilator therapy not started  - Aldosterone antagonist with spironolactone 25 mg daily  - Diuretic therapy with bumetanide 2 mg daily  2.  Continue current medications  3.  Continue monitoring weights and following a low-sodium diet  4.  Continue regular exercise    Betty Pan will follow up with Dr. Nur February 27 and in the heart failure clinic in 1 month.     History of Present Illness/Subjective    Ms. Betty Pan is a 48 year old female seen at [facility] heart failure clinic today for continued follow-up.  She follows up for heart failure with reduced ejection fraction, ischemic cardiomyopathy.  She was recently hospitalized early December and underwent four-vessel CABG on December 1 with   "Dmitri.  She had a limited echocardiogram postsurgery which showed an ejection fraction of 30 to 35%.  She has a past medical history significant for hypertension, prediabetes, morbid obesity, MANINDER not on CPAP, CAD with four-vessel CABG, hyperlipidemia.    Today, she has mild fatigue and dyspnea on exertion.  She denies lightheadedness, shortness of breath, orthopnea, PND, palpitations, chest pain, abdominal fullness/bloating, and lower extremity edema.      She is monitoring home weights which are stable around 231 pounds.  She is following a low sodium diet.  She participates in regular physical activity including cardiac rehab.        LIMITED ECHOCARDIOGRAM: 12/3/2023-reviewed  Interpretation Summary     1.Left ventricular function is decreased. The ejection fraction is 30-35%  (moderately reduced).  2.There is mild to moderate anterior, septal, and apical wall  hypokinesis.Difficult to say but this might be in distribution of stress  induced cardiomyopathy.  3.Normal right ventricle size and systolic function.  4.No hemodynamically significant valvular abnormalities on 2D or color flow  imaging altho limited study without complete Doppler done.  5.The study was technically difficult.  Compared to the prior study dated 10/12/2023, thev overall LV EF looks  improved, prior echo felt to have a lower EF then reported.     Physical Examination Review of Systems   BP (!) 86/56 (BP Location: Left arm, Patient Position: Sitting, Cuff Size: Adult Large)   Pulse 68   Resp 16   Ht 1.499 m (4' 11\")   Wt 104.8 kg (231 lb)   BMI 46.66 kg/m    Body mass index is 46.66 kg/m .  Wt Readings from Last 3 Encounters:   12/27/23 104.8 kg (231 lb)   12/26/23 107.3 kg (236 lb 9.6 oz)   12/06/23 110.1 kg (242 lb 12.8 oz)       General Appearance:   no acute distress   ENT/Mouth: No abnormalities   EYES:  no scleral icterus, normal conjunctivae   Neck: no thyromegaly   Chest/Lungs:   lungs are clear to auscultation, no rales or " wheezing, equal chest wall expansion    Cardiovascular:   Regular. Normal first and second heart sounds with no murmurs, rubs, or gallops, trace edema bilaterally    Abdomen:  bowel sounds are present   Extremities: no cyanosis or clubbing   Skin: warm   Neurologic: no tremors     Psychiatric: alert and oriented x3                                              Medical History  Surgical History Family History Social History   Past Medical History:   Diagnosis Date    Aspiration pneumonia of lower lobe, unspecified aspiration pneumonia type, unspecified laterality (H) 10/11/2023    Congestive heart failure (H)     Coronary artery disease     Heart failure with reduced ejection fraction (H) 10/27/2023    Hyperlipidemia     Hypertension, unspecified type 10/11/2023    Ischemic cardiomyopathy 10/27/2023    Obese     Obesity due to excess calories 10/27/2023    MANINDER (obstructive sleep apnea) 10/27/2023    Pleural effusion 10/11/2023    Sleep apnea     Past Surgical History:   Procedure Laterality Date    CORONARY ANGIOGRAPHY ADULT ORDER      CORONARY ARTERY BYPASS      CORONARY ARTERY BYPASS GRAFT, WITH ENDOSCOPIC VESSEL PROCUREMENT N/A 12/01/2023    Procedure: CORONARY ARTERY BYPASS GRAFT TIMES FOUR ,LEFT INTERNAL MAMMARY ARTERY HARVEST, BILATERAL LOWER LEG ENDOSCOPIC VESSEL PROCUREMENT, EPIAORTIC ULTRASOUND;  Surgeon: Angélica Rizvi MD;  Location: [facility] OR     CORONARY ANGIOGRAM N/A 10/13/2023    Procedure: Coronary Angiogram;  Surgeon: Roxana Melgoza MD;  Location: [facility] CATH LAB     CV RIGHT HEART CATH MEASUREMENTS RECORDED N/A 10/13/2023    Procedure: Right Heart Catheterization;  Surgeon: Roxana Melgoza MD;  Location: [facility]    TRANSESOPHAGEAL ECHOCARDIOGRAM INTRAOPERATIVE N/A 12/01/2023    Procedure: ANESTHESIA TRANSESOPHAGEAL ECHOCARDIOGRAM;  Surgeon: Angélica Rizvi MD;  Location: [facility] OR    No family history on file. Social History     Socioeconomic History     Marital status: Single     Spouse name: Not on file    Number of children: Not on file    Years of education: Not on file    Highest education level: Not on file   Occupational History    Not on file   Tobacco Use    Smoking status: Never    Smokeless tobacco: Never   Vaping Use    Vaping Use: Never used   Substance and Sexual Activity    Alcohol use: Never    Drug use: Never    Sexual activity: Not on file   Other Topics Concern    Not on file   Social History Narrative    Not on file     Social Determinants of Health     Financial Resource Strain: Not on file   Food Insecurity: Not on file   Transportation Needs: Not on file   Physical Activity: Not on file   Stress: Not on file   Social Connections: Not on file   Interpersonal Safety: Not on file   Housing Stability: Not on file          Medications  Allergies   Current Outpatient Medications   Medication Sig Dispense Refill    acetaminophen (TYLENOL) 325 MG tablet Take 2 tablets (650 mg) by mouth every 6 hours as needed for other or mild pain 100 tablet 0    aspirin (ASA) 81 MG chewable tablet Take 2 tablets (162 mg) by mouth daily 180 tablet 3    atorvastatin (LIPITOR) 80 MG tablet Take 1 tablet (80 mg) by mouth daily 90 tablet 1    bumetanide (BUMEX) 2 MG tablet Take 1 tablet (2 mg) by mouth daily 90 tablet 1    cephALEXin (KEFLEX) 500 MG capsule Take 1 capsule (500 mg) by mouth 4 times daily 20 capsule 0    cyanocobalamin (VITAMIN B-12) 1000 MCG tablet Take 1,000 mcg by mouth daily      empagliflozin (JARDIANCE) 10 MG TABS tablet Take 1 tablet (10 mg) by mouth daily 90 tablet 1    Ferrous Sulfate 324 (65 Fe) MG TBEC Take 1 tablet by mouth daily      metoprolol succinate ER (TOPROL XL) 25 MG 24 hr tablet Take 1 tablet (25 mg) by mouth daily 90 tablet 1    miconazole (MICATIN) 2 % external powder Apply topically 2 times daily Apply BID to perianal (gluteal cleft) after cleansing and drying well. Apply to gloved hand, then pat powder onto desired skin, Rub Into  Skin To Activate 43 g 0    multivitamin, therapeutic (THERA-VIT) TABS tablet Take 1 tablet by mouth daily      oxyCODONE (ROXICODONE) 5 MG tablet Take 1 tablet (5 mg) by mouth every 4 hours as needed for moderate pain 15 tablet 0    pantoprazole (PROTONIX) 40 MG EC tablet Take 1 tablet (40 mg) by mouth daily 21 tablet 0    polyethylene glycol (MIRALAX) 17 GM/Dose powder Take 17 g by mouth daily as needed for constipation 510 g 0    sacubitril-valsartan (ENTRESTO) 24-26 MG per tablet Take 0.5 tablets by mouth 2 times daily 90 tablet 1    senna-docusate (SENOKOT-S/PERICOLACE) 8.6-50 MG tablet Take 1-2 tablets by mouth 2 times daily as needed for constipation 120 tablet 0    spironolactone (ALDACTONE) 25 MG tablet Take 1 tablet (25 mg) by mouth daily 90 tablet 3    No Known Allergies      Lab Results    Chemistry/lipid CBC Cardiac Enzymes/BNP/TSH/INR   Lab Results   Component Value Date    BUN 24.6 (H) 12/06/2023     12/06/2023    CO2 32 (H) 12/06/2023    Lab Results   Component Value Date    WBC 8.3 12/06/2023    HGB 8.3 (L) 12/06/2023    HCT 26.5 (L) 12/06/2023    MCV 93 12/06/2023     (L) 12/06/2023    Lab Results   Component Value Date    TSH 1.30 10/10/2023    INR 1.21 (H) 12/05/2023             This note has been dictated using voice recognition software. Any grammatical, typographical, or context distortions are unintentional and inherent to the software    30 minutes spent on the date of encounter doing chart review, review of outside records, review of test results, interpretation with above tests, patient visit, and documentation.                Thank you for allowing me to participate in the care of your patient.      Sincerely,     ANDREW Florez [facility]  cc:   Yenny Crespo MD  [address] [facility] [address]  [place] [address]

## 2024-01-02 ENCOUNTER — HOSPITAL ENCOUNTER (OUTPATIENT)
Dept: CARDIAC REHAB | Facility: HOSPITAL | Age: 49
Discharge: HOME | End: 2024-01-02
Attending: INTERNAL MEDICINE
Payer: COMMERCIAL

## 2024-01-02 PROCEDURE — 93798 PHYS/QHP OP CAR RHAB W/ECG: CPT

## 2024-01-04 ENCOUNTER — HOSPITAL ENCOUNTER (OUTPATIENT)
Dept: CARDIAC REHAB | Facility: HOSPITAL | Age: 49
Discharge: HOME | End: 2024-01-04
Attending: INTERNAL MEDICINE
Payer: COMMERCIAL

## 2024-01-04 PROCEDURE — 93798 PHYS/QHP OP CAR RHAB W/ECG: CPT

## 2024-01-09 ENCOUNTER — HOSPITAL ENCOUNTER (OUTPATIENT)
Dept: CARDIAC REHAB | Facility: HOSPITAL | Age: 49
Discharge: HOME | End: 2024-01-09
Attending: INTERNAL MEDICINE
Payer: COMMERCIAL

## 2024-01-09 PROCEDURE — 93798 PHYS/QHP OP CAR RHAB W/ECG: CPT

## 2024-01-11 ENCOUNTER — HOSPITAL ENCOUNTER (OUTPATIENT)
Dept: CARDIAC REHAB | Facility: HOSPITAL | Age: 49
Discharge: HOME | End: 2024-01-11
Attending: INTERNAL MEDICINE
Payer: COMMERCIAL

## 2024-01-11 PROCEDURE — 93798 PHYS/QHP OP CAR RHAB W/ECG: CPT

## 2024-01-16 ENCOUNTER — HOSPITAL ENCOUNTER (OUTPATIENT)
Dept: CARDIAC REHAB | Facility: HOSPITAL | Age: 49
Discharge: HOME | End: 2024-01-16
Attending: INTERNAL MEDICINE
Payer: COMMERCIAL

## 2024-01-16 PROCEDURE — 93798 PHYS/QHP OP CAR RHAB W/ECG: CPT

## 2024-01-18 ENCOUNTER — HOSPITAL ENCOUNTER (OUTPATIENT)
Dept: CARDIAC REHAB | Facility: HOSPITAL | Age: 49
Discharge: HOME | End: 2024-01-18
Attending: INTERNAL MEDICINE
Payer: COMMERCIAL

## 2024-01-18 PROCEDURE — 93798 PHYS/QHP OP CAR RHAB W/ECG: CPT

## 2024-01-23 ENCOUNTER — HOSPITAL ENCOUNTER (OUTPATIENT)
Dept: CARDIAC REHAB | Facility: HOSPITAL | Age: 49
Discharge: HOME | End: 2024-01-23
Attending: INTERNAL MEDICINE
Payer: COMMERCIAL

## 2024-01-23 PROCEDURE — 93798 PHYS/QHP OP CAR RHAB W/ECG: CPT

## 2024-01-25 ENCOUNTER — HOSPITAL ENCOUNTER (OUTPATIENT)
Dept: CARDIAC REHAB | Facility: HOSPITAL | Age: 49
Discharge: HOME | End: 2024-01-25
Attending: INTERNAL MEDICINE
Payer: COMMERCIAL

## 2024-01-25 PROCEDURE — 93798 PHYS/QHP OP CAR RHAB W/ECG: CPT

## 2024-01-30 ENCOUNTER — HOSPITAL ENCOUNTER (OUTPATIENT)
Dept: CARDIAC REHAB | Facility: HOSPITAL | Age: 49
Discharge: HOME | End: 2024-01-30
Attending: INTERNAL MEDICINE
Payer: COMMERCIAL

## 2024-01-30 PROCEDURE — 93798 PHYS/QHP OP CAR RHAB W/ECG: CPT

## 2024-01-31 ENCOUNTER — OFFICE VISIT (OUTPATIENT)
Dept: CARDIOLOGY | Facility: CLINIC | Age: 49
End: 2024-01-31
Payer: COMMERCIAL

## 2024-01-31 VITALS
DIASTOLIC BLOOD PRESSURE: 78 MMHG | SYSTOLIC BLOOD PRESSURE: 124 MMHG | BODY MASS INDEX: 48.88 KG/M2 | RESPIRATION RATE: 16 BRPM | WEIGHT: 242 LBS | HEART RATE: 64 BPM | OXYGEN SATURATION: 97 %

## 2024-01-31 DIAGNOSIS — I25.10 CORONARY ARTERY DISEASE INVOLVING NATIVE CORONARY ARTERY OF NATIVE HEART WITHOUT ANGINA PECTORIS: ICD-10-CM

## 2024-01-31 DIAGNOSIS — G47.33 OSA (OBSTRUCTIVE SLEEP APNEA): ICD-10-CM

## 2024-01-31 DIAGNOSIS — E66.813 CLASS 3 SEVERE OBESITY DUE TO EXCESS CALORIES WITH BODY MASS INDEX (BMI) OF 45.0 TO 49.9 IN ADULT, UNSPECIFIED WHETHER SERIOUS COMORBIDITY PRESENT (H): ICD-10-CM

## 2024-01-31 DIAGNOSIS — E66.01 CLASS 3 SEVERE OBESITY DUE TO EXCESS CALORIES WITH BODY MASS INDEX (BMI) OF 45.0 TO 49.9 IN ADULT, UNSPECIFIED WHETHER SERIOUS COMORBIDITY PRESENT (H): ICD-10-CM

## 2024-01-31 DIAGNOSIS — I50.20 HEART FAILURE WITH REDUCED EJECTION FRACTION (H): Primary | ICD-10-CM

## 2024-01-31 DIAGNOSIS — Z95.1 S/P CABG (CORONARY ARTERY BYPASS GRAFT): ICD-10-CM

## 2024-01-31 DIAGNOSIS — I10 HYPERTENSION, UNSPECIFIED TYPE: ICD-10-CM

## 2024-01-31 DIAGNOSIS — I25.5 ISCHEMIC CARDIOMYOPATHY: ICD-10-CM

## 2024-01-31 LAB
ANION GAP SERPL CALCULATED.3IONS-SCNC: 9 MMOL/L (ref 7–15)
BUN SERPL-MCNC: 22.7 MG/DL (ref 6–20)
CALCIUM SERPL-MCNC: 9.6 MG/DL (ref 8.6–10)
CHLORIDE SERPL-SCNC: 103 MMOL/L (ref 98–107)
CREAT SERPL-MCNC: 0.89 MG/DL (ref 0.51–0.95)
DEPRECATED HCO3 PLAS-SCNC: 31 MMOL/L (ref 22–29)
EGFRCR SERPLBLD CKD-EPI 2021: 79 ML/MIN/1.73M2
GLUCOSE SERPL-MCNC: 124 MG/DL (ref 70–99)
POTASSIUM SERPL-SCNC: 3.7 MMOL/L (ref 3.4–5.3)
SODIUM SERPL-SCNC: 143 MMOL/L (ref 135–145)

## 2024-01-31 PROCEDURE — 99214 OFFICE O/P EST MOD 30 MIN: CPT | Performed by: NURSE PRACTITIONER

## 2024-01-31 PROCEDURE — 36415 COLL VENOUS BLD VENIPUNCTURE: CPT | Performed by: NURSE PRACTITIONER

## 2024-01-31 PROCEDURE — G2211 COMPLEX E/M VISIT ADD ON: HCPCS | Performed by: NURSE PRACTITIONER

## 2024-01-31 PROCEDURE — 80048 BASIC METABOLIC PNL TOTAL CA: CPT | Performed by: NURSE PRACTITIONER

## 2024-01-31 RX ORDER — SACUBITRIL AND VALSARTAN 24; 26 MG/1; MG/1
1 TABLET, FILM COATED ORAL 2 TIMES DAILY
Qty: 180 TABLET | Refills: 1 | Status: SHIPPED | OUTPATIENT
Start: 2024-01-31 | End: 2024-04-29

## 2024-01-31 RX ORDER — ASPIRIN 81 MG/1
81 TABLET, CHEWABLE ORAL DAILY
Qty: 90 TABLET | Refills: 3 | Status: SHIPPED | OUTPATIENT
Start: 2024-01-31

## 2024-01-31 NOTE — PATIENT INSTRUCTIONS
Betty Pan,    It was a pleasure to see you today at [facility].     My recommendations after this visit include:  - Please follow up with Dr Nur February 27   - Please follow up with Sabine Mohr in mid March  - Sleep medicine scheduled March 27  - Increase Entresto to full tablet 24-26 mg twice a day  - I have checked labs        Sabine Mohr, CNP

## 2024-01-31 NOTE — LETTER
1/31/2024    Cristina Alexis MD  [address] [facility] [address]    RE: Betty Pan       Dear Colleague,     I had the pleasure of seeing Betty Pan in the [facility].        Assessment/Recommendations   Assessment:    1. Ischemic cardiomyopathy, heart failure with reduced ejection fraction, ejection fraction 30-35%, NYHA class II: Compensated.  She states she ran out of her Bumex for a few weeks.  She restarted about a week ago.  Her weight has increased about 6 pounds since I saw her the end of December.  She also states she has been eating more.  Euvolemic on exam.  Discussed importance of letting us know if she runs out of her medications.  We discussed titrating medications to GDMT and checking an echocardiogram in 3 months after to reassess LVEF  2.  Hypertension: Controlled.  Blood pressure 124/78  3.  MANINDER: Scheduled to see sleep medicine March 27.  She was diagnosed with sleep apnea approximately 10 years ago and does not currently wear CPAP  4.  CAD:  Denies angina.  Status post four-vessel CABG on December 1.  She continues atorvastatin and aspirin.  She is participating in cardiac rehab twice a week.   5.  Morbid obesity: BMI 48.88.  Encourage weight loss with exercise and nutrition    Plan:  1.   Heart failure medications:  - Beta blocker therapy with metoprolol succinate 25 mg daily  - ARNI therapy with Entresto increased to full tab 24-26 mg twice a day  - SGLT2 inhibitor with Jardiance 10 mg daily   - Aldosterone antagonist with spironolactone 25 mg daily  - Diuretic therapy with bumetanide 2 mg daily  2.  BMP pending  3.  Continue monitoring weights and following a low-salt diet  4.  Encouraged regular exercise and continue cardiac rehab    Betty Pan will follow up with Dr. Nur February 27 and in the heart failure clinic mid March.     History of Present Illness/Subjective    Ms. Betty Pan is a 48 year old female seen at [facility] heart failure  clinic today for continued follow-up.  She follows up for heart failure with reduced ejection fraction, ischemic cardiomyopathy.  She was hospitalized early December and underwent four-vessel CABG on December 1 with Dr. Rizvi.  She had a limited echocardiogram postsurgery which showed an ejection fraction of 30 to 35%.  She has a past medical history significant for hypertension, prediabetes, morbid obesity, MANINDER not on CPAP, CAD with four-vessel CABG, hyperlipidemia.     Today, she has fatigue and dyspnea on exertion.  She denies lightheadedness, shortness of breath, orthopnea, PND, palpitations, chest pain, abdominal fullness/bloating, and lower extremity edema.      She is monitoring home weights which have increased slightly to 237 pounds.  She is following a low sodium diet.  She participates in regular physical activity including cardiac rehab twice a week.  She is trying to walk on her own also.       Physical Examination Review of Systems   /78 (BP Location: Left arm, Patient Position: Sitting, Cuff Size: Adult Large)   Pulse 64   Resp 16   Wt 109.8 kg (242 lb)   SpO2 97%   BMI 48.88 kg/m    Body mass index is 48.88 kg/m .  Wt Readings from Last 3 Encounters:   01/31/24 109.8 kg (242 lb)   12/27/23 104.8 kg (231 lb)   12/26/23 107.3 kg (236 lb 9.6 oz)       General Appearance:   no acute distress   ENT/Mouth: No abnormalities   EYES:  no scleral icterus, normal conjunctivae   Neck: no thyromegaly   Chest/Lungs:   lungs are clear to auscultation, no rales or wheezing, equal chest wall expansion    Cardiovascular:   Regular. Normal first and second heart sounds with no murmurs, rubs, or gallops, no edema bilaterally    Abdomen:  Obese, bowel sounds are present   Extremities: no cyanosis or clubbing   Skin: warm   Neurologic: no tremors     Psychiatric: alert and oriented x3    Enc Vitals  BP: 124/78  Pulse: 64  Resp: 16  SpO2: 97 %  Weight: 109.8 kg (242 lb)                                          Medical History  Surgical History Family History Social History   Past Medical History:   Diagnosis Date    Aspiration pneumonia of lower lobe, unspecified aspiration pneumonia type, unspecified laterality (H) 10/11/2023    Congestive heart failure (H)     Coronary artery disease     Heart failure with reduced ejection fraction (H) 10/27/2023    Hyperlipidemia     Hypertension, unspecified type 10/11/2023    Ischemic cardiomyopathy 10/27/2023    Obese     Obesity due to excess calories 10/27/2023    MANINDER (obstructive sleep apnea) 10/27/2023    Pleural effusion 10/11/2023    Sleep apnea     Past Surgical History:   Procedure Laterality Date    CORONARY ANGIOGRAPHY ADULT ORDER      CORONARY ARTERY BYPASS      CORONARY ARTERY BYPASS GRAFT, WITH ENDOSCOPIC VESSEL PROCUREMENT N/A 12/01/2023    Procedure: CORONARY ARTERY BYPASS GRAFT TIMES FOUR ,LEFT INTERNAL MAMMARY ARTERY HARVEST, BILATERAL LOWER LEG ENDOSCOPIC VESSEL PROCUREMENT, EPIAORTIC ULTRASOUND;  Surgeon: Angélica Rizvi MD;  Location: [facility] OR     CORONARY ANGIOGRAM N/A 10/13/2023    Procedure: Coronary Angiogram;  Surgeon: Roxana Melgoza MD;  Location: [facility] CATH LAB CV    CV RIGHT HEART CATH MEASUREMENTS RECORDED N/A 10/13/2023    Procedure: Right Heart Catheterization;  Surgeon: Roxana Melgoza MD;  Location: [facility] CV    TRANSESOPHAGEAL ECHOCARDIOGRAM INTRAOPERATIVE N/A 12/01/2023    Procedure: ANESTHESIA TRANSESOPHAGEAL ECHOCARDIOGRAM;  Surgeon: Angélica Rizvi MD;  Location: [facility] OR    No family history on file. Social History     Socioeconomic History    Marital status: Single     Spouse name: Not on file    Number of children: Not on file    Years of education: Not on file    Highest education level: Not on file   Occupational History    Not on file   Tobacco Use    Smoking status: Never    Smokeless tobacco: Never   Vaping Use    Vaping Use: Never used   Substance and Sexual Activity    Alcohol use: Never     Drug use: Never    Sexual activity: Not on file   Other Topics Concern    Not on file   Social History Narrative    Not on file     Social Determinants of Health     Financial Resource Strain: Not on file   Food Insecurity: Not on file   Transportation Needs: Not on file   Physical Activity: Not on file   Stress: Not on file   Social Connections: Not on file   Interpersonal Safety: Not on file   Housing Stability: Not on file          Medications  Allergies   Current Outpatient Medications   Medication Sig Dispense Refill    acetaminophen (TYLENOL) 325 MG tablet Take 2 tablets (650 mg) by mouth every 6 hours as needed for other or mild pain 100 tablet 0    aspirin (ASA) 81 MG chewable tablet Take 1 tablet (81 mg) by mouth daily 90 tablet 3    atorvastatin (LIPITOR) 80 MG tablet Take 1 tablet (80 mg) by mouth daily 90 tablet 1    bumetanide (BUMEX) 2 MG tablet Take 1 tablet (2 mg) by mouth daily 90 tablet 1    cephALEXin (KEFLEX) 500 MG capsule Take 1 capsule (500 mg) by mouth 4 times daily 20 capsule 0    cyanocobalamin (VITAMIN B-12) 1000 MCG tablet Take 1,000 mcg by mouth daily      empagliflozin (JARDIANCE) 10 MG TABS tablet Take 1 tablet (10 mg) by mouth daily 90 tablet 1    Ferrous Sulfate 324 (65 Fe) MG TBEC Take 1 tablet by mouth daily      metoprolol succinate ER (TOPROL XL) 25 MG 24 hr tablet Take 1 tablet (25 mg) by mouth daily 90 tablet 1    miconazole (MICATIN) 2 % external powder Apply topically 2 times daily Apply BID to perianal (gluteal cleft) after cleansing and drying well. Apply to gloved hand, then pat powder onto desired skin, Rub Into Skin To Activate 43 g 0    multivitamin, therapeutic (THERA-VIT) TABS tablet Take 1 tablet by mouth daily      oxyCODONE (ROXICODONE) 5 MG tablet Take 1 tablet (5 mg) by mouth every 4 hours as needed for moderate pain 15 tablet 0    pantoprazole (PROTONIX) 40 MG EC tablet Take 1 tablet (40 mg) by mouth daily 21 tablet 0    polyethylene glycol (MIRALAX) 17  GM/Dose powder Take 17 g by mouth daily as needed for constipation 510 g 0    sacubitril-valsartan (ENTRESTO) 24-26 MG per tablet Take 1 tablet by mouth 2 times daily 180 tablet 1    senna-docusate (SENOKOT-S/PERICOLACE) 8.6-50 MG tablet Take 1-2 tablets by mouth 2 times daily as needed for constipation 120 tablet 0    spironolactone (ALDACTONE) 25 MG tablet Take 1 tablet (25 mg) by mouth daily 90 tablet 3    No Known Allergies      Lab Results    Chemistry/lipid CBC Cardiac Enzymes/BNP/TSH/INR   Lab Results   Component Value Date    BUN 24.6 (H) 12/06/2023     12/06/2023    CO2 32 (H) 12/06/2023    Lab Results   Component Value Date    WBC 8.3 12/06/2023    HGB 8.3 (L) 12/06/2023    HCT 26.5 (L) 12/06/2023    MCV 93 12/06/2023     (L) 12/06/2023    Lab Results   Component Value Date    TSH 1.30 10/10/2023    INR 1.21 (H) 12/05/2023             This note has been dictated using voice recognition software. Any grammatical, typographical, or context distortions are unintentional and inherent to the software    30 minutes spent on the date of encounter doing chart review, review of outside records, review of test results, interpretation with above tests, patient visit, and documentation.        The longitudinal plan of care for heart failure with reduced ejection fraction, ischemic cardiomyopathy, CAD, hypertension, MANINDER, obesity was addressed during this visit. Due to the added complexity in care, I will continue to support Betty in the subsequent management of this condition(s) and with the ongoing continuity of care of this condition(s).                Thank you for allowing me to participate in the care of your patient.      Sincerely,     ANDREW Florez CNP     [facility]  cc:   ANDREW Florez CNP  [address], [address]  [place] [address]

## 2024-01-31 NOTE — PROGRESS NOTES
Assessment/Recommendations   Assessment:    1. Ischemic cardiomyopathy, heart failure with reduced ejection fraction, ejection fraction 30-35%, NYHA class II: Compensated.  She states she ran out of her Bumex for a few weeks.  She restarted about a week ago.  Her weight has increased about 6 pounds since I saw her the end of December.  She also states she has been eating more.  Euvolemic on exam.  Discussed importance of letting us know if she runs out of her medications.  We discussed titrating medications to GDMT and checking an echocardiogram in 3 months after to reassess LVEF  2.  Hypertension: Controlled.  Blood pressure 124/78  3.  MANINDER: Scheduled to see sleep medicine March 27.  She was diagnosed with sleep apnea approximately 10 years ago and does not currently wear CPAP  4.  CAD:  Denies angina.  Status post four-vessel CABG on December 1.  She continues atorvastatin and aspirin.  She is participating in cardiac rehab twice a week.   5.  Morbid obesity: BMI 48.88.  Encourage weight loss with exercise and nutrition    Plan:  1.   Heart failure medications:  - Beta blocker therapy with metoprolol succinate 25 mg daily  - ARNI therapy with Entresto increased to full tab 24-26 mg twice a day  - SGLT2 inhibitor with Jardiance 10 mg daily   - Aldosterone antagonist with spironolactone 25 mg daily  - Diuretic therapy with bumetanide 2 mg daily  2.  BMP pending  3.  Continue monitoring weights and following a low-salt diet  4.  Encouraged regular exercise and continue cardiac rehab    Betty Pan will follow up with Dr. Nur February 27 and in the heart failure clinic mid March.     History of Present Illness/Subjective    Ms. Betty Pan is a 48 year old female seen at [facility] heart failure clinic today for continued follow-up.  She follows up for heart failure with reduced ejection fraction, ischemic cardiomyopathy.  She was hospitalized early December and underwent four-vessel CABG on  December 1 with Dr. Rizvi.  She had a limited echocardiogram postsurgery which showed an ejection fraction of 30 to 35%.  She has a past medical history significant for hypertension, prediabetes, morbid obesity, MANINDER not on CPAP, CAD with four-vessel CABG, hyperlipidemia.     Today, she has fatigue and dyspnea on exertion.  She denies lightheadedness, shortness of breath, orthopnea, PND, palpitations, chest pain, abdominal fullness/bloating, and lower extremity edema.      She is monitoring home weights which have increased slightly to 237 pounds.  She is following a low sodium diet.  She participates in regular physical activity including cardiac rehab twice a week.  She is trying to walk on her own also.       Physical Examination Review of Systems   /78 (BP Location: Left arm, Patient Position: Sitting, Cuff Size: Adult Large)   Pulse 64   Resp 16   Wt 109.8 kg (242 lb)   SpO2 97%   BMI 48.88 kg/m    Body mass index is 48.88 kg/m .  Wt Readings from Last 3 Encounters:   01/31/24 109.8 kg (242 lb)   12/27/23 104.8 kg (231 lb)   12/26/23 107.3 kg (236 lb 9.6 oz)       General Appearance:   no acute distress   ENT/Mouth: No abnormalities   EYES:  no scleral icterus, normal conjunctivae   Neck: no thyromegaly   Chest/Lungs:   lungs are clear to auscultation, no rales or wheezing, equal chest wall expansion    Cardiovascular:   Regular. Normal first and second heart sounds with no murmurs, rubs, or gallops, no edema bilaterally    Abdomen:  Obese, bowel sounds are present   Extremities: no cyanosis or clubbing   Skin: warm   Neurologic: no tremors     Psychiatric: alert and oriented x3    Enc Vitals  BP: 124/78  Pulse: 64  Resp: 16  SpO2: 97 %  Weight: 109.8 kg (242 lb)                                         Medical History  Surgical History Family History Social History   Past Medical History:   Diagnosis Date    Aspiration pneumonia of lower lobe, unspecified aspiration pneumonia type, unspecified  laterality (H) 10/11/2023    Congestive heart failure (H)     Coronary artery disease     Heart failure with reduced ejection fraction (H) 10/27/2023    Hyperlipidemia     Hypertension, unspecified type 10/11/2023    Ischemic cardiomyopathy 10/27/2023    Obese     Obesity due to excess calories 10/27/2023    MANINDER (obstructive sleep apnea) 10/27/2023    Pleural effusion 10/11/2023    Sleep apnea     Past Surgical History:   Procedure Laterality Date    CORONARY ANGIOGRAPHY ADULT ORDER      CORONARY ARTERY BYPASS      CORONARY ARTERY BYPASS GRAFT, WITH ENDOSCOPIC VESSEL PROCUREMENT N/A 12/01/2023    Procedure: CORONARY ARTERY BYPASS GRAFT TIMES FOUR ,LEFT INTERNAL MAMMARY ARTERY HARVEST, BILATERAL LOWER LEG ENDOSCOPIC VESSEL PROCUREMENT, EPIAORTIC ULTRASOUND;  Surgeon: Angélica Rizvi MD;  Location: [facility] OR     CORONARY ANGIOGRAM N/A 10/13/2023    Procedure: Coronary Angiogram;  Surgeon: Roxana Melgoza MD;  Location: [facility] CATH LAB CV    CV RIGHT HEART CATH MEASUREMENTS RECORDED N/A 10/13/2023    Procedure: Right Heart Catheterization;  Surgeon: Roxana Melgoza MD;  Location: [facility] CATH LAB CV    TRANSESOPHAGEAL ECHOCARDIOGRAM INTRAOPERATIVE N/A 12/01/2023    Procedure: ANESTHESIA TRANSESOPHAGEAL ECHOCARDIOGRAM;  Surgeon: Angélica Rizvi MD;  Location: [facility] OR    No family history on file. Social History     Socioeconomic History    Marital status: Single     Spouse name: Not on file    Number of children: Not on file    Years of education: Not on file    Highest education level: Not on file   Occupational History    Not on file   Tobacco Use    Smoking status: Never    Smokeless tobacco: Never   Vaping Use    Vaping Use: Never used   Substance and Sexual Activity    Alcohol use: Never    Drug use: Never    Sexual activity: Not on file   Other Topics Concern    Not on file   Social History Narrative    Not on file     Social Determinants of Health     Financial Resource Strain:  Not on file   Food Insecurity: Not on file   Transportation Needs: Not on file   Physical Activity: Not on file   Stress: Not on file   Social Connections: Not on file   Interpersonal Safety: Not on file   Housing Stability: Not on file          Medications  Allergies   Current Outpatient Medications   Medication Sig Dispense Refill    acetaminophen (TYLENOL) 325 MG tablet Take 2 tablets (650 mg) by mouth every 6 hours as needed for other or mild pain 100 tablet 0    aspirin (ASA) 81 MG chewable tablet Take 1 tablet (81 mg) by mouth daily 90 tablet 3    atorvastatin (LIPITOR) 80 MG tablet Take 1 tablet (80 mg) by mouth daily 90 tablet 1    bumetanide (BUMEX) 2 MG tablet Take 1 tablet (2 mg) by mouth daily 90 tablet 1    cephALEXin (KEFLEX) 500 MG capsule Take 1 capsule (500 mg) by mouth 4 times daily 20 capsule 0    cyanocobalamin (VITAMIN B-12) 1000 MCG tablet Take 1,000 mcg by mouth daily      empagliflozin (JARDIANCE) 10 MG TABS tablet Take 1 tablet (10 mg) by mouth daily 90 tablet 1    Ferrous Sulfate 324 (65 Fe) MG TBEC Take 1 tablet by mouth daily      metoprolol succinate ER (TOPROL XL) 25 MG 24 hr tablet Take 1 tablet (25 mg) by mouth daily 90 tablet 1    miconazole (MICATIN) 2 % external powder Apply topically 2 times daily Apply BID to perianal (gluteal cleft) after cleansing and drying well. Apply to gloved hand, then pat powder onto desired skin, Rub Into Skin To Activate 43 g 0    multivitamin, therapeutic (THERA-VIT) TABS tablet Take 1 tablet by mouth daily      oxyCODONE (ROXICODONE) 5 MG tablet Take 1 tablet (5 mg) by mouth every 4 hours as needed for moderate pain 15 tablet 0    pantoprazole (PROTONIX) 40 MG EC tablet Take 1 tablet (40 mg) by mouth daily 21 tablet 0    polyethylene glycol (MIRALAX) 17 GM/Dose powder Take 17 g by mouth daily as needed for constipation 510 g 0    sacubitril-valsartan (ENTRESTO) 24-26 MG per tablet Take 1 tablet by mouth 2 times daily 180 tablet 1    senna-docusate  (SENOKOT-S/PERICOLACE) 8.6-50 MG tablet Take 1-2 tablets by mouth 2 times daily as needed for constipation 120 tablet 0    spironolactone (ALDACTONE) 25 MG tablet Take 1 tablet (25 mg) by mouth daily 90 tablet 3    No Known Allergies      Lab Results    Chemistry/lipid CBC Cardiac Enzymes/BNP/TSH/INR   Lab Results   Component Value Date    BUN 24.6 (H) 12/06/2023     12/06/2023    CO2 32 (H) 12/06/2023    Lab Results   Component Value Date    WBC 8.3 12/06/2023    HGB 8.3 (L) 12/06/2023    HCT 26.5 (L) 12/06/2023    MCV 93 12/06/2023     (L) 12/06/2023    Lab Results   Component Value Date    TSH 1.30 10/10/2023    INR 1.21 (H) 12/05/2023             This note has been dictated using voice recognition software. Any grammatical, typographical, or context distortions are unintentional and inherent to the software    30 minutes spent on the date of encounter doing chart review, review of outside records, review of test results, interpretation with above tests, patient visit, and documentation.        The longitudinal plan of care for heart failure with reduced ejection fraction, ischemic cardiomyopathy, CAD, hypertension, MANINDER, obesity was addressed during this visit. Due to the added complexity in care, I will continue to support Betty in the subsequent management of this condition(s) and with the ongoing continuity of care of this condition(s).

## 2024-02-01 ENCOUNTER — HOSPITAL ENCOUNTER (OUTPATIENT)
Dept: CARDIAC REHAB | Facility: HOSPITAL | Age: 49
Discharge: HOME | End: 2024-02-01
Attending: INTERNAL MEDICINE
Payer: COMMERCIAL

## 2024-02-01 PROCEDURE — 93798 PHYS/QHP OP CAR RHAB W/ECG: CPT

## 2024-02-13 ENCOUNTER — HOSPITAL ENCOUNTER (OUTPATIENT)
Dept: CARDIAC REHAB | Facility: HOSPITAL | Age: 49
Discharge: HOME | End: 2024-02-13
Attending: INTERNAL MEDICINE
Payer: COMMERCIAL

## 2024-02-13 ENCOUNTER — OFFICE VISIT (OUTPATIENT)
Dept: CARDIOLOGY | Facility: CLINIC | Age: 49
End: 2024-02-13
Payer: COMMERCIAL

## 2024-02-13 VITALS
DIASTOLIC BLOOD PRESSURE: 76 MMHG | OXYGEN SATURATION: 96 % | SYSTOLIC BLOOD PRESSURE: 110 MMHG | RESPIRATION RATE: 18 BRPM | HEART RATE: 79 BPM | BODY MASS INDEX: 49.48 KG/M2 | WEIGHT: 245 LBS

## 2024-02-13 DIAGNOSIS — L03.313 CELLULITIS OF CHEST WALL: Primary | ICD-10-CM

## 2024-02-13 PROCEDURE — 87077 CULTURE AEROBIC IDENTIFY: CPT

## 2024-02-13 PROCEDURE — 99024 POSTOP FOLLOW-UP VISIT: CPT

## 2024-02-13 PROCEDURE — 87205 SMEAR GRAM STAIN: CPT

## 2024-02-13 PROCEDURE — 93798 PHYS/QHP OP CAR RHAB W/ECG: CPT

## 2024-02-13 PROCEDURE — 87186 SC STD MICRODIL/AGAR DIL: CPT

## 2024-02-13 PROCEDURE — 87070 CULTURE OTHR SPECIMN AEROBIC: CPT

## 2024-02-13 RX ORDER — CEPHALEXIN 500 MG/1
500 CAPSULE ORAL 4 TIMES DAILY
Qty: 28 CAPSULE | Refills: 0 | Status: SHIPPED | OUTPATIENT
Start: 2024-02-13 | End: 2024-02-16

## 2024-02-13 NOTE — PROGRESS NOTES
BRIEF CV SURGERY CLINIC NOTE     Reason for Visit: Betty Pan is a 48 year old female who is s/p CABG X4 (LIMA to LAD, SVG to LAD diagonal branch 1, OM 1 and RPDA) with Dr. Rizvi on December 1, 2023. Patient's hospital course was uncomplicated and she was discharged to home. Cardiac rehab called our office today w/ concerns for infection at the superior portion of sternal incision on 2/13/2024. Pt is seen in clinic today for a wound check.      S: Patient reports that inferior portion of incision was open at postop clinic appt on 12/26/23. She was placed on 5 days of Keflex and it healed w/o recurrent issue. She was laying in bed the other night when she turned on her side and the incision seems to have opened at that point at the very top. Reports minimal drainage. Reports putting neosporin on it. Denies increased pain, fevers, or spreading erythema.     O: /76 (BP Location: Left arm, Patient Position: Sitting, Cuff Size: Adult Large)   Pulse 79   Resp 18   Wt 111.1 kg (245 lb)   SpO2 96%   BMI 49.48 kg/m       Exam:  Constitutional: NAD, pleasant, conversant.  Cardiovascular: RRR. 2+ pulses in all extremities  Respiratory: Nonlabored effort on room air.  Incision: Top portion of incision w/ 1x0.3x0.3cm superficial appearing opening. Minimal drainage. No surrounding cellulitis, tunneling, or undermining of the wound. Remainder of incision is healing well w/o drainage, erythema, or tenderness.  Neurologic: A&Ox3, IRIZARRY, CN grossly intact.      A/P: Betty Pan is a 48 year old female s/p CABGx4 on 12/1/2023 presenting w/ c/o sternal wound dehiscence/infection at the superior portion of her incision.      - Concern for superficial sternal incision infection. Swab sent for cx.   - Treat w/ Keflex 500 mg QID x7 days  - Continue to cleanse incision daily in shower w/ antibacterial soap  - Leave open to air unless draining onto clothes, then can cover w/ gauze.  - Call if the site develops increased  drainage, the redness worsens, or if it does not start to improve.  - She will send a photo to our RN[facility] in 2 days to determine further care.  - Surgically doing overall well. Sternum is stable. Incision as above but otherwise healing well  - Hemodynamics are stable. No medication changes were needed today.  - Continue strict sternal precautions.     Uyen Trotter PA-C  [facility] Cardiothoracic Surgery  Pager: [phone]  February 13, 2024

## 2024-02-15 ENCOUNTER — TELEPHONE (OUTPATIENT)
Dept: CARDIOLOGY | Facility: CLINIC | Age: 49
End: 2024-02-15
Payer: COMMERCIAL

## 2024-02-15 ENCOUNTER — HOSPITAL ENCOUNTER (OUTPATIENT)
Dept: CARDIAC REHAB | Facility: HOSPITAL | Age: 49
Discharge: HOME | End: 2024-02-15
Attending: INTERNAL MEDICINE
Payer: COMMERCIAL

## 2024-02-15 LAB
BACTERIA WND CULT: ABNORMAL
BACTERIA WND CULT: ABNORMAL
GRAM STAIN RESULT: ABNORMAL
GRAM STAIN RESULT: ABNORMAL

## 2024-02-15 PROCEDURE — 93798 PHYS/QHP OP CAR RHAB W/ECG: CPT

## 2024-02-15 NOTE — TELEPHONE ENCOUNTER
Phone VM is full and unable to leave message. Emailed patient requesting pictures of sternal incision.      ----- Message from Trinh Hernandez RN sent at 2/13/2024 10:44 AM CST -----  Regarding: FW: Wound Photo  Sternal incision pic 2/15    ----- Message -----  From: Yasmin Trotter PA-C  Sent: 2/13/2024  10:22 AM CST  To: Trinh Hernandez RN  Subject: Wound Photo                                      1x0.3x0.3 open area of superior sternal incision. Looks like a very superficial infection, hoping it will improve w/ PO abx. Sent w/ 500 mg QID keflex x7 days. Planning on emailing you a photo on Thursday. Thanks!

## 2024-02-16 DIAGNOSIS — Z95.1 S/P CABG (CORONARY ARTERY BYPASS GRAFT): ICD-10-CM

## 2024-02-16 DIAGNOSIS — L08.9 WOUND INFECTION: Primary | ICD-10-CM

## 2024-02-16 DIAGNOSIS — T14.8XXA WOUND INFECTION: Primary | ICD-10-CM

## 2024-02-16 RX ORDER — DOXYCYCLINE HYCLATE 100 MG
100 TABLET ORAL 2 TIMES DAILY
Qty: 20 TABLET | Refills: 0 | Status: SHIPPED | OUTPATIENT
Start: 2024-02-16 | End: 2024-02-28

## 2024-02-16 NOTE — TELEPHONE ENCOUNTER
Called patient to give her the results of her wound culture, MRSA+. Changed antibiotic from keflex to Doxycyline 100 mg BID for 10 days. She reports that the wound is the same, no fevers.     Will forward to  to schedule a two week wound check.     Steph Victoria, DNP, CNP  [facility] Cardiothoracic Surgery

## 2024-02-19 ENCOUNTER — TELEPHONE (OUTPATIENT)
Dept: CARDIOLOGY | Facility: CLINIC | Age: 49
End: 2024-02-19
Payer: COMMERCIAL

## 2024-02-19 NOTE — TELEPHONE ENCOUNTER
Tried to reach out to patient to schedule,2 week wound check with KELLEE's on 2/27 or 2/29. VM was full

## 2024-02-20 ENCOUNTER — HOSPITAL ENCOUNTER (OUTPATIENT)
Dept: CARDIAC REHAB | Facility: HOSPITAL | Age: 49
Discharge: HOME | End: 2024-02-20
Attending: INTERNAL MEDICINE
Payer: COMMERCIAL

## 2024-02-20 ENCOUNTER — TELEPHONE (OUTPATIENT)
Dept: CARDIOLOGY | Facility: CLINIC | Age: 49
End: 2024-02-20
Payer: COMMERCIAL

## 2024-02-20 PROCEDURE — 93798 PHYS/QHP OP CAR RHAB W/ECG: CPT

## 2024-02-20 NOTE — TELEPHONE ENCOUNTER
Tried to reach pt x 3 VM full. Added pt on for wound check after cardiac rehab on 2/27. Will try to reach out to pt and send my chart message

## 2024-02-22 ENCOUNTER — HOSPITAL ENCOUNTER (OUTPATIENT)
Dept: CARDIAC REHAB | Facility: HOSPITAL | Age: 49
Discharge: HOME | End: 2024-02-22
Attending: INTERNAL MEDICINE
Payer: COMMERCIAL

## 2024-02-22 PROCEDURE — 93798 PHYS/QHP OP CAR RHAB W/ECG: CPT

## 2024-02-27 ENCOUNTER — HOSPITAL ENCOUNTER (OUTPATIENT)
Dept: CARDIAC REHAB | Facility: HOSPITAL | Age: 49
Discharge: HOME | End: 2024-02-27
Attending: INTERNAL MEDICINE
Payer: COMMERCIAL

## 2024-02-27 ENCOUNTER — OFFICE VISIT (OUTPATIENT)
Dept: CARDIOLOGY | Facility: CLINIC | Age: 49
End: 2024-02-27
Payer: COMMERCIAL

## 2024-02-27 VITALS
HEART RATE: 64 BPM | DIASTOLIC BLOOD PRESSURE: 78 MMHG | SYSTOLIC BLOOD PRESSURE: 122 MMHG | WEIGHT: 247 LBS | RESPIRATION RATE: 14 BRPM | BODY MASS INDEX: 49.89 KG/M2

## 2024-02-27 DIAGNOSIS — I50.20 HEART FAILURE WITH REDUCED EJECTION FRACTION (H): ICD-10-CM

## 2024-02-27 DIAGNOSIS — I25.5 ISCHEMIC CARDIOMYOPATHY: Primary | ICD-10-CM

## 2024-02-27 PROCEDURE — 99214 OFFICE O/P EST MOD 30 MIN: CPT | Performed by: INTERNAL MEDICINE

## 2024-02-27 PROCEDURE — 93798 PHYS/QHP OP CAR RHAB W/ECG: CPT

## 2024-02-27 NOTE — PROGRESS NOTES
Cardiology Progress Note     Assessment:  Coronary artery disease, multivessel, status post coronary artery bypass graft surgery(LIMA to LAD and vein grafts to diagonal obtuse marginal and distal RCA) in December 2023, no angina  Chronic systolic heart failure, probably euvolemic  History of gastric bypass surgery  Hypercholesterolemia on high-dose statin  Superficial infection of sternotomy incision    Plan:  Continue current cardiac medications.  We discussed the secondary prevention of cardiac events.  I stressed the importance of compliance with medications  We should reassess LV function vis-à-vis need for defibrillator etc.  She has had poor quality echocardiogram in the past.  Will perform MRI for most accurate assessment of LV function possible.  Follow-up based on the results of MRI    Subjective:   This is 48 year old female who comes in today for follow-up visit.  I saw her in October when she presented with acute heart failure.  She had abnormal echo with significant LV systolic dysfunction.  Coronary angiogram showed multivessel coronary artery disease including  of LAD.  She was discharged home and then readmitted for elective coronary artery bypass graft surgery in December 2023.  She did well in the postop.  She has been attending cardiac rehab program.  She has mild infection of the skin overlying the operation sternotomy incision.  She follows up with the surgical team  She feels better.  She is less short of breath.  She denies PND and orthopnea.  She has not had heart palpitations or syncope.    Review of Systems:   Negative other than history of present illness    Objective:   /78 (BP Location: Right arm, Patient Position: Sitting, Cuff Size: Adult Large)   Pulse 64   Resp 14   Wt 112 kg (247 lb)   BMI 49.89 kg/m    Physical Exam:  GENERAL: no distress  NECK: No JVD  LUNGS: Clear to auscultation.  CARDIAC: regular rhythm, distant S1 & S2 normal.  No heaves, thrills, gallops or  murmurs.  ABDOMEN: flat, negative hepatosplenomegaly, soft and non-tender.  EXTREMITIES: No evidence of cyanosis, clubbing or edema.    Current Outpatient Medications   Medication Sig Dispense Refill    acetaminophen (TYLENOL) 325 MG tablet Take 2 tablets (650 mg) by mouth every 6 hours as needed for other or mild pain 100 tablet 0    aspirin (ASA) 81 MG chewable tablet Take 1 tablet (81 mg) by mouth daily 90 tablet 3    atorvastatin (LIPITOR) 80 MG tablet Take 1 tablet (80 mg) by mouth daily 90 tablet 1    bumetanide (BUMEX) 2 MG tablet Take 1 tablet (2 mg) by mouth daily 90 tablet 1    cyanocobalamin (VITAMIN B-12) 1000 MCG tablet Take 1,000 mcg by mouth daily      doxycycline hyclate (VIBRA-TABS) 100 MG tablet Take 1 tablet (100 mg) by mouth 2 times daily 20 tablet 0    empagliflozin (JARDIANCE) 10 MG TABS tablet Take 1 tablet (10 mg) by mouth daily 90 tablet 1    Ferrous Sulfate 324 (65 Fe) MG TBEC Take 1 tablet by mouth daily      metoprolol succinate ER (TOPROL XL) 25 MG 24 hr tablet Take 1 tablet (25 mg) by mouth daily 90 tablet 1    miconazole (MICATIN) 2 % external powder Apply topically 2 times daily Apply BID to perianal (gluteal cleft) after cleansing and drying well. Apply to gloved hand, then pat powder onto desired skin, Rub Into Skin To Activate 43 g 0    multivitamin, therapeutic (THERA-VIT) TABS tablet Take 1 tablet by mouth daily      oxyCODONE (ROXICODONE) 5 MG tablet Take 1 tablet (5 mg) by mouth every 4 hours as needed for moderate pain 15 tablet 0    pantoprazole (PROTONIX) 40 MG EC tablet Take 1 tablet (40 mg) by mouth daily 21 tablet 0    polyethylene glycol (MIRALAX) 17 GM/Dose powder Take 17 g by mouth daily as needed for constipation 510 g 0    sacubitril-valsartan (ENTRESTO) 24-26 MG per tablet Take 1 tablet by mouth 2 times daily 180 tablet 1    senna-docusate (SENOKOT-S/PERICOLACE) 8.6-50 MG tablet Take 1-2 tablets by mouth 2 times daily as needed for constipation 120 tablet 0     "spironolactone (ALDACTONE) 25 MG tablet Take 1 tablet (25 mg) by mouth daily 90 tablet 3       Cardiographics:    EC2023 read by me normal sinus rhythm poor R progression, normal QRS duration    Echocardiogram: 2023  Technically very challenging study. Definity contrast utilized.  Left ventricle measures mildly enlarged. Mild left ventricular hypertrophy  suggested. Left ventricular systolic function is moderate to severely globally  reduced with an ejection fraction of 30 to 35% with akinesis of the left  ventricular apex.Diastolic Doppler findings (E/E' ratio and/or other  parameters) suggest left ventricular filling pressures are increased.  The right ventricle is suboptimally visualized. On limited imaging right  ventricular systolic function appears potentially reduced.  Mild enlargement of the left atrium.  Valve structures are suboptimally visualized on this technically challenging  study. No obvious hemodynamically significant valve abnormality noted.  Consider cardiac MRI to further define cardiac structures.      Coronary angio: 2023   Severe multivessel CAD without significant left main stenosis.  The proximal to midLAD has severe calcific stensosis involving the ostia of D1 and D2 and the distal LAD is occluded and fills by collaterals.  The LCx OM1 branch has severe ostial stenosis.  The RPDA has subtotal stensis and there is severe stenosis of a large RPLA  branch.  Right side filling pressures are high-normal and left side filling pressures are mildly elevated.  RA mean 8mmHg   Lab Results    Chemistry/lipid CBC Cardiac Enzymes/BNP/TSH/INR   No results for input(s): \"CHOL\", \"HDL\", \"LDL\", \"TRIG\", \"CHOLHDLRATIO\" in the last 26527 hours.  No results for input(s): \"LDL\" in the last 99208 hours.  Recent Labs   Lab Test 24  0855      POTASSIUM 3.7   CHLORIDE 103   CO2 31*   *   BUN 22.7*   CR 0.89   GFRESTIMATED 79   KHADIJAH 9.6     Recent Labs   Lab Test " "01/31/24  0855 12/06/23  0421 12/05/23  0418   CR 0.89 0.78 0.76     Recent Labs   Lab Test 10/11/23  1057   A1C 5.8*          Recent Labs   Lab Test 12/06/23 0421   WBC 8.3   HGB 8.3*   HCT 26.5*   MCV 93   *     Recent Labs   Lab Test 12/06/23  0421 12/05/23  0418 12/04/23  0401   HGB 8.3* 7.9* 7.4*    No results for input(s): \"TROPONINI\" in the last 30591 hours.  Recent Labs   Lab Test 10/11/23  1057 10/10/23  2012   NTBNPI 5,007*  --    NTBNP  --  5,019*     Recent Labs   Lab Test 10/10/23  2012   TSH 1.30     Recent Labs   Lab Test 12/05/23 0418 12/04/23  0401 12/03/23  0410   INR 1.21* 1.25* 1.33*                     "

## 2024-02-27 NOTE — LETTER
2/27/2024    Cristina Alexis MD  [address]  [facility] [address]    RE: Betty Pan       Dear Colleague,     I had the pleasure of seeing Betty Pan in the [facility].      Cardiology Progress Note     Assessment:  Coronary artery disease, multivessel, status post coronary artery bypass graft surgery(LIMA to LAD and vein grafts to diagonal obtuse marginal and distal RCA) in December 2023, no angina  Chronic systolic heart failure, probably euvolemic  History of gastric bypass surgery  Hypercholesterolemia on high-dose statin  Superficial infection of sternotomy incision    Plan:  Continue current cardiac medications.  We discussed the secondary prevention of cardiac events.  I stressed the importance of compliance with medications  We should reassess LV function vis-à-vis need for defibrillator etc.  She has had poor quality echocardiogram in the past.  Will perform MRI for most accurate assessment of LV function possible.  Follow-up based on the results of MRI    Subjective:   This is 48 year old female who comes in today for follow-up visit.  I saw her in October when she presented with acute heart failure.  She had abnormal echo with significant LV systolic dysfunction.  Coronary angiogram showed multivessel coronary artery disease including  of LAD.  She was discharged home and then readmitted for elective coronary artery bypass graft surgery in December 2023.  She did well in the postop.  She has been attending cardiac rehab program.  She has mild infection of the skin overlying the operation sternotomy incision.  She follows up with the surgical team  She feels better.  She is less short of breath.  She denies PND and orthopnea.  She has not had heart palpitations or syncope.    Review of Systems:   Negative other than history of present illness    Objective:   /78 (BP Location: Right arm, Patient Position: Sitting, Cuff Size: Adult Large)   Pulse 64   Resp 14   Wt  112 kg (247 lb)   BMI 49.89 kg/m    Physical Exam:  GENERAL: no distress  NECK: No JVD  LUNGS: Clear to auscultation.  CARDIAC: regular rhythm, distant S1 & S2 normal.  No heaves, thrills, gallops or murmurs.  ABDOMEN: flat, negative hepatosplenomegaly, soft and non-tender.  EXTREMITIES: No evidence of cyanosis, clubbing or edema.    Current Outpatient Medications   Medication Sig Dispense Refill    acetaminophen (TYLENOL) 325 MG tablet Take 2 tablets (650 mg) by mouth every 6 hours as needed for other or mild pain 100 tablet 0    aspirin (ASA) 81 MG chewable tablet Take 1 tablet (81 mg) by mouth daily 90 tablet 3    atorvastatin (LIPITOR) 80 MG tablet Take 1 tablet (80 mg) by mouth daily 90 tablet 1    bumetanide (BUMEX) 2 MG tablet Take 1 tablet (2 mg) by mouth daily 90 tablet 1    cyanocobalamin (VITAMIN B-12) 1000 MCG tablet Take 1,000 mcg by mouth daily      doxycycline hyclate (VIBRA-TABS) 100 MG tablet Take 1 tablet (100 mg) by mouth 2 times daily 20 tablet 0    empagliflozin (JARDIANCE) 10 MG TABS tablet Take 1 tablet (10 mg) by mouth daily 90 tablet 1    Ferrous Sulfate 324 (65 Fe) MG TBEC Take 1 tablet by mouth daily      metoprolol succinate ER (TOPROL XL) 25 MG 24 hr tablet Take 1 tablet (25 mg) by mouth daily 90 tablet 1    miconazole (MICATIN) 2 % external powder Apply topically 2 times daily Apply BID to perianal (gluteal cleft) after cleansing and drying well. Apply to gloved hand, then pat powder onto desired skin, Rub Into Skin To Activate 43 g 0    multivitamin, therapeutic (THERA-VIT) TABS tablet Take 1 tablet by mouth daily      oxyCODONE (ROXICODONE) 5 MG tablet Take 1 tablet (5 mg) by mouth every 4 hours as needed for moderate pain 15 tablet 0    pantoprazole (PROTONIX) 40 MG EC tablet Take 1 tablet (40 mg) by mouth daily 21 tablet 0    polyethylene glycol (MIRALAX) 17 GM/Dose powder Take 17 g by mouth daily as needed for constipation 510 g 0    sacubitril-valsartan (ENTRESTO) 24-26 MG per  "tablet Take 1 tablet by mouth 2 times daily 180 tablet 1    senna-docusate (SENOKOT-S/PERICOLACE) 8.6-50 MG tablet Take 1-2 tablets by mouth 2 times daily as needed for constipation 120 tablet 0    spironolactone (ALDACTONE) 25 MG tablet Take 1 tablet (25 mg) by mouth daily 90 tablet 3       Cardiographics:    EC2023 read by me normal sinus rhythm poor R progression, normal QRS duration    Echocardiogram: 2023  Technically very challenging study. Definity contrast utilized.  Left ventricle measures mildly enlarged. Mild left ventricular hypertrophy  suggested. Left ventricular systolic function is moderate to severely globally  reduced with an ejection fraction of 30 to 35% with akinesis of the left  ventricular apex.Diastolic Doppler findings (E/E' ratio and/or other  parameters) suggest left ventricular filling pressures are increased.  The right ventricle is suboptimally visualized. On limited imaging right  ventricular systolic function appears potentially reduced.  Mild enlargement of the left atrium.  Valve structures are suboptimally visualized on this technically challenging  study. No obvious hemodynamically significant valve abnormality noted.  Consider cardiac MRI to further define cardiac structures.      Coronary angio: 2023   Severe multivessel CAD without significant left main stenosis.  The proximal to midLAD has severe calcific stensosis involving the ostia of D1 and D2 and the distal LAD is occluded and fills by collaterals.  The LCx OM1 branch has severe ostial stenosis.  The RPDA has subtotal stensis and there is severe stenosis of a large RPLA  branch.  Right side filling pressures are high-normal and left side filling pressures are mildly elevated.  RA mean 8mmHg   Lab Results    Chemistry/lipid CBC Cardiac Enzymes/BNP/TSH/INR   No results for input(s): \"CHOL\", \"HDL\", \"LDL\", \"TRIG\", \"CHOLHDLRATIO\" in the last 34470 hours.  No results for input(s): \"LDL\" in the last 15430 " "hours.  Recent Labs   Lab Test 01/31/24  0855      POTASSIUM 3.7   CHLORIDE 103   CO2 31*   *   BUN 22.7*   CR 0.89   GFRESTIMATED 79   KHADIJAH 9.6     Recent Labs   Lab Test 01/31/24  0855 12/06/23  0421 12/05/23  0418   CR 0.89 0.78 0.76     Recent Labs   Lab Test 10/11/23  1057   A1C 5.8*          Recent Labs   Lab Test 12/06/23  0421   WBC 8.3   HGB 8.3*   HCT 26.5*   MCV 93   *     Recent Labs   Lab Test 12/06/23  0421 12/05/23  0418 12/04/23  0401   HGB 8.3* 7.9* 7.4*    No results for input(s): \"TROPONINI\" in the last 92359 hours.  Recent Labs   Lab Test 10/11/23  1057 10/10/23  2012   NTBNPI 5,007*  --    NTBNP  --  5,019*     Recent Labs   Lab Test 10/10/23  2012   TSH 1.30     Recent Labs   Lab Test 12/05/23 0418 12/04/23  0401 12/03/23  0410   INR 1.21* 1.25* 1.33*               Thank you for allowing me to participate in the care of your patient.      Sincerely,     Gus Nur MD   [facility]  cc:   Sabine Mohr, ANDREW CNP  [address], [address]  [place] [address]  "

## 2024-02-28 DIAGNOSIS — L08.9 WOUND INFECTION: ICD-10-CM

## 2024-02-28 DIAGNOSIS — Z95.1 S/P CABG (CORONARY ARTERY BYPASS GRAFT): ICD-10-CM

## 2024-02-28 DIAGNOSIS — T14.8XXA WOUND INFECTION: ICD-10-CM

## 2024-02-28 RX ORDER — DOXYCYCLINE HYCLATE 100 MG
100 TABLET ORAL 2 TIMES DAILY
Qty: 20 TABLET | Refills: 0 | Status: SHIPPED | OUTPATIENT
Start: 2024-02-28 | End: 2024-03-14

## 2024-02-29 ENCOUNTER — HOSPITAL ENCOUNTER (OUTPATIENT)
Dept: CARDIAC REHAB | Facility: HOSPITAL | Age: 49
Discharge: HOME | End: 2024-02-29
Attending: INTERNAL MEDICINE
Payer: COMMERCIAL

## 2024-02-29 PROCEDURE — 93798 PHYS/QHP OP CAR RHAB W/ECG: CPT

## 2024-03-07 ENCOUNTER — HOSPITAL ENCOUNTER (OUTPATIENT)
Dept: CARDIAC REHAB | Facility: HOSPITAL | Age: 49
Discharge: HOME | End: 2024-03-07
Attending: INTERNAL MEDICINE
Payer: COMMERCIAL

## 2024-03-07 PROCEDURE — 93798 PHYS/QHP OP CAR RHAB W/ECG: CPT

## 2024-03-12 ENCOUNTER — HOSPITAL ENCOUNTER (OUTPATIENT)
Dept: CARDIAC REHAB | Facility: HOSPITAL | Age: 49
Discharge: HOME | End: 2024-03-12
Attending: INTERNAL MEDICINE
Payer: COMMERCIAL

## 2024-03-12 PROCEDURE — 93798 PHYS/QHP OP CAR RHAB W/ECG: CPT

## 2024-03-14 ENCOUNTER — OFFICE VISIT (OUTPATIENT)
Dept: CARDIOLOGY | Facility: CLINIC | Age: 49
End: 2024-03-14
Payer: COMMERCIAL

## 2024-03-14 ENCOUNTER — HOSPITAL ENCOUNTER (OUTPATIENT)
Dept: CARDIAC REHAB | Facility: HOSPITAL | Age: 49
Discharge: HOME | End: 2024-03-14
Attending: INTERNAL MEDICINE
Payer: COMMERCIAL

## 2024-03-14 VITALS
SYSTOLIC BLOOD PRESSURE: 100 MMHG | RESPIRATION RATE: 16 BRPM | HEART RATE: 71 BPM | BODY MASS INDEX: 49.08 KG/M2 | OXYGEN SATURATION: 96 % | DIASTOLIC BLOOD PRESSURE: 62 MMHG | WEIGHT: 243 LBS

## 2024-03-14 DIAGNOSIS — Z95.1 S/P CABG (CORONARY ARTERY BYPASS GRAFT): Primary | ICD-10-CM

## 2024-03-14 PROCEDURE — 93798 PHYS/QHP OP CAR RHAB W/ECG: CPT

## 2024-03-14 PROCEDURE — 99212 OFFICE O/P EST SF 10 MIN: CPT | Performed by: THORACIC SURGERY (CARDIOTHORACIC VASCULAR SURGERY)

## 2024-03-14 NOTE — LETTER
3/14/2024    Cristina Alexis MD  [address] [facility] [address]    RE: Betty MARTINEZ Maxim       Dear Colleague,     I had the pleasure of seeing Betty Pan in the [facility].  The patient is well known to me.  She underwent a coronary artery bypass graftng procedure on 12-1-2023.  More recently she had small areas of her sternal wound open up and she has been treated with oral antibiotics  Today on physical examination:  The sternal wound has healed with keloid formation.  At the uppermost area of the wound there is a scab measuring no more than 1 cm in length.  There is no associated drainage or cellulitis.  She was instructed to return if any redness or drainage occurs.  Assessment: Complete resolution of probable stitch abscesses.  Return prn.      Thank you for allowing me to participate in the care of your patient.      Sincerely,     Angélica Rizvi MD     [facility]  cc:   No referring provider defined for this encounter.

## 2024-03-14 NOTE — PROGRESS NOTES
The patient is well known to me.  She underwent a coronary artery bypass graftng procedure on 12-1-2023.  More recently she had small areas of her sternal wound open up and she has been treated with oral antibiotics  Today on physical examination:  The sternal wound has healed with keloid formation.  At the uppermost area of the wound there is a scab measuring no more than 1 cm in length.  There is no associated drainage or cellulitis.  She was instructed to return if any redness or drainage occurs.  Assessment: Complete resolution of probable stitch abscesses.  Return prn.

## 2024-03-19 ENCOUNTER — HOSPITAL ENCOUNTER (OUTPATIENT)
Dept: CARDIAC REHAB | Facility: HOSPITAL | Age: 49
Discharge: HOME | End: 2024-03-19
Attending: INTERNAL MEDICINE
Payer: COMMERCIAL

## 2024-03-19 ENCOUNTER — OFFICE VISIT (OUTPATIENT)
Dept: CARDIOLOGY | Facility: CLINIC | Age: 49
End: 2024-03-19
Attending: NURSE PRACTITIONER
Payer: COMMERCIAL

## 2024-03-19 VITALS
BODY MASS INDEX: 49.08 KG/M2 | OXYGEN SATURATION: 97 % | SYSTOLIC BLOOD PRESSURE: 100 MMHG | DIASTOLIC BLOOD PRESSURE: 70 MMHG | RESPIRATION RATE: 18 BRPM | HEART RATE: 74 BPM | WEIGHT: 243 LBS

## 2024-03-19 DIAGNOSIS — G47.33 OSA (OBSTRUCTIVE SLEEP APNEA): ICD-10-CM

## 2024-03-19 DIAGNOSIS — I25.5 ISCHEMIC CARDIOMYOPATHY: ICD-10-CM

## 2024-03-19 DIAGNOSIS — I50.20 HEART FAILURE WITH REDUCED EJECTION FRACTION (H): Primary | ICD-10-CM

## 2024-03-19 DIAGNOSIS — I25.10 CORONARY ARTERY DISEASE INVOLVING NATIVE CORONARY ARTERY OF NATIVE HEART WITHOUT ANGINA PECTORIS: ICD-10-CM

## 2024-03-19 DIAGNOSIS — E66.813 CLASS 3 SEVERE OBESITY DUE TO EXCESS CALORIES WITHOUT SERIOUS COMORBIDITY WITH BODY MASS INDEX (BMI) OF 45.0 TO 49.9 IN ADULT (H): ICD-10-CM

## 2024-03-19 DIAGNOSIS — E66.01 CLASS 3 SEVERE OBESITY DUE TO EXCESS CALORIES WITHOUT SERIOUS COMORBIDITY WITH BODY MASS INDEX (BMI) OF 45.0 TO 49.9 IN ADULT (H): ICD-10-CM

## 2024-03-19 DIAGNOSIS — I10 HYPERTENSION, UNSPECIFIED TYPE: ICD-10-CM

## 2024-03-19 PROCEDURE — 99214 OFFICE O/P EST MOD 30 MIN: CPT | Performed by: NURSE PRACTITIONER

## 2024-03-19 PROCEDURE — 93798 PHYS/QHP OP CAR RHAB W/ECG: CPT

## 2024-03-19 NOTE — LETTER
3/19/2024    Cristina Alexis MD  [address] [facility] [address]    RE: Betty Pan       Dear Colleague,     I had the pleasure of seeing Betty Pan in the [facility].        Assessment/Recommendations   Assessment:    1. Ischemic cardiomyopathy, heart failure with reduced ejection fraction, ejection fraction 30-35%, NYHA class II: Compensated.  She has fatigue and dyspnea on exertion.  Her weight is stable.  She tries to follow a low-sodium diet.  She is supposed to schedule cardiac MRI which she has not done.  2.  Hypertension: Blood pressure 100/70  3.  MANINDER: Scheduled to see sleep medicine March 27. She was diagnosed with sleep apnea approximately 10 years ago and does not currently wear CPAP   4.  CAD:  Denies angina.  Status post four-vessel CABG on December 1.  She continues atorvastatin and aspirin.  She continues to participate in cardiac rehab  5.  Morbid obesity: BMI 49.08    Plan:  1.   Heart failure medications:  - Beta blocker therapy with metoprolol succinate 25 mg daily  - ARNI therapy with Entresto 24-26 mg twice a day  - SGLT2 inhibitor with Jardiance 10 mg daily  - Aldosterone antagonist with spironolactone 25 mg daily  - Diuretic therapy with bumetanide 2 mg daily  2.  Schedule cardiac MRI  3.  Continue current medications  4.  Continue monitoring weights and following a low-sodium diet  5.  Work on weight loss    Betty Pan will follow up with Dr. Nur pendant on cardiac MRI results and in the heart failure clinic in 2 months.     History of Present Illness/Subjective    Ms. Betty Pan is a 48 year old female seen at [facility] heart failure clinic today for continued follow-up.  She follows up for heart failure with reduced ejection fraction, ischemic cardiomyopathy.  She was hospitalized early December and underwent four-vessel CABG on December 1 with Dr. Rizvi.  She had a limited echocardiogram postsurgery which showed an ejection fraction  of 30 to 35%.  She has a past medical history significant for hypertension, prediabetes, morbid obesity, MANINDER not on CPAP, CAD with four-vessel CABG, hyperlipidemia.      Today, she has fatigue and dyspnea on exertion.  She denies lightheadedness, shortness of breath, orthopnea, PND, palpitations, chest pain, abdominal fullness/bloating, and lower extremity edema.      She is monitoring home weights which are stable between 240-243 pounds.  She is following a low sodium diet.  She participates in regular physical activity including cardiac rehab.       Physical Examination Review of Systems   /70   Pulse 74   Resp 18   Wt 110.2 kg (243 lb)   SpO2 97%   BMI 49.08 kg/m    Body mass index is 49.08 kg/m .  Wt Readings from Last 3 Encounters:   03/19/24 110.2 kg (243 lb)   03/14/24 110.2 kg (243 lb)   02/27/24 112 kg (247 lb)       General Appearance:   no acute distress   ENT/Mouth: No abnormalities   EYES:  no scleral icterus, normal conjunctivae   Neck: no thyromegaly   Chest/Lungs:   lungs are clear to auscultation, no rales or wheezing, equal chest wall expansion    Cardiovascular:   Regular. Normal first and second heart sounds with no murmurs, rubs, or gallops, no edema bilaterally    Abdomen:  Obese, bowel sounds are present   Extremities: no cyanosis or clubbing   Skin: warm   Neurologic: no tremors     Psychiatric: alert and oriented x3                                              Medical History  Surgical History Family History Social History   Past Medical History:   Diagnosis Date    Aspiration pneumonia of lower lobe, unspecified aspiration pneumonia type, unspecified laterality (H) 10/11/2023    Congestive heart failure (H)     Coronary artery disease     Heart failure with reduced ejection fraction (H) 10/27/2023    Hyperlipidemia     Hypertension, unspecified type 10/11/2023    Ischemic cardiomyopathy 10/27/2023    Obese     Obesity due to excess calories 10/27/2023    MANINDER (obstructive sleep  apnea) 10/27/2023    Pleural effusion 10/11/2023    Sleep apnea     Past Surgical History:   Procedure Laterality Date    CORONARY ANGIOGRAPHY ADULT ORDER      CORONARY ARTERY BYPASS      CORONARY ARTERY BYPASS GRAFT, WITH ENDOSCOPIC VESSEL PROCUREMENT N/A 12/01/2023    Procedure: CORONARY ARTERY BYPASS GRAFT TIMES FOUR ,LEFT INTERNAL MAMMARY ARTERY HARVEST, BILATERAL LOWER LEG ENDOSCOPIC VESSEL PROCUREMENT, EPIAORTIC ULTRASOUND;  Surgeon: Angélica Rizvi MD;  Location: [facility] [place] OR    CV CORONARY ANGIOGRAM N/A 10/13/2023    Procedure: Coronary Angiogram;  Surgeon: Roxana Melgoza MD;  Location: [facility] CATH LAB CV    CV RIGHT HEART CATH MEASUREMENTS RECORDED N/A 10/13/2023    Procedure: Right Heart Catheterization;  Surgeon: Roxana Melgoza MD;  Location: [facility] CATH LAB CV    TRANSESOPHAGEAL ECHOCARDIOGRAM INTRAOPERATIVE N/A 12/01/2023    Procedure: ANESTHESIA TRANSESOPHAGEAL ECHOCARDIOGRAM;  Surgeon: Angélica Rizvi MD;  Location: [facility] [place] OR    No family history on file. Social History     Socioeconomic History    Marital status: Single     Spouse name: Not on file    Number of children: Not on file    Years of education: Not on file    Highest education level: Not on file   Occupational History    Not on file   Tobacco Use    Smoking status: Never    Smokeless tobacco: Never   Vaping Use    Vaping Use: Never used   Substance and Sexual Activity    Alcohol use: Never    Drug use: Never    Sexual activity: Not on file   Other Topics Concern    Not on file   Social History Narrative    Not on file     Social Determinants of Health     Financial Resource Strain: Not on file   Food Insecurity: Not on file   Transportation Needs: Not on file   Physical Activity: Not on file   Stress: Not on file   Social Connections: Not on file   Interpersonal Safety: Not on file   Housing Stability: Not on file          Medications  Allergies   Current Outpatient Medications   Medication Sig Dispense Refill     acetaminophen (TYLENOL) 325 MG tablet Take 2 tablets (650 mg) by mouth every 6 hours as needed for other or mild pain 100 tablet 0    aspirin (ASA) 81 MG chewable tablet Take 1 tablet (81 mg) by mouth daily 90 tablet 3    atorvastatin (LIPITOR) 80 MG tablet Take 1 tablet (80 mg) by mouth daily 90 tablet 1    bumetanide (BUMEX) 2 MG tablet Take 1 tablet (2 mg) by mouth daily 90 tablet 1    cyanocobalamin (VITAMIN B-12) 1000 MCG tablet Take 1,000 mcg by mouth daily      empagliflozin (JARDIANCE) 10 MG TABS tablet Take 1 tablet (10 mg) by mouth daily 90 tablet 1    Ferrous Sulfate 324 (65 Fe) MG TBEC Take 1 tablet by mouth daily      metoprolol succinate ER (TOPROL XL) 25 MG 24 hr tablet Take 1 tablet (25 mg) by mouth daily 90 tablet 1    miconazole (MICATIN) 2 % external powder Apply topically 2 times daily Apply BID to perianal (gluteal cleft) after cleansing and drying well. Apply to gloved hand, then pat powder onto desired skin, Rub Into Skin To Activate 43 g 0    multivitamin, therapeutic (THERA-VIT) TABS tablet Take 1 tablet by mouth daily      pantoprazole (PROTONIX) 40 MG EC tablet Take 1 tablet (40 mg) by mouth daily 21 tablet 0    polyethylene glycol (MIRALAX) 17 GM/Dose powder Take 17 g by mouth daily as needed for constipation 510 g 0    sacubitril-valsartan (ENTRESTO) 24-26 MG per tablet Take 1 tablet by mouth 2 times daily 180 tablet 1    senna-docusate (SENOKOT-S/PERICOLACE) 8.6-50 MG tablet Take 1-2 tablets by mouth 2 times daily as needed for constipation 120 tablet 0    spironolactone (ALDACTONE) 25 MG tablet Take 1 tablet (25 mg) by mouth daily 90 tablet 3    No Known Allergies      Lab Results    Chemistry/lipid CBC Cardiac Enzymes/BNP/TSH/INR   Lab Results   Component Value Date    BUN 22.7 (H) 01/31/2024     01/31/2024    CO2 31 (H) 01/31/2024    Lab Results   Component Value Date    WBC 8.3 12/06/2023    HGB 8.3 (L) 12/06/2023    HCT 26.5 (L) 12/06/2023    MCV 93 12/06/2023      (L) 12/06/2023    Lab Results   Component Value Date    TSH 1.30 10/10/2023    INR 1.21 (H) 12/05/2023             This note has been dictated using voice recognition software. Any grammatical, typographical, or context distortions are unintentional and inherent to the software    30 minutes spent on the date of encounter doing chart review, review of outside records, review of test results, interpretation with above tests, patient visit, and documentation.                    Thank you for allowing me to participate in the care of your patient.      Sincerely,     ANDREW Florez CNP     [facility]  cc:   ANDREW Florez CNP  [address], [address]  [place] [id number]

## 2024-03-19 NOTE — PATIENT INSTRUCTIONS
Betty Pan,    It was a pleasure to see you today at [facility].     My recommendations after this visit include:  - Please follow up with Dr Nur depending the MRI results   - Please follow up with Sabine Mohr in 2 months  - Schedule cardiac MRI  - Sleep medicine scheduled March 27  - Continue current medications    Sabine Mohr, CNP

## 2024-03-19 NOTE — PROGRESS NOTES
Assessment/Recommendations   Assessment:    1. Ischemic cardiomyopathy, heart failure with reduced ejection fraction, ejection fraction 30-35%, NYHA class II: Compensated.  She has fatigue and dyspnea on exertion.  Her weight is stable.  She tries to follow a low-sodium diet.  She is supposed to schedule cardiac MRI which she has not done.  2.  Hypertension: Blood pressure 100/70  3.  MANINDER: Scheduled to see sleep medicine March 27. She was diagnosed with sleep apnea approximately 10 years ago and does not currently wear CPAP   4.  CAD:  Denies angina.  Status post four-vessel CABG on December 1.  She continues atorvastatin and aspirin.  She continues to participate in cardiac rehab  5.  Morbid obesity: BMI 49.08    Plan:  1.   Heart failure medications:  - Beta blocker therapy with metoprolol succinate 25 mg daily  - ARNI therapy with Entresto 24-26 mg twice a day  - SGLT2 inhibitor with Jardiance 10 mg daily  - Aldosterone antagonist with spironolactone 25 mg daily  - Diuretic therapy with bumetanide 2 mg daily  2.  Schedule cardiac MRI  3.  Continue current medications  4.  Continue monitoring weights and following a low-sodium diet  5.  Work on weight loss    Betty Pan will follow up with Dr. Nur pendant on cardiac MRI results and in the heart failure clinic in 2 months.     History of Present Illness/Subjective    Ms. Betty Pan is a 48 year old female seen at [facility] heart failure clinic today for continued follow-up.  She follows up for heart failure with reduced ejection fraction, ischemic cardiomyopathy.  She was hospitalized early December and underwent four-vessel CABG on December 1 with Dr. Rizvi.  She had a limited echocardiogram postsurgery which showed an ejection fraction of 30 to 35%.  She has a past medical history significant for hypertension, prediabetes, morbid obesity, MANINDER not on CPAP, CAD with four-vessel CABG, hyperlipidemia.      Today, she has fatigue and  dyspnea on exertion.  She denies lightheadedness, shortness of breath, orthopnea, PND, palpitations, chest pain, abdominal fullness/bloating, and lower extremity edema.      She is monitoring home weights which are stable between 240-243 pounds.  She is following a low sodium diet.  She participates in regular physical activity including cardiac rehab.       Physical Examination Review of Systems   /70   Pulse 74   Resp 18   Wt 110.2 kg (243 lb)   SpO2 97%   BMI 49.08 kg/m    Body mass index is 49.08 kg/m .  Wt Readings from Last 3 Encounters:   03/19/24 110.2 kg (243 lb)   03/14/24 110.2 kg (243 lb)   02/27/24 112 kg (247 lb)       General Appearance:   no acute distress   ENT/Mouth: No abnormalities   EYES:  no scleral icterus, normal conjunctivae   Neck: no thyromegaly   Chest/Lungs:   lungs are clear to auscultation, no rales or wheezing, equal chest wall expansion    Cardiovascular:   Regular. Normal first and second heart sounds with no murmurs, rubs, or gallops, no edema bilaterally    Abdomen:  Obese, bowel sounds are present   Extremities: no cyanosis or clubbing   Skin: warm   Neurologic: no tremors     Psychiatric: alert and oriented x3                                              Medical History  Surgical History Family History Social History   Past Medical History:   Diagnosis Date    Aspiration pneumonia of lower lobe, unspecified aspiration pneumonia type, unspecified laterality (H) 10/11/2023    Congestive heart failure (H)     Coronary artery disease     Heart failure with reduced ejection fraction (H) 10/27/2023    Hyperlipidemia     Hypertension, unspecified type 10/11/2023    Ischemic cardiomyopathy 10/27/2023    Obese     Obesity due to excess calories 10/27/2023    MANINDER (obstructive sleep apnea) 10/27/2023    Pleural effusion 10/11/2023    Sleep apnea     Past Surgical History:   Procedure Laterality Date    CORONARY ANGIOGRAPHY ADULT ORDER      CORONARY ARTERY BYPASS      CORONARY  ARTERY BYPASS GRAFT, WITH ENDOSCOPIC VESSEL PROCUREMENT N/A 12/01/2023    Procedure: CORONARY ARTERY BYPASS GRAFT TIMES FOUR ,LEFT INTERNAL MAMMARY ARTERY HARVEST, BILATERAL LOWER LEG ENDOSCOPIC VESSEL PROCUREMENT, EPIAORTIC ULTRASOUND;  Surgeon: Angélica Rizvi MD;  Location: [facility] OR    CV CORONARY ANGIOGRAM N/A 10/13/2023    Procedure: Coronary Angiogram;  Surgeon: Roxana Melgoza MD;  Location: [facility] CATH LAB CV    CV RIGHT HEART CATH MEASUREMENTS RECORDED N/A 10/13/2023    Procedure: Right Heart Catheterization;  Surgeon: Roxana Melgoza MD;  Location: [facility] CATH LAB CV    TRANSESOPHAGEAL ECHOCARDIOGRAM INTRAOPERATIVE N/A 12/01/2023    Procedure: ANESTHESIA TRANSESOPHAGEAL ECHOCARDIOGRAM;  Surgeon: Angélica Rizvi MD;  Location: [facility] OR    No family history on file. Social History     Socioeconomic History    Marital status: Single     Spouse name: Not on file    Number of children: Not on file    Years of education: Not on file    Highest education level: Not on file   Occupational History    Not on file   Tobacco Use    Smoking status: Never    Smokeless tobacco: Never   Vaping Use    Vaping Use: Never used   Substance and Sexual Activity    Alcohol use: Never    Drug use: Never    Sexual activity: Not on file   Other Topics Concern    Not on file   Social History Narrative    Not on file     Social Determinants of Health     Financial Resource Strain: Not on file   Food Insecurity: Not on file   Transportation Needs: Not on file   Physical Activity: Not on file   Stress: Not on file   Social Connections: Not on file   Interpersonal Safety: Not on file   Housing Stability: Not on file          Medications  Allergies   Current Outpatient Medications   Medication Sig Dispense Refill    acetaminophen (TYLENOL) 325 MG tablet Take 2 tablets (650 mg) by mouth every 6 hours as needed for other or mild pain 100 tablet 0    aspirin (ASA) 81 MG chewable tablet Take 1 tablet (81 mg) by  mouth daily 90 tablet 3    atorvastatin (LIPITOR) 80 MG tablet Take 1 tablet (80 mg) by mouth daily 90 tablet 1    bumetanide (BUMEX) 2 MG tablet Take 1 tablet (2 mg) by mouth daily 90 tablet 1    cyanocobalamin (VITAMIN B-12) 1000 MCG tablet Take 1,000 mcg by mouth daily      empagliflozin (JARDIANCE) 10 MG TABS tablet Take 1 tablet (10 mg) by mouth daily 90 tablet 1    Ferrous Sulfate 324 (65 Fe) MG TBEC Take 1 tablet by mouth daily      metoprolol succinate ER (TOPROL XL) 25 MG 24 hr tablet Take 1 tablet (25 mg) by mouth daily 90 tablet 1    miconazole (MICATIN) 2 % external powder Apply topically 2 times daily Apply BID to perianal (gluteal cleft) after cleansing and drying well. Apply to gloved hand, then pat powder onto desired skin, Rub Into Skin To Activate 43 g 0    multivitamin, therapeutic (THERA-VIT) TABS tablet Take 1 tablet by mouth daily      pantoprazole (PROTONIX) 40 MG EC tablet Take 1 tablet (40 mg) by mouth daily 21 tablet 0    polyethylene glycol (MIRALAX) 17 GM/Dose powder Take 17 g by mouth daily as needed for constipation 510 g 0    sacubitril-valsartan (ENTRESTO) 24-26 MG per tablet Take 1 tablet by mouth 2 times daily 180 tablet 1    senna-docusate (SENOKOT-S/PERICOLACE) 8.6-50 MG tablet Take 1-2 tablets by mouth 2 times daily as needed for constipation 120 tablet 0    spironolactone (ALDACTONE) 25 MG tablet Take 1 tablet (25 mg) by mouth daily 90 tablet 3    No Known Allergies      Lab Results    Chemistry/lipid CBC Cardiac Enzymes/BNP/TSH/INR   Lab Results   Component Value Date    BUN 22.7 (H) 01/31/2024     01/31/2024    CO2 31 (H) 01/31/2024    Lab Results   Component Value Date    WBC 8.3 12/06/2023    HGB 8.3 (L) 12/06/2023    HCT 26.5 (L) 12/06/2023    MCV 93 12/06/2023     (L) 12/06/2023    Lab Results   Component Value Date    TSH 1.30 10/10/2023    INR 1.21 (H) 12/05/2023             This note has been dictated using voice recognition software. Any grammatical,  typographical, or context distortions are unintentional and inherent to the software    30 minutes spent on the date of encounter doing chart review, review of outside records, review of test results, interpretation with above tests, patient visit, and documentation.

## 2024-03-21 ENCOUNTER — HOSPITAL ENCOUNTER (OUTPATIENT)
Dept: CARDIAC REHAB | Facility: HOSPITAL | Age: 49
Discharge: HOME | End: 2024-03-21
Attending: INTERNAL MEDICINE
Payer: COMMERCIAL

## 2024-03-21 PROCEDURE — 93798 PHYS/QHP OP CAR RHAB W/ECG: CPT | Performed by: OCCUPATIONAL THERAPIST

## 2024-03-27 ENCOUNTER — OFFICE VISIT (OUTPATIENT)
Dept: SLEEP MEDICINE | Facility: CLINIC | Age: 49
End: 2024-03-27
Attending: NURSE PRACTITIONER
Payer: COMMERCIAL

## 2024-03-27 VITALS
DIASTOLIC BLOOD PRESSURE: 81 MMHG | WEIGHT: 249.8 LBS | SYSTOLIC BLOOD PRESSURE: 127 MMHG | OXYGEN SATURATION: 95 % | HEIGHT: 59 IN | HEART RATE: 62 BPM | BODY MASS INDEX: 50.36 KG/M2

## 2024-03-27 DIAGNOSIS — R06.83 SNORING: ICD-10-CM

## 2024-03-27 DIAGNOSIS — I50.20 HEART FAILURE WITH REDUCED EJECTION FRACTION (H): ICD-10-CM

## 2024-03-27 DIAGNOSIS — Z86.69 HISTORY OF OBSTRUCTIVE SLEEP APNEA: Primary | ICD-10-CM

## 2024-03-27 DIAGNOSIS — R51.9 SLEEP RELATED HEADACHES: ICD-10-CM

## 2024-03-27 DIAGNOSIS — I25.10 CORONARY ARTERY DISEASE INVOLVING NATIVE CORONARY ARTERY OF NATIVE HEART WITHOUT ANGINA PECTORIS: ICD-10-CM

## 2024-03-27 PROCEDURE — 99204 OFFICE O/P NEW MOD 45 MIN: CPT | Performed by: INTERNAL MEDICINE

## 2024-03-27 ASSESSMENT — SLEEP AND FATIGUE QUESTIONNAIRES
HOW LIKELY ARE YOU TO NOD OFF OR FALL ASLEEP WHILE LYING DOWN TO REST IN THE AFTERNOON WHEN CIRCUMSTANCES PERMIT: MODERATE CHANCE OF DOZING
HOW LIKELY ARE YOU TO NOD OFF OR FALL ASLEEP WHILE SITTING QUIETLY AFTER LUNCH WITHOUT ALCOHOL: WOULD NEVER DOZE
HOW LIKELY ARE YOU TO NOD OFF OR FALL ASLEEP WHILE SITTING AND TALKING TO SOMEONE: WOULD NEVER DOZE
HOW LIKELY ARE YOU TO NOD OFF OR FALL ASLEEP WHILE SITTING INACTIVE IN A PUBLIC PLACE: WOULD NEVER DOZE
HOW LIKELY ARE YOU TO NOD OFF OR FALL ASLEEP WHILE SITTING AND READING: MODERATE CHANCE OF DOZING
HOW LIKELY ARE YOU TO NOD OFF OR FALL ASLEEP IN A CAR, WHILE STOPPED FOR A FEW MINUTES IN TRAFFIC: SLIGHT CHANCE OF DOZING
HOW LIKELY ARE YOU TO NOD OFF OR FALL ASLEEP WHILE WATCHING TV: MODERATE CHANCE OF DOZING
HOW LIKELY ARE YOU TO NOD OFF OR FALL ASLEEP WHEN YOU ARE A PASSENGER IN A CAR FOR AN HOUR WITHOUT A BREAK: MODERATE CHANCE OF DOZING

## 2024-03-27 NOTE — PROGRESS NOTES
Additional 15 minutes on the date of service was spent performing the following:    -Preparing to see the patient  -Obtaining and/or reviewing separately obtained history   -Ordering medications, tests, or procedures   -Documenting clinical information in the electronic or other health record     Thank you for the opportunity to participate in the care of Betty Pan.     She is a 48 year old y/o female patient who comes to the sleep medicine clinic for respiratory of care for obstructive sleep apnea.  The patient states that she was diagnosed with obstructive sleep apnea from outside sleep center more than 10 years ago and placed on CPAP therapy.  She quit CPAP many years ago.  However she has since been diagnosed with ischemic cardiomyopathy with heart failure with reduced ejection fraction.  Furthermore she also has coronary artery disease.  While the patient denies any episodes of daytime sleepiness, she has been told that she still snores.  She also frequently wakes up with headaches.  She has been advised by her cardiologist to get on treatment for CPAP therapy if she still qualifies.     Assessment and Plan:  In summary Betty Pan is a 48 year old year old female who is here for follow up.    1. History of obstructive sleep apnea/Hypersomnia/Snoring/Heart failure with reduced ejection fraction (H)/Coronary artery disease involving native coronary artery of native heart without angina pectoris  I will put an order for the patient to repeat her sleep study with a split-night nocturnal polysomnogram.  Return to clinic after the sleep study has been completed.  - Sleep Study Referral  - Comprehensive Sleep Study; Future      Lab reviewed: Discussed with patient.    Sleep-Wake Cycle:    TIME IN BED:    1) Work/School Days:    Do you work or go to school? Yes   What time do you usually get into bed? 11   About how long does it take you to fall asleep? 5   How often do you have trouble falling asleep? 3   How  often do you wake up during the night? 3   Do you work days/evenings/nights/rotating shifts? Days   What wakes you up at night? Use the bathroom   How often do you have trouble falling back to sleep?    About how long does it take to fall back to sleep?    What do you usually do if you have trouble getting back to sleep? watch tv   What time do you usually get out of bed to start your day? 530   Do you use an alarm? Yes   2) Weekends/Non-work Days/All Other Days    What time do you usually get into bed? 12   About how long does it take you to fall asleep? 5   What time do you usually get out of bed to start your day? 530   Do you use an alarm? Yes   SLEEP NEED    On average, about how much sleep do you think you get? 4   About how much sleep do you think you need? 8   SLEEP POSITION    Which sleep positions do you prefer? Side   Do you do any of the following activities in bed?    How often do you take a nap on purpose?    About how long are your naps?    Do you feel better after naps? Yes   How often do you doze off unintentionally? 1   Have you ever had a driving accident or near-miss due to sleepiness/drowsiness? No     Stop Bang Questionnaire    Question 3/27/2024  8:18 AM CDT - Filed by Patient   Do you SNORE loudly (louder than talking or loud enough to be heard through closed doors)? Yes   Do you often feel TIRED, fatigued, or sleepy during daytime? Yes   Has anyone OBSERVED you stop breathing during your sleep? Yes   Do you have or are you being treated for high blood PRESSURE? No   BMI more than 35kg/m2? Yes   AGE over 50 years old? Yes   NECK circumference > 16 inches (40cm)? Yes   GENDER: Male? No   STOP-BANG Total Score (range: 0 - 8) 7 (High risk of MANINDER) Critical        THONG:  THONG Total Score: 11  Total score - [place]: 9 (3/27/2024  8:17 AM)    Failed to redirect to the [facility]eline [facility] of the [facility].   Patient Active Problem List   Diagnosis    Pyelonephritis, acute    Peripheral edema     Pleural effusion    Renal mass    Blood glucose elevated    Renal insufficiency    Aspiration pneumonia of lower lobe, unspecified aspiration pneumonia type, unspecified laterality (H)    Hypertension, unspecified type    Acute systolic congestive heart failure (H)    Heart failure with reduced ejection fraction (H)    Ischemic cardiomyopathy    Obesity due to excess calories    Coronary artery disease involving native coronary artery of native heart without angina pectoris    MANINDER (obstructive sleep apnea)    Prediabetes       Past Medical History:   Diagnosis Date    Aspiration pneumonia of lower lobe, unspecified aspiration pneumonia type, unspecified laterality (H) 10/11/2023    Congestive heart failure (H)     Coronary artery disease     Heart failure with reduced ejection fraction (H) 10/27/2023    Hyperlipidemia     Hypertension, unspecified type 10/11/2023    Ischemic cardiomyopathy 10/27/2023    Obese     Obesity due to excess calories 10/27/2023    MANINDER (obstructive sleep apnea) 10/27/2023    Pleural effusion 10/11/2023    Sleep apnea        Past Surgical History:   Procedure Laterality Date    CORONARY ANGIOGRAPHY ADULT ORDER      CORONARY ARTERY BYPASS      CORONARY ARTERY BYPASS GRAFT, WITH ENDOSCOPIC VESSEL PROCUREMENT N/A 12/01/2023    Procedure: CORONARY ARTERY BYPASS GRAFT TIMES FOUR ,LEFT INTERNAL MAMMARY ARTERY HARVEST, BILATERAL LOWER LEG ENDOSCOPIC VESSEL PROCUREMENT, EPIAORTIC ULTRASOUND;  Surgeon: Angélica Rizvi MD;  Location: [facility] Main OR    CV CORONARY ANGIOGRAM N/A 10/13/2023    Procedure: Coronary Angiogram;  Surgeon: Roxana Melgoza MD;  Location: [facility] CATH LAB CV    CV RIGHT HEART CATH MEASUREMENTS RECORDED N/A 10/13/2023    Procedure: Right Heart Catheterization;  Surgeon: Roxana Melgoza MD;  Location: [facility] CATH LAB CV    TRANSESOPHAGEAL ECHOCARDIOGRAM INTRAOPERATIVE N/A 12/01/2023    Procedure: ANESTHESIA TRANSESOPHAGEAL ECHOCARDIOGRAM;  Surgeon: Angélica Rizvi  "MD;  Location: [facility] Main OR       Current Outpatient Medications   Medication Sig Dispense Refill    acetaminophen (TYLENOL) 325 MG tablet Take 2 tablets (650 mg) by mouth every 6 hours as needed for other or mild pain 100 tablet 0    aspirin (ASA) 81 MG chewable tablet Take 1 tablet (81 mg) by mouth daily 90 tablet 3    atorvastatin (LIPITOR) 80 MG tablet Take 1 tablet (80 mg) by mouth daily 90 tablet 1    bumetanide (BUMEX) 2 MG tablet Take 1 tablet (2 mg) by mouth daily 90 tablet 1    cyanocobalamin (VITAMIN B-12) 1000 MCG tablet Take 1,000 mcg by mouth daily      empagliflozin (JARDIANCE) 10 MG TABS tablet Take 1 tablet (10 mg) by mouth daily 90 tablet 1    Ferrous Sulfate 324 (65 Fe) MG TBEC Take 1 tablet by mouth daily      metoprolol succinate ER (TOPROL XL) 25 MG 24 hr tablet Take 1 tablet (25 mg) by mouth daily 90 tablet 1    miconazole (MICATIN) 2 % external powder Apply topically 2 times daily Apply BID to perianal (gluteal cleft) after cleansing and drying well. Apply to gloved hand, then pat powder onto desired skin, Rub Into Skin To Activate 43 g 0    multivitamin, therapeutic (THERA-VIT) TABS tablet Take 1 tablet by mouth daily      pantoprazole (PROTONIX) 40 MG EC tablet Take 1 tablet (40 mg) by mouth daily 21 tablet 0    polyethylene glycol (MIRALAX) 17 GM/Dose powder Take 17 g by mouth daily as needed for constipation 510 g 0    sacubitril-valsartan (ENTRESTO) 24-26 MG per tablet Take 1 tablet by mouth 2 times daily 180 tablet 1    senna-docusate (SENOKOT-S/PERICOLACE) 8.6-50 MG tablet Take 1-2 tablets by mouth 2 times daily as needed for constipation 120 tablet 0    spironolactone (ALDACTONE) 25 MG tablet Take 1 tablet (25 mg) by mouth daily 90 tablet 3       No Known Allergies    Physical Exam:  /81   Pulse 62   Ht 1.499 m (4' 11\")   Wt 113.3 kg (249 lb 12.8 oz)   SpO2 95%   BMI 50.45 kg/m    BMI:Body mass index is 50.45 kg/m .   GEN: NAD, morbidly obese  Head: " "Normocephalic.  EYES:EOMI  Psych: normal mood, normal affect  Patient declined any other physical exams.    Labs/Studies:      Lab Results   Component Value Date    PH 7.44 12/06/2023    PH 7.40 12/05/2023    PO2 90 12/04/2023    PO2 92 (H) 12/03/2023    PCO2 55 (H) 12/04/2023    PCO2 56 (H) 12/03/2023    HCO3 28 12/04/2023    HCO3 27 12/03/2023    STERLING 1.5 12/04/2023    STERLING 0.6 12/03/2023     Lab Results   Component Value Date    TSH 1.30 10/10/2023     Lab Results   Component Value Date     (H) 01/31/2024     (H) 12/06/2023     Lab Results   Component Value Date    HGB 8.3 (L) 12/06/2023    HGB 7.9 (L) 12/05/2023     Lab Results   Component Value Date    BUN 22.7 (H) 01/31/2024    BUN 24.6 (H) 12/06/2023    CR 0.89 01/31/2024    CR 0.78 12/06/2023     Lab Results   Component Value Date    AST 18 12/06/2023    AST 15 12/05/2023    ALT 7 12/06/2023    ALT 6 12/05/2023    ALKPHOS 38 (L) 12/06/2023    ALKPHOS 34 (L) 12/05/2023    BILITOTAL 1.2 12/06/2023    BILITOTAL 0.7 12/05/2023     No results found for: \"UAMP\", \"UBARB\", \"BENZODIAZEUR\", \"UCANN\", \"UCOC\", \"OPIT\", \"UPCP\"    Recent Labs   Lab Test 01/31/24  0855 12/06/23  0421    140   POTASSIUM 3.7 3.6   CHLORIDE 103 97*   CO2 31* 32*   ANIONGAP 9 11   * 109*   BUN 22.7* 24.6*   CR 0.89 0.78   KHADIJAH 9.6 9.4       Patient verbalized understanding of these issues, agrees with the plan and all questions were answered today. Patient was given an opportuntity to voice any other symptoms or concerns not listed above. Patient did not have any other symptoms or concerns.      Ancelmo Mullins DO  Board Certified in Internal Medicine and Sleep Medicine    (Note created with Dragon voice recognition and unintended spelling errors and word substitutions may occur)     Audio and visual devices were used for this virtual clinic visit with permission from patient.    "

## 2024-03-27 NOTE — NURSING NOTE
"Chief Complaint   Patient presents with    Sleep Apnea     Has not used CPAP in 10+ years        Initial /81   Pulse 62   Ht 1.499 m (4' 11\")   Wt 113.3 kg (249 lb 12.8 oz)   SpO2 95%   BMI 50.45 kg/m   Estimated body mass index is 50.45 kg/m  as calculated from the following:    Height as of this encounter: 1.499 m (4' 11\").    Weight as of this encounter: 113.3 kg (249 lb 12.8 oz).    Medication Reconciliation: complete    Neck circumference:  inches / 44 centimeters.    DME: n/a    Sleep study and follow-up appt scheduled.      Va Guevara MA  "

## 2024-03-28 ENCOUNTER — HOSPITAL ENCOUNTER (OUTPATIENT)
Dept: CARDIAC REHAB | Facility: HOSPITAL | Age: 49
Discharge: HOME | End: 2024-03-28
Attending: INTERNAL MEDICINE
Payer: COMMERCIAL

## 2024-03-28 PROCEDURE — 93798 PHYS/QHP OP CAR RHAB W/ECG: CPT

## 2024-04-02 ENCOUNTER — HOSPITAL ENCOUNTER (OUTPATIENT)
Dept: CARDIAC REHAB | Facility: HOSPITAL | Age: 49
Discharge: HOME | End: 2024-04-02
Attending: INTERNAL MEDICINE
Payer: COMMERCIAL

## 2024-04-02 PROCEDURE — 93798 PHYS/QHP OP CAR RHAB W/ECG: CPT

## 2024-04-04 ENCOUNTER — HOSPITAL ENCOUNTER (OUTPATIENT)
Dept: CARDIAC REHAB | Facility: HOSPITAL | Age: 49
Discharge: HOME | End: 2024-04-04
Attending: INTERNAL MEDICINE
Payer: COMMERCIAL

## 2024-04-04 PROCEDURE — 93798 PHYS/QHP OP CAR RHAB W/ECG: CPT

## 2024-04-26 ENCOUNTER — HOSPITAL ENCOUNTER (OUTPATIENT)
Dept: MRI IMAGING | Facility: HOSPITAL | Age: 49
Discharge: HOME | End: 2024-04-26
Attending: INTERNAL MEDICINE
Payer: COMMERCIAL

## 2024-04-26 DIAGNOSIS — I25.5 ISCHEMIC CARDIOMYOPATHY: ICD-10-CM

## 2024-04-26 DIAGNOSIS — I50.20 HEART FAILURE WITH REDUCED EJECTION FRACTION (H): ICD-10-CM

## 2024-04-26 PROCEDURE — 75561 CARDIAC MRI FOR MORPH W/DYE: CPT

## 2024-04-26 PROCEDURE — 999N000122 MR MYOCARDIUM  OVERREAD

## 2024-04-26 PROCEDURE — 75561 CARDIAC MRI FOR MORPH W/DYE: CPT | Mod: 26 | Performed by: GENERAL ACUTE CARE HOSPITAL

## 2024-04-26 PROCEDURE — A9585 GADOBUTROL INJECTION: HCPCS | Performed by: INTERNAL MEDICINE

## 2024-04-26 PROCEDURE — 255N000002 HC RX 255 OP 636: Performed by: INTERNAL MEDICINE

## 2024-04-26 RX ORDER — GADOBUTROL 604.72 MG/ML
20 INJECTION INTRAVENOUS ONCE
Status: COMPLETED | OUTPATIENT
Start: 2024-04-26 | End: 2024-04-26

## 2024-04-26 RX ADMIN — GADOBUTROL 20 ML: 604.72 INJECTION INTRAVENOUS at 09:03

## 2024-04-29 ENCOUNTER — TELEPHONE (OUTPATIENT)
Dept: CARDIOLOGY | Facility: CLINIC | Age: 49
End: 2024-04-29
Payer: COMMERCIAL

## 2024-04-29 DIAGNOSIS — I50.20 HEART FAILURE WITH REDUCED EJECTION FRACTION (H): ICD-10-CM

## 2024-04-29 RX ORDER — SACUBITRIL AND VALSARTAN 24; 26 MG/1; MG/1
1 TABLET, FILM COATED ORAL 2 TIMES DAILY
Qty: 180 TABLET | Refills: 3 | Status: SHIPPED | OUTPATIENT
Start: 2024-04-29

## 2024-04-29 NOTE — TELEPHONE ENCOUNTER
[facility] Call Center    Phone Message    May a detailed message be left on voicemail: yes     Reason for Call: Medication Refill Request    Has the patient contacted the pharmacy for the refill? Yes   Name of medication being requested: sacubitril-valsartan (ENTRESTO) 24-26 MG per tablet [[id number]] (Order [id number]   Provider who prescribed the medication: Fani   [id number] [address]      Date medication is needed: pt is out of medication since last week. [facility] has been telling pt they have no refills and did not notify her until a week after she requested medications.      Action Taken: Other: cardiology     Travel Screening: Not Applicable                                                               Thank you!  [facility]

## 2024-04-30 ENCOUNTER — TELEPHONE (OUTPATIENT)
Dept: SLEEP MEDICINE | Facility: CLINIC | Age: 49
End: 2024-04-30
Payer: COMMERCIAL

## 2024-04-30 NOTE — TELEPHONE ENCOUNTER
Voicemail was left regarding sleep appointment location change.  Call back number for central scheduling left. [phone]

## 2024-05-01 DIAGNOSIS — I50.20 HEART FAILURE WITH REDUCED EJECTION FRACTION (H): Primary | ICD-10-CM

## 2024-05-01 NOTE — PROGRESS NOTES
Sabine Mohr, APRN CNP  5/1/2024 11:42 AM CDT Back to Top      Left ventricular ejection fraction has improved to normal.  Can discuss further at follow-up with me.  Please have her continue current medications.  Would recommend follow-up with Dr. Nur in 6 months.  Thank you           ==[place] placed. [place] [place]

## 2024-05-02 ENCOUNTER — THERAPY VISIT (OUTPATIENT)
Dept: SLEEP MEDICINE | Facility: CLINIC | Age: 49
End: 2024-05-02
Payer: COMMERCIAL

## 2024-05-02 DIAGNOSIS — I50.20 HEART FAILURE WITH REDUCED EJECTION FRACTION (H): ICD-10-CM

## 2024-05-02 DIAGNOSIS — I25.10 CORONARY ARTERY DISEASE INVOLVING NATIVE CORONARY ARTERY OF NATIVE HEART WITHOUT ANGINA PECTORIS: ICD-10-CM

## 2024-05-02 DIAGNOSIS — R51.9 SLEEP RELATED HEADACHES: ICD-10-CM

## 2024-05-02 DIAGNOSIS — Z86.69 HISTORY OF OBSTRUCTIVE SLEEP APNEA: ICD-10-CM

## 2024-05-02 DIAGNOSIS — R06.83 SNORING: ICD-10-CM

## 2024-05-02 PROCEDURE — 95811 POLYSOM 6/>YRS CPAP 4/> PARM: CPT | Performed by: INTERNAL MEDICINE

## 2024-05-03 NOTE — PROGRESS NOTES
Completed a split night PSG per provider order.    Preliminary AHI 26.1.  A final therapeutic PAP pressure was achieved.    Supine REM was seen on therapeutic pressure.    Patient reports feeling refreshed in AM.

## 2024-05-06 LAB — SLPCOMP: NORMAL

## 2024-05-16 ENCOUNTER — OFFICE VISIT (OUTPATIENT)
Dept: CARDIOLOGY | Facility: CLINIC | Age: 49
End: 2024-05-16
Attending: NURSE PRACTITIONER
Payer: COMMERCIAL

## 2024-05-16 VITALS
BODY MASS INDEX: 49.89 KG/M2 | DIASTOLIC BLOOD PRESSURE: 60 MMHG | HEART RATE: 68 BPM | RESPIRATION RATE: 16 BRPM | OXYGEN SATURATION: 96 % | WEIGHT: 247 LBS | SYSTOLIC BLOOD PRESSURE: 94 MMHG

## 2024-05-16 DIAGNOSIS — E66.01 CLASS 3 SEVERE OBESITY DUE TO EXCESS CALORIES WITH BODY MASS INDEX (BMI) OF 45.0 TO 49.9 IN ADULT, UNSPECIFIED WHETHER SERIOUS COMORBIDITY PRESENT (H): ICD-10-CM

## 2024-05-16 DIAGNOSIS — Z95.1 S/P CABG (CORONARY ARTERY BYPASS GRAFT): ICD-10-CM

## 2024-05-16 DIAGNOSIS — I50.21 ACUTE SYSTOLIC CONGESTIVE HEART FAILURE (H): ICD-10-CM

## 2024-05-16 DIAGNOSIS — I50.20 HEART FAILURE WITH REDUCED EJECTION FRACTION (H): Primary | ICD-10-CM

## 2024-05-16 DIAGNOSIS — G47.33 OSA (OBSTRUCTIVE SLEEP APNEA): ICD-10-CM

## 2024-05-16 DIAGNOSIS — E66.813 CLASS 3 SEVERE OBESITY DUE TO EXCESS CALORIES WITH BODY MASS INDEX (BMI) OF 45.0 TO 49.9 IN ADULT, UNSPECIFIED WHETHER SERIOUS COMORBIDITY PRESENT (H): ICD-10-CM

## 2024-05-16 DIAGNOSIS — I10 HYPERTENSION, UNSPECIFIED TYPE: ICD-10-CM

## 2024-05-16 DIAGNOSIS — I25.10 CORONARY ARTERY DISEASE INVOLVING NATIVE CORONARY ARTERY OF NATIVE HEART WITHOUT ANGINA PECTORIS: ICD-10-CM

## 2024-05-16 DIAGNOSIS — I25.5 ISCHEMIC CARDIOMYOPATHY: ICD-10-CM

## 2024-05-16 LAB
ANION GAP SERPL CALCULATED.3IONS-SCNC: 12 MMOL/L (ref 7–15)
BUN SERPL-MCNC: 24.8 MG/DL (ref 6–20)
CALCIUM SERPL-MCNC: 9.7 MG/DL (ref 8.6–10)
CHLORIDE SERPL-SCNC: 101 MMOL/L (ref 98–107)
CREAT SERPL-MCNC: 1.02 MG/DL (ref 0.51–0.95)
DEPRECATED HCO3 PLAS-SCNC: 27 MMOL/L (ref 22–29)
EGFRCR SERPLBLD CKD-EPI 2021: 67 ML/MIN/1.73M2
GLUCOSE SERPL-MCNC: 124 MG/DL (ref 70–99)
POTASSIUM SERPL-SCNC: 4 MMOL/L (ref 3.4–5.3)
SODIUM SERPL-SCNC: 140 MMOL/L (ref 135–145)

## 2024-05-16 PROCEDURE — 99214 OFFICE O/P EST MOD 30 MIN: CPT | Performed by: NURSE PRACTITIONER

## 2024-05-16 PROCEDURE — G2211 COMPLEX E/M VISIT ADD ON: HCPCS | Performed by: NURSE PRACTITIONER

## 2024-05-16 PROCEDURE — 80048 BASIC METABOLIC PNL TOTAL CA: CPT | Performed by: NURSE PRACTITIONER

## 2024-05-16 PROCEDURE — 36415 COLL VENOUS BLD VENIPUNCTURE: CPT | Performed by: NURSE PRACTITIONER

## 2024-05-16 RX ORDER — ATORVASTATIN CALCIUM 80 MG/1
80 TABLET, FILM COATED ORAL DAILY
Qty: 90 TABLET | Refills: 1 | Status: SHIPPED | OUTPATIENT
Start: 2024-05-16 | End: 2024-07-08

## 2024-05-16 RX ORDER — BUMETANIDE 2 MG/1
2 TABLET ORAL DAILY
Qty: 90 TABLET | Refills: 1 | Status: SHIPPED | OUTPATIENT
Start: 2024-05-16

## 2024-05-16 NOTE — PROGRESS NOTES
Assessment/Recommendations   Assessment:    1. Ischemic cardiomyopathy, heart failure with improved ejection fraction, ejection fraction 55%, NYHA class II: Compensated.  She has fatigue and occasional dyspnea on exertion.  Her weight is stable.  She tries to follow a low-sodium diet.  We discussed the results of her cardiac MRI today.  2.  CAD:  Denies angina.  Status post four-vessel CABG on December 1.  She continues atorvastatin and aspirin.    3.  Morbid obesity: BMI 49.89  4.  Hypertension: Blood pressure 86/60 with a recheck of 94/60  5.  MANINDER: Sleep study was completed on May 2 and looks like she has moderate sleep apnea.  She meets with sleep medicine May 21 to review results    Plan:  1.  BMP pending  2.  Continue current medications  3.  Continue monitoring weights and follow a low-sodium diet  4.  Work on weight loss    Betty Pan will follow up with Dr. Nur in November.     History of Present Illness/Subjective    Ms. Betty Pan is a 48 year old female seen at [facility] heart failure clinic today for continued follow-up.  She follows up for heart failure with improved ejection fraction, ischemic cardiomyopathy.  She had a cardiac MRI April 26, 2024 which showed an improved ejection fraction of 55% from previously 30 to 35%.  She has a past medical history significant for hypertension, prediabetes, morbid obesity, MANINDER, CAD, status post four-vessel CABG December 2023, hyperlipidemia.    Today, she has mild fatigue and occasional dyspnea on exertion.  She denies lightheadedness, shortness of breath, orthopnea, PND, palpitations, chest pain, abdominal fullness/bloating, and lower extremity edema.      She is monitoring home weights which are stable.  She is following a low sodium diet.       Cardiac MRI: 4/26/2024-reviewed  SUMMARY   ==========================================================================================================     1.  Left ventricular chamber size and  wall thickness are normal. There is anterior, anteroseptal and apical  wall hypokinesis although overall systolic function is preserved. The quantified left ventricular ejection  fraction is 55%.   2.  There is a medium-sized area of mostly subendocardial scar in the territory of the left anterior  descending artery with transmural scar localized to the distal septal wall and the apex.  3.  Right ventricular chamber size and systolic function are normal. The quantified right ventricular  ejection fraction is 61%.   4.  No significant valvular abnormalities.     Physical Examination Review of Systems   BP 94/60   Pulse 68   Resp 16   Wt 112 kg (247 lb)   SpO2 96%   BMI 49.89 kg/m    Body mass index is 49.89 kg/m .  Wt Readings from Last 3 Encounters:   05/16/24 112 kg (247 lb)   03/27/24 113.3 kg (249 lb 12.8 oz)   03/19/24 110.2 kg (243 lb)       General Appearance:   no acute distress, obese   ENT/Mouth: No abnormalities   EYES:  no scleral icterus, normal conjunctivae   Neck: no thyromegaly   Chest/Lungs:   lungs are clear to auscultation, no rales or wheezing, equal chest wall expansion    Cardiovascular:   Regular. Normal first and second heart sounds with no murmurs, rubs, or gallops, no edema bilaterally    Abdomen:  bowel sounds are present   Extremities: no cyanosis or clubbing   Skin: warm   Neurologic: no tremors     Psychiatric: alert and oriented x3                                              Medical History  Surgical History Family History Social History   Past Medical History:   Diagnosis Date    Aspiration pneumonia of lower lobe, unspecified aspiration pneumonia type, unspecified laterality (H) 10/11/2023    Congestive heart failure (H)     Coronary artery disease     Heart failure with reduced ejection fraction (H) 10/27/2023    Hyperlipidemia     Hypertension, unspecified type 10/11/2023    Ischemic cardiomyopathy 10/27/2023    Obese     Obesity due to excess calories 10/27/2023    MANINDER  (obstructive sleep apnea) 10/27/2023    Pleural effusion 10/11/2023    Sleep apnea     Past Surgical History:   Procedure Laterality Date    CORONARY ANGIOGRAPHY ADULT ORDER      CORONARY ARTERY BYPASS      CORONARY ARTERY BYPASS GRAFT, WITH ENDOSCOPIC VESSEL PROCUREMENT N/A 12/01/2023    Procedure: CORONARY ARTERY BYPASS GRAFT TIMES FOUR ,LEFT INTERNAL MAMMARY ARTERY HARVEST, BILATERAL LOWER LEG ENDOSCOPIC VESSEL PROCUREMENT, EPIAORTIC ULTRASOUND;  Surgeon: Angélica Rizvi MD;  Location: [facility] OR    CV CORONARY ANGIOGRAM N/A 10/13/2023    Procedure: Coronary Angiogram;  Surgeon: Roxana Melgoza MD;  Location: [facility] CATH LAB CV    CV RIGHT HEART CATH MEASUREMENTS RECORDED N/A 10/13/2023    Procedure: Right Heart Catheterization;  Surgeon: Roxana Melgoza MD;  Location: [facility] CATH LAB CV    TRANSESOPHAGEAL ECHOCARDIOGRAM INTRAOPERATIVE N/A 12/01/2023    Procedure: ANESTHESIA TRANSESOPHAGEAL ECHOCARDIOGRAM;  Surgeon: Angélica Rizvi MD;  Location: [facility] OR    Family History   Problem Relation Age of Onset    Snoring Mother     Snoring Father     Social History     Socioeconomic History    Marital status: Single     Spouse name: Not on file    Number of children: Not on file    Years of education: Not on file    Highest education level: Not on file   Occupational History    Not on file   Tobacco Use    Smoking status: Never    Smokeless tobacco: Never   Vaping Use    Vaping status: Never Used   Substance and Sexual Activity    Alcohol use: Never    Drug use: Never    Sexual activity: Not on file   Other Topics Concern    Not on file   Social History Narrative    Not on file     Social Determinants of Health     Financial Resource Strain: Low Risk  (10/25/2023)    Received from [facility], [facility]    Financial Resource Strain     Difficulty of Paying Living Expenses: 3     Difficulty of Paying Living Expenses: Not on  file   Food Insecurity: No Food Insecurity (10/25/2023)    Received from [facility], [facility]    Food Insecurity     Worried About Running Out of Food in the Last Year: 1   Transportation Needs: No Transportation Needs (10/25/2023)    Received from [facility], [facility]    Transportation Needs     Lack of Transportation (Medical): 1   Physical Activity: Not on file   Stress: Not on file   Social Connections: Socially Integrated (10/25/2023)    Received from [facility], [facility]    Social Connections     Frequency of Communication with Friends and Family: 0   Interpersonal Safety: Not on file   Housing Stability: Low Risk  (10/25/2023)    Received from [facility], [facility]    Housing Stability     Unable to Pay for Housing in the Last Year: 1          Medications  Allergies   Current Outpatient Medications   Medication Sig Dispense Refill    acetaminophen (TYLENOL) 325 MG tablet Take 2 tablets (650 mg) by mouth every 6 hours as needed for other or mild pain 100 tablet 0    aspirin (ASA) 81 MG chewable tablet Take 1 tablet (81 mg) by mouth daily 90 tablet 3    atorvastatin (LIPITOR) 80 MG tablet Take 1 tablet (80 mg) by mouth daily 90 tablet 1    bumetanide (BUMEX) 2 MG tablet Take 1 tablet (2 mg) by mouth daily 90 tablet 1    cyanocobalamin (VITAMIN B-12) 1000 MCG tablet Take 1,000 mcg by mouth daily      empagliflozin (JARDIANCE) 10 MG TABS tablet Take 1 tablet (10 mg) by mouth daily 90 tablet 1    Ferrous Sulfate 324 (65 Fe) MG TBEC Take 1 tablet by mouth daily      metoprolol succinate ER (TOPROL XL) 25 MG 24 hr tablet Take 1 tablet (25 mg) by mouth daily 90 tablet 1    miconazole (MICATIN) 2 % external powder Apply topically 2 times daily  Apply BID to perianal (gluteal cleft) after cleansing and drying well. Apply to gloved hand, then pat powder onto desired skin, Rub Into Skin To Activate (Patient taking differently: Apply topically as needed Apply BID to perianal (gluteal cleft) after cleansing and drying well. Apply to gloved hand, then pat powder onto desired skin, Rub Into Skin To Activate) 43 g 0    multivitamin, therapeutic (THERA-VIT) TABS tablet Take 1 tablet by mouth daily      polyethylene glycol (MIRALAX) 17 GM/Dose powder Take 17 g by mouth daily as needed for constipation 510 g 0    sacubitril-valsartan (ENTRESTO) 24-26 MG per tablet Take 1 tablet by mouth 2 times daily 180 tablet 3    senna-docusate (SENOKOT-S/PERICOLACE) 8.6-50 MG tablet Take 1-2 tablets by mouth 2 times daily as needed for constipation 120 tablet 0    spironolactone (ALDACTONE) 25 MG tablet Take 1 tablet (25 mg) by mouth daily 90 tablet 3    No Known Allergies      Lab Results    Chemistry/lipid CBC Cardiac Enzymes/BNP/TSH/INR   Lab Results   Component Value Date    BUN 22.7 (H) 01/31/2024     01/31/2024    CO2 31 (H) 01/31/2024    Lab Results   Component Value Date    WBC 8.3 12/06/2023    HGB 8.3 (L) 12/06/2023    HCT 26.5 (L) 12/06/2023    MCV 93 12/06/2023     (L) 12/06/2023    Lab Results   Component Value Date    TSH 1.30 10/10/2023    INR 1.21 (H) 12/05/2023             This note has been dictated using voice recognition software. Any grammatical, typographical, or context distortions are unintentional and inherent to the software    30 minutes spent on the date of encounter doing chart review, review of outside records, review of test results, interpretation with above tests, patient visit, and documentation.

## 2024-05-16 NOTE — LETTER
5/16/2024    Cristina Alexis MD  [address] [facility] [address]    RE: Betty Pan       Dear Colleague,     I had the pleasure of seeing Betty Pan in the [facility].        Assessment/Recommendations   Assessment:    1. Ischemic cardiomyopathy, heart failure with improved ejection fraction, ejection fraction 55%, NYHA class II: Compensated.  She has fatigue and occasional dyspnea on exertion.  Her weight is stable.  She tries to follow a low-sodium diet.  We discussed the results of her cardiac MRI today.  2.  CAD:  Denies angina.  Status post four-vessel CABG on December 1.  She continues atorvastatin and aspirin.    3.  Morbid obesity: BMI 49.89  4.  Hypertension: Blood pressure 86/60 with a recheck of 94/60  5.  MANINDER: Sleep study was completed on May 2 and looks like she has moderate sleep apnea.  She meets with sleep medicine May 21 to review results    Plan:  1.  BMP pending  2.  Continue current medications  3.  Continue monitoring weights and follow a low-sodium diet  4.  Work on weight loss    Betty Pan will follow up with Dr. Nur in November.     History of Present Illness/Subjective    Ms. Betty Pan is a 48 year old female seen at [facility] heart failure clinic today for continued follow-up.  She follows up for heart failure with improved ejection fraction, ischemic cardiomyopathy.  She had a cardiac MRI April 26, 2024 which showed an improved ejection fraction of 55% from previously 30 to 35%.  She has a past medical history significant for hypertension, prediabetes, morbid obesity, MANINDER, CAD, status post four-vessel CABG December 2023, hyperlipidemia.    Today, she has mild fatigue and occasional dyspnea on exertion.  She denies lightheadedness, shortness of breath, orthopnea, PND, palpitations, chest pain, abdominal fullness/bloating, and lower extremity edema.      She is monitoring home weights which are stable.  She is following a low sodium  diet.       Cardiac MRI: 4/26/2024-reviewed  SUMMARY   ==========================================================================================================     1.  Left ventricular chamber size and wall thickness are normal. There is anterior, anteroseptal and apical  wall hypokinesis although overall systolic function is preserved. The quantified left ventricular ejection  fraction is 55%.   2.  There is a medium-sized area of mostly subendocardial scar in the territory of the left anterior  descending artery with transmural scar localized to the distal septal wall and the apex.  3.  Right ventricular chamber size and systolic function are normal. The quantified right ventricular  ejection fraction is 61%.   4.  No significant valvular abnormalities.     Physical Examination Review of Systems   BP 94/60   Pulse 68   Resp 16   Wt 112 kg (247 lb)   SpO2 96%   BMI 49.89 kg/m    Body mass index is 49.89 kg/m .  Wt Readings from Last 3 Encounters:   05/16/24 112 kg (247 lb)   03/27/24 113.3 kg (249 lb 12.8 oz)   03/19/24 110.2 kg (243 lb)       General Appearance:   no acute distress, obese   ENT/Mouth: No abnormalities   EYES:  no scleral icterus, normal conjunctivae   Neck: no thyromegaly   Chest/Lungs:   lungs are clear to auscultation, no rales or wheezing, equal chest wall expansion    Cardiovascular:   Regular. Normal first and second heart sounds with no murmurs, rubs, or gallops, no edema bilaterally    Abdomen:  bowel sounds are present   Extremities: no cyanosis or clubbing   Skin: warm   Neurologic: no tremors     Psychiatric: alert and oriented x3                                              Medical History  Surgical History Family History Social History   Past Medical History:   Diagnosis Date    Aspiration pneumonia of lower lobe, unspecified aspiration pneumonia type, unspecified laterality (H) 10/11/2023    Congestive heart failure (H)     Coronary artery disease     Heart failure with reduced  ejection fraction (H) 10/27/2023    Hyperlipidemia     Hypertension, unspecified type 10/11/2023    Ischemic cardiomyopathy 10/27/2023    Obese     Obesity due to excess calories 10/27/2023    MAINNDER (obstructive sleep apnea) 10/27/2023    Pleural effusion 10/11/2023    Sleep apnea     Past Surgical History:   Procedure Laterality Date    CORONARY ANGIOGRAPHY ADULT ORDER      CORONARY ARTERY BYPASS      CORONARY ARTERY BYPASS GRAFT, WITH ENDOSCOPIC VESSEL PROCUREMENT N/A 12/01/2023    Procedure: CORONARY ARTERY BYPASS GRAFT TIMES FOUR ,LEFT INTERNAL MAMMARY ARTERY HARVEST, BILATERAL LOWER LEG ENDOSCOPIC VESSEL PROCUREMENT, EPIAORTIC ULTRASOUND;  Surgeon: Angélica Rizvi MD;  Location: [facility] OR    CV CORONARY ANGIOGRAM N/A 10/13/2023    Procedure: Coronary Angiogram;  Surgeon: Roxana Melgoza MD;  Location: [facility] CATH LAB CV    CV RIGHT HEART CATH MEASUREMENTS RECORDED N/A 10/13/2023    Procedure: Right Heart Catheterization;  Surgeon: Roxana Melgoza MD;  Location: [facility] CATH LAB CV    TRANSESOPHAGEAL ECHOCARDIOGRAM INTRAOPERATIVE N/A 12/01/2023    Procedure: ANESTHESIA TRANSESOPHAGEAL ECHOCARDIOGRAM;  Surgeon: Angélica Rizvi MD;  Location: [facility] OR    Family History   Problem Relation Age of Onset    Snoring Mother     Snoring Father     Social History     Socioeconomic History    Marital status: Single     Spouse name: Not on file    Number of children: Not on file    Years of education: Not on file    Highest education level: Not on file   Occupational History    Not on file   Tobacco Use    Smoking status: Never    Smokeless tobacco: Never   Vaping Use    Vaping status: Never Used   Substance and Sexual Activity    Alcohol use: Never    Drug use: Never    Sexual activity: Not on file   Other Topics Concern    Not on file   Social History Narrative    Not on file     Social Determinants of Health     Financial Resource Strain: Low Risk  (10/25/2023)    Received from [facility] [facility], [facility]    Financial Resource Strain     Difficulty of Paying Living Expenses: 3     Difficulty of Paying Living Expenses: Not on file   Food Insecurity: No Food Insecurity (10/25/2023)    Received from [facility], [facility]    Food Insecurity     Worried About Running Out of Food in the Last Year: 1   Transportation Needs: No Transportation Needs (10/25/2023)    Received from [facility], [facility]    Transportation Needs     Lack of Transportation (Medical): 1   Physical Activity: Not on file   Stress: Not on file   Social Connections: Socially Integrated (10/25/2023)    Received from [facility], [facility]    Social Connections     Frequency of Communication with Friends and Family: 0   Interpersonal Safety: Not on file   Housing Stability: Low Risk  (10/25/2023)    Received from [facility], [facility]    Housing Stability     Unable to Pay for Housing in the Last Year: 1          Medications  Allergies   Current Outpatient Medications   Medication Sig Dispense Refill    acetaminophen (TYLENOL) 325 MG tablet Take 2 tablets (650 mg) by mouth every 6 hours as needed for other or mild pain 100 tablet 0    aspirin (ASA) 81 MG chewable tablet Take 1 tablet (81 mg) by mouth daily 90 tablet 3    atorvastatin (LIPITOR) 80 MG tablet Take 1 tablet (80 mg) by mouth daily 90 tablet 1    bumetanide (BUMEX) 2 MG tablet Take 1 tablet (2 mg) by mouth daily 90 tablet 1    cyanocobalamin (VITAMIN B-12) 1000 MCG tablet Take 1,000 mcg by mouth daily      empagliflozin (JARDIANCE) 10 MG TABS tablet Take 1 tablet (10 mg) by mouth daily 90 tablet 1    Ferrous Sulfate 324 (65 Fe) MG TBEC Take 1 tablet  by mouth daily      metoprolol succinate ER (TOPROL XL) 25 MG 24 hr tablet Take 1 tablet (25 mg) by mouth daily 90 tablet 1    miconazole (MICATIN) 2 % external powder Apply topically 2 times daily Apply BID to perianal (gluteal cleft) after cleansing and drying well. Apply to gloved hand, then pat powder onto desired skin, Rub Into Skin To Activate (Patient taking differently: Apply topically as needed Apply BID to perianal (gluteal cleft) after cleansing and drying well. Apply to gloved hand, then pat powder onto desired skin, Rub Into Skin To Activate) 43 g 0    multivitamin, therapeutic (THERA-VIT) TABS tablet Take 1 tablet by mouth daily      polyethylene glycol (MIRALAX) 17 GM/Dose powder Take 17 g by mouth daily as needed for constipation 510 g 0    sacubitril-valsartan (ENTRESTO) 24-26 MG per tablet Take 1 tablet by mouth 2 times daily 180 tablet 3    senna-docusate (SENOKOT-S/PERICOLACE) 8.6-50 MG tablet Take 1-2 tablets by mouth 2 times daily as needed for constipation 120 tablet 0    spironolactone (ALDACTONE) 25 MG tablet Take 1 tablet (25 mg) by mouth daily 90 tablet 3    No Known Allergies      Lab Results    Chemistry/lipid CBC Cardiac Enzymes/BNP/TSH/INR   Lab Results   Component Value Date    BUN 22.7 (H) 01/31/2024     01/31/2024    CO2 31 (H) 01/31/2024    Lab Results   Component Value Date    WBC 8.3 12/06/2023    HGB 8.3 (L) 12/06/2023    HCT 26.5 (L) 12/06/2023    MCV 93 12/06/2023     (L) 12/06/2023    Lab Results   Component Value Date    TSH 1.30 10/10/2023    INR 1.21 (H) 12/05/2023             This note has been dictated using voice recognition software. Any grammatical, typographical, or context distortions are unintentional and inherent to the software    30 minutes spent on the date of encounter doing chart review, review of outside records, review of test results, interpretation with above tests, patient visit, and documentation.                Thank you for allowing me to  participate in the care of your patient.      Sincerely,     ANDREW Florez CNP     [facility]  cc:   ANDREW Florez CNP  [address], [address]  [place] [address]

## 2024-05-16 NOTE — PATIENT INSTRUCTIONS
Betty Pan,    It was a pleasure to see you today at [facility] HEART [facility].     My recommendations after this visit include:  - Please follow up with Dr Nur in November   - Continue current medications  - I have checked labs    Sabine Mohr, CNP

## 2024-05-21 ENCOUNTER — OFFICE VISIT (OUTPATIENT)
Dept: SLEEP MEDICINE | Facility: CLINIC | Age: 49
End: 2024-05-21
Payer: COMMERCIAL

## 2024-05-21 VITALS
OXYGEN SATURATION: 98 % | BODY MASS INDEX: 50 KG/M2 | SYSTOLIC BLOOD PRESSURE: 133 MMHG | HEIGHT: 59 IN | HEART RATE: 55 BPM | WEIGHT: 248 LBS | DIASTOLIC BLOOD PRESSURE: 72 MMHG

## 2024-05-21 DIAGNOSIS — G47.33 OSA (OBSTRUCTIVE SLEEP APNEA): Primary | ICD-10-CM

## 2024-05-21 PROCEDURE — 99213 OFFICE O/P EST LOW 20 MIN: CPT | Performed by: INTERNAL MEDICINE

## 2024-05-21 ASSESSMENT — SLEEP AND FATIGUE QUESTIONNAIRES
HOW LIKELY ARE YOU TO NOD OFF OR FALL ASLEEP IN A CAR, WHILE STOPPED FOR A FEW MINUTES IN TRAFFIC: SLIGHT CHANCE OF DOZING
HOW LIKELY ARE YOU TO NOD OFF OR FALL ASLEEP WHILE WATCHING TV: HIGH CHANCE OF DOZING
HOW LIKELY ARE YOU TO NOD OFF OR FALL ASLEEP WHILE SITTING QUIETLY AFTER LUNCH WITHOUT ALCOHOL: WOULD NEVER DOZE
HOW LIKELY ARE YOU TO NOD OFF OR FALL ASLEEP WHILE SITTING AND TALKING TO SOMEONE: WOULD NEVER DOZE
HOW LIKELY ARE YOU TO NOD OFF OR FALL ASLEEP WHILE SITTING AND READING: HIGH CHANCE OF DOZING
HOW LIKELY ARE YOU TO NOD OFF OR FALL ASLEEP WHILE SITTING INACTIVE IN A PUBLIC PLACE: WOULD NEVER DOZE
HOW LIKELY ARE YOU TO NOD OFF OR FALL ASLEEP WHEN YOU ARE A PASSENGER IN A CAR FOR AN HOUR WITHOUT A BREAK: HIGH CHANCE OF DOZING
HOW LIKELY ARE YOU TO NOD OFF OR FALL ASLEEP WHILE LYING DOWN TO REST IN THE AFTERNOON WHEN CIRCUMSTANCES PERMIT: HIGH CHANCE OF DOZING

## 2024-05-21 NOTE — PATIENT INSTRUCTIONS
"     What is sleep apnea?     Sleep apnea is a condition that makes you stop breathing for short periods while you are asleep. There are 2 types of sleep apnea. One is called \"obstructive sleep apnea,\" and the other is called \"central sleep apnea.\"  In obstructive sleep apnea, you stop breathing because your throat narrows or closes (figure 1). In central sleep apnea, you stop breathing because your brain does not send the right signals to your muscles to make you breathe. When people talk about sleep apnea, they are usually referring to obstructive sleep apnea, which is what this article is about.  People with sleep apnea do not know that they stop breathing when they are asleep. But they do sometimes wake up startled or gasping for breath. They also often hear from loved ones that they snore.  What are the symptoms of sleep apnea? -- The main symptoms of sleep apnea are loud snoring, tiredness, and daytime sleepiness. Other symptoms can include:  ?Restless sleep  ?Waking up choking or gasping  ?Morning headaches, dry mouth, or sore throat  ?Waking up often to urinate  ?Waking up feeling unrested or groggy  ?Trouble thinking clearly or remembering things  Some people with sleep apnea don't have symptoms, or they don't know they have them. They might figure that it's normal to be tired or to snore a lot.  Should I see a doctor or nurse? -- Yes. If you think you might have sleep apnea, see your doctor.  Is there a test for sleep apnea? -- Yes. If your doctor or nurse suspects you have sleep apnea, he or she might send you for a \"sleep study.\" Sleep studies can sometimes be done at home, but they are usually done in a sleep lab. For the study, you spend the night in the lab, and you are hooked up to different machines that monitor your heart rate, breathing, and other body functions. The results of the test will tell your doctor or nurse if you have the disorder.  Is there anything I can do on my own to help my sleep " "apnea? -- Yes. Here are some things that might help:  ?Stay off your back when sleeping. (This is not always practical, because people cannot control their position while asleep. Plus, it only helps some people.)  ?Lose weight, if you are overweight  ?Avoid alcohol, because it can make sleep apnea worse  How is sleep apnea treated? -- The most effective treatment for sleep apnea is a device that keeps your airway open while you sleep. Treatment with this device is called \"continuous positive airway pressure,\" or CPAP. People getting CPAP wear a face mask at night that keeps them breathing (figure 2).  If your doctor or nurse recommends a CPAP machine, try to be patient about using it. The mask might seem uncomfortable to wear at first, and the machine might seem noisy, but using the machine can really pay off. People with sleep apnea who use a CPAP machine feel more rested and generally feel better.  There is also another device that you wear in your mouth called an \"oral appliance\" or \"mandibular advancement device.\" It also helps keep your airway open while you sleep.  In rare cases, when nothing else helps, doctors recommend surgery to keep the airway open. Surgery to do this is not always effective, and even when it is, the problem can come back.  Is sleep apnea dangerous? -- It can be. People with sleep apnea do not get good-quality sleep, so they are often tired and not alert. This puts them at risk for car accidents and other types of accidents. Plus, studies show that people with sleep apnea are more likely than others to have high blood pressure, heart attacks, and other serious heart problems. In people with severe sleep apnea, getting treated (for example, with a CPAP machine) can help prevent some of these problems.     GRAPHICS  Airway in a person with sleep apnea    Normally when a person sleeps, the airway remains open, and air can pass from the nose and mouth to the lungs. In a person with sleep " apnea, parts of the throat and mouth drop into the airway and block off the flow of air. This can cause loud snoring and interrupt breathing for short periods.  Graphic 99531 Version 5.0      Continuous positive airway pressure (CPAP) for sleep apnea    The CPAP mask gently blows air into your nose while you sleep. It puts just enough pressure on your airway to keep it from closing. The mask in this picture fits over just the nose. Other CPAP devices have masks that fit over the nose and mouth.

## 2024-05-21 NOTE — NURSING NOTE
"Chief Complaint   Patient presents with    Study Results       Initial /72   Pulse 55   Ht 1.499 m (4' 11.02\")   Wt 112.5 kg (248 lb)   SpO2 98%   BMI 50.06 kg/m   Estimated body mass index is 50.06 kg/m  as calculated from the following:    Height as of this encounter: 1.499 m (4' 11.02\").    Weight as of this encounter: 112.5 kg (248 lb).    Medication Reconciliation: complete    Neck circumference: 17.32 inches / 44 centimeters.    Dominique Neri CMA on 5/21/2024 at 3:50 PM       "

## 2024-05-21 NOTE — PROGRESS NOTES
Additional 10 minutes on the date of service was spent performing the following:    -Preparing to see the patient  -Ordering medications, tests, or procedures   -Documenting clinical information in the electronic or other health record     Thank you for the opportunity to participate in the care of Betty Pan.     She is a 48 year old  female patient who comes to the sleep medicine clinic for review of sleep study results. The patient had a sleep study performed on 05/02/2024 (AHI= 26.6).  The patient's respiratory events were worse in stage REM sleep.  Optimal pressure was achieved at a CPAP pressure of 13 cmH2O.    Assessment and Plan:  In summary Betty Pan is a 48 year old year old female here for review of sleep study results.  1. Obstructive Sleep Apnea  We had an extensive conversation to review the results of her sleep study and to  her on the importance of treating sleep apnea. Patient decided to proceed with CPAP. She will start using the device as soon as she receives it with the intention to use if for the entire night. We discussed some tips to increase PAP tolerance as well as the normal curve of adaptation. CPAP is going to provide improved respiratory function during the night but it can cause some sleep disruption that tends to improve with continuous usage. She should return to the clinic in 7 weeks to review compliance and efficacy monitoring. Since the patient does not have any preference, we will use [employer] as the [employer] equipment company.     THONG:  THONG Total Score: 10  Total score - [place]: 13 (5/21/2024  3:42 PM)    Patient Active Problem List   Diagnosis    Pyelonephritis, acute    Peripheral edema    Pleural effusion    Renal mass    Blood glucose elevated    Renal insufficiency    Aspiration pneumonia of lower lobe, unspecified aspiration pneumonia type, unspecified laterality (H)    Hypertension, unspecified type    Acute systolic congestive heart  failure (H)    Heart failure with reduced ejection fraction (H)    Ischemic cardiomyopathy    Obesity due to excess calories    Coronary artery disease involving native coronary artery of native heart without angina pectoris    MANINDER (obstructive sleep apnea)    Prediabetes       Current Outpatient Medications   Medication Sig Dispense Refill    acetaminophen (TYLENOL) 325 MG tablet Take 2 tablets (650 mg) by mouth every 6 hours as needed for other or mild pain 100 tablet 0    aspirin (ASA) 81 MG chewable tablet Take 1 tablet (81 mg) by mouth daily 90 tablet 3    atorvastatin (LIPITOR) 80 MG tablet Take 1 tablet (80 mg) by mouth daily 90 tablet 1    bumetanide (BUMEX) 2 MG tablet Take 1 tablet (2 mg) by mouth daily 90 tablet 1    cyanocobalamin (VITAMIN B-12) 1000 MCG tablet Take 1,000 mcg by mouth daily      empagliflozin (JARDIANCE) 10 MG TABS tablet Take 1 tablet (10 mg) by mouth daily 90 tablet 1    Ferrous Sulfate 324 (65 Fe) MG TBEC Take 1 tablet by mouth daily      metoprolol succinate ER (TOPROL XL) 25 MG 24 hr tablet Take 1 tablet (25 mg) by mouth daily 90 tablet 1    miconazole (MICATIN) 2 % external powder Apply topically 2 times daily Apply BID to perianal (gluteal cleft) after cleansing and drying well. Apply to gloved hand, then pat powder onto desired skin, Rub Into Skin To Activate (Patient taking differently: Apply topically as needed Apply BID to perianal (gluteal cleft) after cleansing and drying well. Apply to gloved hand, then pat powder onto desired skin, Rub Into Skin To Activate) 43 g 0    multivitamin, therapeutic (THERA-VIT) TABS tablet Take 1 tablet by mouth daily      polyethylene glycol (MIRALAX) 17 GM/Dose powder Take 17 g by mouth daily as needed for constipation 510 g 0    sacubitril-valsartan (ENTRESTO) 24-26 MG per tablet Take 1 tablet by mouth 2 times daily 180 tablet 3    senna-docusate (SENOKOT-S/PERICOLACE) 8.6-50 MG tablet Take 1-2 tablets by mouth 2 times daily as needed for  "constipation 120 tablet 0    spironolactone (ALDACTONE) 25 MG tablet Take 1 tablet (25 mg) by mouth daily 90 tablet 3       No Known Allergies    Physical Exam:  /72   Pulse 55   Ht 1.499 m (4' 11.02\")   Wt 112.5 kg (248 lb)   SpO2 98%   BMI 50.06 kg/m    BMI:Body mass index is 50.06 kg/m .   GEN: NAD, morbidly obese  Head: Normocephalic.  EYES: EOMI  Psych: normal mood, normal affect     Labs/Studies:  - We reviewed the results of the overnight study as described on the HPI.     Patient verbalized understanding of these issues, agrees with the plan and all questions were answered today. Patient was given an opportuntity to voice any other symptoms or concerns not listed above. Patient did not have any other symptoms or concerns.      Ancelmo Mullins DO  Board Certified in Internal Medicine and Sleep Medicine      (Note created with Dragon voice recognition and unintended spelling errors and word substitutions may occur)   "

## 2024-05-23 ENCOUNTER — TELEPHONE (OUTPATIENT)
Dept: SLEEP MEDICINE | Facility: CLINIC | Age: 49
End: 2024-05-23
Payer: COMMERCIAL

## 2024-05-23 NOTE — TELEPHONE ENCOUNTER
Left message for patient to return call to schedule CPAP setup. I also let patient know that [employer] has her  as 1975 so she may want to call them to get it corrected in their system so all her claims go through. I let her know that [employer] billing sent her a notification about the  issue back in April.

## 2024-06-10 ENCOUNTER — DOCUMENTATION ONLY (OUTPATIENT)
Dept: SLEEP MEDICINE | Facility: CLINIC | Age: 49
End: 2024-06-10
Payer: COMMERCIAL

## 2024-06-10 DIAGNOSIS — G47.33 OBSTRUCTIVE SLEEP APNEA (ADULT) (PEDIATRIC): Primary | ICD-10-CM

## 2024-06-10 NOTE — PROGRESS NOTES
Patient was offered choice of vendor and chose [facility].  Patient Betty Pan was set up at [place] [facility] on Zofia 10, 2024. Patient received a [employer] Airsense 11 Pressures were set at  13-15 cm H2O.   Patient s ramp is 5 cm H2O for Auto and FLEX/EPR is EPR, 3.  Patient received a [employer] Mask name: Vitera Full Face mask size Small, heated tubing and heated humidifier.  Patient has the following compliance requirements: usage only.  Patient has a follow up on 9/3/24 with Dr. Mullins.    WU AMOS

## 2024-06-13 ENCOUNTER — DOCUMENTATION ONLY (OUTPATIENT)
Dept: SLEEP MEDICINE | Facility: CLINIC | Age: 49
End: 2024-06-13
Payer: COMMERCIAL

## 2024-06-13 NOTE — PROGRESS NOTES
3 day Sleep therapy management telephone visit    Diagnostic AHI: PS.6         Confirmed with patient at time of call- N/A Patient is still interested in STM service       Message left for patient to return call    Order settings:  CPAP MIN CPAP MAX   13 cm H2O 15 cm H2O       Device settings:  CPAP MIN CPAP MAX EPR RESMED SOFT RESPONSE SETTING   13.0 cm  H20 15.0 cm  H20 THREE OFF       Compliance 66 %    Assessment: Nighty usage most nights over four hours     Patient has the following upcoming sleep appts:  Future Sleep Appointments         Provider Department    9/3/2024 2:30 PM (Arrive by 2:15 PM) Ancelmo Mullins DO [facility] [place]            Replacement device: No  STM ordered by provider: Yes     Total time spent on accessing and  interpreting remote patient PAP therapy data  10 minutes    Total time spent counseling, coaching  and reviewing PAP therapy data with patient  1 minutes    [id number] no

## 2024-06-26 ENCOUNTER — DOCUMENTATION ONLY (OUTPATIENT)
Dept: SLEEP MEDICINE | Facility: CLINIC | Age: 49
End: 2024-06-26
Payer: COMMERCIAL

## 2024-06-26 NOTE — PROGRESS NOTES
14  DAY STM VISIT    Diagnostic AHI: PS.6         MESSAGE LEFT FOR PT TO RETURN CALL    Assessment: Pt not meeting objective benchmarks for leak     Action plan: waiting for patient to return call.         PAP settings:  CPAP MIN CPAP MAX 95TH % PRESSURE EPR RESMED SOFT RESPONSE SETTING   13.0 cm  H20 15.0 cm  H20 13.3 cm  H20  THREE OFF     Objective measures: 14 day rolling measures   COMPLIANCE LEAK AHI AVERAGE USE IN MINUTES   78 % 38.58 1.12 287   GOAL >70% GOAL < 24 LPM GOAL <5 GOAL >240      Patient has the following upcoming sleep appts:  Future Sleep Appointments         Provider Department    9/3/2024 2:30 PM (Arrive by 2:15 PM) Ancelmo Mullins DO [facility] [place]            Total time spent on accessing and interpreting remote patient PAP therapy data  10 minutes    Total time spent counseling, coaching  and reviewing PAP therapy data with patient  1 minute    [id number]  [id number]  no (3 day STM)

## 2024-07-03 DIAGNOSIS — Z95.1 S/P CABG (CORONARY ARTERY BYPASS GRAFT): ICD-10-CM

## 2024-07-08 RX ORDER — ATORVASTATIN CALCIUM 80 MG/1
80 TABLET, FILM COATED ORAL DAILY
Qty: 90 TABLET | Refills: 1 | Status: SHIPPED | OUTPATIENT
Start: 2024-07-08

## 2024-10-27 ENCOUNTER — HEALTH MAINTENANCE LETTER (OUTPATIENT)
Age: 49
End: 2024-10-27

## 2025-01-09 NOTE — PATIENT INSTRUCTIONS
"Your wound is now healed    Will have you apply Calmoseptine to the buttocks 1-2 times per day    Try to stay out of the recliner chair    Position off the buttocks as able    You will need to reposition frequently; keep direct pressure off of the wound or reddened area    Reposition Every 1-2 hours while in bed; left, right; supine; repeat    Reposition Every 15 minutes while up in a chair; chair push ups    Stand every 30 minutes while in a chair if able    Obtain a seat cushion; can get a 4\" high density foam at [employer] [facility]; or [employer] or J-cushion from [employer]; or we can order a ROHO cushion if you qualify for this type of cushion        4\" high density foam cushion    [employer] Cushion      Joshua Cushion        Roho Cushion        [facility]   [address]             [address]                 [address]     [place] [address]      [place] [address]         [place] [address]  [phone]  [phone]     [phone]               Fax: [phone]  [email or web address]  [address] [place] [id number]  [phone]  [phone]  [email or web address]    Shaun Robbins  [phone]  [email or web address]    [facility]   [phone]    [facility]  [address] [place] [address]    [phone]          Chair Pushups        Bed Positioning           " General: He is not in acute distress.     Appearance: He is well-developed. He is not diaphoretic.   HENT:      Head: Normocephalic and atraumatic.      Right Ear: External ear normal.      Left Ear: External ear normal.      Nose: Nose normal.   Eyes:      General: No scleral icterus.        Right eye: No discharge.         Left eye: No discharge.      Conjunctiva/sclera: Conjunctivae normal.   Cardiovascular:      Rate and Rhythm: Normal rate and regular rhythm.      Heart sounds: Normal heart sounds. No murmur heard.  Pulmonary:      Effort: Pulmonary effort is normal.      Breath sounds: Normal breath sounds.   Musculoskeletal:      Cervical back: Normal range of motion.   Skin:     General: Skin is warm and dry.      Findings: No erythema or rash.   Neurological:      Mental Status: He is alert and oriented to person, place, and time.      Cranial Nerves: No cranial nerve deficit.   Psychiatric:         Behavior: Behavior normal.         Thought Content: Thought content normal.         Judgment: Judgment normal.         Results           Assessment/Plan:     Assessment & Plan  1. Progressive Multiple Sclerosis.  He reports that his walking is significantly impaired despite undergoing physical therapy. He has been on Ocrevus infusions since 2017 without issues. He is currently on gabapentin 200 mg at night for nerve pain and sleep but has experienced difficulty walking, which he attributes to the medication. Gabapentin will be discontinued, and a trial of pregabalin 50 mg will be initiated, with the potential to increase the dosage if necessary. A 30-day prescription for pregabalin will be provided and sent to the clinic pharmacy.    2. Sleep issues.  He has difficulty falling asleep and staying asleep, which may be exacerbated by gabapentin. Pregabalin 50 mg will be initiated to manage nerve pain and improve sleep. If sleep issues persist, alternative medications will be considered.    3. Appetite issues.  He

## (undated) DEVICE — CATH SUCTION 14FR W/O CTRL DYND41962

## (undated) DEVICE — SU DERMABOND ADVANCED .7ML DNX12

## (undated) DEVICE — HEMOSTASIS BONE OSTENE 2.5G SYNTHETIC 1503832

## (undated) DEVICE — SU PLEDGET SOFT TFE 3/8"X3/26"X1/16" PCP40

## (undated) DEVICE — SPONGE LAP 18X18" X8435

## (undated) DEVICE — TUBING INSUFFLATION W/FILTER 28-0207

## (undated) DEVICE — PACK MAJOR BASIN 673

## (undated) DEVICE — DRSG DRAIN 4X4" 7086

## (undated) DEVICE — SOL WATER IRRIG 1000ML BOTTLE 2F7114

## (undated) DEVICE — ESU PENCIL SMOKE EVAC W/ROCKER SWITCH 0703-047-000

## (undated) DEVICE — Device

## (undated) DEVICE — DRSG WND NEGATIVE PRESSURE PREVENA 20CM PRE1055US.S

## (undated) DEVICE — CLIP SPRING FOGARTY SOFTJAW CSOFT6

## (undated) DEVICE — GLOVE GAMMEX NEOPRENE ULTRA SZ 7 LF 8514

## (undated) DEVICE — CLIP HORIZON MULTI MED BLUE 002204

## (undated) DEVICE — ESU ELEC BLADE 2.75" COATED/INSULATED E1455

## (undated) DEVICE — STPL SKIN 35W 6.9MM  PXW35

## (undated) DEVICE — PREP CHLORAPREP 26ML TINTED HI-LITE ORANGE 930815

## (undated) DEVICE — BLANKET BAIR ADLT PLYMR 60X36IN 63000

## (undated) DEVICE — RX SURGIFLO HEMOSTATIC MATRIX W/THROMBIN 8ML 2994

## (undated) DEVICE — CONNECTOR BLAKE DRAIN SGL BCC1

## (undated) DEVICE — CUSTOM PACK CORONARY SAN5BCRHEA

## (undated) DEVICE — CATH ANGIO INFINITI 3DRC 4FRX100CM 538476

## (undated) DEVICE — SU ETHIBOND 3-0 BBDA 36" X588H

## (undated) DEVICE — CABLE MYO/LEAD PACING WHITE DISP 019-530

## (undated) DEVICE — CATH ANGIO INFINITI JR4 4FRX100CM 538421

## (undated) DEVICE — VESSEL LOOP BLUE MINI 30-713

## (undated) DEVICE — SHEATH GLIDE RADIAL 4FR 25CM 0.021

## (undated) DEVICE — SUCTION DRY CHEST DRAIN OASIS 3600-100

## (undated) DEVICE — SUTURE STRATAFIX PDS 3-0 SH

## (undated) DEVICE — MANIFOLD KIT ANGIO AUTOMATED 014613

## (undated) DEVICE — LIGACLIP LARGE AESCULAP O4120-1

## (undated) DEVICE — LEAD PACER MYOCARDIAL BIPOLAR TEMPORARY 53CM 6495F

## (undated) DEVICE — KIT ENDO VASOVIEW HARVESTING SYSTEM VH-3200

## (undated) DEVICE — PITCHER STERILE 1000ML  SSK9004A

## (undated) DEVICE — DEVICE TISSUE STABILIZATION OCTOBASE 28707

## (undated) DEVICE — CATH DIAG 4FR AR 1 MOD 538441

## (undated) DEVICE — SHTH INTRO 0.021IN ID 6FR DIA

## (undated) DEVICE — SU PROLENE 7-0 BV175-6 24' M8737

## (undated) DEVICE — CUSTOM PACK CV ST JOES SCV5BCVHEA

## (undated) DEVICE — CATH DIAG 4FR JL 4.5 538417

## (undated) DEVICE — GOWN IMPERVIOUS BREATHABLE SMART LG 89015

## (undated) DEVICE — ESU GROUND PAD ADULT REM W/15' CORD E7507DB

## (undated) DEVICE — GLOVE LINER COTTON MED 32-2076

## (undated) DEVICE — SOL NACL 0.9% IRRIG 1000ML BOTTLE 2F7124

## (undated) DEVICE — SU MONOCRYL+ 4-0 18IN PS2 UND MCP496G

## (undated) DEVICE — CATH THORACIC STRAIGHT CLOTSTOP 32FR

## (undated) DEVICE — CATH ANGIO SUPERTORQUE MPA1 4FRX100CM 532430

## (undated) DEVICE — SU PROLENE 6-0 C-1DA 30" M8706

## (undated) DEVICE — CELL SAVER

## (undated) DEVICE — SUCTION CANISTER MEDIVAC LINER 3000ML W/LID 65651-530

## (undated) DEVICE — INTRO TERUMO 7FRX25CM W/MARKER RSB703

## (undated) DEVICE — INTRO GLIDESHEATH SLENDER 6FR 10X45CM 60-1060

## (undated) DEVICE — RX VISTASEAL FIBRIN SEALANT W/THROMBIN 10ML VST10

## (undated) DEVICE — SUTURE VICRYL+ 2-0 27IN CT-1 UND VCP259H

## (undated) DEVICE — SURGICEL POWDER ABSORBABLE HEMOSTAT 3GM 3013SP

## (undated) DEVICE — NDL BLUNT 18GA 1.5" W/O FILTER 305180

## (undated) DEVICE — SPONGE RAY-TEC 4X8" 7318

## (undated) DEVICE — CUSTOM PACK CAB SCV5BCBHEA

## (undated) DEVICE — CLIP HORIZON MULTI SM YELLOW 001204

## (undated) DEVICE — SPONGE KITNER DISSECTING 7102*

## (undated) DEVICE — PLATE GROUNDING ADULT W/CORD 9165L

## (undated) DEVICE — SYR ANGIOGRAPHY MULTIUSE KIT ACIST 014612

## (undated) DEVICE — INTRO MICRO MINI STICK 4FR STIFF NITINOL 45-753

## (undated) DEVICE — SYSTEM PANNUS RETENTION 4 PAD 2 STRAP CZ-PRS-04

## (undated) DEVICE — KIT HAND CONTROL ACIST 014644 AR-P54

## (undated) DEVICE — CATH PULM ART 7FR X 110CM, SWA

## (undated) DEVICE — ELECTRODE DEFIB CADENCE 22550R

## (undated) DEVICE — CABLE MYO/LEAD PACING BLUE DISP 019-535

## (undated) DEVICE — CATH ANGIO INFINITI AL1 4FRX100CM 538445

## (undated) RX ORDER — ONDANSETRON 2 MG/ML
INJECTION INTRAMUSCULAR; INTRAVENOUS
Status: DISPENSED
Start: 2023-12-01

## (undated) RX ORDER — PROPOFOL 10 MG/ML
INJECTION, EMULSION INTRAVENOUS
Status: DISPENSED
Start: 2023-12-01

## (undated) RX ORDER — NEOSTIGMINE METHYLSULFATE 1 MG/ML
VIAL (ML) INJECTION
Status: DISPENSED
Start: 2023-12-01

## (undated) RX ORDER — ATROPINE SULFATE 0.4 MG/ML
AMPUL (ML) INJECTION
Status: DISPENSED
Start: 2023-12-01

## (undated) RX ORDER — GLYCOPYRROLATE 0.2 MG/ML
INJECTION, SOLUTION INTRAMUSCULAR; INTRAVENOUS
Status: DISPENSED
Start: 2023-12-01

## (undated) RX ORDER — LIDOCAINE HYDROCHLORIDE 10 MG/ML
INJECTION, SOLUTION EPIDURAL; INFILTRATION; INTRACAUDAL; PERINEURAL
Status: DISPENSED
Start: 2023-12-01

## (undated) RX ORDER — EPHEDRINE SULFATE 50 MG/ML
INJECTION, SOLUTION INTRAMUSCULAR; INTRAVENOUS; SUBCUTANEOUS
Status: DISPENSED
Start: 2023-12-01

## (undated) RX ORDER — CEFAZOLIN SODIUM 1 G/3ML
INJECTION, POWDER, FOR SOLUTION INTRAMUSCULAR; INTRAVENOUS
Status: DISPENSED
Start: 2023-12-01

## (undated) RX ORDER — FENTANYL CITRATE 50 UG/ML
INJECTION, SOLUTION INTRAMUSCULAR; INTRAVENOUS
Status: DISPENSED
Start: 2023-12-01

## (undated) RX ORDER — ACETAMINOPHEN 325 MG/1
TABLET ORAL
Status: DISPENSED
Start: 2023-10-13

## (undated) RX ORDER — HEPARIN SODIUM 1000 [USP'U]/ML
INJECTION, SOLUTION INTRAVENOUS; SUBCUTANEOUS
Status: DISPENSED
Start: 2023-12-01

## (undated) RX ORDER — VANCOMYCIN HYDROCHLORIDE 1 G/20ML
INJECTION, POWDER, LYOPHILIZED, FOR SOLUTION INTRAVENOUS
Status: DISPENSED
Start: 2023-12-01

## (undated) RX ORDER — FENTANYL CITRATE 50 UG/ML
INJECTION, SOLUTION INTRAMUSCULAR; INTRAVENOUS
Status: DISPENSED
Start: 2023-10-13

## (undated) RX ORDER — FUROSEMIDE 10 MG/ML
INJECTION INTRAMUSCULAR; INTRAVENOUS
Status: DISPENSED
Start: 2023-10-13

## (undated) RX ORDER — HYDRALAZINE HYDROCHLORIDE 20 MG/ML
INJECTION INTRAMUSCULAR; INTRAVENOUS
Status: DISPENSED
Start: 2023-10-13